# Patient Record
Sex: FEMALE | Race: WHITE
[De-identification: names, ages, dates, MRNs, and addresses within clinical notes are randomized per-mention and may not be internally consistent; named-entity substitution may affect disease eponyms.]

---

## 2016-11-25 NOTE — EDDOCDS
Nurse's Notes                                                                                     

[facility]                                                                         

Name: Mimi Raphael                                                                                 

Age: 66 yrs                                                                                       

Sex: Female                                                                                       

: 1950                                                                                   

MRN: [id number]                                                                                     

Arrival Date: 2016                                                                          

Time: 13:00                                                                                       

Account#: [id number]                                                                              

Bed 4                                                                                             

Private MD: [facility]                                                   

Diagnosis: Chronic obstructive pulmonary disease with (acute) exacerbation;Pleural effusion, not  

elsewhere classified;Hypotension                                                                

                                                                                                  

Presentation:                                                                                     

                                                                                             

13:34 Presenting complaint: Patient states: increasing SOB over last week. more SOB with any  bcj 

      activity. denies chest pain. denies swollen legs. has had diarrhea x 1 week. + cough no     

      sputum. Acuity level changed due to the patient meeting SIRS criteria. Adult Sepsis         

      Screening: Patient has a respiratory rate of greater than or equal to 22 (1 point). The     

      patient does not have new or worsening altered mentation. Systolic blood pressure is        

      less than or equal to 100 (1 point). Patient has a qSOFA Score of 2. Infection              

      Criteria- Patient has cough and/or SOB Sepsis Screen Results: Positive Sepsis Screen-       

      Pt has a qSOFA Score of 2 or more and at least one Infection Criteria. Patient made RAFI     

      Level 2. Suicide/Homicide risk assessment- the patient denies having any suicidal           

      and/or homicidal ideations.  Status: Patient is not a  or             

       dependent. Transition of care: patient was not received from another setting       

      of care.                                                                                    

13:34 Acuity: RAFI Level 3                                                                     [facility] 

13:34 Method Of Arrival: Ambulance                                                            [facility] 

13:34 Acuity: RAFI Level 2                                                                     [facility] 

                                                                                                  

Triage Assessment:                                                                                

13:46 General: Appears in no apparent distress, comfortable, Behavior is cooperative. Pain:   bcj 

      Denies pain. Respiratory: Onset: The symptoms/episode began/occurred yesterday.             

                                                                                                  

Historical:                                                                                       

- Allergies: SULFA (SULFONAMIDES) (Rash);                                                         

- Home Meds:                                                                                      

1. Pradaxa 150 mg oral cap 1 cap 2 times per day                                                

2. metoprolol tartrate 25 mg Oral tab 12.5 mg 2 times per day                                   

3. atorvastatin 40 mg oral tab 1 tab once daily                                                 

4. ProAir HFA 108mcg inhalation HFAA 2 puffs every 6 hours every 2 hours as needed for          

     SOB                                                                                          

5. Spiriva with HandiHaler 18 mcg Inhl CpDv 1 cap once daily                                    

6. Symbicort 160-4.5 mcg/actuation inhalation HFAA 2 puffs 2 times per day                      

7. Breo Ellipta 200-25 mcg/dose inhalation dsdv 1 puff once daily                               

8. potassium chloride 10 mEq Oral TbER 1 tab 2 times per day                                    

9. lisinopril 2.5 mg oral tab once daily                                                        

10. furosemide 80 mg oral tab 1 tab once daily                                                  

11. metformin 500 mg Oral tab 1 tab 2 times per day                                             

12. hydrochlorothiazide 12.5 mg Oral tab 1 tab once daily                                       

13. multivitamin Oral tab 1 tab daily                                                           

14. loratadine 10 mg Oral cap 10 mg daily as needed                                             

15. aspirin 81 mg Oral TbEC 1 tab once daily                                                    

16. docusate calcium 240 mg Oral cap 1 cap 2 times per day                                      

- PMHx: CAD; CHF; COPD; Diabetes - NIDDM: controlled; Hypercholesterolemia;                       

Hypertension; Myocardial infarction; Obesity; Sleep Apnea w/o CPAP;                             

- PSHx: Colon Resection; Coronary Stents; Hernia repair; hernia revision; tendon                  

repaired;                                                                                       

- Social history: Smoking status: Patient states former smoker of tobacco. No barriers            

to communication noted, The patient speaks fluent English.                                      

- Family history: Not pertinent.                                                                  

- : The pt / caregiver states he / she is on anticoagulants: Pradaxa (Dabigatran) Home            

medication list is obtained from the patient, patients' pharmacy. Ep[employer] import data.          

- Exposure Risk Screening:: None identified.                                                      

                                                                                                  

                                                                                                  

Screenin:14 Screening information is obtained from the patient. Fall risk: At risk due to gait      bcj 

      disturbance, The following interventions are performed due to a positive Fall Risk          

      Screen: Fall Risk is added to Special Handling on the patient Summary Screen. A Fall        

      Risk Bracelet was applied to the patient. Side Rails are placed in the up position. A       

      Call Woods is given with instruction to call for help when getting out of bed. Fall          

      Alert bracelet is placed on the patient. Assistance ADL's: requires no assistance with      

      activities of daily living. Abuse/DV Screen: The patient / caregiver reports he/she is:     

      not in a situation that causes fear, pain or injury. Nutritional screening: No deficits     

      noted. home support is adequate.                                                            

18:17 Advance Directives: Currently, there is no health care proxy. There is no active DNR    jmb 

      order. There is no living will. There is no Power of .                              

                                                                                                  

Assessment:                                                                                       

14:14 General: Appears in no apparent distress, comfortable, Behavior is cooperative. Pain:   bcj 

      Denies pain. Cardiovascular: Rhythm is sinus rhythm Chest pain is denied. Respiratory:      

      Airway is patent Respiratory effort is even, unlabored, Respiratory pattern is regular,     

      Breath sounds with rhonchi bilaterally. Breath sounds are diminished bilaterally. Derm:     

      Skin is pink, warm & dry.                                                                 

15:12 General: Appears in no apparent distress, Behavior is appropriate for age, cooperative, jmb 

      Patient laying on stretcher on left side. Patient denies discomfort. Patient reports        

      being able to breath better when she does not move. Patient blood pressure circulating      

      every 15 minutes per provider orders. Patient currently at CT. NO voiced complaints at      

      this time. . Neurological: Level of Consciousness is awake, alert, obeys commands,          

      Oriented to person, place, time, Speech is normal, Facial symmetry appears normal,          

      Facial symmetry: tongue is midline. Cardiovascular: Capillary refill < 3 seconds Heart      

      tones present Pulses are all present. Rhythm is sinus rhythm No ectopy. Respiratory:        

      Airway is patent Respiratory effort is even, unlabored, Respiratory pattern is regular,     

      symmetrical, Breath sounds are diminished bilaterally. GI: Abdomen is obese, Bowel          

      sounds present X 4 quads. Abd is soft and non tender X 4 quads. Derm: Skin is pink,         

      warm & dry. Musculoskeletal: Range of motion intact in all extremities.                   

15:38 General: Appears in no apparent distress, comfortable, Behavior is appropriate for age, jmb 

      cooperative, Patient laying on stretcher sitting up. NO voiced complaints at this time.     

      . Neurological: Level of Consciousness is awake, alert, obeys commands, Oriented to         

      person, place, time. Respiratory: Airway is patent Respiratory effort is even,              

      unlabored, Respiratory pattern is regular, symmetrical.                                     

15:46 General: Appears in no apparent distress, comfortable, Behavior is appropriate for age, jmb 

      cooperative, Patient sitting up on stretcher, continues with some shortness of breath.      

      Patient on 4 liters oxygen via nasal cannula. NO voiced complaints at this time. .          

      Neurological: Level of Consciousness is awake, alert, obeys commands, Oriented to           

      person, place, time. Respiratory: Airway is patent Respiratory effort is even,              

      unlabored, Respiratory pattern is regular, symmetrical.                                     

16:30 General: Appears in no apparent distress, comfortable, Behavior is appropriate for age, jmb 

      cooperative, Patient laying on stretcher, no voiced complaints at this time. .              

      Neurological: Level of Consciousness is awake, alert, obeys commands, Oriented to           

      person, place, time. Respiratory: Airway is patent Respiratory effort is even,              

      unlabored, Respiratory pattern is regular, symmetrical.                                     

17:30 General: Appears in no apparent distress, comfortable, Behavior is appropriate for age, jmb 

      cooperative. Neurological: Level of Consciousness is awake, alert, obeys commands,          

      Oriented to person, place, time. Respiratory: Airway is patent Respiratory effort is        

      even, unlabored, Respiratory pattern is regular, symmetrical.                               

18:02 General: SBAR faxed and tubed to PCU.                                                   jmb 

18:24 General: PAtient ready for transfer to PCU..                                            jmb 

                                                                                                  

Vital Signs:                                                                                      

13:24 BP 92 / 55; Pulse 85; Resp 24; Temp 98.1(O); Pulse Ox 90% on 2 lpm NC; Weight 148.32    dem1

      kg; Height 5 ft. 7 in. (170.18 cm); Pain 8/10;                                              

14:19  / 53 (auto/);                                                                    jmb 

14:19 Pulse 84 MON; Pulse Ox 97% ;                                                            jmb 

14:20 BP 80 / 39 RA Supine (man/lg);                                                          jrd 

15:08  / 56 (auto/);                                                                    jmb 

15:08 Pulse 94 MON; Pulse Ox 93% ;                                                            jmb 

15:38  / 68 (auto/);                                                                    jmb 

15:38 Pulse 96 MON; Pulse Ox 87% ;                                                            jmb 

15:50  / 69 (auto/);                                                                    jmb 

15:50 Pulse 92 MON; Pulse Ox 91% ;                                                            jmb 

15:53  / 56 (auto/);                                                                    jmb 

15:54 Pulse 92 MON; Pulse Ox 90% ;                                                            jmb 

16:08  / 83 (auto/);                                                                    jmb 

16:09 Pulse 94 MON; Pulse Ox 90% ;                                                            jmb 

16:38  / 57 (auto/);                                                                    jmb 

16:39 Pulse 90 MON; Pulse Ox 93% ;                                                            jmb 

16:53  / 61 (auto/);                                                                    jmb 

16:54 Pulse 92 MON; Pulse Ox 96% ;                                                            jmb 

17:08  / 59 (auto/);                                                                    jmb 

17:09 Pulse 94 MON; Pulse Ox 89% ;                                                            jmb 

17:38  / 58 (auto/);                                                                    jmb 

17:39 Pulse 92 MON; Pulse Ox 91% ;                                                            jmb 

18:17  / 60; Pulse 93; Resp 22; Temp 97.8(O); Pulse Ox 91% on 4 lpm NC; Pain 0/10;      jmb 

13:24 Body Mass Index 51.21 (148.32 kg, 170.18 cm)                                            [facility]

                                                                                                  

Vitals:                                                                                           

13:24 Log In Time N/A - ambulance arrival.                                                    dem1

14:14 Refer to monitor trend for complete vital signs trends.                                 [facility] 

                                                                                                  

ED Course:                                                                                        

13:01 Patient visited by Ivana Ace PCA.                                               rs6 

13:01 Dalton Fishman is Private Physician.                                                   rs6 

13:01 [facility] is Private Physician.                                rs6 

13:01 Patient moved to Waiting                                                                rs6 

13:01 Patient moved to 4                                                                      rs6 

13:03 Trent Livingston MD is Attending Physician.                                               br1 

13:10 Patient visited by Trent Livingston MD.                                                   br1 

13:19 EKG done. (by ED staff). Reviewed by Trent Livingston MD.                                   dem1

13:24 Patient visited by Kiki Bonilla.                                                     dem1

13:24 Cardiac monitor on. Pulse ox on. NIBP on.                                               dem1

13:25 Patient visited by Kiki Bonilla.                                                     dem1

13:36 Triage Initiated                                                                        bcj 

13:39 -Arterial Blood Gas Sent.                                                               js11

13:47 Patient visited by Mark Ta RN.                                                  bcj 

14:07 Chest, 2 View (pa\E\lat) Returned.                                                        EDMS

14:14 No apparent distress. Resting quietly. awaiting re-evaluation by ER physician.          bcj 

14:14 The patient / caregiver is instructed regarding the plan of care and ED course. Patient [facility] 

      has correct armband on for positive identification. Placed in gown. Bed in low              

      position. Call light in reach. Side rails up X2.                                            

14:14 Inserted saline lock: 20 gauge in left forearm. Labs drawn. (by ED staff). Sent per     [facility] 

      order to lab. Labs/Blood culture drawn. O2 via nasal cannula \T\ 3L/min.                      

14:16 Patient visited by Mark Ta, POLY.                                                  bcj 

14:16 Lactic Acid (Gray tube on ice) Sent.                                                    bcj 

14:16 -Blood Culture Sent.                                                                    bcj 

14:16 TROPONIN Sent.                                                                          bcj 

14:16 CARDIAC INJURY PROFILE Sent.                                                            bcj 

14:17 -Influenza A&B Rapid Antigen - Nose Sent.                                               bcj

15:02 Corinne Moore .                                                                 ys2 

15:10 Corinne Moore is Hospitalizing Provider.                                                     br1 

15:14 Patient visited by Tony Cardona RN.                                                    mark 

15:21 [facility] Payment Agreement was scanned into [employer] and attached to record.               hs2 

15:48 Patient visited by Tony Cardona RN.                                                    mark 

16:31 Patient visited by Tony Cardona RN.                                                    mark 

17:31 Patient visited by Tony Cardona RN.                                                    mark 

18:17 No procedures done that require assistance.                                             mark 

                                                                                                  

Administered Medications:                                                                         

13:30 Drug: Albuterol-Ipratropium 1 neb [ipratropium-albuterol 0.5 mg-3 mg(2.5 mg base)/3 mL  js11

      nebulization soln (1 neb)] Route: Nebulizer;                                                

13:50 Drug: Albuterol-Ipratropium 1 neb [ipratropium-albuterol 0.5 mg-3 mg(2.5 mg base)/3 mL  js11

      nebulization soln (1 neb)] Route: Nebulizer;                                                

14:01 Drug: Albuterol-Ipratropium 1 neb [ipratropium-albuterol 0.5 mg-3 mg(2.5 mg base)/3 mL  js11

      nebulization soln (1 neb)] Route: Nebulizer;                                                

14:16 Drug: Solu-MEDROL 125 mg [Solu-Medrol 500 mg intravenous solution (125 mg)] Route: IVP; bcj 

      Site: right forearm;                                                                        

14:55 Drug: NS 0.9% 500 ml [sodium chloride 0.9 % intravenous solution] Route: IV; Rate:      bcj 

      bolus; Site: right forearm;                                                                 

15:12 Drug: Ondansetron 4 mg [ondansetron HCl 2 mg/mL intravenous solution (2 mL)] Route:     jmb 

      IVP; Site: left forearm;                                                                    

15:38 Drug: cefTRIAXone 2 grams [ceftriaxone 1 gram solution for injection] Route: IVPB;      mark 

      Infused Over: 30 mins; Site: left forearm;                                                  

                                                                                                  

                                                                                                  

RT:                                                                                               

13:30 Initial Med Neb Given as ordered Patient was instructed and evaluated on procedure      js11

      Patient tolerated procedure well without adverse effect. O2 via nasal cannula \T\ 4L/min.     

      Respiratory: Breath sounds are diminished in left upper lobe and left lower lobe.           

13:40 ABG's drawn from left radial artery allens test done and positive pressure held for 5   js11

      minutes no bleeding noted specimen sent pt. tolerated well.                                 

13:50 Subsequent Med Neb Given as ordered Patient tolerated procedure well without adverse    js11

      effect. Respiratory: Breath sounds are diminished in left upper lobe and left lower         

      lobe.                                                                                       

14:02 Subsequent Med Neb Given as ordered Patient tolerated procedure well without adverse    js11

      effect. Respiratory: Breath sounds are diminished in left upper lobe and left lower         

      lobe.                                                                                       

                                                                                                  

Order Results:                                                                                    

Lab Order: B-Type Natiuretic Peptide; SPEC'M 16 14:08                                       

      Test: BRAIN NATRIURETIC PEPTIDE; Value: 427; Range: <100; Abnormal: Above high normal;      

      Units: PG/ML; Status: F                                                                     

Lab Order: Basic Metabolic Profile; SPEC'M 16 14:08                                         

      Test: GLUCOSE, FASTING; Value: 103; Range: ; Units: MG/DL; Status: F                  

      Test: BLOOD UREA NITROGEN; Value: 13; Range: 7-18; Units: MG/DL; Status: F                  

      Test: CREATININE FOR GFR; Value: 1.22; Range: 0.55-1.02; Abnormal: Above high normal;       

      Units: MG/DL; Status: F                                                                     

      Test: GLOMERULAR FILTRATION RATE; Value: 46.9; Range: >45; Status: F                        

      Test: SODIUM LEVEL; Value: 142; Range: 136-145; Units: MEQ/L; Status: F                     

      Test: POTASSIUM SERUM; Value: 3.4; Range: 3.5-5.1; Abnormal: Below low normal; Units:       

      MEQ/L; Status: F                                                                            

      Test: CHLORIDE LEVEL; Value: 106; Range: ; Units: MEQ/L; Status: F                    

      Test: CARBON DIOXIDE LEVEL; Value: 27; Range: 21-32; Units: MEQ/L; Status: F                

      Test: ANION GAP; Value: 9; Range: 8-16; Units: MEQ/L; Status: F                             

      Test: CALCIUM LEVEL; Value: 8.5; Range: 8.8-10.2; Abnormal: Below low normal; Units:        

      MG/DL; Status: F                                                                            

      Test Note: &nbsp;; Units are mL/min/1.73 m2 Chronic Kidney Disease Staging per NKF:       

      Stage I & II GFR >=60 Normal to Mildly Decreased Stage III GFR 30-59 Moderately           

      Decreased Stage IV GFR 15-29 Severely Decreased Stage V GFR <15 Very Little GFR Left        

      ESRD GFR <15 on RRT                                                                         

Lab Order: CBC with Diff; SPEC'M 16 14:08                                                   

      Test: WHITE BLOOD COUNT; Value: 9.3; Range: 4.0-10.0; Units: K/mm3; Status: F               

      Test: RED BLOOD COUNT; Value: 3.86; Range: 4.00-5.40; Abnormal: Below low normal;           

      Units: M/mm3; Status: F                                                                     

      Test: HEMOGLOBIN; Value: 9.6; Range: 12.0-16.0; Abnormal: Below low normal; Units:          

      g/dl; Status: F                                                                             

      Test: HEMATOCRIT; Value: 34.8; Range: 36.0-47.0; Abnormal: Below low normal; Units: %;      

      Status: F                                                                                   

      Test: MEAN CORPUSCULAR VOLUME; Value: 90.2; Range: 80.0-96.0; Units: fl; Status: F          

      Test: MEAN CORPUSCULAR HEMOGLOBIN; Value: 24.8; Range: 27.0-33.0; Abnormal: Below low       

      normal; Units: pg; Status: F                                                                

      Test: MEAN CORPUSCULAR HGB CONC; Value: 27.5; Range: 32.0-36.5; Abnormal: Below low         

      normal; Units: g/dl; Status: F                                                              

      Test: RED CELL DISTRIBUTION WIDTH; Value: 17.7; Range: 11.5-14.5; Abnormal: Above high      

      normal; Units: %; Status: F                                                                 

      Test: PLATELET COUNT, AUTOMATED; Value: 338; Range: 150-450; Units: k/mm3; Status: F        

      Test: NEUTROPHILS %; Value: 85.9; Range: 36.0-66.0; Abnormal: Above high normal; Units:     

      %; Status: F                                                                                

      Test: LYMPH %; Value: 7.4; Range: 24.0-44.0; Abnormal: Below low normal; Units: %;          

      Status: F                                                                                   

      Test: MONO %; Value: 4.7; Range: 0.0-5.0; Units: %; Status: F                               

      Test: EOS %; Value: 0.8; Range: 0.0-3.0; Units: %; Status: F                                

      Test: BASO %; Value: 0.2; Range: 0.0-1.0; Units: %; Status: F                               

      Test: LARGE UNSTAINED CELL %; Value: 1.0; Range: 0.0-4.0; Units: %; Status: F               

      Test: NEUTROPHILS #; Value: 8.0; Range: 1.8-7.7; Abnormal: Above high normal; Units:        

      K/mm3; Status: F                                                                            

      Test: LYMPH #; Value: 0.8; Range: 1.5-4.5; Abnormal: Below low normal; Units: K/mm3;        

      Status: F                                                                                   

      Test: MONO #; Value: 0.4; Range: 0.0-0.8; Units: K/mm3; Status: F                           

      Test: EOS #; Value: 0.1; Range: 0.0-0.50; Units: K/mm3; Status: F                           

      Test: BASO #; Value: 0.0; Range: 0.0-0.2; Units: K/mm3; Status: F                           

      Test: LARGE UNSTAINED CELL #; Value: 0.1; Range: 0.0-0.4; Units: K/mm3; Status: F           

Lab Order: -Influenza A&B Rapid Antigen - Nose; SPEC'M 16 14:08                           

      Test: INFLUENZA A RAPID SCR by ICA; Value: INFLUENZA A RESULTS NEGATIVE; Status: F          

      Test: INFLUENZA A RAPID SCR by ICA; Value: Comments:; Status: F                             

      Test: INFLUENZA B RAPID SCR by ICA; Value: INFLUENZA B RESULTS NEGATIVE; Status: F          

      Test Note: &nbsp;; The Influenza test is a direct rapid immunoassay for the qualitative   

      detection of Influenza viral antigen. Cell culture (Viral Culture) testing should be        

      considered to confirm NEGATIVE results and to assist in detecting other viruses that        

      can provide similar clinical symptoms. Please contact the lab within 24 hours               

      ([phone] if confirmatory testing is desired.                                              

Lab Order: -Arterial Blood Gas; SPEC'M 16 13:34                                             

      Test: ABG pH (ARTERIAL); Value: 7.339; Range: 7.350-7.450; Abnormal: Below low normal;      

      Units: UNITS; Status: F                                                                     

      Test: ABG PARTIAL PRESSURE CO2; Value: 45.3; Range: 35.0-45.0; Abnormal: Above high         

      normal; Units: mmHg; Status: F                                                              

      Test: ABG PARTIAL PRESSURE O2; Value: 84.4; Range: 75.0-100.0; Units: mmHg; Status: F       

      Test: ABG TOTAL CO2; Value: 25.2; Range: 23.0-31.0; Units: MEQ/L; Status: F                 

      Test: ABG HCO3; Value: 23.8; Range: 22.0-26.0; Units: MEQ/L; Status: F                      

      Test: ABG BASE EXCESS; Value: -2.0; Range: -2.0-2.0; Status: F                              

      Test: ABG STANDARD HCO3; Value: 22.8; Range: 22.0-26.0; Units: MEQ/L; Status: F             

      Test: ABG O2 SATURATION; Value: 96.0; Range: 95.0-99.0; Units: %; Status: F                 

      Test: ABG DEVICE; Value: NASAL MITCHELL; Status: F                                              

Lab Order: CARDIAC INJURY PROFILE; SPEC'M 16 14:08                                          

      Test: CPK CREATINE PHOSPHOKINASE; Value: 33; Range: ; Units: U/L; Status: F           

      Test: CK-MB VALUE MASS; Value: 1.2; Range: 0.0-3.6; Units: NG/ML; Status: F                 

      Test: MB/CK RELATIVE INDEX; Value: 3.63; Range: < OR =4; Status: F                          

      Test Note: &nbsp;; DIAGNOSIS CRITERIA MMB ng/ml Relative Index (RI) NON-AMI < or = 5      

      N/A GRAY ZONE > 5 < or = 4 AMI > 5 > 4                                                      

Lab Order: TROPONIN; SPEC'M 16 14:08                                                        

      Test: TROPONIN I; Value: 0.04; Range: < 0.10; Units: NG/ML; Status: F                       

      Test Note: &nbsp;; Troponin I Reference Interval for [employer] [place] LOCI: 99th             

      Percentile= 0.00-0.045 ng/ml Risk Stratification: <= 0.10 ng/ml Decreased Risk for          

      Adverse Clinical Events. 0.10-1.50 ng/ml Increased Risk for Adverse Clinical Events.        

      Evaluation of additional criterion and/or repeat testing in 2-6 hours is suggested to       

      rule out myocardial damage. >= 1.50 ng/ml Indicative of Myocardial Injury.                  

Lab Order: Lactic Acid (Gray tube on ice); SPEC'M 16 14:08                                  

      Test: LACTIC ACID LEVEL, LACTATE; Value: 4.1; Range: 0.4-2.0; Abnormal: Above upper         

      panic limits; Units: MMOL/L; Status: F                                                      

                                                                                                  

Radiology Order: Chest, 2 View (pa\E\lat)                                                         

      Test: Chest, 2 View (pa\E\lat)                                                              

      REASON FOR EXAMINATION: Shortness of Breath; Clinical: Shortness of breath.; ;              

      Technique: PA and lateral.; ; Comparison: 2016.; ; Findings:; Opacification of        

      the left mid to lower lung zone suggests chronic changes with; superimposed                 

      atelectasis/infiltrate and effusion. Medial right basilar; atelectasis cannot be as         

      excluded as well. Cardiomegaly appears similar to prior; examination although the           

      contour raises the possibility of underlying pericardial; effusion. No pneumothorax.        

      Skeletal structures demonstrate degenerative; changes.; ; Impression:; Chronic stable       

      changes.; Cannot exclude superimposed bilateral atelectasis/infiltrates (left greater       

      than; right) and moderate left pleural effusion.; Cardiac silhouette raises the             

      possibility of underlying pericardial effusion.; ; ; Signed by; Cameron Chang MD           

      2016 01:39 P;                                                                         

Outcome:                                                                                          

15:10 Decision to Hospitalize by Provider.                                                    br1 

18:17 Discharge Assessment: Patient awake, alert and oriented x 3. No cognitive and/or        jmb 

      functional deficits noted. Patient verbalized understanding of disposition                  

      instructions. Patient awake and alert. obeys commands, Oriented to person, place and        

      time. Patient verbalized understanding of disposition instructions. Patient has no          

      functional deficits. patient administered narcotics - no. The following High Risk           

      Discharge criteria are identified: None. Admitted to PCU accompanied by nurse,              

      accompanied by tech, via stretcher, with oxygen, on monitor, with chart. Condition:         

      stable. No special radiology studies were completed. Property sent home with patient.       

18:29 Patient left the ED.                                                                    mark 

                                                                                                  

Signatures:                                                                                       

Dispatcher [employer]                           EDMark Melgar, Trent Min RN, MD MD   br1                                                  

Bill [id number]                                                 

Kiki Bonilla Joshua, RN RN jmb Donoghue, Joseph, PCA                   PCA  jrd                                                  

Ivana Ace, PCA                   PCA  rs6                                                  

Corinne Moore                                     ys2                                                  

Roxann Peña, Reg                   Reg  hs2                                                  

                                                                                                  

**************************************************************************************************

MTDD

## 2016-11-25 NOTE — PHACANCOPD
PHARMACY VANCOMYCIN DOSING


Pt Demographics


Demographics


Patient Age:66 , Weight:158.900  , Gender: female


Adjusted Body Weight


Date: 11/25/16, Adjusted Body Weight: [100] Kg





Events Past 24 Hours


Events Past 24 Hours:  YES: Change in CrCl, 


   NO: Dialysis, Diuretic Therapy, Elevation in WBC, Fever, Other, Pending 

Diagnostics, Pending Procedures





Vancomycin


Vancomycin indication:  elevated lactic acid with severe hypotension


Vancomycin Target Ranges:  15-20 mcg/ml


Vancomycin Load Y/N:  No


Load Dose Date Time


Vancomycin Load Dose:         Date:         Time:


Vancomycin Dose


Date: 11/25/16. Current Vancomycin Dose: [1g IV q8h @21]


Intermittent Dosing?:  No





Labs


Labs











Item Value  Date Time


 


Creatinine 1.22 MG/DL H 11/25/16 1408


 


White Blood Count 9.3 K/mm3 11/25/16 1408








Micro





 Microbiology


11/25/16 Blood Culture, Received


           Pending


11/25/16 Influenza Virus Type A Antigen - Final, Complete


           


11/25/16 Influenza Virus Type B Antigen - Final, Complete


Creatinine Clearance


Date:11/25/16. Creatinine Clearance: [70 ml/min using adjusted BW].





Assessment and Plan


Maintaining Current Dose?:  Yes


Reason for dose change:  No Dose Change





Pharmacist Note


Pharmacist Note


Date: 11/25/16. Pharmacist note: pt has had increasing SOB with hypotension, 

also Hx of CHF and COPD. She does not have a MRSA or relevant past culture Hx. 

She was last on vancomycin at our facility in February 2016, based on that 

dosing I will continue 1g IV q8h without a load due to her current SCr. I will 

continue to monitor over the weekend and order a trough as needed








Mitchell,Randy Pharm.D. Nov 25, 2016 20:45

## 2016-11-25 NOTE — REP
Clinical:  Shortness of breath.

 

Technique:  PA and lateral.

 

Comparison:  04/05/2016.

 

Findings:

Opacification of the left mid to lower lung zone suggests chronic changes with

superimposed atelectasis/infiltrate and effusion.  Medial right basilar

atelectasis cannot be as excluded as well.  Cardiomegaly appears similar to prior

examination although the contour raises the possibility of underlying pericardial

effusion.  No pneumothorax.  Skeletal structures demonstrate degenerative

changes.

 

Impression:

Chronic stable changes.

Cannot exclude superimposed bilateral atelectasis/infiltrates (left greater than

right) and moderate left pleural effusion.

Cardiac silhouette raises the possibility of underlying pericardial effusion.

 

 

Signed by

Cameron Chang MD 11/25/2016 01:39 P

## 2016-11-25 NOTE — HPE
DATE OF ADMISSION:   11/25/2016

 

PRIMARY CARE PROVIDER:  Dr. Olivas in the [facility].

 

HISTORY OF PRESENT ILLNESS:   This patient is a 66-year-old female with a past

medical history significant for myocardial infarction, congestive heart failure

(CHF), chronic obstructive pulmonary disease (COPD), diabetes,

hypercholesterolemia, hypertension, who presented to [facility] on

11/25/2016 for worsening shortness of breath.  The patient stated that she has

increased shortness of breath, especially with any type of movement and symptoms

have been progressively worse, and the patient started to have increased oxygen

requirement.  At baseline, the patient is 3 liters nasal cannula for her chronic

COPD.  The patient denies any sputum production.  Denies any worsening cough.

Denies any wheezing.  The patient denies any lower extremity swelling.  Denies

any fever or chills.  Other associated symptoms include that the patient started

having increasing diarrhea for the past week.  At baseline, the patient has a

bowel movement once; however, for the past week, the patient started having two

to three bowel movements and those stools are almost watery with minimal formed

stool.  Last hospitalization was in June 2014.  Denies any recent sick contacts.

When the patient arrived in the emergency room, the patient was found to have a

blood pressure of 80/39 with a respiration rate of 24, oxygen saturation running

between 85 to 90 with 2 to 3 liters nasal cannula.  IV bolus was given, and the

patient's systolic blood pressure started to rise above 100.  The hospitalist

team was called for admission.

 

ALLERGIES:  SULFA (rash).

 

HOME MEDICATIONS:

- Ventolin two inhalation every 4 hours as needed for shortness of breath

- aspirin 81 mg by mouth daily

- atorvastatin 40 mg by mouth daily

- Pradaxa 150 mg by mouth twice a day

- Flonase two sprays nasally for nasal congestion

- Breo one puff inhalation daily

- Lasix 80 mg by mouth daily

- Lasix 40 mg by mouth at night

- Mucinex 600 mg by mouth twice a day

- Lisinopril 245 mg by mouth daily

- Loratadine 10 mg by mouth daily as needed for allergies

- metformin 500 mg by mouth twice a day

- metoprolol 25 mg by mouth twice a day

- multivitamin one tablet by mouth daily

- potassium chloride 10 mEq by mouth twice a day

- Spiriva inhalation daily

 

PAST MEDICAL HISTORY:

1.  COPD on 3 liters nasal cannula

2.  Diastolic congestive heart failure (CHF), grade 2.

3.  Obstructive sleep apnea.  In the process of getting repeated sleep study for

continuous positive airway pressure (CPAP) application.

4.  Coronary artery disease, status post two stents.

5.  Dyslipidemia.

6.  Severe hypertension.

7.  Morbid obesity.

8.  Ischemic bowel.

 

PAST SURGICAL HISTORY:

1.  Colon resection in 2010 for ischemic bowel.

2.  Hyatal hernia repair times two.

3.  Coronary artery stents times two.

4.  Right lumpectomy.

 

SOCIAL HISTORY:  The patient quit smoking Christmas 2015.  The patient used to

smoke one pack daily for approximately 48 years.  Denies alcohol use.  Last drink

was in 2005.  Denies any recreational drug use.

 

REVIEW OF SYSTEMS:

GENERAL:  No fever.  No chills.

HEENT:  No change in vision.  No auditory changes.

CARDIOVASCULAR:  No chest pain noted.  No palpitations.

RESPIRATORY:  Increased worsening shortness of breath exacerbated by any type of

movement.  Denies any wheezes.  Denies any sputum changes.  Denies any increase

of cough or frequency.

GASTROINTESTINAL:  Diarrhea for the past week, stool is mainly water with no

formed stool.  No nausea.  No vomiting.

MUSCULOSKELETAL:  No muscle pain or joint pain.

NEUROLOGIC:  No numbness or tingling.

 

OBJECTIVE:

VITAL SIGNS:  Blood pressure is 80/39, pulse is 85, respirations 24, temperature

is 98.1, pulse oximetry is 90% with 2 to 3 liters nasal cannula.  Body weight is

148 kg.  Body height is 170 cm.

GENERAL:  Fatigued.  Mild distress secondary to difficulty breathing.  Alert and

oriented times three.

HEENT:  Normocephalic, atraumatic.  Extraocular muscles intact.

CARDIOVASCULAR:  Positive S1, S2.  Regular rate.

LUNGS:  Decreased breath sounds bilaterally.  No wheezes appreciated.  Mild

crackles mainly on the left lower lobe.

ABDOMEN:  Morbidly obese, soft, nontender, nondistended.  Bowel sounds present.

 

EXTREMITIES:  Mild bilateral lower extremity edema.  No cyanosis.

NEUROLOGIC:  Sensation to fine touch grossly intact.  Muscle strength 5/5.

 

LABORATORY DATA:

WBC 9.3, hemoglobin is 9.6, hematocrit is 34.8, platelet count is 338.

 

Sodium is 142, potassium 3.4, chloride is 106, carbon dioxide 27, BUN 13,

creatinine 1.22, GFR is 46.9, fasting glucose 103, lactic acid 4.1, calcium is

8.5, total CK is 33, troponin I is 0.04, BNP is 427.

 

IMAGING STUDIES:

 

Chest x-ray shows chronic stable changes. Cannot exclude superimposed bilateral

atelectasis/infiltrate, left greater than right.  Moderate left pleural effusion.

SOFA score raises the probability of underlying pericardial effusion.

 

CT of the chest without contrast, preliminary results show moderate left pleural

effusion with basilar lingula atelectasis.  Pulmonary vascular congestion.

 

ASSESSMENT AND PLAN:

1.  Acute respiratory distress.  The patient will be admitted to the progressive

care unit (PCU) under inpatient status.  Currently, the patient presented with

acute Sequential Organ Failure Assessment (SOFA) score of 2.  The patient

presents with significant hypotension with tachypnea.  At this moment, we will

panculture the patient and empirically start vancomycin and Zosyn and adjust the

antibiotic regimen based on the culture results.  The patient does have a history

of congestive heart failure (CHF).  The patient has chronic pleural effusion at

baseline.  However, at this moment, the patient does have a lactic acid of 4.1.

IV fluid is warranted at the moment. Based on imaging studies and history, the

source of infection could be either respiratory system versus gastrointestinal

system.

 

2.  Chronic obstructive pulmonary disease (COPD).  Currently, the patient has

respiratory distress.  We are in the process of ruling out any infectious

processes.  The patient will have breathing treatment as needed, and at baseline

the patient is on 3 liters nasal cannula for her COPD.  Continue home breathing

therapy.

 

3.  Diastolic congestive heart failure (CHF) grade 2.  The patient does not have

worsening bilateral lower extremity swelling.  The patient does have pleural

effusions, left greater than right.  When compared to CT today and in 2015, there

is some worsening of the pleural effusion; however, currently, the patient has an

elevated lactic acid with severe hypotension with a systolic blood pressure of

80s.  We will start the patient on gentle hydration.  If the patient starts

having CHF exacerbation symptoms, we will slow down the fluid.

 

4.  Non-insulin-dependent diabetes.  We will hold metformin.  The patient will be

on sliding scale.

 

5.  Hypercholesterolemia.  The patient is on atorvastatin.

 

6.  Hypertension.  The patient has been taking metoprolol for her blood pressure

and also Lasix.  Lasix and metoprolol will be on hold with holding parameters.

 

7.  Obstructive sleep apnea.  The patient stated that she will have a repeated

sleep study in December to apply for her CPAP.

 

8.  History of myocardial infarction.  The patient is on aspirin, metoprolol and

Lisinopril at baseline.  At this moment, the blood pressure medication is on hold

due to hypotension.

 

9.  Deep vein thrombosis (DVT) prophylaxis.  The patient is taking Pradaxa.

## 2016-11-25 NOTE — EDDOCDS
Physician Documentation                                                                           

[facility]                                                                         

Name: Mimi Raphael                                                                                 

Age: 66 yrs                                                                                       

Sex: Female                                                                                       

: 1950                                                                                   

MRN: [id number]                                                                                     

Arrival Date: 2016                                                                          

Time: 13:00                                                                                       

Account#: [id number]                                                                              

Bed 4                                                                                             

Private MD: [facility]                                                   

Disposition:                                                                                      

16 15:10 Hospitalization ordered by Corinne Moore for Inpatient Admission. Preliminary           

diagnosis are Chronic obstructive pulmonary disease with (acute)                                

exacerbation, Pleural effusion, not elsewhere classified, Hypotension.                          

- Bed requested for  PCU.                                                                        

- Status is Inpatient Admission.                                                              jmb 

- Condition is Stable.                                                                            

- Problem is new.                                                                                 

- Symptoms are unchanged.                                                                         

                                                                                                  

                                                                                                  

                                                                                                  

Historical:                                                                                       

- Allergies: SULFA (SULFONAMIDES) (Rash);                                                         

- Home Meds:                                                                                      

1. Pradaxa 150 mg oral cap 1 cap 2 times per day                                                

2. metoprolol tartrate 25 mg Oral tab 12.5 mg 2 times per day                                   

3. atorvastatin 40 mg oral tab 1 tab once daily                                                 

4. ProAir HFA 108mcg inhalation HFAA 2 puffs every 6 hours every 2 hours as needed for          

     SOB                                                                                          

5. Spiriva with HandiHaler 18 mcg Inhl CpDv 1 cap once daily                                    

6. Symbicort 160-4.5 mcg/actuation inhalation HFAA 2 puffs 2 times per day                      

7. Breo Ellipta 200-25 mcg/dose inhalation dsdv 1 puff once daily                               

8. potassium chloride 10 mEq Oral TbER 1 tab 2 times per day                                    

9. lisinopril 2.5 mg oral tab once daily                                                        

10. furosemide 80 mg oral tab 1 tab once daily                                                  

11. metformin 500 mg Oral tab 1 tab 2 times per day                                             

12. hydrochlorothiazide 12.5 mg Oral tab 1 tab once daily                                       

13. multivitamin Oral tab 1 tab daily                                                           

14. loratadine 10 mg Oral cap 10 mg daily as needed                                             

15. aspirin 81 mg Oral TbEC 1 tab once daily                                                    

16. docusate calcium 240 mg Oral cap 1 cap 2 times per day                                      

- PMHx: CAD; CHF; COPD; Diabetes - NIDDM: controlled; Hypercholesterolemia;                       

Hypertension; Myocardial infarction; Obesity; Sleep Apnea w/o CPAP;                             

- PSHx: Colon Resection; Coronary Stents; Hernia repair; hernia revision; tendon                  

repaired;                                                                                       

- Social history: Smoking status: Patient states former smoker of tobacco. No barriers            

to communication noted, The patient speaks fluent English.                                      

- Family history: Not pertinent.                                                                  

- : The pt / caregiver states he / she is on anticoagulants: Pradaxa (Dabigatran) Home            

medication list is obtained from the patient, patients' pharmacy. [employer] import data.          

- Exposure Risk Screening:: None identified.                                                      

                                                                                                  

                                                                                                  

Vital Signs:                                                                                      

                                                                                             

13:24 BP 92 / 55; Pulse 85; Resp 24; Temp 98.1(O); Pulse Ox 90% on 2 lpm NC; Weight 148.32 kg dem1

      / 326.99 lbs; Height 5 ft. 7 in. (170.18 cm); Pain 8/10;                                    

14:19  / 53 (auto/);                                                                    jmb 

14:19 Pulse 84 MON; Pulse Ox 97% ;                                                            jmb 

14:20 BP 80 / 39 RA Supine (man/lg);                                                          jrd 

15:08  / 56 (auto/);                                                                    jmb 

15:08 Pulse 94 MON; Pulse Ox 93% ;                                                            jmb 

15:38  / 68 (auto/);                                                                    jmb 

15:38 Pulse 96 MON; Pulse Ox 87% ;                                                            jmb 

15:50  / 69 (auto/);                                                                    jmb 

15:50 Pulse 92 MON; Pulse Ox 91% ;                                                            jmb 

15:53  / 56 (auto/);                                                                    jmb 

15:54 Pulse 92 MON; Pulse Ox 90% ;                                                            jmb 

16:08  / 83 (auto/);                                                                    jmb 

16:09 Pulse 94 MON; Pulse Ox 90% ;                                                            jmb 

16:38  / 57 (auto/);                                                                    jmb 

16:39 Pulse 90 MON; Pulse Ox 93% ;                                                            jmb 

16:53  / 61 (auto/);                                                                    jmb 

16:54 Pulse 92 MON; Pulse Ox 96% ;                                                            jmb 

17:08  / 59 (auto/);                                                                    jmb 

17:09 Pulse 94 MON; Pulse Ox 89% ;                                                            jmb 

17:38  / 58 (auto/);                                                                    jmb 

17:39 Pulse 92 MON; Pulse Ox 91% ;                                                            jmb 

18:17  / 60; Pulse 93; Resp 22; Temp 97.8(O); Pulse Ox 91% on 4 lpm NC; Pain 0/10;      jmb 

13:24 Body Mass Index 51.21 (148.32 kg, 170.18 cm)                                            dem1

                                                                                                  

MDM:                                                                                              

13:10 Cardiac Monitor/Pulse Ox/q 30 min VS ordered.                                           br1 

13:10 IV Saline Lock ordered.                                                                 br1 

13:10 Oxygen at 4L/Min NC or Home dosage ordered.                                             br1 

13:10 Rhythm Strip to chart ordered.                                                          br1 

13:10 Undress patient appropriately for examination ordered.                                  br1 

13:12 B-Type Natiuretic Peptide Ordered.                                                      EDMS

13:12 Basic Metabolic Profile Ordered.                                                        EDMS

13:12 CBC with Diff Ordered.                                                                  EDMS

13:12 Chest, 2 View (pa\E\lat) Ordered.                                                         EDMS

13:12 ECG WITH READING ER PHYS+CARDIAG ordered.                                               EDMS

13:13 Albuterol-Ipratropium 1 neb Nebulizer every 20 minutes x3 ordered.                      br1 

13:13 Call Respiratory ordered.                                                               br1 

13:13 -Influenza A&B Rapid Antigen - Nose Ordered.                                            EDMS

13:14 Solu-MEDROL 125 mg IVP once ordered.                                                    br1 

13:14 -Arterial Blood Gas Ordered.                                                            EDMS

13:15 CARDIAC INJURY PROFILE Ordered.                                                         EDMS

13:15 TROPONIN Ordered.                                                                       EDMS

13:31 Misc. Nursing Order ordered.                                                            br1 

13:40 -Blood Culture (Adults Only), peripheral from different site, or from device/port/PICC  br1 

      etc. if present ordered.                                                                    

13:41 Lactic Acid (Gray tube on ice) Ordered.                                                 EDMS

13:42 -Blood Culture Ordered.                                                                 EDMS

14:15 -Arterial Blood Gas Reviewed.                                                           br1 

14:15 Chest, 2 View (pa\E\lat) Reviewed.                                                        br1

14:16 -Blood Culture (Adults Only), peripheral from different site, or from device/port/PICC  bcj 

      etc. if present complete.                                                                   

14:20 NS 0.9% 500 ml IV at bolus once ordered.                                                br1 

14:21 BED REQUEST+ADM ordered.                                                                EDMS

14:22 Ondansetron 4 mg IVP once ordered.                                                      br1 

14:23 Call Respiratory complete.                                                              rs6 

14:46 B-Type Natiuretic Peptide Reviewed.                                                     br1 

14:46 Basic Metabolic Profile Reviewed.                                                       br1 

14:46 CBC with Diff Reviewed.                                                                 br1 

14:46 -Influenza A&B Rapid Antigen - Nose Reviewed.                                           br1

14:46 CARDIAC INJURY PROFILE Reviewed.                                                        br1 

14:46 TROPONIN Reviewed.                                                                      br1 

14:59 Financial registration complete.                                                        hs2 

15:05 Lactic Acid (Gray tube on ice) Reviewed.                                                br1 

15:06 CT Chest Without Contrast Ordered.                                                      EDMS

15:06 Recheck B/P ordered.                                                                    br1 

15:06 cefTRIAXone 2 grams IVPB once over 30 mins; dilute in 50mL of NS or D5W ordered.        br1 

15:21 [facility] Payment Agreement was scanned into [employer] and attached to record.               hs2 

16:47 Admission / Observation Status ordered.                                                 EDMS

16:47 CONSISTENT CARBOHYDRATES ordered.                                                       EDMS

16:49 URINALYSIS Ordered.                                                                     EDMS

16:49 BLOOD CULTURES Ordered.                                                                 EDMS

16:49 BLOOD CULTURES Ordered.                                                                 EDMS

16:51 GASTROINTESTINAL (GI) PANEL Ordered.                                                    EDMS

17:10 SPUTUM CULTURE AND GRAM STAIN Ordered.                                                  EDMS

17:32 CARDIAC MARKER PANEL Ordered.                                                           EDMS

17:34 LACTIC ACID LEVEL, LACTATE Ordered.                                                     EDMS

                                                                                                  

Administered Medications:                                                                         

13:30 Drug: Albuterol-Ipratropium 1 neb [ipratropium-albuterol 0.5 mg-3 mg(2.5 mg base)/3 mL  js11

      nebulization soln (1 neb)] Route: Nebulizer;                                                

13:50 Drug: Albuterol-Ipratropium 1 neb [ipratropium-albuterol 0.5 mg-3 mg(2.5 mg base)/3 mL  js11

      nebulization soln (1 neb)] Route: Nebulizer;                                                

14:01 Drug: Albuterol-Ipratropium 1 neb [ipratropium-albuterol 0.5 mg-3 mg(2.5 mg base)/3 mL  js11

      nebulization soln (1 neb)] Route: Nebulizer;                                                

14:16 Drug: Solu-MEDROL 125 mg [Solu-Medrol 500 mg intravenous solution (125 mg)] Route: IVP; bcj 

      Site: right forearm;                                                                        

14:55 Drug: NS 0.9% 500 ml [sodium chloride 0.9 % intravenous solution] Route: IV; Rate:      bcj 

      bolus; Site: right forearm;                                                                 

15:12 Drug: Ondansetron 4 mg [ondansetron HCl 2 mg/mL intravenous solution (2 mL)] Route:     jmb 

      IVP; Site: left forearm;                                                                    

15:38 Drug: cefTRIAXone 2 grams [ceftriaxone 1 gram solution for injection] Route: IVPB;      jmb 

      Infused Over: 30 mins; Site: left forearm;                                                  

                                                                                                  

                                                                                                  

Signatures:                                                                                       

Dispatcher [employer]                           EDMS                                                 

Mark Ta RN RN bcj Roggie, Brian, MD MD   br1                                                  

Tony Cardona,RN                        RN   jmb                                                  

Leidy Nino, RN                    RN   sls2                                                 

Ivana Ace, PCA                   PCA  rs6                                                  

Roxann Peña, Reg                   Reg  hs2                                                  

Bill Jones                               js11                                                 

                                                                                                  

The chart was reviewed and I authenticate all verbal orders and agree with the evaluation and 
treatment provided.Corrections: (The following items were deleted from the chart)

13:15 13:12 CARDIAC INJURY PROFILE+LAB ordered. EDMS                                          EDMS

13:15 13:12 TROPONIN+LAB ordered. EDMS                                                        EDMS

16:51 16:49 GASTROINTESTINAL (GI) PANEL ordered. EDMS                                         EDMS

                                                                                                  

Attachments:                                                                                      

15:21 [facility] Payment Agreement                                                                hs2 

                                                                                                  

**************************************************************************************************

MTDD

## 2016-11-26 NOTE — ECGEPIP
Stationary ECG Study

                           [facility] - ED

                                       

                                       Test Date:    2016

Pat Name:     NGHIA HUI              Department:   

Patient ID:   [id number]                 Room:         -

Gender:       F                        Technician:   nuria

:          1950               Requested By: YAMIL VELASCO

Order Number: [id number]     Reading MD:   Sania Farrar

                                 Measurements

Intervals                              Axis          

Rate:         84                       P:            

ID:           0                        QRS:          87

QRSD:         94                       T:            26

QT:           383                                    

QTc:          455                                    

                           Interpretive Statements

ATRIAL FIBRILLATION

LOW QRS VOLTAGE IN PRECORDIAL LEADS

POSSIBLE INFERIOR MYOCARDIAL INFARCTION, PROBABLY OLD

ABNORMAL RHYTHM ECG

NSTTW ABNORMALITY

BASELINE ARTIFACT LIMITS INTERPRETATION

 

Electronically Signed On 2016 13:47:16 EST by Sania Farrar

## 2016-11-26 NOTE — IPNPDOC
Text Note


Date of Service


The patient was seen on 11/26/16 at 11:18.





NOTE:


Subjective:


Patient is a 66 year old female with a PMHx of CHF (ECHO 2/2016 with normal-

near normal EF, Grade 2 Diastolic dysfunction), COPD (on home O2 at 3 liters), 

DM2, DLP, HTN, atrial fibrillation (on anticoagulation), history of left 

pleural effusion (small to moderate) who presented to the ER with complaints of 

shortness of breath. She notes that she has been requiring more oxygen at home. 

She notes that she is coughing, but non-productive, no sputum. She denies fever 

or chills. She denies any leg swelling. She also noted that she has been 

experiencing some diarrhea, with 3-4 episodes daily in the last few days. She 

notes the diarrhea is loose, but no blood was noted. 





In the ER patient was found to be dyspneic with RR of 24, hypotensive with BP 

of 80/39. She recieve IV fluid bolus of normal saline and her blood pressure 

improved to SBP of 100s. She had an elevated lactic acid in the ER, so she has 

been continued with IV fluids and started on broad spectrum antibiotics (

Vancomycin and Zosyn). 





Patient has been seen and examined at the bedside. Currently she notes that her 

breathing has had some improvement. She denies production of sputum, fever or 

chills. She denies chest pain, palpitations or leg swelling. She notes that 

today her stool has been more formed. 





Objective:


Vitals (See below)


General: Sitting up in bed, no acute distress, AAOx3


HEENT: NC, AT


CVS: Irregularly irregular, +S1S2


Lungs: Poor respiratory effort, Decreased lung sounds at left lung base, No 

wheezing / rhonchi / rales appreciated


Abdomen: Soft, ND, NT, +BSx4


Extremities: +PPx4, No edema, no calf tenderness





Assessment and plan:


1. Sepsis - possibly 2/2 pneumonia (CAP), possible gastrointestinal etiology


- Symptomatic improvement in dyspnea, cough is still present


- Oxygen requirements have gone done; she has been returned back to her 

baseline oxygen requirement of 3 liters


- CT chest 11/25: Moderate left effusion with basilar and lingular atelectasis. 

Pulmonary vascular congestion. Ascites


- Lactic acid has been elevated initially, however normalized at this time


- Blood cultures 11/25 - currently negative; Influenza negative


- Will repeat lactic acid 


- will continue with reduced rate of IV fluid for now


- will attempt to get IR guided drainage of left pleural effusion and send 

fluid for analysis


- Will continue with broad spectrum antibiotics (Vancomycin and Zosyn) - Day #2





2. Normocytic anemia


- Hg baseline of 10-11


- Has received significant IV fluid hydration; possible component of dilution, 

however is also on anticoagulation for atrial fibrillation


- Will check Iron panel, B12, Folate and reticulocyte count


- Will repeat CBC at 12PM


- Urinalysis for hematuria


- Will transfuse if Hg < 8





3. COPD


- no evidence of COPD exacerbation


- physical exam does not reveal any wheezing


- will continue with inhaled therapy from home and duoneb PRN





4. Diastolic CHF, 


- ECHO 2/2015 - with normal / near-normal EF, Grade 2 diastolic dysfunction


- CT chest shows evidence of worsening effusion and vascular congestion


- if repeat lactic acid has normalized; will stop IV fluids 


- Currently does not appear to have fluid overload on physical exam





5. NIDDM2


- c/w insulin sliding scale


- will add Levemir if glucose remains uncontrolled





6. DLP


- c/w atorvastatin





7. HTN


- BP has been low in the ER and currently remains in the low 100s


- will hold hypertensive medications at this time, until blood pressure improves





8. SINTIA - not on CPAP


- Will go for repeat sleep study in December for CPAP





9. History of MI 


- c/w ASA, Atorvastatin


- Metoprolol dose has been decreased (with holding parameters) 


- Lisinopril have been held (re: Hypotension)





10. Atrial fibrillation


- has been on rate control as an outpatient with metoprolol 25mg PO BID


- Metoprolol dose has been continued at 12.5 mg PO BID (with holding parameters

) 


- full anticoagulation with Pradaxa





11. GI prophylaxis


- will start protonix 





12. DVT prophylaxis


- on full anticoagulation with pradaxa





VS,Fishbone, I+O


VS, Fishbone, I+O


 Laboratory Tests


11/25/16 14:08








Calcium Level 8.5 L, Total Creatine Kinase 33, Red Blood Count 3.86 L, Mean 

Corpuscular Volume 90.2, Mean Corpuscular Hemoglobin 24.8 L, Mean Corpuscular 

Hemoglobin Concent 27.5 L, Red Cell Distribution Width 17.7 H, Neutrophils (%) (

Auto) 85.9 H, Lymphocytes (%) (Auto) 7.4 L, Monocytes (%) (Auto) 4.7, 

Eosinophils (%) (Auto) 0.8, Basophils (%) (Auto) 0.2, Neutrophils # (Auto) 8.0 H

, Lymphocytes # (Auto) 0.8 L, Monocytes # (Auto) 0.4, Eosinophils # (Auto) 0.1, 

Basophils # (Auto) 0.0





11/26/16 05:51








Calcium Level 8.2 L, Total Creatine Kinase 31, Red Blood Count 3.32 L, Mean 

Corpuscular Volume 88.2, Mean Corpuscular Hemoglobin 25.4 L, Mean Corpuscular 

Hemoglobin Concent 28.8 L, Red Cell Distribution Width 17.7 H








 Vital Signs








  Date Time  Temp Pulse Resp B/P Pulse Ox O2 Delivery O2 Flow Rate FiO2


 


11/26/16 08:35  90  104/60    


 


11/26/16 08:00 97.7  20  96 Nasal Cannula 3.0 














 I&O- Last 24 Hours up to 6 AM 


 


 11/26/16





 06:00


 


Intake Total 600 ml


 


Output Total 375 ml


 


Balance 225 ml














LATOYA WREN MD Nov 26, 2016 11:52

## 2016-11-27 NOTE — REP
Clinical:  Follow up effusion.

 

Comparison:  11/25/2016.

 

Findings:  Examination is limited by technique and underpenetration.

Cardiomegaly remains stable.  Perihilar and bibasilar opacities (left greater

than right) are identified along with suspected moderate left pleural effusion.

No pneumothorax.

 

Impression:

Cardiomegaly.

Moderate left pleural effusion.

Bibasilar infiltrate/atelectasis.

Findings appear relatively similar to prior examination although evaluation is

limited due to technique.

 

 

Signed by

Cameron Chang MD 11/27/2016 01:13 P

## 2016-11-27 NOTE — IPNPDOC
Text Note


Date of Service


The patient was seen on 11/27/16 at 11:10.





NOTE:


Subjective:


Patient is a 66 year old female with a PMHx of CHF (ECHO 2/2016 with normal-

near normal EF, Grade 2 Diastolic dysfunction), COPD (on home O2 at 3 liters), 

DM2, DLP, HTN, atrial fibrillation (on anticoagulation), history of left 

pleural effusion (small to moderate) who presented to the ER with complaints of 

shortness of breath. She notes that she has been requiring more oxygen at home. 

She notes that she is coughing, but non-productive, no sputum. She denies fever 

or chills. She denies any leg swelling. She also noted that she has been 

experiencing some diarrhea, with 3-4 episodes daily in the last few days. She 

notes the diarrhea is loose, but no blood was noted. 





In the ER patient was found to be dyspneic with RR of 24, hypotensive with BP 

of 80/39. She recieve IV fluid bolus of normal saline and her blood pressure 

improved to SBP of 100s. She had an elevated lactic acid in the ER, so she has 

been continued with IV fluids and started on broad spectrum antibiotics (

Vancomycin and Zosyn). 





Patient has been seen and examined at the bedside. She notes that she had 

difficulty sleeping overnight. She has required more oxygen. Reports 

improvement in chest pain, no sputum production, no chest pain / palpitations / 

leg edema. She notes that she has constipation at this point. 





Objective:


Vitals (See below)


General: Sitting up in bed, no acute distress, AAOx3


HEENT: NC, AT


CVS: Irregularly irregular, +S1S2


Lungs: Poor respiratory effort, Decreased lung sounds at left lung base, No 

wheezing / rhonchi / rales appreciated


Abdomen: Soft, ND, NT, +BSx4


Extremities: +PPx4, No edema, no calf tenderness





Assessment and plan:


1. Sepsis - possibly 2/2 pneumonia (CAP), possible gastrointestinal etiology


- Symptomatic improvement in dyspnea, cough is still present


- Oxygen requirements have gone down from admission; still elevated from 

baseline of 3L


- CT chest 11/25: Moderate left effusion with basilar and lingular atelectasis. 

Pulmonary vascular congestion. Ascites


- Lactic acid has normalized 


- Blood cultures 11/25 - currently negative; Influenza negative


- will discontinue IV fluids 


- will attempt to get IR guided drainage of left pleural effusion and send 

fluid for analysis on Monday - will re-evaluate need based on symptoms 


- Will continue with broad spectrum antibiotics (Vancomycin and Zosyn) - Day #3





2. Normocytic anemia


- Hg baseline of 10-11


- Has received significant IV fluid hydration; possible component of dilution, 

however is also on anticoagulation for atrial fibrillation


- Hg stable 


- Reticulocyte index of 0.9 - Hypoproliferative 


- Vitamin B12 and Foalte - pending 


- Iron panel shows iron deficiency anemia 


- UA negative for hematuria 


- Will transfuse if Hg < 8


- Will start Ferrous sulfate 325 BID





3. COPD


- no evidence of COPD exacerbation


- physical exam does not reveal any wheezing


- will continue with inhaled therapy from home and duoneb PRN





4. Diastolic CHF, 


- ECHO 2/2015 - with normal / near-normal EF, Grade 2 diastolic dysfunction


- CT chest shows evidence of worsening effusion and vascular congestion


- Currently does not appear to have fluid overload on physical exam


- discontinued IV fluids 





5. NIDDM2


- c/w insulin sliding scale


- will add Levemir if glucose remains uncontrolled





6. DLP


- c/w atorvastatin





7. HTN


- BP has been low in the ER and currently remains in the low 100s


- will hold hypertensive medications at this time, until blood pressure improves





8. SINTIA - not on CPAP


- Will go for repeat sleep study in December for CPAP





9. History of MI 


- c/w ASA, Atorvastatin


- Metoprolol dose has been decreased (with holding parameters) 


- Lisinopril have been held (re: Hypotension)





10. Atrial fibrillation


- has been on rate control as an outpatient with metoprolol 25mg PO BID


- Metoprolol dose has been continued at 12.5 mg PO BID (with holding parameters

) 


- full anticoagulation with Pradaxa





11. GI prophylaxis


- c/w protonix 





12. DVT prophylaxis


- on full anticoagulation with pradaxa





VS,Fishbone, I+O


VS, Fishbone, I+O


 Laboratory Tests


11/26/16 12:08








Red Blood Count 3.33 L, Mean Corpuscular Volume 87.4, Mean Corpuscular 

Hemoglobin 25.1 L, Mean Corpuscular Hemoglobin Concent 28.7 L, Red Cell 

Distribution Width 17.7 H, Neutrophils (%) (Auto) 82.4 H, Lymphocytes (%) (Auto

) 6.8 L, Monocytes (%) (Auto) 8.3 H, Eosinophils (%) (Auto) 0.2, Basophils (%) (

Auto) 0.1, Neutrophils # (Auto) 7.2, Lymphocytes # (Auto) 0.8 L, Monocytes # (

Auto) 0.7, Eosinophils # (Auto) 0.0, Basophils # (Auto) 0.0





11/27/16 05:19








Red Blood Count 3.41 L, Mean Corpuscular Volume 88.6, Mean Corpuscular 

Hemoglobin 25.4 L, Mean Corpuscular Hemoglobin Concent 28.6 L, Red Cell 

Distribution Width 17.8 H, Calcium Level 8.2 L








 Vital Signs








  Date Time  Temp Pulse Resp B/P Pulse Ox O2 Delivery O2 Flow Rate FiO2


 


11/27/16 09:00  74  104/64    


 


11/27/16 08:00 96.3  24  90 Nasal Cannula 4.0 














 I&O- Last 24 Hours up to 6 AM 


 


 11/27/16





 06:00


 


Intake Total 3400 ml


 


Output Total 850 ml


 


Balance 2550 ml














LATOYA WREN MD Nov 27, 2016 11:16

## 2016-11-27 NOTE — EDDOCDS
Nurse's Notes                                                                                     

[facility]                                                                         

Name: Mimi Raphael                                                                                 

Age: 66 yrs                                                                                       

Sex: Female                                                                                       

: 1950                                                                                   

MRN: [id number]                                                                                     

Arrival Date: 2016                                                                          

Time: 13:00                                                                                       

Account#: [id number]                                                                              

Bed 4                                                                                             

Private MD: [facility]                                                   

Diagnosis: Chronic obstructive pulmonary disease with (acute) exacerbation;Pleural effusion, not  

elsewhere classified;Hypotension                                                                

                                                                                                  

Presentation:                                                                                     

                                                                                             

13:34 Presenting complaint: Patient states: increasing SOB over last week. more SOB with any  bcj 

      activity. denies chest pain. denies swollen legs. has had diarrhea x 1 week. + cough no     

      sputum. Acuity level changed due to the patient meeting SIRS criteria. Adult Sepsis         

      Screening: Patient has a respiratory rate of greater than or equal to 22 (1 point). The     

      patient does not have new or worsening altered mentation. Systolic blood pressure is        

      less than or equal to 100 (1 point). Patient has a qSOFA Score of 2. Infection              

      Criteria- Patient has cough and/or SOB Sepsis Screen Results: Positive Sepsis Screen-       

      Pt has a qSOFA Score of 2 or more and at least one Infection Criteria. Patient made RAFI     

      Level 2. Suicide/Homicide risk assessment- the patient denies having any suicidal           

      and/or homicidal ideations.  Status: Patient is not a  or             

       dependent. Transition of care: patient was not received from another setting       

      of care.                                                                                    

13:34 Acuity: RAFI Level 3                                                                     [facility] 

13:34 Method Of Arrival: Ambulance                                                            [facility] 

13:34 Acuity: RAFI Level 2                                                                     [facility] 

                                                                                                  

Triage Assessment:                                                                                

13:46 General: Appears in no apparent distress, comfortable, Behavior is cooperative. Pain:   bcj 

      Denies pain. Respiratory: Onset: The symptoms/episode began/occurred yesterday.             

                                                                                                  

Historical:                                                                                       

- Allergies: SULFA (SULFONAMIDES) (Rash);                                                         

- Home Meds:                                                                                      

1. Pradaxa 150 mg oral cap 1 cap 2 times per day                                                

2. metoprolol tartrate 25 mg Oral tab 12.5 mg 2 times per day                                   

3. atorvastatin 40 mg oral tab 1 tab once daily                                                 

4. ProAir HFA 108mcg inhalation HFAA 2 puffs every 6 hours every 2 hours as needed for          

     SOB                                                                                          

5. Spiriva with HandiHaler 18 mcg Inhl CpDv 1 cap once daily                                    

6. Symbicort 160-4.5 mcg/actuation inhalation HFAA 2 puffs 2 times per day                      

7. Breo Ellipta 200-25 mcg/dose inhalation dsdv 1 puff once daily                               

8. potassium chloride 10 mEq Oral TbER 1 tab 2 times per day                                    

9. lisinopril 2.5 mg oral tab once daily                                                        

10. furosemide 80 mg oral tab 1 tab once daily                                                  

11. metformin 500 mg Oral tab 1 tab 2 times per day                                             

12. hydrochlorothiazide 12.5 mg Oral tab 1 tab once daily                                       

13. multivitamin Oral tab 1 tab daily                                                           

14. loratadine 10 mg Oral cap 10 mg daily as needed                                             

15. aspirin 81 mg Oral TbEC 1 tab once daily                                                    

16. docusate calcium 240 mg Oral cap 1 cap 2 times per day                                      

- PMHx: CAD; CHF; COPD; Diabetes - NIDDM: controlled; Hypercholesterolemia;                       

Hypertension; Myocardial infarction; Obesity; Sleep Apnea w/o CPAP;                             

- PSHx: Colon Resection; Coronary Stents; Hernia repair; hernia revision; tendon                  

repaired;                                                                                       

- Social history: Smoking status: Patient states former smoker of tobacco. No barriers            

to communication noted, The patient speaks fluent English.                                      

- Family history: Not pertinent.                                                                  

- : The pt / caregiver states he / she is on anticoagulants: Pradaxa (Dabigatran) Home            

medication list is obtained from the patient, patients' pharmacy. [employer] import data.          

- Exposure Risk Screening:: None identified.                                                      

                                                                                                  

                                                                                                  

Screenin:14 Screening information is obtained from the patient. Fall risk: At risk due to gait      bcj 

      disturbance, The following interventions are performed due to a positive Fall Risk          

      Screen: Fall Risk is added to Special Handling on the patient Summary Screen. A Fall        

      Risk Bracelet was applied to the patient. Side Rails are placed in the up position. A       

      Call Woods is given with instruction to call for help when getting out of bed. Fall          

      Alert bracelet is placed on the patient. Assistance ADL's: requires no assistance with      

      activities of daily living. Abuse/DV Screen: The patient / caregiver reports he/she is:     

      not in a situation that causes fear, pain or injury. Nutritional screening: No deficits     

      noted. home support is adequate.                                                            

18:17 Advance Directives: Currently, there is no health care proxy. There is no active DNR    jmb 

      order. There is no living will. There is no Power of .                              

                                                                                                  

Assessment:                                                                                       

14:14 General: Appears in no apparent distress, comfortable, Behavior is cooperative. Pain:   bcj 

      Denies pain. Cardiovascular: Rhythm is sinus rhythm Chest pain is denied. Respiratory:      

      Airway is patent Respiratory effort is even, unlabored, Respiratory pattern is regular,     

      Breath sounds with rhonchi bilaterally. Breath sounds are diminished bilaterally. Derm:     

      Skin is pink, warm & dry.                                                                 

15:12 General: Appears in no apparent distress, Behavior is appropriate for age, cooperative, jmb 

      Patient laying on stretcher on left side. Patient denies discomfort. Patient reports        

      being able to breath better when she does not move. Patient blood pressure circulating      

      every 15 minutes per provider orders. Patient currently at CT. NO voiced complaints at      

      this time. . Neurological: Level of Consciousness is awake, alert, obeys commands,          

      Oriented to person, place, time, Speech is normal, Facial symmetry appears normal,          

      Facial symmetry: tongue is midline. Cardiovascular: Capillary refill < 3 seconds Heart      

      tones present Pulses are all present. Rhythm is sinus rhythm No ectopy. Respiratory:        

      Airway is patent Respiratory effort is even, unlabored, Respiratory pattern is regular,     

      symmetrical, Breath sounds are diminished bilaterally. GI: Abdomen is obese, Bowel          

      sounds present X 4 quads. Abd is soft and non tender X 4 quads. Derm: Skin is pink,         

      warm & dry. Musculoskeletal: Range of motion intact in all extremities.                   

15:38 General: Appears in no apparent distress, comfortable, Behavior is appropriate for age, jmb 

      cooperative, Patient laying on stretcher sitting up. NO voiced complaints at this time.     

      . Neurological: Level of Consciousness is awake, alert, obeys commands, Oriented to         

      person, place, time. Respiratory: Airway is patent Respiratory effort is even,              

      unlabored, Respiratory pattern is regular, symmetrical.                                     

15:46 General: Appears in no apparent distress, comfortable, Behavior is appropriate for age, jmb 

      cooperative, Patient sitting up on stretcher, continues with some shortness of breath.      

      Patient on 4 liters oxygen via nasal cannula. NO voiced complaints at this time. .          

      Neurological: Level of Consciousness is awake, alert, obeys commands, Oriented to           

      person, place, time. Respiratory: Airway is patent Respiratory effort is even,              

      unlabored, Respiratory pattern is regular, symmetrical.                                     

16:30 General: Appears in no apparent distress, comfortable, Behavior is appropriate for age, jmb 

      cooperative, Patient laying on stretcher, no voiced complaints at this time. .              

      Neurological: Level of Consciousness is awake, alert, obeys commands, Oriented to           

      person, place, time. Respiratory: Airway is patent Respiratory effort is even,              

      unlabored, Respiratory pattern is regular, symmetrical.                                     

17:30 General: Appears in no apparent distress, comfortable, Behavior is appropriate for age, jmb 

      cooperative. Neurological: Level of Consciousness is awake, alert, obeys commands,          

      Oriented to person, place, time. Respiratory: Airway is patent Respiratory effort is        

      even, unlabored, Respiratory pattern is regular, symmetrical.                               

18:02 General: SBAR faxed and tubed to PCU.                                                   jmb 

18:24 General: PAtient ready for transfer to PCU..                                            jmb 

                                                                                                  

Vital Signs:                                                                                      

13:24 BP 92 / 55; Pulse 85; Resp 24; Temp 98.1(O); Pulse Ox 90% on 2 lpm NC; Weight 148.32    dem1

      kg; Height 5 ft. 7 in. (170.18 cm); Pain 8/10;                                              

14:19  / 53 (auto/);                                                                    jmb 

14:19 Pulse 84 MON; Pulse Ox 97% ;                                                            jmb 

14:20 BP 80 / 39 RA Supine (man/lg);                                                          jrd 

15:08  / 56 (auto/);                                                                    jmb 

15:08 Pulse 94 MON; Pulse Ox 93% ;                                                            jmb 

15:38  / 68 (auto/);                                                                    jmb 

15:38 Pulse 96 MON; Pulse Ox 87% ;                                                            jmb 

15:50  / 69 (auto/);                                                                    jmb 

15:50 Pulse 92 MON; Pulse Ox 91% ;                                                            jmb 

15:53  / 56 (auto/);                                                                    jmb 

15:54 Pulse 92 MON; Pulse Ox 90% ;                                                            jmb 

16:08  / 83 (auto/);                                                                    jmb 

16:09 Pulse 94 MON; Pulse Ox 90% ;                                                            jmb 

16:38  / 57 (auto/);                                                                    jmb 

16:39 Pulse 90 MON; Pulse Ox 93% ;                                                            jmb 

16:53  / 61 (auto/);                                                                    jmb 

16:54 Pulse 92 MON; Pulse Ox 96% ;                                                            jmb 

17:08  / 59 (auto/);                                                                    jmb 

17:09 Pulse 94 MON; Pulse Ox 89% ;                                                            jmb 

17:38  / 58 (auto/);                                                                    jmb 

17:39 Pulse 92 MON; Pulse Ox 91% ;                                                            jmb 

18:17  / 60; Pulse 93; Resp 22; Temp 97.8(O); Pulse Ox 91% on 4 lpm NC; Pain 0/10;      jmb 

13:24 Body Mass Index 51.21 (148.32 kg, 170.18 cm)                                            [facility]

                                                                                                  

Vitals:                                                                                           

13:24 Log In Time N/A - ambulance arrival.                                                    dem1

14:14 Refer to monitor trend for complete vital signs trends.                                 [facility] 

                                                                                                  

ED Course:                                                                                        

13:01 Patient visited by Ivana Ace PCA.                                               rs6 

13:01 Dalton Fishman is Private Physician.                                                   rs6 

13:01 [facility] is Private Physician.                                rs6 

13:01 Patient moved to Waiting                                                                rs6 

13:01 Patient moved to 4                                                                      rs6 

13:03 Trent Livingston MD is Attending Physician.                                               br1 

13:10 Patient visited by Trent Livingston MD.                                                   br1 

13:19 EKG done. (by ED staff). Reviewed by Trent Livingston MD.                                   dem1

13:24 Patient visited by Kiki Bonilla.                                                     dem1

13:24 Cardiac monitor on. Pulse ox on. NIBP on.                                               dem1

13:25 Patient visited by Kiki Bonilla.                                                     dem1

13:36 Triage Initiated                                                                        bcj 

13:39 -Arterial Blood Gas Sent.                                                               js11

13:47 Patient visited by Mark Ta RN.                                                  bcj 

14:07 Chest, 2 View (pa\E\lat) Returned.                                                        EDMS

14:14 No apparent distress. Resting quietly. awaiting re-evaluation by ER physician.          bcj 

14:14 The patient / caregiver is instructed regarding the plan of care and ED course. Patient [facility] 

      has correct armband on for positive identification. Placed in gown. Bed in low              

      position. Call light in reach. Side rails up X2.                                            

14:14 Inserted saline lock: 20 gauge in left forearm. Labs drawn. (by ED staff). Sent per     [facility] 

      order to lab. Labs/Blood culture drawn. O2 via nasal cannula \T\ 3L/min.                      

14:16 Patient visited by Mark Ta, POLY.                                                  bcj 

14:16 Lactic Acid (Gray tube on ice) Sent.                                                    bcj 

14:16 -Blood Culture Sent.                                                                    bcj 

14:16 TROPONIN Sent.                                                                          bcj 

14:16 CARDIAC INJURY PROFILE Sent.                                                            bcj 

14:17 -Influenza A&B Rapid Antigen - Nose Sent.                                               bcj

15:02 Corinne Moore .                                                                 ys2 

15:10 Corinne Moore is Hospitalizing Provider.                                                     br1 

15:14 Patient visited by Tony Cardona RN.                                                    mark 

15:21 [facility] Payment Agreement was scanned into [employer] and attached to record.               hs2 

15:48 Patient visited by Tony Cardona RN.                                                    amrckb 

16:31 Patient visited by Tony Cardona RN.                                                    jmb 

17:31 Patient visited by Tony Cardona RN.                                                    marckb 

18:17 No procedures done that require assistance.                                             mark 

21:24 T-Sheet-- Draft Copy was scanned into [employer] and attached to record.                   klr 

                                                                                                  

Administered Medications:                                                                         

13:30 Drug: Albuterol-Ipratropium 1 neb [ipratropium-albuterol 0.5 mg-3 mg(2.5 mg base)/3 mL  js11

      nebulization soln (1 neb)] Route: Nebulizer;                                                

13:50 Drug: Albuterol-Ipratropium 1 neb [ipratropium-albuterol 0.5 mg-3 mg(2.5 mg base)/3 mL  js11

      nebulization soln (1 neb)] Route: Nebulizer;                                                

14:01 Drug: Albuterol-Ipratropium 1 neb [ipratropium-albuterol 0.5 mg-3 mg(2.5 mg base)/3 mL  js11

      nebulization soln (1 neb)] Route: Nebulizer;                                                

14:16 Drug: Solu-MEDROL 125 mg [Solu-Medrol 500 mg intravenous solution (125 mg)] Route: IVP; bcj 

      Site: right forearm;                                                                        

14:55 Drug: NS 0.9% 500 ml [sodium chloride 0.9 % intravenous solution] Route: IV; Rate:      bcj 

      bolus; Site: right forearm;                                                                 

15:12 Drug: Ondansetron 4 mg [ondansetron HCl 2 mg/mL intravenous solution (2 mL)] Route:     jmb 

      IVP; Site: left forearm;                                                                    

15:38 Drug: cefTRIAXone 2 grams [ceftriaxone 1 gram solution for injection] Route: IVPB;      mark 

      Infused Over: 30 mins; Site: left forearm;                                                  

                                                                                                  

                                                                                                  

RT:                                                                                               

13:30 Initial Med Neb Given as ordered Patient was instructed and evaluated on procedure      js11

      Patient tolerated procedure well without adverse effect. O2 via nasal cannula \T\ 4L/min.     

      Respiratory: Breath sounds are diminished in left upper lobe and left lower lobe.           

13:40 ABG's drawn from left radial artery allens test done and positive pressure held for 5   js11

      minutes no bleeding noted specimen sent pt. tolerated well.                                 

13:50 Subsequent Med Neb Given as ordered Patient tolerated procedure well without adverse    js11

      effect. Respiratory: Breath sounds are diminished in left upper lobe and left lower         

      lobe.                                                                                       

14:02 Subsequent Med Neb Given as ordered Patient tolerated procedure well without adverse    js11

      effect. Respiratory: Breath sounds are diminished in left upper lobe and left lower         

      lobe.                                                                                       

                                                                                                  

Order Results:                                                                                    

Lab Order: B-Type Natiuretic Peptide; SPEC'M 16 14:08                                       

      Test: BRAIN NATRIURETIC PEPTIDE; Value: 427; Range: <100; Abnormal: Above high normal;      

      Units: PG/ML; Status: F                                                                     

Lab Order: Basic Metabolic Profile; SPEC'M 16 14:08                                         

      Test: GLUCOSE, FASTING; Value: 103; Range: ; Units: MG/DL; Status: F                  

      Test: BLOOD UREA NITROGEN; Value: 13; Range: 7-18; Units: MG/DL; Status: F                  

      Test: CREATININE FOR GFR; Value: 1.22; Range: 0.55-1.02; Abnormal: Above high normal;       

      Units: MG/DL; Status: F                                                                     

      Test: GLOMERULAR FILTRATION RATE; Value: 46.9; Range: >45; Status: F                        

      Test: SODIUM LEVEL; Value: 142; Range: 136-145; Units: MEQ/L; Status: F                     

      Test: POTASSIUM SERUM; Value: 3.4; Range: 3.5-5.1; Abnormal: Below low normal; Units:       

      MEQ/L; Status: F                                                                            

      Test: CHLORIDE LEVEL; Value: 106; Range: ; Units: MEQ/L; Status: F                    

      Test: CARBON DIOXIDE LEVEL; Value: 27; Range: 21-32; Units: MEQ/L; Status: F                

      Test: ANION GAP; Value: 9; Range: 8-16; Units: MEQ/L; Status: F                             

      Test: CALCIUM LEVEL; Value: 8.5; Range: 8.8-10.2; Abnormal: Below low normal; Units:        

      MG/DL; Status: F                                                                            

      Test Note: &nbsp;; Units are mL/min/1.73 m2 Chronic Kidney Disease Staging per NKF:       

      Stage I & II GFR >=60 Normal to Mildly Decreased Stage III GFR 30-59 Moderately           

      Decreased Stage IV GFR 15-29 Severely Decreased Stage V GFR <15 Very Little GFR Left        

      ESRD GFR <15 on RRT                                                                         

Lab Order: CBC with Diff; SPEC'M 16 14:08                                                   

      Test: WHITE BLOOD COUNT; Value: 9.3; Range: 4.0-10.0; Units: K/mm3; Status: F               

      Test: RED BLOOD COUNT; Value: 3.86; Range: 4.00-5.40; Abnormal: Below low normal;           

      Units: M/mm3; Status: F                                                                     

      Test: HEMOGLOBIN; Value: 9.6; Range: 12.0-16.0; Abnormal: Below low normal; Units:          

      g/dl; Status: F                                                                             

      Test: HEMATOCRIT; Value: 34.8; Range: 36.0-47.0; Abnormal: Below low normal; Units: %;      

      Status: F                                                                                   

      Test: MEAN CORPUSCULAR VOLUME; Value: 90.2; Range: 80.0-96.0; Units: fl; Status: F          

      Test: MEAN CORPUSCULAR HEMOGLOBIN; Value: 24.8; Range: 27.0-33.0; Abnormal: Below low       

      normal; Units: pg; Status: F                                                                

      Test: MEAN CORPUSCULAR HGB CONC; Value: 27.5; Range: 32.0-36.5; Abnormal: Below low         

      normal; Units: g/dl; Status: F                                                              

      Test: RED CELL DISTRIBUTION WIDTH; Value: 17.7; Range: 11.5-14.5; Abnormal: Above high      

      normal; Units: %; Status: F                                                                 

      Test: PLATELET COUNT, AUTOMATED; Value: 338; Range: 150-450; Units: k/mm3; Status: F        

      Test: NEUTROPHILS %; Value: 85.9; Range: 36.0-66.0; Abnormal: Above high normal; Units:     

      %; Status: F                                                                                

      Test: LYMPH %; Value: 7.4; Range: 24.0-44.0; Abnormal: Below low normal; Units: %;          

      Status: F                                                                                   

      Test: MONO %; Value: 4.7; Range: 0.0-5.0; Units: %; Status: F                               

      Test: EOS %; Value: 0.8; Range: 0.0-3.0; Units: %; Status: F                                

      Test: BASO %; Value: 0.2; Range: 0.0-1.0; Units: %; Status: F                               

      Test: LARGE UNSTAINED CELL %; Value: 1.0; Range: 0.0-4.0; Units: %; Status: F               

      Test: NEUTROPHILS #; Value: 8.0; Range: 1.8-7.7; Abnormal: Above high normal; Units:        

      K/mm3; Status: F                                                                            

      Test: LYMPH #; Value: 0.8; Range: 1.5-4.5; Abnormal: Below low normal; Units: K/mm3;        

      Status: F                                                                                   

      Test: MONO #; Value: 0.4; Range: 0.0-0.8; Units: K/mm3; Status: F                           

      Test: EOS #; Value: 0.1; Range: 0.0-0.50; Units: K/mm3; Status: F                           

      Test: BASO #; Value: 0.0; Range: 0.0-0.2; Units: K/mm3; Status: F                           

      Test: LARGE UNSTAINED CELL #; Value: 0.1; Range: 0.0-0.4; Units: K/mm3; Status: F           

Lab Order: -Influenza A&B Rapid Antigen - Nose; SPEC'M 16 14:08                           

      Test: INFLUENZA A RAPID SCR by ICA; Value: INFLUENZA A RESULTS NEGATIVE; Status: F          

      Test: INFLUENZA A RAPID SCR by ICA; Value: Comments:; Status: F                             

      Test: INFLUENZA B RAPID SCR by ICA; Value: INFLUENZA B RESULTS NEGATIVE; Status: F          

      Test Note: &nbsp;; The Influenza test is a direct rapid immunoassay for the qualitative   

      detection of Influenza viral antigen. Cell culture (Viral Culture) testing should be        

      considered to confirm NEGATIVE results and to assist in detecting other viruses that        

      can provide similar clinical symptoms. Please contact the lab within 24 [phone]               

      [phone] if confirmatory testing is desired.                                              

Lab Order: -Arterial Blood Gas; SPEC'M 16 13:34                                             

      Test: ABG pH (ARTERIAL); Value: 7.339; Range: 7.350-7.450; Abnormal: Below low normal;      

      Units: UNITS; Status: F                                                                     

      Test: ABG PARTIAL PRESSURE CO2; Value: 45.3; Range: 35.0-45.0; Abnormal: Above high         

      normal; Units: mmHg; Status: F                                                              

      Test: ABG PARTIAL PRESSURE O2; Value: 84.4; Range: 75.0-100.0; Units: mmHg; Status: F       

      Test: ABG TOTAL CO2; Value: 25.2; Range: 23.0-31.0; Units: MEQ/L; Status: F                 

      Test: ABG HCO3; Value: 23.8; Range: 22.0-26.0; Units: MEQ/L; Status: F                      

      Test: ABG BASE EXCESS; Value: -2.0; Range: -2.0-2.0; Status: F                              

      Test: ABG STANDARD HCO3; Value: 22.8; Range: 22.0-26.0; Units: MEQ/L; Status: F             

      Test: ABG O2 SATURATION; Value: 96.0; Range: 95.0-99.0; Units: %; Status: F                 

      Test: ABG DEVICE; Value: NASAL MITCHELL; Status: F                                              

Lab Order: CARDIAC INJURY PROFILE; SPEC' 16 14:08                                          

      Test: CPK CREATINE PHOSPHOKINASE; Value: 33; Range: ; Units: U/L; Status: F           

      Test: CK-MB VALUE MASS; Value: 1.2; Range: 0.0-3.6; Units: NG/ML; Status: F                 

      Test: MB/CK RELATIVE INDEX; Value: 3.63; Range: < OR =4; Status: F                          

      Test Note: &nbsp;; DIAGNOSIS CRITERIA MMB ng/ml Relative Index (RI) NON-AMI < or = 5      

      N/A GRAY ZONE > 5 < or = 4 AMI > 5 > 4                                                      

Lab Order: TROPONIN; SPEC'M 16 14:08                                                        

      Test: TROPONIN I; Value: 0.04; Range: < 0.10; Units: NG/ML; Status: F                       

      Test Note: &nbsp;; Troponin I Reference Interval for [employer] LOCI: 99th             

      Percentile= 0.00-0.045 ng/ml Risk Stratification: <= 0.10 ng/ml Decreased Risk for          

      Adverse Clinical Events. 0.10-1.50 ng/ml Increased Risk for Adverse Clinical Events.        

      Evaluation of additional criterion and/or repeat testing in 2-6 hours is suggested to       

      rule out myocardial damage. >= 1.50 ng/ml Indicative of Myocardial Injury.                  

Lab Order: Lactic Acid (Gray tube on ice); SPEC'M 16 14:08                                  

      Test: LACTIC ACID LEVEL, LACTATE; Value: 4.1; Range: 0.4-2.0; Abnormal: Above upper         

      panic limits; Units: MMOL/L; Status: F                                                      

                                                                                                  

Radiology Order: Chest, 2 View (pa\E\lat)                                                         

      Test: Chest, 2 View (pa\E\lat)                                                              

      REASON FOR EXAMINATION: Shortness of Breath; Clinical: Shortness of breath.; ;              

      Technique: PA and lateral.; ; Comparison: 2016.; ; Findings:; Opacification of        

      the left mid to lower lung zone suggests chronic changes with; superimposed                 

      atelectasis/infiltrate and effusion. Medial right basilar; atelectasis cannot be as         

      excluded as well. Cardiomegaly appears similar to prior; examination although the           

      contour raises the possibility of underlying pericardial; effusion. No pneumothorax.        

      Skeletal structures demonstrate degenerative; changes.; ; Impression:; Chronic stable       

      changes.; Cannot exclude superimposed bilateral atelectasis/infiltrates (left greater       

      than; right) and moderate left pleural effusion.; Cardiac silhouette raises the             

      possibility of underlying pericardial effusion.; ; ; Signed by; Cameron Chang MD           

      2016 01:39 P;                                                                         

Outcome:                                                                                          

15:10 Decision to Hospitalize by Provider.                                                    br1 

18:17 Discharge Assessment: Patient awake, alert and oriented x 3. No cognitive and/or        jmb 

      functional deficits noted. Patient verbalized understanding of disposition                  

      instructions. Patient awake and alert. obeys commands, Oriented to person, place and        

      time. Patient verbalized understanding of disposition instructions. Patient has no          

      functional deficits. patient administered narcotics - no. The following High Risk           

      Discharge criteria are identified: None. Admitted to PCU accompanied by nurse,              

      accompanied by tech, via stretcher, with oxygen, on monitor, with chart. Condition:         

      stable. No special radiology studies were completed. Property sent home with patient.       

18:29 Patient left the ED.                                                                    mark 

                                                                                                  

Signatures:                                                                                       

Dispatcher [employer]                           EDMark Melgar, Trent Min RN, MD MD   br1                                                  

Bill [id number]                                                 

Kiki Bonilla1                                                 

Tony Cardona RN RN jmb Donoghue, Joseph, PCA                   PCA  d                                                  

Ivana Ace, PCA                   PCA  rs6                                                  

Corinne Moore                                     ys2                                                  

Roxann Peña, Reg                   Reg  hs2                                                  

Jessie Berger                                                  

                                                                                                  

**************************************************************************************************



*** Chart Complete ***
MTDD

## 2016-11-27 NOTE — EDDOCDS
Physician Documentation                                                                           

[facility]                                                                         

Name: Mimi Raphael                                                                                 

Age: 66 yrs                                                                                       

Sex: Female                                                                                       

: 1950                                                                                   

MRN: [id number]                                                                                     

Arrival Date: 2016                                                                          

Time: 13:00                                                                                       

Account#: [id number]                                                                              

Bed 4                                                                                             

Private MD: [facility]                                                   

Disposition:                                                                                      

16 15:10 Hospitalization ordered by Corinne Moore for Inpatient Admission. Preliminary           

diagnosis are Chronic obstructive pulmonary disease with (acute)                                

exacerbation, Pleural effusion, not elsewhere classified, Hypotension.                          

- Bed requested for  PCU.                                                                        

- Status is Inpatient Admission.                                                              jmb 

- Condition is Stable.                                                                            

- Problem is new.                                                                                 

- Symptoms are unchanged.                                                                         

                                                                                                  

                                                                                                  

                                                                                                  

Historical:                                                                                       

- Allergies: SULFA (SULFONAMIDES) (Rash);                                                         

- Home Meds:                                                                                      

1. Pradaxa 150 mg oral cap 1 cap 2 times per day                                                

2. metoprolol tartrate 25 mg Oral tab 12.5 mg 2 times per day                                   

3. atorvastatin 40 mg oral tab 1 tab once daily                                                 

4. ProAir HFA 108mcg inhalation HFAA 2 puffs every 6 hours every 2 hours as needed for          

     SOB                                                                                          

5. Spiriva with HandiHaler 18 mcg Inhl CpDv 1 cap once daily                                    

6. Symbicort 160-4.5 mcg/actuation inhalation HFAA 2 puffs 2 times per day                      

7. Breo Ellipta 200-25 mcg/dose inhalation dsdv 1 puff once daily                               

8. potassium chloride 10 mEq Oral TbER 1 tab 2 times per day                                    

9. lisinopril 2.5 mg oral tab once daily                                                        

10. furosemide 80 mg oral tab 1 tab once daily                                                  

11. metformin 500 mg Oral tab 1 tab 2 times per day                                             

12. hydrochlorothiazide 12.5 mg Oral tab 1 tab once daily                                       

13. multivitamin Oral tab 1 tab daily                                                           

14. loratadine 10 mg Oral cap 10 mg daily as needed                                             

15. aspirin 81 mg Oral TbEC 1 tab once daily                                                    

16. docusate calcium 240 mg Oral cap 1 cap 2 times per day                                      

- PMHx: CAD; CHF; COPD; Diabetes - NIDDM: controlled; Hypercholesterolemia;                       

Hypertension; Myocardial infarction; Obesity; Sleep Apnea w/o CPAP;                             

- PSHx: Colon Resection; Coronary Stents; Hernia repair; hernia revision; tendon                  

repaired;                                                                                       

- Social history: Smoking status: Patient states former smoker of tobacco. No barriers            

to communication noted, The patient speaks fluent English.                                      

- Family history: Not pertinent.                                                                  

- : The pt / caregiver states he / she is on anticoagulants: Pradaxa (Dabigatran) Home            

medication list is obtained from the patient, patients' pharmacy. [employer] import data.          

- Exposure Risk Screening:: None identified.                                                      

                                                                                                  

                                                                                                  

Vital Signs:                                                                                      

                                                                                             

13:24 BP 92 / 55; Pulse 85; Resp 24; Temp 98.1(O); Pulse Ox 90% on 2 lpm NC; Weight 148.32 kg dem1

      / 326.99 lbs; Height 5 ft. 7 in. (170.18 cm); Pain 8/10;                                    

14:19  / 53 (auto/);                                                                    jmb 

14:19 Pulse 84 MON; Pulse Ox 97% ;                                                            jmb 

14:20 BP 80 / 39 RA Supine (man/lg);                                                          jrd 

15:08  / 56 (auto/);                                                                    jmb 

15:08 Pulse 94 MON; Pulse Ox 93% ;                                                            jmb 

15:38  / 68 (auto/);                                                                    jmb 

15:38 Pulse 96 MON; Pulse Ox 87% ;                                                            jmb 

15:50  / 69 (auto/);                                                                    jmb 

15:50 Pulse 92 MON; Pulse Ox 91% ;                                                            jmb 

15:53  / 56 (auto/);                                                                    jmb 

15:54 Pulse 92 MON; Pulse Ox 90% ;                                                            jmb 

16:08  / 83 (auto/);                                                                    jmb 

16:09 Pulse 94 MON; Pulse Ox 90% ;                                                            jmb 

16:38  / 57 (auto/);                                                                    jmb 

16:39 Pulse 90 MON; Pulse Ox 93% ;                                                            jmb 

16:53  / 61 (auto/);                                                                    jmb 

16:54 Pulse 92 MON; Pulse Ox 96% ;                                                            jmb 

17:08  / 59 (auto/);                                                                    jmb 

17:09 Pulse 94 MON; Pulse Ox 89% ;                                                            jmb 

17:38  / 58 (auto/);                                                                    jmb 

17:39 Pulse 92 MON; Pulse Ox 91% ;                                                            jmb 

18:17  / 60; Pulse 93; Resp 22; Temp 97.8(O); Pulse Ox 91% on 4 lpm NC; Pain 0/10;      jmb 

13:24 Body Mass Index 51.21 (148.32 kg, 170.18 cm)                                            dem1

                                                                                                  

MDM:                                                                                              

13:10 Cardiac Monitor/Pulse Ox/q 30 min VS ordered.                                           br1 

13:10 IV Saline Lock ordered.                                                                 br1 

13:10 Oxygen at 4L/Min NC or Home dosage ordered.                                             br1 

13:10 Rhythm Strip to chart ordered.                                                          br1 

13:10 Undress patient appropriately for examination ordered.                                  br1 

13:12 B-Type Natiuretic Peptide Ordered.                                                      EDMS

13:12 Basic Metabolic Profile Ordered.                                                        EDMS

13:12 CBC with Diff Ordered.                                                                  EDMS

13:12 Chest, 2 View (pa\E\lat) Ordered.                                                         EDMS

13:12 ECG WITH READING ER PHYS+CARDIAG ordered.                                               EDMS

13:13 Albuterol-Ipratropium 1 neb Nebulizer every 20 minutes x3 ordered.                      br1 

13:13 Call Respiratory ordered.                                                               br1 

13:13 -Influenza A&B Rapid Antigen - Nose Ordered.                                            EDMS

13:14 Solu-MEDROL 125 mg IVP once ordered.                                                    br1 

13:14 -Arterial Blood Gas Ordered.                                                            EDMS

13:15 CARDIAC INJURY PROFILE Ordered.                                                         EDMS

13:15 TROPONIN Ordered.                                                                       EDMS

13:31 Misc. Nursing Order ordered.                                                            br1 

13:40 -Blood Culture (Adults Only), peripheral from different site, or from device/port/PICC  br1 

      etc. if present ordered.                                                                    

13:41 Lactic Acid (Gray tube on ice) Ordered.                                                 EDMS

13:42 -Blood Culture Ordered.                                                                 EDMS

14:15 -Arterial Blood Gas Reviewed.                                                           br1 

14:15 Chest, 2 View (pa\E\lat) Reviewed.                                                        br1

14:16 -Blood Culture (Adults Only), peripheral from different site, or from device/port/PICC  bcj 

      etc. if present complete.                                                                   

14:20 NS 0.9% 500 ml IV at bolus once ordered.                                                br1 

14:21 BED REQUEST+ADM ordered.                                                                EDMS

14:22 Ondansetron 4 mg IVP once ordered.                                                      br1 

14:23 Call Respiratory complete.                                                              rs6 

14:46 B-Type Natiuretic Peptide Reviewed.                                                     br1 

14:46 Basic Metabolic Profile Reviewed.                                                       br1 

14:46 CBC with Diff Reviewed.                                                                 br1 

14:46 -Influenza A&B Rapid Antigen - Nose Reviewed.                                           br1

14:46 CARDIAC INJURY PROFILE Reviewed.                                                        br1 

14:46 TROPONIN Reviewed.                                                                      br1 

14:59 Financial registration complete.                                                        hs2 

15:05 Lactic Acid (Gray tube on ice) Reviewed.                                                br1 

15:06 CT Chest Without Contrast Ordered.                                                      EDMS

15:06 Recheck B/P ordered.                                                                    br1 

15:06 cefTRIAXone 2 grams IVPB once over 30 mins; dilute in 50mL of NS or D5W ordered.        br1 

15:21 [facility] Payment Agreement was scanned into [employer] and attached to record.               hs2 

16:47 Admission / Observation Status ordered.                                                 EDMS

16:47 CONSISTENT CARBOHYDRATES ordered.                                                       EDMS

16:49 URINALYSIS Ordered.                                                                     EDMS

16:49 BLOOD CULTURES Ordered.                                                                 EDMS

16:49 BLOOD CULTURES Ordered.                                                                 EDMS

16:51 GASTROINTESTINAL (GI) PANEL Ordered.                                                    EDMS

17:10 SPUTUM CULTURE AND GRAM STAIN Ordered.                                                  EDMS

17:32 CARDIAC MARKER PANEL Ordered.                                                           EDMS

17:34 LACTIC ACID LEVEL, LACTATE Ordered.                                                     EDMS

21:24 T-Sheet-- Draft Copy was scanned into [employer] and attached to record.                   klr 

                                                                                                  

Administered Medications:                                                                         

13:30 Drug: Albuterol-Ipratropium 1 neb [ipratropium-albuterol 0.5 mg-3 mg(2.5 mg base)/3 mL  js11

      nebulization soln (1 neb)] Route: Nebulizer;                                                

13:50 Drug: Albuterol-Ipratropium 1 neb [ipratropium-albuterol 0.5 mg-3 mg(2.5 mg base)/3 mL  js11

      nebulization soln (1 neb)] Route: Nebulizer;                                                

14:01 Drug: Albuterol-Ipratropium 1 neb [ipratropium-albuterol 0.5 mg-3 mg(2.5 mg base)/3 mL  js11

      nebulization soln (1 neb)] Route: Nebulizer;                                                

14:16 Drug: Solu-MEDROL 125 mg [Solu-Medrol 500 mg intravenous solution (125 mg)] Route: IVP; bcj 

      Site: right forearm;                                                                        

14:55 Drug: NS 0.9% 500 ml [sodium chloride 0.9 % intravenous solution] Route: IV; Rate:      bcj 

      bolus; Site: right forearm;                                                                 

15:12 Drug: Ondansetron 4 mg [ondansetron HCl 2 mg/mL intravenous solution (2 mL)] Route:     jmb 

      IVP; Site: left forearm;                                                                    

15:38 Drug: cefTRIAXone 2 grams [ceftriaxone 1 gram solution for injection] Route: IVPB;      jmb 

      Infused Over: 30 mins; Site: left forearm;                                                  

                                                                                                  

                                                                                                  

Signatures:                                                                                       

Dispatcher [employer]                           EDMS                                                 

Mark Ta, RN                      RN   Trent Rothman MD MD   br1                                                  

Tony Cardona RN                        RN   [email or web address]                                                  

Leidy Nino RN                    RN   sls2                                                 

Ivana Ace, PCA                   PCA  rs6                                                  

Roxann Peña, Reg                   Reg  hs2                                                  

TarunJessie andrea                               klr                                                  

Bill Jones                               js11                                                 

                                                                                                  

The chart was reviewed and I authenticate all verbal orders and agree with the evaluation and 
treatment provided.Corrections: (The following items were deleted from the chart)

13:15 13:12 CARDIAC INJURY PROFILE+LAB ordered. EDMS                                          EDMS

13:15 13:12 TROPONIN+LAB ordered. EDMS                                                        EDMS

16:51 16:49 GASTROINTESTINAL (GI) PANEL ordered. EDMS                                         EDMS

                                                                                                  

Attachments:                                                                                      

15:21 [facility] Payment Agreement                                                                hs2 

21:24 T-Sheet-- Draft Copy                                                                    klr 

                                                                                                  

**************************************************************************************************



*** Chart Complete ***
MTDD

## 2016-11-27 NOTE — PHACANCOPD
PHARMACY VANCOMYCIN DOSING


Pt Demographics


Demographics


Patient Age:66 , Weight:165.300  , Gender: female


Adjusted Body Weight


Date: 11/25/16, Adjusted Body Weight: [100] Kg





Events Past 24 Hours


Events Past 24 Hours:  YES: Change in CrCl, 


   NO: Dialysis, Diuretic Therapy, Elevation in WBC, Fever, Other, Pending 

Diagnostics, Pending Procedures





Vancomycin


Vancomycin indication:  elevated lactic acid with severe hypotension


Vancomycin Target Ranges:  15-20 mcg/ml


Vancomycin Load Y/N:  No


Load Dose Date Time


Vancomycin Load Dose:         Date:         Time:


Vancomycin Dose


Date: 11/25/16. Current Vancomycin Dose: [1g IV q8h @21]


Intermittent Dosing?:  No





Labs


Labs











Item Value  Date Time


 


White Blood Count 6.9 K/mm3 11/26/16 0551


 


White Blood Count 8.8 K/mm3 11/26/16 1208


 


White Blood Count 8.9 K/mm3 11/27/16 0519


 


Vancomycin Level Trough 21.7 UG/ML H 11/27/16 1200


 


Creatinine 1.04 MG/DL H 11/26/16 0551


 


Creatinine 1.00 MG/DL 11/27/16 0519


 


Creatinine 1.22 MG/DL H 11/25/16 1408








Micro





 Microbiology


11/25/16 Blood Culture - Preliminary, Resulted


           No growth after 24 hours . All specim...


11/25/16 Blood Culture - Preliminary, Resulted


           No growth after 24 hours . All specim...


11/25/16 Blood Culture - Preliminary, Resulted


           


11/25/16 Influenza Virus Type A Antigen - Final, Complete


           


11/25/16 Influenza Virus Type B Antigen - Final, Complete


Creatinine Clearance


Date:11/25/16. Creatinine Clearance: [70 ml/min using adjusted BW].





Assessment and Plan


Maintaining Current Dose?:  Yes


Reason for dose change:  Trough too low, Other





Pharmacist Note


Pharmacist Note


Date: 11/27/16. Pharmacist note: vanco trough was drawn on time 1 hr before the 

dose and came back at 21.7. I will hold the 13:00 dose and resume 1g q8h dosing 

as her renal function has improved since admission. Blood cultures are 1/3 + 

for gram positive cocci in cl, influenza negative. No other cultures pending. 

We will continue to monitor. 





Date: 11/25/16. Pharmacist note: pt has had increasing SOB with hypotension, 

also Hx of CHF and COPD. She does not have a MRSA or relevant past culture Hx. 

She was last on vancomycin at our facility in February 2016, based on that 

dosing I will continue 1g IV q8h without a load due to her current SCr. I will 

continue to monitor over the weekend and order a trough as needed








Randy Mitchell Pharm.D. Nov 27, 2016 13:04

## 2016-11-28 NOTE — REP
TWO VIEW CHEST:

 

Two views of the chest are performed status post left thoracentesis.  There is no

pneumothorax.  There is bibasilar fibroatelectatic change.  There is

cardiomegaly.  There is calcification and tortuosity of the thoracic aorta.

 

IMPRESSION:

 

No pneumothorax status post left thoracentesis.

 

 

Signed by

Donn Kuo MD 11/28/2016 04:45 P

## 2016-11-28 NOTE — REP
ULTRASOUND GUIDED LEFT THORACENTESIS:

 

The procedure was performed under the direct supervision of Dr. Kuo.

 

The risks and benefits of the procedure were explained to the patient and

informed consent was obtained.

 

The left pleural effusion was localized using ultrasound guidance. The skin was

prepped and draped in a sterile fashion. 1% lidocaine was used as a local

anesthetic. An #8-Irish multisidehole catheter was inserted using trocar

technique. 610 mL of jerson colored fluid was withdrawn with a sample sent to the

lab for analysis.

 

The patient tolerated the procedure well and there were no immediate

complications.

 

 

 

 

Reviewed by

TEDDY Yung 11/29/2016 09:40 AEdited and Signed by

Donn Kuo MD 11/29/2016 03:31 P

## 2016-11-28 NOTE — IPNPDOC
Text Note


Date of Service


The patient was seen on 11/28/16 at 12:36.





NOTE:


Subjective:


Patient is a 66 year old female with a PMHx of CHF (ECHO 2/2016 with normal-

near normal EF, Grade 2 Diastolic dysfunction), COPD (on home O2 at 3 liters), 

DM2, DLP, HTN, atrial fibrillation (on anticoagulation), history of left 

pleural effusion (small to moderate) who presented to the ER with complaints of 

shortness of breath. She notes that she has been requiring more oxygen at home. 

She notes that she is coughing, but non-productive, no sputum. She denies fever 

or chills. She denies any leg swelling. She also noted that she has been 

experiencing some diarrhea, with 3-4 episodes daily in the last few days. She 

notes the diarrhea is loose, but no blood was noted. 





In the ER patient was found to be dyspneic with RR of 24, hypotensive with BP 

of 80/39. She recieve IV fluid bolus of normal saline and her blood pressure 

improved to SBP of 100s. She had an elevated lactic acid in the ER, so she has 

been continued with IV fluids and started on broad spectrum antibiotics (

Vancomycin and Zosyn). 





Patient has been seen and examined at the bedside. She was seen after her 

thoracentesis, she notes her breathing is doing a better than admission. She is 

concerned about her leg swelling. 





Objective:


Vitals (See below)


General: Sitting up in bed, no acute distress, AAOx3


HEENT: NC, AT


CVS: Irregularly irregular, +S1S2


Lungs: Poor respiratory effort, Decreased lung sounds at left lung base, No 

wheezing / rhonchi / rales appreciated


Abdomen: Soft, ND, NT, +BSx4


Extremities: +PPx4, No edema, no calf tenderness





Assessment and plan:


1. Sepsis - possibly 2/2 pneumonia (CAP), possible gastrointestinal etiology


- Symptomatic improvement in dyspnea, cough is still present


- Oxygen requirements went back up to 4L (from baseline of 3L)


- CT chest 11/25: Moderate left effusion with basilar and lingular atelectasis. 

Pulmonary vascular congestion. Ascites


- Lactic acid has normalized 


- Blood cultures from ER 11/25 - Gram positive cocci in clusters; Repeat blood 

cultures on floor 11/25 - Negative; Influenza negative


- s/p IV fluids 


- s/p Thoracentesis; removed 610 cc of fluid; appears transudative; awaiting 

gram stain and culture 


- Will continue with broad spectrum antibiotics (Vancomycin and Zosyn) - Day #4





2. Normocytic anemia


- Hg baseline of 10-11


- Reticulocyte index of 0.9 - Hypoproliferative 


- Vitamin B12 and Foalte normal; Iron panel shows iron deficiency anemia 


- c/w Ferrous sulfate 325 BID





3. COPD


- no evidence of COPD exacerbation


- physical exam does not reveal any wheezing


- will continue with inhaled therapy from home and duoneb PRN





4. Diastolic CHF, 


- ECHO 2/2015 - with normal / near-normal EF, Grade 2 diastolic dysfunction


- CT chest shows evidence of worsening effusion and vascular congestion


- Currently does not appear to have fluid overload on physical exam


- s/p IV fluids


- Restarted lasix today at home dose; 80 am and 40 pm  





5. NIDDM2


- c/w insulin sliding scale


- will add Levemir if glucose remains uncontrolled





6. DLP


- c/w atorvastatin





7. HTN


- BP has been low in the ER and currently remains in the low 100s


- will restart Lisinopril 2.5 with holding parameters 





8. SINTIA - not on CPAP


- Will go for repeat sleep study in December for CPAP





9. History of MI 


- c/w ASA, Atorvastatin


- Metoprolol dose has been decreased (with holding parameters) 


- Lisinopril have been held (re: Hypotension)





10. Atrial fibrillation


- has been on rate control as an outpatient with metoprolol 25mg PO BID


- Metoprolol dose has been continued at 12.5 mg PO BID (with holding parameters

) 


- restarted Pradaxa (re: thoracentesis - held for 2 doses)





11. GI prophylaxis


- c/w protonix 





12. DVT prophylaxis


- restarted pradaxa





VS,Fishbone, I+O


VS, Fishbone, I+O


 Laboratory Tests


11/28/16 05:39








Calcium Level 8.1 L, Red Blood Count 3.20 L, Mean Corpuscular Volume 90.1, Mean 

Corpuscular Hemoglobin 25.9 L, Mean Corpuscular Hemoglobin Concent 28.8 L, Red 

Cell Distribution Width 16.5 H








 Vital Signs








  Date Time  Temp Pulse Resp B/P Pulse Ox O2 Delivery O2 Flow Rate FiO2


 


11/28/16 09:59  90  121/65    


 


11/28/16 09:39     91 Nasal Cannula 4.0 


 


11/28/16 04:34 97.5  20     














 I&O- Last 24 Hours up to 6 AM 


 


 11/28/16





 06:00


 


Intake Total 1250 ml


 


Output Total 1075 ml


 


Balance 175 ml














LATOYA WREN MD Nov 28, 2016 12:48

## 2016-11-29 NOTE — REP
PORTABLE CHEST X-RAY:  SINGLE VIEW.

 

History:  Hypoxia.  Status post thoracentesis.

 

Comparison study 28 November 2016.

 

Findings:  Moderate cardiomegaly is again observed unchanged.  There is hazy

opacity in the left base and the left hemidiaphragm is indistinct.  The

radiograph is poorly penetrated due to patient body habitus.  There is no

evidence of pneumothorax or new infiltrate.

 

 

Signed by

Corona He MD 11/29/2016 12:34 P

## 2016-11-29 NOTE — ECGEPIP
Stationary ECG Study

                              [facility]

                                       

                                       Test Date:    2016

Pat Name:     NGHIA HUI              Department:   

Patient ID:   [id number]                 Room:         [place] [address]

Gender:       F                        Technician:   EDY

:          1950               Requested By: GUERLINE ALAN

Order Number: [id number]     Reading MD:   David Johnson

                                 Measurements

Intervals                              Axis          

Rate:         105                      P:            

SC:           0                        QRS:          77

QRSD:         94                       T:            -69

QT:           294                                    

QTc:          389                                    

                           Interpretive Statements

Atrial fibrillation with controlled ventricular response

Incomplete RBBB

Low QRS complex voltage in the precordial leads

Nonspecific ST-T wave abnormalities

Q in III of uncertain significance

No significant change when compared to prior tracing of 2016

Electronically Signed On 2016 22:47:14 EST by David Johnson

## 2016-11-29 NOTE — IPN
DATE:  11/29/2016

 

Patient is seen and examined at the bedside.  Chart has been reviewed.  Upon

returning from the bathroom, the patient became more dyspneic.  Currently

saturating 92% on 4 liters nasal cannula.  Denies any chest pain, pressure or

tightness, nausea, vomiting, epigastric discomfort, fever or chills.  Telemetry

shows three beats of nonsustained V-tach.  Potassium level is normal.  Magnesium

was 2.1 yesterday.  Magnesium today is pending.  No other issues aside from

chronic lower extremity edema, which is unchanged from yesterday.  The patient

denies dizziness or lightheadedness.  Blood pressure had been running in the 90s
,

systolic to about 110.

 

Temperature 96.3, pulse 94, respiratory rate 22, blood pressure 110/60, 92% on 4

liters nasal cannula.

Generally, patient is in no distress, able to complete full sentences by using

respiratory accessory muscles.

Lungs diminished breath sounds, no wheezing or rales.

Heart S1 and S2, irregular.

Abdomen is soft, nontender, nondistended.  Morbidly obese.  Positive bowel 
sounds

times four quadrants.

Extremities no cyanosis or clubbing.

 

LABORATORY DATA:  CBC and metabolic panel have been reviewed.

 

ASSESSMENT/PLAN:  This is a 66-year-old female with a history of congestive 
heart

failure (CHF), grade 2 diastolic dysfunction, normal systolic function, COPD on

chronic home oxygen at 3 liters nasal cannula at all times, type 2 diabetes,

dyslipidemia, hypertension, atrial fibrillation, history of left pleural

effusion, small to moderate being worked up for obstructive sleep apnea (SINTIA)

with planned sleep apnea study as an outpatient, who presented with worsening

shortness of breath, cough which is non productive without sputum, fever or

chills.

 

IMPRESSION:

1.  Community acquired pneumonia.  Currently on vancomycin and Zosyn, changed to

ceftriaxone today.  Blood culture showed gram positive cocci in clusters.  
Repeat

cultures were negative.  Influenza is negative.  Thoracentesis removed 610 mL of

fluid, which was transudative.

 

2.  Normocytic anemia.  Hemoglobin of 10-12.  Hypoproliferative retic index 0.9,

B12 and folate are normal.

 

3.  Iron deficiency.  Continue with ferrous sulfate.

 

4.  COPD.  No exacerbation.  No wheezing on exam.  Continue with inhaled therapy

and DuoNebs.

 

5.  Diastolic heart failure.  CT shows worsening effusion, vascular congestion

and the patient was restarted on her home dose of Lasix.  If blood pressure

permits, will change to IV Lasix for better diuresis.

 

6.  Nonsustained V-tach.  Obtain an echo.  Will check potassium and magnesium.

 

7.  Hypertension.  Currently with systolic pressure in the 90s.  Will 
discontinue

lisinopril for now, and decrease metoprolol dose.

 

8.  Chronic hypoxic respiratory failure.  At home dose of oxygen.

 

9.  Irregular rhythm with PACs.  Obtain a 12 lead EKG.

 

10.  Obesity.  Rule out sleep apnea.  Continue with home oxygen.

MTDD

## 2016-11-29 NOTE — ECHO
DATE OF PROCEDURE: 11/29/2016

 

REFERRING PHYSICIAN: Jaz Otero MD

 

INDICATION: Nonsustained ventricular tachycardia.

 

HEIGHT: 170 cm

WEIGHT: 169 kg.

 

DIMENSIONS:

IVS: 1.4

LV: 5.6

LVPW: 1.4

LA: 4.6

Aorta: 2.8

 

FINDINGS: Study is of very limited technical quality.  The patient is morbidly

obese with body mass index 58. Left ventricle is on upper limits of normal size.

There is mild to moderate left ventricular hypertrophy.  Based on very limited

views overall left ventricular systolic function is going to be probably mildly

reduced.  Right ventricle appears enlarged, but it was very poorly seen.  Both

atria are likely severely enlarged.  Aortic valve is sclerotic, I cannot comment

much on its structure.  There is also some thickening of mitral leaflets and

mitral annular calcifications, but leaflet mobility seems to be grossly

preserved.  Tricuspid valve appears grossly normal, again, based on very limited

visualization.  Pulmonic valve was not well seen.  Pericardial fat pad is noted.

Inferior vena cava was not visualized.  Aortic root is normal.  Aortic arch and

abdominal aorta were not well seen.

 

Doppler interrogation of aortic valve reveals no stenosis and trivial

insufficiency.  There is likely some degree of mitral insufficiency possibly due

to posterior mitral leaflet prolapse with mitral regurgitation (MR) jet oriented

towards the atrial surface of anterior mitral leaflet.  I cannot reliably assess

the severity but it does not look worse than maybe mild or mild-to-moderate.

Tricuspid valve and pulmonic valve were poorly seen and even though there is 
some

degree of tricuspid insufficiency, I am unable to estimate pulmonary artery

pressure.

 

Mitral inflow pattern and tissue Doppler imaging revealed grade 2 diastolic

dysfunction.

 

CONCLUSIONS:

1.  Study is of very limited technical quality.

2.  Borderline dilated left ventricle with mild to moderate left ventricular

hypertrophy (LVH) and overall at least mildly reduced left ventricle (LV)

systolic function.

3.  Dilated right ventricle.

4.  Aortic sclerosis but no significant stenosis or insufficiency.

5.  Some degree of mitral insufficiency, probably not more than

mild-to-moderate.

6.  Unable to estimate central venous pressure and pulmonary artery pressure.

 

COMMENT:

Subacute bacterial endocarditis (SBE) prophylaxis is not recommended.  Overall

very limited echocardiogram.

[facility]

## 2016-11-30 NOTE — IPN
DATE:  2016

 

Patient is seen and examined at the bedside.  Chart has been reviewed.  Patient

denies any dizziness, lightheadedness.  No chest pain, pressure, or tightness.

She is still short of breath, especially with ambulation and desaturates to 88%

and currently requiring 4 liters of oxygen.  Repeat xray does not show pulmonary

edema or any changes in the infiltrates, there is hazy opacity at the left base

and left hemidiaphragm is indistinct, no pneumothorax and no new infiltrate has

been seen.  There is no fever or chills overnight.  Patient's white count has

been normal for the past 4 days.  Input and output has been net negative for the

past 3 days.  Current weight, however, is 167.5 kg, with admission weight of

158.9 kg.  Patient is currently in a fluid restriction.  Complains of 
generalized

weakness, unable to ambulate due to worsening shortness of breath.

 

Temperature 96.8, pulse 89, respiratory rate 22, blood pressure 111/52, 97% on 4

liters nasal cannula.

GENERALLY:  Patient is awake, alert, oriented times three.  Answering questions

appropriately.  She has poor dentition with missing teeth.  No jugular venous

distention.  Moist mucous membranes.

LUNGS:  Diminished with bilateral crackles at the bases.

HEART:  S1, S2, irregularly irregular.

ABDOMEN:  Soft, nontender, nondistended.  Positive bowel sounds.

EXTREMITIES:  2+ pitting edema.

 

CBC and metabolic panel have been reviewed.

 

ASSESSMENT AND PLAN:  This is a 66-year-old female with history of chronic

obstructive pulmonary disease (COPD), congestive heart failure (CHF) grade 2

diastolic dysfunction with normal systolic function, on chronic 3 liters of

oxygen with hypoxic respiratory failure, type 2 diabetes, dyslipidemia,

hypertension, history of left pleural effusions, mild to moderate, being worked

up for obstructive sleep apnea as outpatient, presented with worsening shortness

of breath, cough which is nonproductive without sputum, fever, or chills.

Patient is currently being treated for the followin.  Community-acquired pneumonia.  Patient has received vancomycin and Zosyn.

Patient has no sputum culture.  She is currently switched over to ceftriaxone

with no fever, chills, or worsening white count.

 

2.  Congestive heart failure (CHF), diastolic dysfunction.  Patient appears to 
be

much more hypoxic which is most likely due to community-acquired pneumonia,

however she does have pitting edema.  We will change to intravenous (IV) Lasix

for better diuresis at 20 mg with holding parameters for systolic pressure less

than 110.

 

3.  Nonsustained ventricular tachycardia.  Potassium and magnesium are within

normal limits, supplement if needed.  Echocardiogram read by Dr. Fishman shows

some degree of mitral insufficiency, not more than mild to moderate, dilated

right ventricle.

 

4.  Morbid obesity.  Rule out obstructive sleep apnea as outpatient.

 

5.  Iron deficiency anemia.  Hemoglobin is stable.  Continue with ferrous 
sulfate

for now.

 

6.  Hypertension.  We have had to discontinue lisinopril and metoprolol 
secondary

to low blood pressure in the 90s.  Will discontinue patient's orthostatics as 
she

does not appear to be dehydrated.

 

7.  Chronic hypoxic respiratory failure.  Currently on 4 liters nasal cannula,

home dose is 3 liters.  Will continue with diuresis until patient is much more

euvolemic.  Patient may be transferred to medical/surgical floor as she is

medically stable.  We will change to oral antibiotics at discharge.  Continue to

work with physical therapy.  She is currently not safe for discharge as of 
today.

Patient will require continued diuresis at least for the next 4 days until she 
is

euvolemic and back to her baseline 3 liters of home oxygen.

MTDD

## 2016-12-01 NOTE — IPN
DATE:  12/01/2016

 

Patient is seen and examined at the bedside.  Chart has been reviewed.  This

morning, patient still complains of increasing shortness of breath with

ambulation.  Denies any paroxysmal nocturnal dyspnea (PMD) or orthopnea.  She

chronic two pillow orthopnea and has chronic lower extremity edema.  Patient has

been diuresing well, 3.7 liters last night, negative 2.6 liters overnight.

Current weight is 161 kg from a peak weight of 168.5 kg on intravenous Lasix.

She denies any lightheadedness, dizziness this morning and has no other

complaints.

 

VITALS:  Temperature 97, pulse 101, respiratory 20, blood pressure 128/61, 89% 
on

4 liters nasal cannula.

General:  Awake, alert, oriented times three, answering questions appropriately.

No use of respiratory accessory muscles.  Able to complete full sentences.  She

has poor dentition with missing teeth.  No jugular venous distention.  Moist

mucous membranes.

Lungs:  Diminished breath sounds bilaterally with fine crackles at the bases.

Heart:  S1, S2.  Irregular.

Abdomen:  Obese, soft, nontender, nondistended.  Positive bowel sounds times 
four

quadrants.  No hepatosplenomegaly, rebound or guarding.

Extremities:  2+ pitting edema.

 

12/01 CBC, metabolic panel have been reviewed.  Creatinine is notable 1.03.  BNP

was 240.

 

ASSESSMENT AND PLAN:  This is a 66-year-old female with history of Chronic

obstructive pulmonary disease (COPD), congestive heart failure (CHF) grade 2

diastolic dysfunction with normal systolic function on chronic 3 liters of 
oxygen

with chronic hypoxic respiratory failure, type 2 diabetes, dyslipidemia,

hypertension, history of left pleural effusion mild to moderate, being worked up

for obstructive sleep apnea, presented with shortness of breath, cough which was

nonproductive without sputum, fever or chills.

 

CURRENT ISSUES:

1.  Acute on chronic hypoxic respiratory failure.  Patient's baseline oxygen

requirement is 3 liters, currently at 4 liters.  She is currently being diuresed

with intravenous Lasix to net negative with a 1.8 fluid restriction according to

the patient per Dr. Fishman.  She was initially placed on 1.5 liter fluid

restriction and increased to 1.8 eventually.  Patient's dry weight is usually 
324

pounds.  She is currently still hypoxic at 89% via nasal cannula.  She appears 
to

be diuresing well with weight loss from 167.5 kg to 161.6 kg.

 

2.  Congestive heart failure (CHF), diastolic dysfunction, appears to be much

more hypoxic, most likely secondary to community acquired pneumonia but with

persistent pitting edema, patient has been changed to IV Lasix with better

diuresis.  Holding parameters for systolic pressure less than 110.

 

3.  Nonsustained ventricular tachycardia, three beats on telemetry 4 days ago.

Echocardiogram shows mild mitral insufficiency, not more than mild to moderate

dilated right ventricle.  Potassium and magnesium are within normal limits.

Supplement them if needed.

 

4.  Community acquired pneumonia.  Patient had initially received broad spectrum

antibiotics with vancomycin and Zosyn.  Sputum culture negative.  Blood culture

showed staphylococcus capitis with repeat blood culture being negative.  Patient

is currently on ceftriaxone with no fever or chills and stable white count.

 

5.  Hypertension.  Patient's lisinopril and metoprolol have been discontinued 
due

to low blood pressure in the 90s.  Patient's orthostatics were discontinued as

she appears to not be dehydrated.  Will resume low dose metoprolol for better

rate control.

 

6.  Morbid obesity, probable obstructive sleep apnea.  Patient will need a 
formal

sleep study as outpatient.  Continue with supplemental oxygen for now.

 

7.  Acute on chronic hypoxic respiratory failure, currently on 4 liters of

oxygen, previously on 3 liters most likely secondary to community acquired

pneumonia.  Continue with antibiotics for now.  Patient was medically stable on

medical/surgical floor, not safe for discharge as yet and has not passed a home

safety evaluation.

 

DISPOSITION: Possible discharge over the weekend.  Patient will need a few more

days of diuresis, monitor for worsening symptoms.

 

Anemia.  No acute indication for red blood cell transfusion.  If patient has

persistent hemoglobin less than 8, will transfuse 1-2 units of blood.  She

appears to have anemia of chronic disease.  Stool for blood is still pending.

Will start on iron supplements and vitamin C.

[facility]

## 2016-12-02 NOTE — IPN
DATE:  12/02/2016

 

The patient had a coughing fit this morning. States that she felt nauseous

afterwards and received Zofran. She continues to have dyspnea on exertion.

Chronic lower extremity edema is unchanged despite the diuresis net negative for

the past four days. The patient denies dizziness or lightheadedness. She also

complains of constipation and has not had a bowel movement without difficulty 
for

at least 3 days. She has had two bowel movements yesterday, very difficulty to

get out and requested a stool softener.

 

Vitals: Temperature 97.1, pulse 91, respiratory rate 18, blood pressure 137/.61,

90% 4 liters nasal cannula.

Generally, the patient is awake, alert, oriented times three answering questions

appropriately. Speaks in full sentences. No cyanosis. No use of respiratory

accessory muscles.

Has poor dentition with missing teeth. No jugular venous distention. Moist 
mucous

membranes.

Lungs are clear to auscultation. No wheezing rales or rhonchi.

Heart: S1, S2. Irregular.

Abdomen is obese, soft, nontender, nondistended, positive bowel sounds in all

four quadrants. No hepatosplenomegaly, rebound or guarding.

Extremities: 2+ pitting edema, unchanged.

 

LAB DATA: CBC, metabolic panel have been reviewed. Microbiology has been

reviewed.

 

ASSESSMENT/PLAN: This is a 66-year-old female with a history COPD, congestive

heart failure, grade 2 diastolic dysfunction with normal systolic function on

chronic 3 liters of home oxygen with chronic hypoxic respiratory failure, type 2

diabetes, dyslipidemia, hypertension, left pleural effusion, mild to moderate

being worked up for sleep apnea as an outpatient, who presented with worsening

shortness of breath, nonproductive cough without sputum, fever or chills.

 

Current Issues:

1.  Acute on chronic hypoxic respiratory failure: The patient's baseline oxygen

requirement is 3 liters, currently at 4 liters. She is currently being diuresed

with intravenous Lasix with a net negative with 1.8 liter fluid restriction,

according to the patient per Dr. Fishman. The patient's dry weight is 324 pounds.

She is still hypoxic. She appeared to be diuresing well and has been net 
negative

for the past 4 days. To continue with diuresis and treatment for pneumonia.

 

2.  Community acquired pneumonia. The patient had initially been treated with

broad spectrum antibiotics with vancomycin and Zosyn. Sputum cultures could not

be obtained as the patient has had a dry cough. Blood cultures showed

Staphylococcus capitus with repeat blood cultures being negative. She has had no

fever, no chills and a stable white count. She is currently on IV ceftriaxone

with improvement, still hypoxic. Will continue with nebulizer treatments and her

Spiriva for now.

 

3.  Congestive heart failure diastolic dysfunction: She appears to be more

hypoxic but most likely secondary to pneumonia, but with persistent edema. The

patient appears to be euvolemic with no signs of pulmonary edema on chest x-ray.

The patient has been changed to IV Lasix for better diuresis with holding

parameters for systolic pressure less than 110.

 

4.  Hypertension: Lisinopril and metoprolol have been discontinued to prior 
blood

pressure in the 90s systolic. The patient is orthostatic. Will discontinue this

as she does not appear to be dehydrated. The patient appears to be slightly more

tachycardic. She has been receiving low dose of pressor 12.5 mg by mouth twice a

day.

 

5.  Obstructive sleep apnea, probable: The patient is waiting a sleep study

scheduled for Thursday next week. The patient may be discharged either Monday or

Tuesday. Will continue diuresis over the weekend. She will need to reschedule 
her

sleep study exam.

 

6.  History of coronary artery disease status post two stents. She is resumed on

low dose metoprolol at a reduced dose due to diuresis. Will resume low dose

lisinopril at discharge.

 

7.  History of ischemic bowel: The patient is continued on aspirin and Pradaxa.

MTDD

## 2016-12-04 NOTE — IPN
DATE:  12/03/2016

 

The patient is seen and examined at the bedside. Chart has been reviewed.

 

This morning, the patient still continues to complain of severe fatigue, dyspnea

on exertion as she walks around the room, into the bathroom. She was afebrile. No

cough. No chest pain, pressure or tightness. Chronic lower extremity edema

despite adequate diuresis. The patient has been net negative balance for the past

5 days with improvement in weight from a peak weight of 168.5 kg to current

weight of 163.9 kg. The patient's blood pressure is well maintained at 119

systolic, 113-124 systolic. No other issues per nursing.

 

Temperature 98, pulse 94, respiratory rate 18, blood pressure 119/59, 96% on 4

liters nasal cannula.

Generally, patient is awake, alert, oriented times three, answering questions

appropriately. No respiratory distress, use of accessory muscles, able to

complete full sentences.

She has poor dentition with missing teeth.

No jugular venous distention.

Lungs are clear to auscultation. No wheezing, rales or rhonchi.

Heart: S1, S2, regular.

Abdomen is obese, soft, nontender, nondistended. Positive bowel sounds in all

four quadrants. No hepatosplenomegaly, rebound or guarding.

Extremities: Improved 2+ pitting edema.

 

LABORATORY DATA:

CBC, metabolic panel, imaging studies and microbiology have been reviewed.

 

ASSESSMENT AND PLAN:

This is a 66-year-old female with a history of chronic obstructive pulmonary

disease (COPD), morbid obesity, congestive heart failure, grade 2 diastolic

dysfunction, normal systolic function, with chronic hypoxic respiratory failure -

on 3 liters of home oxygen, type 2 diabetes, dyslipidemia, hypertension, left

pleural effusion, mild to moderate, being worked up for sleep apnea as an

outpatient, presented with worsening shortness of breath, nonproductive cough

with sputum, fevers, chills, admitted for acute on chronic hypoxic respiratory

failure secondary to community-acquired pneumonia with chronic lower extremity

edema.

 

Current Issues:

 

1. Acute on chronic hypoxic respiratory failure secondary to pneumonia. Currently

on IV antibiotics and supplemental oxygen. Currently at 4 liters, from a baseline

of 3 liters. She is currently being diuresed with IV Lasix with net negative

balance for the past 5 days. The patient is usually on 1-1/2 to 1.8 liters fluid

restriction, follows with Dr. Fishman as an outpatient.

 

2. Community-acquired pneumonia. The patient was initially treated with

broad-spectrum antibiotics with vancomycin and Zosyn. Sputum cultures could not

be obtained as the patient has had a dry cough. Blood culture showed

Staphylococcus capitis with repeat blood cultures being negative. She has had no

fever, chills and stable white count. Currently on IV ceftriaxone with some

improvement. Still hypoxic, however; will continue on nebulizer treatment and her

Spiriva for now.

 

3. Congestive heart failure, diastolic dysfunction. She appears to be more

hypoxic. Most likely secondary to pneumonia but with persistent lower extremity

edema. The patient appears to be euvolemic with no signs of pulmonary edema on

chest x-ray or clinically as her lungs are clear. The patient has been changed to

IV Lasix for better diuresis with holding parameters for systolic pressure less

than 110.

 

4. Hypertension. Lisinopril and metoprolol have been discontinued due to blood

pressure in the 90s and orthostasis. Currently does not appear to be dehydrated.

We will continue Lasix diuresis. Once the patient is euvolemic, we may restart

lower doses of her lisinopril and metoprolol.

 

5. Obstructive sleep apnea probable. Awaiting sleep study. Scheduled for Thursday

next week. The patient may be discharged either on Monday or Tuesday. Will

continue with Lasix diuresis over the weekend, and she will need to reschedule

her sleep study exam for the following week.

 

6. History of coronary artery disease, status post two stents. We will resume low

dose metoprolol and lisinopril once diuresis is discontinued and patient's

hypoxia is improved.

 

7. History of ischemic bowel. Continue aspirin and Pradaxa.

## 2016-12-04 NOTE — REP
PORTABLE CHEST:

 

Portable view of the chest is performed and compared to prior study of

11/29/2016.  There is again cardiomegaly.  Bibasilar atelectasis/infiltrate has

slightly worsened on the right, but is unchanged on the left.  Cardiomediastinal

silhouette is unchanged.  There is some degree of vascular congestion which is

stable.

 

IMPRESSION:

 

Relatively stable exam as discussed in detail above.  There does appear to be

mild increase in atelectatic change in the right lung base.

 

 

Signed by

Donn Kuo MD 12/04/2016 05:45 P

## 2016-12-05 NOTE — IPN
DATE:  12/04/2016

 

Patient is seen and examined at the bedside.  Chart has been reviewed.

 

Patient complains of persistent shortness of breath and dizziness on exertion,

which is unchanged by net negative diuresis.  Repeat chest x-ray, 11/29/2016,

shows no pneumothorax.  Patient complained of severe back pain yesterday

alleviated with some Tylenol.  No other issues per nursing overnight.

 

Vital signs:  Temperature 97.4, pulse 94, respiratory rate 22, blood pressure

116/69, 92% on 4 liters nasal cannula.

Generally, patient is awake, alert, oriented times three, answering questions

appropriately, speaks in full sentences.   No cyanosis.  No use of accessory

respiratory muscles.

Has poor dentition with missing teeth. No jugular venous distention.  Moist

mucous membranes.

Lungs are clear to auscultation.  No wheezing, rales or rhonchi.

Heart:  S1, S2, irregular.

Abdomen is soft, nontender, nondistended.  Positive bowel sounds in all four

quadrants.  No hepatosplenomegaly, rebound or guarding.

Extremities:  2+ pitting edema unchanged.

 

LABORATORY DATA:  CBC, metabolic panel, microbiology, imaging studies have been

reviewed.

 

ASSESSMENT AND PLAN:  This is a 66-year-old female with a history of chronic

obstructive pulmonary disease (COPD), congestive heart failure (CHF), grade 2

diastolic dysfunction with normal systolic function on chronic 3 liters of home

oxygen with chronic hypoxic respiratory failure, type 2 diabetes, dyslipidemia,

hypertension, left pleural effusion status post thoracentesis  with 600 mL

removed, being worked up for sleep apnea as outpatient, presented with worsening

shortness of breath, nonproductive cough without sputum, fevers or chills.

 

IMPRESSION:

1.  Acute on chronic hypoxic respiratory failure.  Baseline oxygen requirement is

3 liters nasal cannula, currently 4 liters.  Currently being diuresed with IV

Lasix and net negative balance for the past 6 days with 1.8 fluid restriction

according to the patient per Dr. Fishman.  Patient's dry weight today 124 pounds.

Currently still hypoxic, appears to be diuresing well and has been net negative

for the past 6 days.  Patient is currently nebulizer treatments.

 

2.  Continue with diuresing treatment for pneumonia.  Will evaluate.  Will repeat

chest x-ray and CT chest.  Rule out pulmonary embolism.

 

3.  Community-acquired pneumonia.  Currently, patient was initially treated with

broad-spectrum antibiotics with vancomycin and Zosyn.  Sputum cultures could not

be obtained as the patient has had a dry cough.  Blood culture showed

Staphylococcus capitis with repeat blood cultures being negative.  No fever or

chills.  Stable white count.  Currently on IV ceftriaxone with some improvement

but still hypoxic.  Nebulizer treatments and Spiriva are continued.

 

3.  Congestive heart failure, diastolic dysfunction.  Appears to be more hypoxic.

Patient will have repeat chest x-ray today and CT chest.

 

4. Hypertension.  Patient is currently receiving IV Lasix.  Blood pressure is

well maintained.

 

5.  History of coronary artery disease, myocardial infarction (MI) in the past,

chronic.

 

6.  Ischemic bowel on chronic Pradaxa, status post colon resection in 2010.

## 2016-12-05 NOTE — IPN
DATE:  12/05/2016

 

Despite adequate diuresis and net negative balance for the past 6 days, patient

continues to have persistent hypoxia with requirement of 4-5 liters of oxygen

from baseline of 3 liters.  She has been on intravenous antibiotics since

admission and has remained afebrile with normal white count.  Patient's anemia 
is

unchanged.  Hemoglobin is above 8 at 8.5 and hematocrit of 29.  Current weight 
is

162.4 kg with a peak weight of 168.5 kg.  Patient complains of thirst, dry lips,

and dry mouth.  She also complains of severe pain in the back which is usually

alleviated by 1 gram of Tylenol 2-3 times daily.

 

Temperature 97.4, pulse 99, respiratory rate 22, blood pressure 132/70, 91% on 4

liters nasal cannula.  Input and output overnight:  Input of 1270, output of

2400, negative 1130.  Current weight is 162.4 kg with a peak weight of 168.5 kg

during this admission.  Admission weight of 148.32 kg.

 

Generally, patient is awake, alert, oriented times three, answering questions

appropriately.  Speaks in full sentences.  No cyanosis.  No use of respiratory 
or

accessory muscles.  Poor dentition with missing teeth.  No jugular venous

distention.  Dry mucous membranes and cracked lips.

Lungs are clear to auscultation.  No wheezing, rales, or rhonchi.

Heart S1, S2, irregular.

Abdomen is soft, nontender, nondistended.  Positive bowel sounds in all four

quadrants.  No hepatosplenomegaly, rebound, or guarding.

Extremities 2+ pitting edema.

 

LABORATORY DATA:  CBC, metabolic panel, microbiology, and imaging studies have

been reviewed.  CT chest has no report at the moment.

 

ASSESSMENT AND PLAN:  This is a 66-year-old female with history of chronic

obstructive pulmonary disease (COPD), congestive heart failure (CHF) grade 2

diastolic dysfunction with normal systolic function, on chronic 3 liters of

oxygen at home with chronic hypoxic respiratory failure, type 2 diabetes,

dyslipidemia, chronic back pain, hypertension, left pleural effusion status post

thoracentesis with 600 mL removed, being worked up for sleep apnea as outpatient

with an appointment on Thursday, presents with worsening shortness of breath,

nonproductive cough without sputum, fever, or chills.

 

IMPRESSION:

1.  Acute on chronic hypoxic respiratory failure.  Baseline oxygen requirement 
is

3 liters nasal cannula, currently on 4-5 liters.  She is being treated for

multifactorial etiology including community-acquired pneumonia with intravenous

(IV) antibiotics.  Net negative balance and Lasix with improvement.  Dry weight

is 324 pounds.  Will obtain a CT chest to rule out other etiology.  Patient has

been on nebulizer treatments with clear lungs.  No signs of chronic obstructive

pulmonary disease (COPD) exacerbation.  Patient takes Pradaxa for ischemic bowel
,

unlikely to have pulmonary embolus or deep venous thrombosis (DVT).

 

2.  Community-acquired pneumonia.  Patient was initially on vancomycin and 
Zosyn.

Sputum cultures could not be obtained as the patient has had a dry cough.  Blood

culture showed Staphylococcus capitis.  Repeat blood cultures are negative.  No

fever or chills, stable white count.  Currently on intravenous (IV) ceftriaxone.

Still hypoxic on nebulizer treatment, Spiriva.  Patient may resume her home Breo

Ellipta.

 

3.  Morbid obesity.  Needs outpatient workup for obstructive sleep apnea but may

need to reschedule due to appointment being on Thursday.

 

4.  Hypertension.  Currently on intravenous (IV) Lasix.  Blood pressure is

controlled.

 

5.  Congestive heart failure (CHF) diastolic dysfunction.  Appears to be hypoxic

but no edema.  Appears to be compensated.

 

6.  History of coronary artery disease (CAD) myocardial infarction (MI) in the

past, chronic.

 

7.  Ischemic bowel.  On chronic Pradaxa status post colon resection in 2010.

 

DISPOSITION:  Patient has not passed a home safety evaluation, therefore we will

continue with intravenous (IV) medications for now to optimize.

MTDD

## 2016-12-06 NOTE — IPNPDOC
Assessment/Plan


Date Seen


The patient was seen on 12/6/16.





Problems


Problems:  


(1) Acute and chronic respiratory failure with hypoxia


Status:  Acute


Problem Text:  multifactorial due to CAP and diastolic chf exacerbation and 

fluid


slowly improving with diuresis and antibiotics. 





(2) CAP (community acquired pneumonia)


Status:  Acute


Problem Text:  continue antibiotic ceftriaxone. 





(3) Acute on chronic diastolic CHF (congestive heart failure)


Status:  Acute


Problem Text:  continue diuresis , daily weights. 





(4) Constipation


Status:  Acute


Problem Text:  on bowel regimen 





(5) SINTIA (obstructive sleep apnea)


Status:  Chronic


(6) Morbid obesity


Status:  Chronic


(7) Diabetes


Status:  Chronic


(8) Hypertension


Status:  Chronic


(9) COPD (chronic obstructive pulmonary disease)


Status:  Chronic


Problem Text:  continue home meds.





(10) CAD (coronary artery disease)


Status:  Chronic


(11) Hyperlipidemia


Status:  Chronic


(12) Mitral stenosis and incompetence


Status:  Chronic


(13) Pulmonary hypertension


Status:  Chronic


(14) Afib


Status:  Chronic


Problem Text:  on pradaxa








Plan / VTE


VTE Prophylaxis Ordered?:  Yes





Subjective


CC/HPI


The patient is a 66-year-old female admitted with a reason for visit of Acute 

Respiratory Distress.


Events since last encounter


continues to be SOb requiring more than baseline of oxygen, had severe 

constipation yesterday had to be disimpacted, had some bowel movements after 

that , no fever or chills no chest pain, no abdominal pain , nausea or vomiting.





Objective


General Exam:  : Alert: No Acute Distress


Eye Exam:  : Conjunctiva & lids normal: EOMI: PERRLANo: Sclera icteric


ENT Exam:  : Atraumatic: Mucous membr. moist/pink: Pharynx Normal


Neck Exam:  : SuppleNo: JVD, thyromegaly


Chest Exam:  : Diminished


Heart Exam:  : Normal S1: Normal S2: Rate Normal: Regular Rhythm


Abdomen Exam:  : Normal bowel sounds: SoftNo: Hepatospenomegaly, Tenderness


Extremity Exam:  : Edema


Vital Signs  I&O





 Vital Sign - Last 24 Hours








 12/5/16 12/5/16 12/5/16 12/5/16





 12:37 14:00 18:19 19:53


 


Temp  96.3  


 


Pulse 104 98 101 


 


Resp  22  


 


B/P 138/74 116/61 145/65 


 


Pulse Ox  93  


 


O2 Delivery  Nasal Cannula  Nasal Cannula


 


O2 Flow Rate  4.0  5.0


 


    





 12/5/16 12/5/16 12/6/16 12/6/16





 21:00 22:00 06:00 09:19


 


Temp  98.3 96.9 


 


Pulse  105 96 96


 


Resp  19 17 


 


B/P  152/58 133/65 133/65


 


Pulse Ox  89  


 


O2 Delivery Nasal Cannula   


 


O2 Flow Rate 4.0   


 


    





 12/6/16   





 10:27   


 


O2 Delivery Nasal Cannula   


 


O2 Flow Rate 4.0   














 I&O- Last 24 Hours up to 6 AM 


 


 12/6/16





 05:59


 


Intake Total 1370 ml


 


Output Total 2150 ml


 


Balance -780 ml











Laboratory Data


Labs 24H


 Laboratory Tests 2


12/5/16 11:51: Bedside Glucose (Misc Panel) 118H


12/5/16 17:12: Bedside Glucose (Misc Panel) 156H


12/6/16 06:19: 


Anion Gap 8, Blood Urea Nitrogen 18, Creatinine 1.02, Sodium Level 139, 

Potassium Level 4.9, Chloride Level 100, Carbon Dioxide Level 31, Calcium Level 

8.7L, Glomerular Filtration Rate 57.7


CBC/BMP


 Laboratory Tests


12/6/16 06:19








Calcium Level 8.7 L


FSBS





Laboratory Tests








Test


  12/5/16


11:51 12/5/16


17:12 Range/Units


 


 


Bedside Glucose (Misc Panel) 118 156   MG/DL











Medications


Medications





 Current Medications








 Medications


  (Trade)  Dose


 Ordered  Sig/Andrea


 Route


 PRN Reason  Start Time


 Stop Time Status Last Admin


Dose Admin


 


 Acetaminophen


  (Tylenol)  650 mg  Q4HP  PRN


 PO


 MILD PAIN OR FEVER  11/25/16 16:45


 11/27/16 09:27 DC 11/26/16 22:32


 


 


 Acetaminophen


  (Tylenol)  650 mg  Q6HP  PRN


 PO


 MILD PAIN OR FEVER  11/27/16 09:30


 12/5/16 11:26 DC 12/5/16 05:07


 


 


 Acetaminophen


  (Tylenol)  1,000 mg  Q6HP  PRN


 PO


 PAIN / FEVER  12/5/16 11:30


 1/4/17 11:29  12/5/16 18:14


 


 


 Albuterol Sulfate


  (Proventil Neb)  2.5 mg  Q4HP  PRN


 NEB


 SOB/WHEEZING  11/25/16 17:00


 11/26/16 11:45 DC  


 


 


 Albuterol Sulfate


  (Proventil Neb)  2.5 mg  RQ4H


 NEB


   11/29/16 12:00


 12/2/16 07:01 DC 12/2/16 03:44


 


 


 Albuterol Sulfate


  (Proventil,


 Ventolin Hfa)  2 puff  Q4H  PRN


 INH


 SHORTNESS OF BREATH  11/25/16 17:00


 11/25/16 17:06 DC  


 


 


 Albuterol/


 Ipratropium


  (Duoneb (Ipr


 0.5mg/Alb 2.5mg))  3 ml  Q2HP  PRN


 NEB


 SOB/WHEEZING  11/26/16 11:45


 11/29/16 10:54 DC 11/29/16 07:06


 


 


 Albuterol/


 Ipratropium


  (Duoneb (Ipr


 0.5mg/Alb 2.5mg))  3 ml  Q2HP  PRN


 NEB


 SOB/WHEEZING  12/2/16 07:00


 1/1/17 06:59   


 


 


 Albuterol/


 Ipratropium


  (Duoneb (Ipr


 0.5mg/Alb 2.5mg))  3 ml  RQID


 NEB


   12/2/16 08:00


 1/1/17 07:59  12/6/16 07:13


 


 


 Albuterol/


 Ipratropium


  (Duoneb (Ipr


 0.5mg/Alb 2.5mg))  9 ml  STK-MED ONCE


 As Ordered


   11/25/16 13:22


 11/25/16 13:23 DC  


 


 


 Ascorbic Acid


  (Vitamin C)  500 mg  BIDWM


 PO


   12/1/16 08:00


 12/31/16 07:59  12/6/16 09:17


 


 


 Aspirin


  (Ecotrin)  81 mg  DAILY


 PO


   11/26/16 09:00


 12/26/16 08:59  12/6/16 09:18


 


 


 Atorvastatin


 Calcium


  (Lipitor)  40 mg  DAILY


 PO


   11/26/16 09:00


 12/26/16 08:59  12/6/16 09:17


 


 


 Bisacodyl


  (Dulcolax)  10 mg  Q12HP  PRN


 HI


 CONSTIPATION  12/2/16 07:45


 1/1/17 07:44   


 


 


 Ceftriaxone


 Sodium 2 gm  2 gm  STK-MED ONCE


 As Ordered


   11/25/16 15:28


 11/25/16 15:29 DC  


 


 


 Ceftriaxone


 Sodium/Dextrose


  (Rocephin/


 Dextrose 5%


 Mini-Bag Plus)  50 ml @ 


 100 mls/hr  Q24H


 IV


   11/29/16 12:00


 12/12/16 11:59  12/5/16 12:36


 


 


 Dabigatran


  (Pradaxa)  150 mg  BID


 PO


   11/25/16 21:00


 11/27/16 14:02 DC 11/27/16 10:06


 


 


 Dabigatran


  (Pradaxa)  150 mg  BID


 PO


   11/28/16 09:00


 12/12/16 08:59  12/6/16 09:17


 


 


 Dextrose


  (Dextrose 50%)  25 ml  ASDIRECTED  PRN


 IV


 SEE LABEL COMMENTS  11/25/16 17:45


 12/25/16 17:44   


 


 


 Docusate Sodium


  (Colace)  100 mg  BIDP  PRN


 PO


 CONSTIPATION  11/27/16 09:30


 12/27/16 09:29  12/6/16 09:17


 


 


 Ferrous Sulfate


  (Ferrous Sulfate)  325 mg  BID


 PO


   11/27/16 09:00


 12/27/16 08:59  12/6/16 09:17


 


 


 Fluticasone


 Propionate


  (Flonase 0.05%


 Nasal Spray)  2 spray  DAILY  PRN


 NA


 CONGESTION  11/25/16 17:00


 12/25/16 16:59  12/6/16 10:37


 


 


 Furosemide


  (Lasix)  20 mg  Q6H


 IV


   11/30/16 18:00


 12/30/16 17:59  12/6/16 05:36


 


 


 Furosemide


  (Lasix)  40 mg  DAILY@17


 PO


   11/28/16 17:00


 11/30/16 16:25 DC 11/29/16 15:59


 


 


 Furosemide


  (Lasix)  40 mg  ONCE  ONCE


 PO


   11/28/16 11:45


 11/28/16 11:46 DC 11/28/16 12:47


 


 


 Furosemide


  (Lasix)  80 mg  DAILY


 PO


   11/29/16 09:00


 11/30/16 16:25 DC 11/30/16 08:13


 


 


 Glucagon 1 mg  1 mg  ASDIRECTED  PRN


 SC


 SEE LABEL COMMENTS  11/25/16 17:45


 12/25/16 17:44   


 


 


 Glucose


  (Glucose)  16 GM  ASDIRECTED  PRN


 PO


 SEE LABEL COMMENTS  11/25/16 17:45


 12/25/16 17:44   


 


 


 Guaifenesin


  (Mucinex)  600 mg  BID  PRN


 PO


 CHEST CONGESTION  11/25/16 17:00


 12/25/16 16:59   


 


 


 Home Med


  (Med Rec


 Complete!)    ASDIRECTED


 XX


   11/25/16 15:15


 11/25/16 15:15 DC  


 


 


 Influenza Virus


 Vaccine


  (Fluzone High


 Dose)  0.5 ml  ONCE  ONCE


 IM


   11/27/16 09:00


 11/27/16 09:01 DC 11/27/16 10:11


 


 


 Insulin Human


 Lispro


  (HumaLOG INSULIN)  SEE


 PROTOCOL


 TABLE  AC


 SC


   11/26/16 07:30


 12/26/16 07:29  12/6/16 09:16


 


 


 Insulin Human


 Lispro


  (HumaLOG INSULIN)  SEE


 PROTOCOL


 TABLE  QHS


 SC


   11/25/16 21:00


 12/25/16 20:59   


 


 


 Iopamidol


  (Isovue-370 76%)  100 ml  STK-MED ONCE


 As Ordered


   12/5/16 07:41


 12/5/16 07:42 DC  


 


 


 Lidocaine


  (Lidoderm Patch)  3 patch  DAILY


 TD


   12/5/16 09:00


 1/4/17 08:59  12/6/16 09:19


 


 


 Lisinopril


  (Prinivil)  2.5 mg  DAILY


 PO


   11/26/16 09:00


 11/26/16 09:00 DC  


 


 


 Lisinopril 2.5 mg  2.5 mg  DAILY


 PO


   11/28/16 09:00


 11/29/16 11:06 DC 11/29/16 08:33


 


 


 Loratadine


  (Claritin)  10 mg  DAILY  PRN


 PO


 ALLERGIES  11/25/16 17:00


 12/25/16 16:59  11/26/16 18:15


 


 


 Magnesium


 Hydroxide


  (Milk Of


 Magnesia)  30 ml  Q4HP  PRN


 PO


 CONSTIPATION  12/2/16 07:45


 1/1/17 07:44  12/6/16 09:17


 


 


 Magnesium Sulfate/


 Dextrose/IV


 Miscellaneous


 Supplies


  (Mag Sulf 1gm/


 100ml (Mag Run))  100 ml @ 


 100 mls/hr  ONCE  ONCE


 IV


   11/26/16 02:00


 11/26/16 02:59 DC 11/26/16 02:05


 


 


 Methylprednisolone


  (SOLUmedrol)  125 mg  STK-MED ONCE


 As Ordered


   11/25/16 13:18


 11/25/16 13:19 DC  


 


 


 Metoprolol


 Tartrate


  (Lopressor)  12.5 mg  BID


 PO


   11/26/16 21:00


 11/28/16 16:55 DC 11/28/16 09:59


 


 


 Metoprolol


 Tartrate


  (Lopressor)  12.5 mg  BID


 PO


   12/1/16 09:00


 12/31/16 08:59  12/6/16 09:19


 


 


 Metoprolol


 Tartrate


  (Lopressor)  25 mg  BID


 PO


   11/25/16 21:00


 11/26/16 11:51 DC  


 


 


 Metoprolol


 Tartrate 25 mg  25 mg  BID


 PO


   11/28/16 21:00


 11/29/16 11:06 DC  


 


 


 Miscellaneous


  (Unresolved


 Patient Own Med


 Order)  SEE LABEL


 COMMENTS  UNRESOLVED


 XX


   12/5/16 00:01


 12/5/16 20:41 DC 12/5/16 18:33


 


 


 Multivitamins


  (Theragram-M)  1 tab  DAILY


 PO


   11/26/16 09:00


 12/26/16 08:59  12/6/16 09:18


 


 


 Non-Formulary


 Medication  breo-ellipta


 pls resume


 home d...  BID


 INH


   12/5/16 21:00


 12/5/16 21:00 DC  


 


 


 Non-Formulary


 Medication


  (** See Comment


 Field Below **)  REMOVE


 LIDODERM


 PATCHES  DAILY@21


 XX


   12/5/16 21:00


 1/4/17 20:59  12/5/16 21:53


 


 


 Ondansetron HCl


  (Zofran)  4 mg  Q6HP  PRN


 IV


 NAUSEA OR VOMITING  11/25/16 16:45


 12/25/16 16:44  12/6/16 11:17


 


 


 Ondansetron HCl


  (Zofran)  4 mg  STK-MED ONCE


 As Ordered


   11/25/16 15:07


 11/25/16 15:08 DC  


 


 


 Oxycodone/


 Acetaminophen


  (Percocet 5mg/


 325mg Tablet)  1 tab  Q4HP  PRN


 PO


 MODERATE PAIN (PS 5-7)  11/25/16 16:45


 12/9/16 16:44  12/3/16 15:11


 


 


 Pantoprazole


 Sodium


  (Protonix)  40 mg  DAILY


 PO


   11/26/16 09:00


 12/26/16 08:59  12/6/16 09:18


 


 


 Patient Own


 Medication


  (Patient'S Own


 Med)  Breo-ellipta


 200/25: ONE


 PUFF...  DAILY


 INH


   12/6/16 09:00


 1/5/17 08:59  12/6/16 07:10


 


 


 Piperacillin Sod/


 Tazobactam Sod


 3.375 gm/Dextrose  50 ml @ 50


 mls/hr  Q8H


 IV


   11/25/16 17:00


 11/25/16 17:00 DC  


 


 


 Piperacillin Sod/


 Tazobactam Sod/


 Dextrose


  (Zosyn/Dextrose


 5% Mini-Bag Plus)  50 ml @ 50


 mls/hr  Q8H


 IV


   11/25/16 20:00


 11/29/16 10:07 DC 11/29/16 03:22


 


 


 Polyethylene


 Glycol


  (Miralax)  1 pkt  DAILYPRN  PRN


 PO


 CONSTIPATION  12/2/16 07:45


 1/1/17 07:44  12/5/16 18:14


 


 


 Potassium Chloride


  (Micro-K


 Extencaps)  10 meq  BID


 PO


   11/25/16 21:00


 12/3/16 06:58 DC 12/2/16 21:17


 


 


 Potassium Chloride


  (Micro-K


 Extencaps)  20 meq  BID


 PO


   12/3/16 09:00


 1/2/17 08:59  12/6/16 09:18


 


 


 Sodium


 Biphosphate/


 Sodium Phosphate


  (Fleet Enema)  1 ENEMA  Q12HP  PRN


 HI


 CONSTIPATION  12/2/16 07:45


 1/1/17 07:44   


 


 


 Sodium Chloride


  (Nacl 0.9%)  1,000 ml @ 


 40 mls/hr  Q24H


 IV


   11/25/16 16:43


 11/27/16 09:26 DC 11/26/16 12:16


 


 


 Sodium Chloride


  (Saline Lock


 Flush)  2 ml  ASDIRECTED  PRN


 IV


 SEE LABEL COMMENTS  11/28/16 05:15


 12/28/16 05:14   


 


 


 Sodium Chloride


  (Saline Lock


 Flush)  2 ml  SLF


 IV


   11/28/16 06:00


 12/28/16 05:59  12/6/16 05:36


 


 


 Tiotropium


 Bromide 1


 inhalation  1


 inhalation  DAILY@08


 INH


   11/26/16 08:00


 12/26/16 07:59  12/5/16 07:34


 


 


 Vancomycin HCl


 1000 mg/IV


 Miscellaneous


 Supplies 1 each/


 Dextrose  270 ml @ 


 270 mls/hr  Q8H


 IV


   11/25/16 17:00


 11/25/16 17:00 DC  


 


 


 Vancomycin HCl


 1000 mg/IV


 Miscellaneous


 Supplies 1 each/


 Dextrose  270 ml @ 


 270 mls/hr  Q8H


 IV


   11/25/16 21:00


 11/28/16 13:22 DC 11/28/16 06:02


 


 


 Vancomycin HCl/IV


 Miscellaneous


 Supplies/Dextrose


  (Vancomycin HCl/


 Adapter-Vial Mate/


 Dextrose 5%)  270 ml @ 


 135 mls/hr  Q8H


 IV


   11/28/16 13:00


 11/29/16 10:07 DC 11/29/16 04:43


 











Allergies


Coded Allergies:  


     Sulfa Drugs (Verified  Allergy, Unknown, 4/5/13)


     Sulfa Drugs Cross Reactors (Verified  Allergy, Unknown, 4/5/13)


     Heparin (Verified  Adverse Reaction, Intermediate, HIT, 6/9/16)








JOSE REINOSO MD Dec 6, 2016 11:25

## 2016-12-06 NOTE — REP
CT CHEST WITH IV CONTRAST, 12/05/2016:

 

Indication:  Hypoxia.

 

Comparison:  CT chest without contrast 11/25/2016.

 

TECHNIQUE: Following IV contrast administration of 75 ml Isovue 370 mg/ml, 3 mm

contiguous spiral axial sections were performed through the chest.

 

Findings:  The thoracic aorta is ectatic yet without aneurysm or dissection.

Cardiac silhouette is moderately enlarged with enlargement of the right atrium

and the inferior vena cava.  There are moderate coronary artery calcifications.

Scattered sub cm mediastinal and hilar nodes as well as enlarged 14 mm right

paratracheal node were noted.  There is also a 17 mm carinal node.

 

There is a small posterior layering left pleural effusion mildly improved from

prior study.  There is plate-like atelectasis and/or scarring in the left upper

lobe, lingula and also in the right upper lobe and right middle lobe.  There is

right basilar atelectasis as well.

 

Visualized portions of the liver demonstrate scalloped margins as can be seen

with cirrhosis.  There is a small amount of ascites within the abdomen which is

decreased/improved when compared with prior study.

 

The gallbladder is mildly distended and contains few stones, measuring up to 2.3

cm diameter.  There is thickening of the bilateral adrenal glands.

 

Impression:

 

Moderate cardiomegaly with findings suggestive of right heart failure with

enlargement of the right atrium and the inferior vena cava.

 

Mediastinal and hilar adenopathy,  some nodes of which are enlarged.

 

Small left pleural effusion minimally improved/decreased in size.

 

Scattered areas of atelectatic changes and/or fibrolinear scarring bilaterally.

 

Cirrhosis, small amount of abdominal ascites.  Generous sized gallbladder with

cholelithiasis, incompletely included in view.

 

 

Signed by

Alysia Ocampo MD 12/11/2016 02:14 P

## 2016-12-07 NOTE — IPNPDOC
Assessment/Plan


Date Seen


The patient was seen on 12/7/16.





Problems


Problems:  


(1) Acute and chronic respiratory failure with hypoxia


Status:  Acute


Problem Text:  multifactorial due to CAP and diastolic chf exacerbation and 

fluid


slowly improving with diuresis and antibiotics. 





(2) CAP (community acquired pneumonia)


Status:  Acute


Problem Text:  continue antibiotic ceftriaxone. 





(3) Acute on chronic diastolic CHF (congestive heart failure)


Status:  Acute


Problem Text:  continue diuresis , daily weights. 


will start lasix infusion





(4) Constipation


Status:  Acute


Problem Text:  on bowel regimen 





(5) SINTIA (obstructive sleep apnea)


Status:  Chronic


(6) Morbid obesity


Status:  Chronic


(7) Diabetes


Status:  Chronic


(8) Hypertension


Status:  Chronic


(9) COPD (chronic obstructive pulmonary disease)


Status:  Chronic


Problem Text:  continue home meds.





(10) CAD (coronary artery disease)


Status:  Chronic


(11) Hyperlipidemia


Status:  Chronic


(12) Mitral stenosis and incompetence


Status:  Chronic


(13) Pulmonary hypertension


Status:  Chronic


(14) Afib


Status:  Chronic


Problem Text:  on pradaxa








Plan / VTE


VTE Prophylaxis Ordered?:  Yes





Subjective


CC/HPI


The patient is a 66-year-old female admitted with a reason for visit of Acute 

Respiratory Distress.


Events since last encounter


no new complaints today.





Objective


General Exam:  : Alert: No Acute Distress


Eye Exam:  : Conjunctiva & lids normal: EOMI: PERRLANo: Sclera icteric


ENT Exam:  : Atraumatic: Mucous membr. moist/pink: Pharynx Normal


Neck Exam:  : SuppleNo: JVD, thyromegaly


Chest Exam:  : Diminished


Heart Exam:  : Normal S1: Normal S2: Rate Normal: Regular Rhythm


Abdomen Exam:  : Normal bowel sounds: SoftNo: Hepatospenomegaly, Tenderness


Extremity Exam:  : Edema


Vital Signs  I&O





 Vital Sign - Last 24 Hours








 12/6/16 12/6/16 12/6/16 12/6/16





 09:19 10:27 14:00 19:16


 


Temp   97.8 


 


Pulse 96  87 


 


Resp   20 


 


B/P 133/65  133/69 


 


Pulse Ox   88 88


 


O2 Delivery  Nasal Cannula Nasal Cannula Nasal Cannula


 


O2 Flow Rate  4.0 4.0 4.0


 


    





 12/6/16 12/6/16 12/6/16 12/6/16





 21:56 22:00 22:00 23:45


 


Temp  98.2  


 


Pulse 102 102  88


 


Resp  20  


 


B/P 111/64 111/64  135/67


 


Pulse Ox  91  


 


O2 Delivery  Nasal Cannula Nasal Cannula 


 


O2 Flow Rate  4.0 4.0 


 


    





 12/7/16   





 06:00   


 


Temp 97.5   


 


Pulse 90   


 


Resp 19   


 


B/P 126/55   


 


Pulse Ox 93   


 


O2 Delivery Nasal Cannula   


 


O2 Flow Rate 4.0   














 I&O- Last 24 Hours up to 6 AM 


 


 12/7/16





 06:00


 


Intake Total 1490 ml


 


Output Total 1200 ml


 


Balance 290 ml











Laboratory Data


Labs 24H


 Laboratory Tests 2


12/6/16 11:41: Bedside Glucose (Misc Panel) 128H


12/6/16 16:55: Bedside Glucose (Misc Panel) 130H


12/6/16 20:17: Bedside Glucose (Misc Panel) 187H


12/7/16 05:42: 


Anion Gap 6L, Blood Urea Nitrogen 21H, Creatinine 1.12H, Sodium Level 139, 

Potassium Level 4.7, Chloride Level 101, Carbon Dioxide Level 32, Calcium Level 

8.6L, Glomerular Filtration Rate 51.8


CBC/BMP


 Laboratory Tests


12/7/16 05:42








Calcium Level 8.6 L


FSBS





Laboratory Tests








Test


  12/6/16


11:41 12/6/16


16:55 12/6/16


20:17 Range/Units


 


 


Bedside Glucose (Misc Panel) 128 130 187   MG/DL











Medications


Medications





 Current Medications








 Medications


  (Trade)  Dose


 Ordered  Sig/Andrea


 Route


 PRN Reason  Start Time


 Stop Time Status Last Admin


Dose Admin


 


 Acetaminophen


  (Tylenol)  650 mg  Q4HP  PRN


 PO


 MILD PAIN OR FEVER  11/25/16 16:45


 11/27/16 09:27 DC 11/26/16 22:32


 


 


 Acetaminophen


  (Tylenol)  650 mg  Q6HP  PRN


 PO


 MILD PAIN OR FEVER  11/27/16 09:30


 12/5/16 11:26 DC 12/5/16 05:07


 


 


 Acetaminophen


  (Tylenol)  1,000 mg  Q6HP  PRN


 PO


 PAIN / FEVER  12/5/16 11:30


 1/4/17 11:29  12/6/16 23:57


 


 


 Albuterol Sulfate


  (Proventil Neb)  2.5 mg  Q4HP  PRN


 NEB


 SOB/WHEEZING  11/25/16 17:00


 11/26/16 11:45 DC  


 


 


 Albuterol Sulfate


  (Proventil Neb)  2.5 mg  RQ4H


 NEB


   11/29/16 12:00


 12/2/16 07:01 DC 12/2/16 03:44


 


 


 Albuterol Sulfate


  (Proventil,


 Ventolin Hfa)  2 puff  Q4H  PRN


 INH


 SHORTNESS OF BREATH  11/25/16 17:00


 11/25/16 17:06 DC  


 


 


 Albuterol/


 Ipratropium


  (Duoneb (Ipr


 0.5mg/Alb 2.5mg))  3 ml  Q2HP  PRN


 NEB


 SOB/WHEEZING  11/26/16 11:45


 11/29/16 10:54 DC 11/29/16 07:06


 


 


 Albuterol/


 Ipratropium


  (Duoneb (Ipr


 0.5mg/Alb 2.5mg))  3 ml  Q2HP  PRN


 NEB


 SOB/WHEEZING  12/2/16 07:00


 1/1/17 06:59   


 


 


 Albuterol/


 Ipratropium


  (Duoneb (Ipr


 0.5mg/Alb 2.5mg))  3 ml  RQID


 NEB


   12/2/16 08:00


 1/1/17 07:59  12/6/16 19:16


 


 


 Albuterol/


 Ipratropium


  (Duoneb (Ipr


 0.5mg/Alb 2.5mg))  9 ml  STK-MED ONCE


 As Ordered


   11/25/16 13:22


 11/25/16 13:23 DC  


 


 


 Ascorbic Acid


  (Vitamin C)  500 mg  BIDWM


 PO


   12/1/16 08:00


 12/31/16 07:59  12/6/16 17:51


 


 


 Aspirin


  (Ecotrin)  81 mg  DAILY


 PO


   11/26/16 09:00


 12/26/16 08:59  12/6/16 09:18


 


 


 Atorvastatin


 Calcium


  (Lipitor)  40 mg  DAILY


 PO


   11/26/16 09:00


 12/26/16 08:59  12/6/16 09:17


 


 


 Bisacodyl


  (Dulcolax)  10 mg  Q12HP  PRN


 MT


 CONSTIPATION  12/2/16 07:45


 1/1/17 07:44   


 


 


 Ceftriaxone


 Sodium 2 gm  2 gm  STK-MED ONCE


 As Ordered


   11/25/16 15:28


 11/25/16 15:29 DC  


 


 


 Ceftriaxone


 Sodium/Dextrose


  (Rocephin/


 Dextrose 5%


 Mini-Bag Plus)  50 ml @ 


 100 mls/hr  Q24H


 IV


   11/29/16 12:00


 12/12/16 11:59  12/6/16 12:41


 


 


 Dabigatran


  (Pradaxa)  150 mg  BID


 PO


   11/25/16 21:00


 11/27/16 14:02 DC 11/27/16 10:06


 


 


 Dabigatran


  (Pradaxa)  150 mg  BID


 PO


   11/28/16 09:00


 12/12/16 08:59  12/6/16 21:57


 


 


 Dextrose


  (Dextrose 50%)  25 ml  ASDIRECTED  PRN


 IV


 SEE LABEL COMMENTS  11/25/16 17:45


 12/25/16 17:44   


 


 


 Docusate Sodium


  (Colace)  100 mg  BIDP  PRN


 PO


 CONSTIPATION  11/27/16 09:30


 12/27/16 09:29  12/6/16 09:17


 


 


 Ferrous Sulfate


  (Ferrous Sulfate)  325 mg  BID


 PO


   11/27/16 09:00


 12/27/16 08:59  12/6/16 21:56


 


 


 Fluticasone


 Propionate


  (Flonase 0.05%


 Nasal Spray)  2 spray  DAILY  PRN


 NA


 CONGESTION  11/25/16 17:00


 12/25/16 16:59  12/6/16 10:37


 


 


 Furosemide


  (Lasix)  20 mg  Q6H


 IV


   11/30/16 18:00


 12/30/16 17:59  12/7/16 06:06


 


 


 Furosemide


  (Lasix)  40 mg  DAILY@17


 PO


   11/28/16 17:00


 11/30/16 16:25 DC 11/29/16 15:59


 


 


 Furosemide


  (Lasix)  40 mg  ONCE  ONCE


 PO


   11/28/16 11:45


 11/28/16 11:46 DC 11/28/16 12:47


 


 


 Furosemide


  (Lasix)  80 mg  DAILY


 PO


   11/29/16 09:00


 11/30/16 16:25 DC 11/30/16 08:13


 


 


 Glucagon 1 mg  1 mg  ASDIRECTED  PRN


 SC


 SEE LABEL COMMENTS  11/25/16 17:45


 12/25/16 17:44   


 


 


 Glucose


  (Glucose)  16 GM  ASDIRECTED  PRN


 PO


 SEE LABEL COMMENTS  11/25/16 17:45


 12/25/16 17:44   


 


 


 Guaifenesin


  (Mucinex)  600 mg  BID  PRN


 PO


 CHEST CONGESTION  11/25/16 17:00


 12/25/16 16:59   


 


 


 Home Med


  (Med Rec


 Complete!)    ASDIRECTED


 XX


   11/25/16 15:15


 11/25/16 15:15 DC  


 


 


 Influenza Virus


 Vaccine


  (Fluzone High


 Dose)  0.5 ml  ONCE  ONCE


 IM


   11/27/16 09:00


 11/27/16 09:01 DC 11/27/16 10:11


 


 


 Insulin Human


 Lispro


  (HumaLOG INSULIN)  SEE


 PROTOCOL


 TABLE  AC


 SC


   11/26/16 07:30


 12/26/16 07:29  12/6/16 17:52


 


 


 Insulin Human


 Lispro


  (HumaLOG INSULIN)  SEE


 PROTOCOL


 TABLE  QHS


 SC


   11/25/16 21:00


 12/25/16 20:59   


 


 


 Iopamidol


  (Isovue-370 76%)  100 ml  STK-MED ONCE


 As Ordered


   12/5/16 07:41


 12/5/16 07:42 DC  


 


 


 Lidocaine


  (Lidoderm Patch)  3 patch  DAILY


 TD


   12/5/16 09:00


 1/4/17 08:59  12/6/16 09:19


 


 


 Lisinopril


  (Prinivil)  2.5 mg  DAILY


 PO


   11/26/16 09:00


 11/26/16 09:00 DC  


 


 


 Lisinopril 2.5 mg  2.5 mg  DAILY


 PO


   11/28/16 09:00


 11/29/16 11:06 DC 11/29/16 08:33


 


 


 Loratadine


  (Claritin)  10 mg  DAILY  PRN


 PO


 ALLERGIES  11/25/16 17:00


 12/25/16 16:59  11/26/16 18:15


 


 


 Magnesium


 Hydroxide


  (Milk Of


 Magnesia)  30 ml  Q4HP  PRN


 PO


 CONSTIPATION  12/2/16 07:45


 1/1/17 07:44  12/6/16 09:17


 


 


 Magnesium Sulfate/


 Dextrose/IV


 Miscellaneous


 Supplies


  (Mag Sulf 1gm/


 100ml (Mag Run))  100 ml @ 


 100 mls/hr  ONCE  ONCE


 IV


   11/26/16 02:00


 11/26/16 02:59 DC 11/26/16 02:05


 


 


 Methylprednisolone


  (SOLUmedrol)  125 mg  STK-MED ONCE


 As Ordered


   11/25/16 13:18


 11/25/16 13:19 DC  


 


 


 Metoprolol


 Tartrate


  (Lopressor)  12.5 mg  BID


 PO


   11/26/16 21:00


 11/28/16 16:55 DC 11/28/16 09:59


 


 


 Metoprolol


 Tartrate


  (Lopressor)  12.5 mg  BID


 PO


   12/1/16 09:00


 12/31/16 08:59  12/6/16 21:56


 


 


 Metoprolol


 Tartrate


  (Lopressor)  25 mg  BID


 PO


   11/25/16 21:00


 11/26/16 11:51 DC  


 


 


 Metoprolol


 Tartrate 25 mg  25 mg  BID


 PO


   11/28/16 21:00


 11/29/16 11:06 DC  


 


 


 Miscellaneous


  (Unresolved


 Patient Own Med


 Order)  SEE LABEL


 COMMENTS  UNRESOLVED


 XX


   12/5/16 00:01


 12/5/16 20:41 DC 12/5/16 18:33


 


 


 Multivitamins


  (Theragram-M)  1 tab  DAILY


 PO


   11/26/16 09:00


 12/26/16 08:59  12/6/16 09:18


 


 


 Non-Formulary


 Medication  breo-ellipta


 pls resume


 home d...  BID


 INH


   12/5/16 21:00


 12/5/16 21:00 DC  


 


 


 Non-Formulary


 Medication


  (** See Comment


 Field Below **)  REMOVE


 LIDODERM


 PATCHES  DAILY@21


 XX


   12/5/16 21:00


 1/4/17 20:59  12/6/16 21:00


 


 


 Ondansetron HCl


  (Zofran)  4 mg  Q6HP  PRN


 IV


 NAUSEA OR VOMITING  11/25/16 16:45


 12/25/16 16:44  12/6/16 11:17


 


 


 Ondansetron HCl


  (Zofran)  4 mg  STK-MED ONCE


 As Ordered


   11/25/16 15:07


 11/25/16 15:08 DC  


 


 


 Oxycodone/


 Acetaminophen


  (Percocet 5mg/


 325mg Tablet)  1 tab  Q4HP  PRN


 PO


 MODERATE PAIN (PS 5-7)  11/25/16 16:45


 12/9/16 16:44  12/3/16 15:11


 


 


 Pantoprazole


 Sodium


  (Protonix)  40 mg  DAILY


 PO


   11/26/16 09:00


 12/26/16 08:59  12/6/16 09:18


 


 


 Patient Own


 Medication


  (Patient'S Own


 Med)  Breo-ellipta


 200/25: ONE


 PUFF...  DAILY


 INH


   12/6/16 09:00


 1/5/17 08:59  12/6/16 07:10


 


 


 Piperacillin Sod/


 Tazobactam Sod


 3.375 gm/Dextrose  50 ml @ 50


 mls/hr  Q8H


 IV


   11/25/16 17:00


 11/25/16 17:00 DC  


 


 


 Piperacillin Sod/


 Tazobactam Sod/


 Dextrose


  (Zosyn/Dextrose


 5% Mini-Bag Plus)  50 ml @ 50


 mls/hr  Q8H


 IV


   11/25/16 20:00


 11/29/16 10:07 DC 11/29/16 03:22


 


 


 Polyethylene


 Glycol


  (Miralax)  1 pkt  DAILYPRN  PRN


 PO


 CONSTIPATION  12/2/16 07:45


 1/1/17 07:44  12/5/16 18:14


 


 


 Potassium Chloride


  (Micro-K


 Extencaps)  10 meq  BID


 PO


   11/25/16 21:00


 12/3/16 06:58 DC 12/2/16 21:17


 


 


 Potassium Chloride


  (Micro-K


 Extencaps)  20 meq  BID


 PO


   12/3/16 09:00


 1/2/17 08:59  12/6/16 21:56


 


 


 Sodium


 Biphosphate/


 Sodium Phosphate


  (Fleet Enema)  1 ENEMA  Q12HP  PRN


 MT


 CONSTIPATION  12/2/16 07:45


 1/1/17 07:44   


 


 


 Sodium Chloride


  (Nacl 0.9%)  1,000 ml @ 


 40 mls/hr  Q24H


 IV


   11/25/16 16:43


 11/27/16 09:26 DC 11/26/16 12:16


 


 


 Sodium Chloride


  (Saline Lock


 Flush)  2 ml  ASDIRECTED  PRN


 IV


 SEE LABEL COMMENTS  11/28/16 05:15


 12/28/16 05:14   


 


 


 Sodium Chloride


  (Saline Lock


 Flush)  2 ml  SLF


 IV


   11/28/16 06:00


 12/28/16 05:59  12/7/16 06:06


 


 


 Tiotropium


 Bromide 1


 inhalation  1


 inhalation  DAILY@08


 INH


   11/26/16 08:00


 12/26/16 07:59  12/6/16 11:23


 


 


 Vancomycin HCl


 1000 mg/IV


 Miscellaneous


 Supplies 1 each/


 Dextrose  270 ml @ 


 270 mls/hr  Q8H


 IV


   11/25/16 17:00


 11/25/16 17:00 DC  


 


 


 Vancomycin HCl


 1000 mg/IV


 Miscellaneous


 Supplies 1 each/


 Dextrose  270 ml @ 


 270 mls/hr  Q8H


 IV


   11/25/16 21:00


 11/28/16 13:22 DC 11/28/16 06:02


 


 


 Vancomycin HCl/IV


 Miscellaneous


 Supplies/Dextrose


  (Vancomycin HCl/


 Adapter-Vial Mate/


 Dextrose 5%)  270 ml @ 


 135 mls/hr  Q8H


 IV


   11/28/16 13:00


 11/29/16 10:07 DC 11/29/16 04:43


 











Allergies


Coded Allergies:  


     Sulfa Drugs (Verified  Allergy, Unknown, 4/5/13)


     Sulfa Drugs Cross Reactors (Verified  Allergy, Unknown, 4/5/13)


     Heparin (Verified  Adverse Reaction, Intermediate, HIT, 6/9/16)








JOSE REINOSO MD Dec 7, 2016 07:44

## 2016-12-08 NOTE — IPNPDOC
Assessment/Plan


Date Seen


The patient was seen on 12/8/16.





Problems


Problems:  


(1) Acute and chronic respiratory failure with hypoxia


Status:  Acute


Problem Text:  multifactorial due to CAP and diastolic chf exacerbation and 

fluid


slowly improving with diuresis and antibiotics. 





(2) CAP (community acquired pneumonia)


Status:  Acute


Problem Text:  finished course on ceftriaxone. 





(3) Acute on chronic diastolic CHF (congestive heart failure)


Status:  Acute


Problem Text:  continue diuresis , daily weights. 


will start lasix infusion





(4) Constipation


Status:  Resolved


Problem Text:  on bowel regimen 





(5) SINTIA (obstructive sleep apnea)


Status:  Chronic


(6) Morbid obesity


Status:  Chronic


(7) Diabetes


Status:  Chronic


(8) Hypertension


Status:  Chronic


(9) COPD (chronic obstructive pulmonary disease)


Status:  Chronic


Problem Text:  continue home meds.





(10) CAD (coronary artery disease)


Status:  Chronic


(11) Hyperlipidemia


Status:  Chronic


(12) Mitral stenosis and incompetence


Status:  Chronic


(13) Pulmonary hypertension


Status:  Chronic


(14) Afib


Status:  Chronic


Problem Text:  on pradaxa








Plan / VTE


VTE Prophylaxis Ordered?:  Yes





Plan / Urinary Catheter


Reason for insertion/continuin:  Other-document below





Subjective


CC/HPI


The patient is a 66-year-old female admitted with a reason for visit of Acute 

Respiratory Distress.


Events since last encounter


no new complaints, breathing good still requiring 4 l of oxygen, making good 

urine





Objective


General Exam:  : Alert: No Acute Distress


Eye Exam:  : Conjunctiva & lids normal: EOMI: PERRLANo: Sclera icteric


ENT Exam:  : Atraumatic: Mucous membr. moist/pink: Pharynx Normal


Neck Exam:  : SuppleNo: JVD, thyromegaly


Chest Exam:  : Diminished


Heart Exam:  : Normal S1: Normal S2: Rate Normal: Regular Rhythm


Abdomen Exam:  : Normal bowel sounds: SoftNo: Hepatospenomegaly, Tenderness


Extremity Exam:  : Edema


Vital Signs  I&O





 Vital Sign - Last 24 Hours








 12/7/16 12/7/16 12/7/16 12/7/16





 09:00 09:07 14:00 20:18


 


Temp   97.2 


 


Pulse  90 84 


 


Resp   18 


 


B/P   130/59 


 


Pulse Ox   91 92


 


O2 Delivery Nasal Cannula  Nasal Cannula Nasal Cannula


 


O2 Flow Rate 4.0  4.0 4.0


 


    





 12/7/16 12/7/16 12/7/16 12/8/16





 20:41 20:51 22:00 06:00


 


Temp   97.7 97.4


 


Pulse  100 90 76


 


Resp   17 19


 


B/P  131/58 160/75 138/82


 


Pulse Ox   92 94


 


O2 Delivery Nasal Cannula  Nasal Cannula Nasal Cannula


 


O2 Flow Rate 4.0  4.0 4.0


 


    





 12/8/16   





 08:03   


 


Pulse 92   


 


B/P 123/65   














 I&O- Last 24 Hours up to 6 AM 


 


 12/8/16





 06:00


 


Intake Total 1200 ml


 


Output Total 2100 ml


 


Balance -900 ml











Laboratory Data


Labs 24H


 Laboratory Tests 2


12/7/16 11:18: Bedside Glucose (Misc Panel) 128H


12/7/16 17:19: Bedside Glucose (Misc Panel) 135H


12/8/16 01:39: 


Creatine Kinase MB 1.0, Creatine Kinase MB Relative Index 3.84, Total Creatine 

Kinase 26, Troponin I 0.02


12/8/16 05:57: 


Anion Gap 8, White Blood Count 10.5H, Red Blood Count 3.28L, Hemoglobin 8.7L, 

Hematocrit 30.5L, Mean Corpuscular Volume 93.0, Mean Corpuscular Hemoglobin 

26.6L, Mean Corpuscular Hemoglobin Concent 28.6L, Red Cell Distribution Width 

23.6H, Platelet Count 189, Neutrophils (%) (Auto) 84.2H, Lymphocytes (%) (Auto) 

6.8L, Monocytes (%) (Auto) 6.0H, Eosinophils (%) (Auto) 1.7, Basophils (%) (Auto

) 0.2, Neutrophils # (Auto) 8.8H, Lymphocytes # (Auto) 0.8L, Monocytes # (Auto) 

0.6, Eosinophils # (Auto) 0.2, Basophils # (Auto) 0.0, Blood Urea Nitrogen 20H, 

Creatinine 1.12H, Sodium Level 137, Potassium Level 4.1, Chloride Level 98, 

Carbon Dioxide Level 31, Calcium Level 8.5L, Glomerular Filtration Rate 51.8, 

Large Unclassified Cells # 0.1, Large Unclassified Cells % 1.2


CBC/BMP


 Laboratory Tests


12/8/16 05:57








Calcium Level 8.5 L, Red Blood Count 3.28 L, Mean Corpuscular Volume 93.0, Mean 

Corpuscular Hemoglobin 26.6 L, Mean Corpuscular Hemoglobin Concent 28.6 L, Red 

Cell Distribution Width 23.6 H, Neutrophils (%) (Auto) 84.2 H, Lymphocytes (%) (

Auto) 6.8 L, Monocytes (%) (Auto) 6.0 H, Eosinophils (%) (Auto) 1.7, Basophils (

%) (Auto) 0.2, Neutrophils # (Auto) 8.8 H, Lymphocytes # (Auto) 0.8 L, 

Monocytes # (Auto) 0.6, Eosinophils # (Auto) 0.2, Basophils # (Auto) 0.0


FSBS





Laboratory Tests








Test


  12/7/16


11:18 12/7/16


17:19 Range/Units


 


 


Bedside Glucose (Misc Panel) 128 135   MG/DL











Current Medications


Current Medications





 Current Medications


Acetaminophen (Tylenol) 650 mg Q4HP  PRN PO MILD PAIN OR FEVER Last 

administered on 11/26/16at 22:32;  Start 11/25/16 at 16:45;  Stop 11/27/16 at 09

:27;  Status DC


Acetaminophen (Tylenol) 650 mg Q6HP  PRN PO MILD PAIN OR FEVER Last 

administered on 12/5/16at 05:07;  Start 11/27/16 at 09:30;  Stop 12/5/16 at 11:

26;  Status DC


Acetaminophen (Tylenol) 1,000 mg Q6HP  PRN PO PAIN / FEVER Last administered on 

12/7/16at 22:47;  Start 12/5/16 at 11:30;  Stop 1/4/17 at 11:29


Albuterol Sulfate (Proventil Neb) 2.5 mg Q4HP  PRN NEB SOB/WHEEZING;  Start 11/ 25/16 at 17:00;  Stop 11/26/16 at 11:45;  Status DC


Albuterol Sulfate (Proventil Neb) 2.5 mg RQ4H NEB  Last administered on 12/2/ 16at 03:44;  Start 11/29/16 at 12:00;  Stop 12/2/16 at 07:01;  Status DC


Albuterol Sulfate (Proventil, Ventolin Hfa) 2 puff Q4H  PRN INH SHORTNESS OF 

BREATH;  Start 11/25/16 at 17:00;  Stop 11/25/16 at 17:06;  Status DC


Albuterol/ Ipratropium (Duoneb (Ipr 0.5mg/Alb 2.5mg)) 3 ml Q2HP  PRN NEB SOB/

WHEEZING Last administered on 11/29/16at 07:06;  Start 11/26/16 at 11:45;  Stop 

11/29/16 at 10:54;  Status DC


Albuterol/ Ipratropium (Duoneb (Ipr 0.5mg/Alb 2.5mg)) 3 ml Q2HP  PRN NEB SOB/

WHEEZING;  Start 12/2/16 at 07:00;  Stop 1/1/17 at 06:59


Albuterol/ Ipratropium (Duoneb (Ipr 0.5mg/Alb 2.5mg)) 3 ml RQID NEB  Last 

administered on 12/7/16at 20:16;  Start 12/2/16 at 08:00;  Stop 1/1/17 at 07:59


Albuterol/ Ipratropium (Duoneb (Ipr 0.5mg/Alb 2.5mg)) 9 ml STK-MED ONCE As 

Ordered ;  Start 11/25/16 at 13:22;  Stop 11/25/16 at 13:23;  Status DC


Ascorbic Acid (Vitamin C) 500 mg BIDWM PO  Last administered on 12/8/16at 08:03

;  Start 12/1/16 at 08:00;  Stop 12/31/16 at 07:59


Aspirin (Ecotrin) 81 mg DAILY PO  Last administered on 12/8/16at 08:03;  Start 

11/26/16 at 09:00;  Stop 12/26/16 at 08:59


Atorvastatin Calcium (Lipitor) 40 mg DAILY PO  Last administered on 12/8/16at 08

:03;  Start 11/26/16 at 09:00;  Stop 12/26/16 at 08:59


Bisacodyl (Dulcolax) 10 mg Q12HP  PRN HI CONSTIPATION;  Start 12/2/16 at 07:45;

  Stop 1/1/17 at 07:44


Ceftriaxone Sodium 2 gm 2 gm STK-MED ONCE As Ordered ;  Start 11/25/16 at 15:28

;  Stop 11/25/16 at 15:29;  Status DC


Ceftriaxone Sodium/Dextrose (Rocephin/ Dextrose 5% Mini-Bag Plus) 50 ml @  100 

mls/hr Q24H IV  Last administered on 12/7/16at 11:56;  Start 11/29/16 at 12:00;

  Stop 12/12/16 at 11:59


Dabigatran (Pradaxa) 150 mg BID PO  Last administered on 11/27/16at 10:06;  

Start 11/25/16 at 21:00;  Stop 11/27/16 at 14:02;  Status DC


Dabigatran (Pradaxa) 150 mg BID PO  Last administered on 12/8/16at 08:03;  

Start 11/28/16 at 09:00;  Stop 12/12/16 at 08:59


Dextrose (Dextrose 50%) 25 ml ASDIRECTED  PRN IV SEE LABEL COMMENTS;  Start 11/ 25/16 at 17:45;  Stop 12/25/16 at 17:44


Docusate Sodium (Colace) 100 mg BIDP  PRN PO CONSTIPATION Last administered on 

12/8/16at 08:06;  Start 11/27/16 at 09:30;  Stop 12/27/16 at 09:29


Ferrous Sulfate (Ferrous Sulfate) 325 mg BID PO  Last administered on 12/8/16at 

08:06;  Start 11/27/16 at 09:00;  Stop 12/27/16 at 08:59


Fluticasone Propionate (Flonase 0.05% Nasal Spray) 2 spray DAILY  PRN NA 

CONGESTION Last administered on 12/6/16at 10:37;  Start 11/25/16 at 17:00;  

Stop 12/25/16 at 16:59


Furosemide (Lasix) 20 mg Q6H IV  Last administered on 12/7/16at 06:06;  Start 11 /30/16 at 18:00;  Stop 12/7/16 at 10:55;  Status DC


Furosemide (Lasix) 40 mg DAILY@17 PO  Last administered on 11/29/16at 15:59;  

Start 11/28/16 at 17:00;  Stop 11/30/16 at 16:25;  Status DC


Furosemide (Lasix) 40 mg ONCE  ONCE PO  Last administered on 11/28/16at 12:47;  

Start 11/28/16 at 11:45;  Stop 11/28/16 at 11:46;  Status DC


Furosemide (Lasix) 80 mg DAILY PO  Last administered on 11/30/16at 08:13;  

Start 11/29/16 at 09:00;  Stop 11/30/16 at 16:25;  Status DC


Furosemide/ Dextrose (Lasix/Dextrose 5%) 250 ml @  10 mls/hr Q24H IV  Last 

administered on 12/7/16at 11:57;  Start 12/7/16 at 11:00;  Stop 1/6/17 at 10:59


Glucagon 1 mg 1 mg ASDIRECTED  PRN SC SEE LABEL COMMENTS;  Start 11/25/16 at 17:

45;  Stop 12/25/16 at 17:44


Glucose (Glucose) 16 GM ASDIRECTED  PRN PO SEE LABEL COMMENTS;  Start 11/25/16 

at 17:45;  Stop 12/25/16 at 17:44


Guaifenesin (Mucinex) 600 mg BID  PRN PO CHEST CONGESTION;  Start 11/25/16 at 17

:00;  Stop 12/25/16 at 16:59


Home Med (Med Rec Complete!)  ASDIRECTED XX ;  Start 11/25/16 at 15:15;  Stop 11 /25/16 at 15:15;  Status DC


Influenza Virus Vaccine (Fluzone High Dose) 0.5 ml ONCE  ONCE IM  Last 

administered on 11/27/16at 10:11;  Start 11/27/16 at 09:00;  Stop 11/27/16 at 09

:01;  Status DC


Insulin Human Lispro (HumaLOG INSULIN) SEE PROTOCOL TABLE AC SC  Last 

administered on 12/8/16at 08:02;  Start 11/26/16 at 07:30;  Stop 12/26/16 at 07:

29


Insulin Human Lispro (HumaLOG INSULIN) SEE PROTOCOL TABLE QHS SC ;  Start 11/25/ 16 at 21:00;  Stop 12/25/16 at 20:59


Iopamidol (Isovue-370 76%) 100 ml STK-MED ONCE As Ordered ;  Start 12/5/16 at 07

:41;  Stop 12/5/16 at 07:42;  Status DC


Lidocaine (Lidoderm Patch) 3 patch DAILY TD  Last administered on 12/8/16at 08:

07;  Start 12/5/16 at 09:00;  Stop 1/4/17 at 08:59


Lisinopril (Prinivil) 2.5 mg DAILY PO ;  Start 11/26/16 at 09:00;  Stop 11/26/ 16 at 09:00;  Status DC


Lisinopril 2.5 mg 2.5 mg DAILY PO  Last administered on 11/29/16at 08:33;  

Start 11/28/16 at 09:00;  Stop 11/29/16 at 11:06;  Status DC


Loratadine (Claritin) 10 mg DAILY  PRN PO ALLERGIES Last administered on 11/26/ 16at 18:15;  Start 11/25/16 at 17:00;  Stop 12/25/16 at 16:59


Magnesium Hydroxide (Milk Of Magnesia) 30 ml Q4HP  PRN PO CONSTIPATION Last 

administered on 12/6/16at 09:17;  Start 12/2/16 at 07:45;  Stop 1/1/17 at 07:44


Magnesium Sulfate/ Dextrose/IV Miscellaneous Supplies (Mag Sulf 1gm/ 100ml (Mag 

Run)) 100 ml @  100 mls/hr ONCE  ONCE IV  Last administered on 11/26/16at 02:05

;  Start 11/26/16 at 02:00;  Stop 11/26/16 at 02:59;  Status DC


Methylprednisolone (SOLUmedrol) 125 mg STK-MED ONCE As Ordered ;  Start 11/25/ 16 at 13:18;  Stop 11/25/16 at 13:19;  Status DC


Metoprolol Tartrate (Lopressor) 12.5 mg BID PO  Last administered on 11/28/16at 

09:59;  Start 11/26/16 at 21:00;  Stop 11/28/16 at 16:55;  Status DC


Metoprolol Tartrate (Lopressor) 12.5 mg BID PO  Last administered on 12/8/16at 

08:03;  Start 12/1/16 at 09:00;  Stop 12/31/16 at 08:59


Metoprolol Tartrate (Lopressor) 25 mg BID PO ;  Start 11/25/16 at 21:00;  Stop 

11/26/16 at 11:51;  Status DC


Metoprolol Tartrate 25 mg 25 mg BID PO ;  Start 11/28/16 at 21:00;  Stop 11/29/ 16 at 11:06;  Status DC


Miscellaneous (Unresolved Patient Own Med Order) SEE LABEL COMMENTS UNRESOLVED 

XX  Last administered on 12/5/16at 18:33;  Start 12/5/16 at 00:01;  Stop 12/5/ 16 at 20:41;  Status DC


Multivitamins (Theragram-M) 1 tab DAILY PO  Last administered on 12/8/16at 08:07

;  Start 11/26/16 at 09:00;  Stop 12/26/16 at 08:59


Non-Formulary Medication breo-ellipta pls resume home d... BID INH ;  Start 12/5 /16 at 21:00;  Stop 12/5/16 at 21:00;  Status DC


Non-Formulary Medication (** See Comment Field Below **) REMOVE LIDODERM 

PATCHES DAILY@21 XX  Last administered on 12/7/16at 20:43;  Start 12/5/16 at 21:

00;  Stop 1/4/17 at 20:59


Ondansetron HCl (Zofran) 4 mg Q6HP  PRN IV NAUSEA OR VOMITING Last administered 

on 12/6/16at 11:17;  Start 11/25/16 at 16:45;  Stop 12/25/16 at 16:44


Ondansetron HCl (Zofran) 4 mg STK-MED ONCE As Ordered ;  Start 11/25/16 at 15:07

;  Stop 11/25/16 at 15:08;  Status DC


Oxycodone/ Acetaminophen (Percocet 5mg/ 325mg Tablet) 1 tab Q4HP  PRN PO 

MODERATE PAIN (PS 5-7) Last administered on 12/3/16at 15:11;  Start 11/25/16 at 

16:45;  Stop 12/9/16 at 16:44


Pantoprazole Sodium (Protonix) 40 mg DAILY PO  Last administered on 12/8/16at 08

:04;  Start 11/26/16 at 09:00;  Stop 12/26/16 at 08:59


Patient Own Medication Breo-ellipta 200/25: ONE PUFF... DAILY INH  Last 

administered on 12/8/16at 07:09;  Start 12/6/16 at 09:00;  Stop 1/5/17 at 08:59


Piperacillin Sod/ Tazobactam Sod 3.375 gm/Dextrose 50 ml @ 50 mls/hr Q8H IV ;  

Start 11/25/16 at 17:00;  Stop 11/25/16 at 17:00;  Status DC


Piperacillin Sod/ Tazobactam Sod/ Dextrose (Zosyn/Dextrose 5% Mini-Bag Plus) 50 

ml @ 50 mls/hr Q8H IV  Last administered on 11/29/16at 03:22;  Start 11/25/16 

at 20:00;  Stop 11/29/16 at 10:07;  Status DC


Polyethylene Glycol (Miralax) 1 pkt DAILYPRN  PRN PO CONSTIPATION Last 

administered on 12/5/16at 18:14;  Start 12/2/16 at 07:45;  Stop 1/1/17 at 07:44


Potassium Chloride (Micro-K Extencaps) 10 meq BID PO  Last administered on 12/2/ 16at 21:17;  Start 11/25/16 at 21:00;  Stop 12/3/16 at 06:58;  Status DC


Potassium Chloride (Micro-K Extencaps) 20 meq BID PO  Last administered on 12/8/ 16at 08:03;  Start 12/3/16 at 09:00;  Stop 1/2/17 at 08:59


Sodium Biphosphate/ Sodium Phosphate (Fleet Enema) 1 ENEMA Q12HP  PRN HI 

CONSTIPATION;  Start 12/2/16 at 07:45;  Stop 1/1/17 at 07:44


Sodium Chloride (Nacl 0.9%) 1,000 ml @  40 mls/hr Q24H IV  Last administered on 

11/26/16at 12:16;  Start 11/25/16 at 16:43;  Stop 11/27/16 at 09:26;  Status DC


Sodium Chloride (Saline Lock Flush) 2 ml ASDIRECTED  PRN IV SEE LABEL COMMENTS;

  Start 11/28/16 at 05:15;  Stop 12/28/16 at 05:14


Sodium Chloride (Saline Lock Flush) 2 ml SLF IV  Last administered on 12/7/16at 

20:42;  Start 11/28/16 at 06:00;  Stop 12/28/16 at 05:59


Tiotropium Bromide 1 inhalation 1 inhalation DAILY@08 INH  Last administered on 

12/8/16at 07:09;  Start 11/26/16 at 08:00;  Stop 12/26/16 at 07:59


Vancomycin HCl 1000 mg/IV Miscellaneous Supplies 1 each/ Dextrose 270 ml @  270 

mls/hr Q8H IV ;  Start 11/25/16 at 17:00;  Stop 11/25/16 at 17:00;  Status DC


Vancomycin HCl 1000 mg/IV Miscellaneous Supplies 1 each/ Dextrose 270 ml @  270 

mls/hr Q8H IV  Last administered on 11/28/16at 06:02;  Start 11/25/16 at 21:00;

  Stop 11/28/16 at 13:22;  Status DC


Vancomycin HCl/IV Miscellaneous Supplies/Dextrose (Vancomycin HCl/ Adapter-Vial 

Mate/ Dextrose 5%) 270 ml @  135 mls/hr Q8H IV  Last administered on 11/29/16at 

04:43;  Start 11/28/16 at 13:00;  Stop 11/29/16 at 10:07;  Status DC





Allergies


Coded Allergies:  


     Sulfa Drugs (Verified  Allergy, Unknown, 4/5/13)


     Sulfa Drugs Cross Reactors (Verified  Allergy, Unknown, 4/5/13)


     Heparin (Verified  Adverse Reaction, Intermediate, HIT, 6/9/16)








JOSE REINOSO MD Dec 8, 2016 08:56

## 2016-12-09 NOTE — IPNPDOC
Assessment/Plan


Date Seen


The patient was seen on 12/9/16.





Problems


Problems:  


(1) Acute and chronic respiratory failure with hypoxia


Status:  Acute


Problem Text:  multifactorial due to CAP and diastolic chf exacerbation and 

fluid


slowly improving with diuresis and antibiotics. 





(2) CAP (community acquired pneumonia)


Status:  Acute


Problem Text:  finished course on ceftriaxone. 





(3) Acute on chronic diastolic CHF (congestive heart failure)


Status:  Acute


Problem Text:  continue diuresis , daily weights. 


will start lasix infusion





(4) Constipation


Status:  Resolved


Problem Text:  on bowel regimen 





(5) SINTIA (obstructive sleep apnea)


Status:  Chronic


(6) Morbid obesity


Status:  Chronic


(7) Diabetes


Status:  Chronic


(8) Hypertension


Status:  Chronic


(9) COPD (chronic obstructive pulmonary disease)


Status:  Chronic


Problem Text:  continue home meds.





(10) CAD (coronary artery disease)


Status:  Chronic


(11) Hyperlipidemia


Status:  Chronic


(12) Mitral stenosis and incompetence


Status:  Chronic


(13) Pulmonary hypertension


Status:  Chronic


(14) Afib


Status:  Chronic


Problem Text:  on pradaxa








Plan / VTE


VTE Prophylaxis Ordered?:  Yes





Plan / Urinary Catheter


Reason for insertion/continuin:  Other-document below





Subjective


CC/HPI


The patient is a 66-year-old female admitted with a reason for visit of Acute 

Respiratory Distress.


Events since last encounter


had episode of severe sob at night with oxygen saturations dropping to 70s, 

having good negative balance





Objective


General Exam:  : Alert: No Acute Distress


Eye Exam:  : Conjunctiva & lids normal: EOMI: PERRLANo: Sclera icteric


ENT Exam:  : Atraumatic: Mucous membr. moist/pink: Pharynx Normal


Neck Exam:  : SuppleNo: JVD, thyromegaly


Chest Exam:  : Diminished


Heart Exam:  : Normal S1: Normal S2: Rate Normal: Regular Rhythm


Abdomen Exam:  : Normal bowel sounds: SoftNo: Hepatospenomegaly, Tenderness


Extremity Exam:  : Edema


Vital Signs  I&O





 Vital Sign - Last 24 Hours








 12/9/16 12/9/16 12/9/16 12/9/16





 03:12 05:55 08:00 08:59


 


Temp  97.5  


 


Pulse  99  99


 


Resp 22 19 20 


 


B/P  118/67  118/67


 


Pulse Ox  89  


 


O2 Delivery Nasal Cannula Nasal Cannula Nasal Cannula 


 


O2 Flow Rate 4.0 3.0 4.0 


 


    





 12/9/16 12/9/16 12/9/16 12/9/16





 09:00 14:00 16:18 16:48


 


Temp  97.6  


 


Pulse  95  


 


Resp  16 20 20


 


B/P  122/68  


 


Pulse Ox  92  


 


O2 Delivery Nasal Cannula Nasal Cannula  Nasal Cannula


 


O2 Flow Rate 4.0 4.0  4.0


 


    





 12/9/16   





 21:30   


 


Pulse 100   


 


B/P 136/88   














 I&O- Last 24 Hours up to 6 AM 


 


 12/9/16





 05:59


 


Intake Total 480 ml


 


Output Total 1975 ml


 


Balance -1495 ml











Laboratory Data


Labs 24H


 Laboratory Tests 2


12/9/16 05:54: 


Blood Urea Nitrogen 20H, Creatinine 1.07H, Sodium Level 140, Potassium Level 4.0

, Chloride Level 100, Carbon Dioxide Level 31, Calcium Level 8.5L, Aspartate 

Amino Transf (AST/SGOT) 16, Alanine Aminotransferase (ALT/SGPT) 18, Alkaline 

Phosphatase 81, Total Bilirubin 0.8, Total Protein 7.0, Albumin 3.3, Albumin/

Globulin Ratio 0.89L, Anion Gap 9, White Blood Count 8.9, Red Blood Count 3.38L

, Hemoglobin 9.4L, Hematocrit 32.6L, Mean Corpuscular Volume 96.4H, Mean 

Corpuscular Hemoglobin 27.7, Mean Corpuscular Hemoglobin Concent 28.7L, Red 

Cell Distribution Width 22.3H, Platelet Count 162, Neutrophils (%) (Auto) 82.7H

, Lymphocytes (%) (Auto) 7.6L, Monocytes (%) (Auto) 5.6H, Eosinophils (%) (Auto

) 1.4, Basophils (%) (Auto) 0.2, Neutrophils # (Auto) 7.4, Lymphocytes # (Auto) 

0.7L, Monocytes # (Auto) 0.5, Eosinophils # (Auto) 0.1, Basophils # (Auto) 0.0, 

Glomerular Filtration Rate 54.6, Large Unclassified Cells # 0.2, Large 

Unclassified Cells % 2.4, Magnesium Level 2.6H


12/9/16 05:58: Bedside Glucose (Misc Panel) 129H


12/9/16 12:14: Bedside Glucose (Misc Panel) 131H


12/9/16 16:44: Bedside Glucose (Misc Panel) 136H


12/9/16 20:01: Bedside Glucose (Misc Panel) 185H


CBC/BMP


 Laboratory Tests


12/9/16 05:54








Calcium Level 8.5 L, Aspartate Amino Transf (AST/SGOT) 16, Alanine 

Aminotransferase (ALT/SGPT) 18, Alkaline Phosphatase 81, Total Bilirubin 0.8, 

Total Protein 7.0, Albumin 3.3, Red Blood Count 3.38 L, Mean Corpuscular Volume 

96.4 H, Mean Corpuscular Hemoglobin 27.7, Mean Corpuscular Hemoglobin Concent 

28.7 L, Red Cell Distribution Width 22.3 H, Neutrophils (%) (Auto) 82.7 H, 

Lymphocytes (%) (Auto) 7.6 L, Monocytes (%) (Auto) 5.6 H, Eosinophils (%) (Auto

) 1.4, Basophils (%) (Auto) 0.2, Neutrophils # (Auto) 7.4, Lymphocytes # (Auto) 

0.7 L, Monocytes # (Auto) 0.5, Eosinophils # (Auto) 0.1, Basophils # (Auto) 0.0


FSBS





Laboratory Tests








Test


  12/9/16


05:58 12/9/16


12:14 12/9/16


16:44 12/9/16


20:01 Range/Units


 


 


Bedside Glucose (Misc Panel) 129 131 136 185   MG/DL











Current Medications


Current Medications





 Current Medications


Acetaminophen (Tylenol) 650 mg Q4HP  PRN PO MILD PAIN OR FEVER Last 

administered on 11/26/16at 22:32;  Start 11/25/16 at 16:45;  Stop 11/27/16 at 09

:27;  Status DC


Acetaminophen (Tylenol) 650 mg Q6HP  PRN PO MILD PAIN OR FEVER Last 

administered on 12/5/16at 05:07;  Start 11/27/16 at 09:30;  Stop 12/5/16 at 11:

26;  Status DC


Acetaminophen (Tylenol) 1,000 mg Q6HP  PRN PO PAIN / FEVER Last administered on 

12/8/16at 20:06;  Start 12/5/16 at 11:30;  Stop 1/4/17 at 11:29


Albuterol Sulfate (Proventil Neb) 2.5 mg Q4HP  PRN NEB SOB/WHEEZING;  Start 11/ 25/16 at 17:00;  Stop 11/26/16 at 11:45;  Status DC


Albuterol Sulfate (Proventil Neb) 2.5 mg RQ4H NEB  Last administered on 12/2/ 16at 03:44;  Start 11/29/16 at 12:00;  Stop 12/2/16 at 07:01;  Status DC


Albuterol Sulfate (Proventil, Ventolin Hfa) 2 puff Q4H  PRN INH SHORTNESS OF 

BREATH;  Start 11/25/16 at 17:00;  Stop 11/25/16 at 17:06;  Status DC


Albuterol/ Ipratropium (Duoneb (Ipr 0.5mg/Alb 2.5mg)) 3 ml Q2HP  PRN NEB SOB/

WHEEZING Last administered on 11/29/16at 07:06;  Start 11/26/16 at 11:45;  Stop 

11/29/16 at 10:54;  Status DC


Albuterol/ Ipratropium (Duoneb (Ipr 0.5mg/Alb 2.5mg)) 3 ml Q2HP  PRN NEB SOB/

WHEEZING Last administered on 12/9/16at 03:24;  Start 12/2/16 at 07:00;  Stop 1/ 1/17 at 06:59


Albuterol/ Ipratropium (Duoneb (Ipr 0.5mg/Alb 2.5mg)) 3 ml RQID NEB  Last 

administered on 12/9/16at 19:15;  Start 12/2/16 at 08:00;  Stop 1/1/17 at 07:59


Albuterol/ Ipratropium (Duoneb (Ipr 0.5mg/Alb 2.5mg)) 9 ml STK-MED ONCE As 

Ordered ;  Start 11/25/16 at 13:22;  Stop 11/25/16 at 13:23;  Status DC


Ascorbic Acid (Vitamin C) 500 mg BIDWM PO  Last administered on 12/9/16at 17:42

;  Start 12/1/16 at 08:00;  Stop 12/31/16 at 07:59


Aspirin (Ecotrin) 81 mg DAILY PO  Last administered on 12/9/16at 08:58;  Start 

11/26/16 at 09:00;  Stop 12/26/16 at 08:59


Atorvastatin Calcium (Lipitor) 40 mg DAILY PO  Last administered on 12/9/16at 08

:59;  Start 11/26/16 at 09:00;  Stop 12/26/16 at 08:59


Bisacodyl (Dulcolax) 10 mg Q12HP  PRN AK CONSTIPATION;  Start 12/2/16 at 07:45;

  Stop 1/1/17 at 07:44


Ceftriaxone Sodium 2 gm 2 gm STK-MED ONCE As Ordered ;  Start 11/25/16 at 15:28

;  Stop 11/25/16 at 15:29;  Status DC


Ceftriaxone Sodium/Dextrose (Rocephin/ Dextrose 5% Mini-Bag Plus) 50 ml @  100 

mls/hr Q24H IV  Last administered on 12/7/16at 11:56;  Start 11/29/16 at 12:00;

  Stop 12/8/16 at 08:55;  Status DC


Dabigatran (Pradaxa) 150 mg BID PO  Last administered on 11/27/16at 10:06;  

Start 11/25/16 at 21:00;  Stop 11/27/16 at 14:02;  Status DC


Dabigatran (Pradaxa) 150 mg BID PO  Last administered on 12/9/16at 21:26;  

Start 11/28/16 at 09:00;  Stop 12/12/16 at 08:59


Dextrose (Dextrose 50%) 25 ml ASDIRECTED  PRN IV SEE LABEL COMMENTS;  Start 11/ 25/16 at 17:45;  Stop 12/25/16 at 17:44


Docusate Sodium (Colace) 100 mg BIDP  PRN PO CONSTIPATION Last administered on 

12/8/16at 08:06;  Start 11/27/16 at 09:30;  Stop 12/27/16 at 09:29


Ferrous Sulfate (Ferrous Sulfate) 325 mg BID PO  Last administered on 12/9/16at 

21:25;  Start 11/27/16 at 09:00;  Stop 12/27/16 at 08:59


Fluticasone Propionate (Flonase 0.05% Nasal Spray) 2 spray DAILY  PRN NA 

CONGESTION Last administered on 12/8/16at 20:03;  Start 11/25/16 at 17:00;  

Stop 12/25/16 at 16:59


Furosemide (Lasix) 20 mg Q6H IV  Last administered on 12/7/16at 06:06;  Start 11 /30/16 at 18:00;  Stop 12/7/16 at 10:55;  Status DC


Furosemide (Lasix) 40 mg DAILY@17 PO  Last administered on 11/29/16at 15:59;  

Start 11/28/16 at 17:00;  Stop 11/30/16 at 16:25;  Status DC


Furosemide (Lasix) 40 mg ONCE  ONCE PO  Last administered on 11/28/16at 12:47;  

Start 11/28/16 at 11:45;  Stop 11/28/16 at 11:46;  Status DC


Furosemide (Lasix) 80 mg DAILY PO  Last administered on 11/30/16at 08:13;  

Start 11/29/16 at 09:00;  Stop 11/30/16 at 16:25;  Status DC


Furosemide/ Dextrose (Lasix/Dextrose 5%) 250 ml @  10 mls/hr Q24H IV  Last 

administered on 12/9/16at 11:31;  Start 12/7/16 at 11:00;  Stop 1/6/17 at 10:59


Glucagon 1 mg 1 mg ASDIRECTED  PRN SC SEE LABEL COMMENTS;  Start 11/25/16 at 17:

45;  Stop 12/25/16 at 17:44


Glucose (Glucose) 16 GM ASDIRECTED  PRN PO SEE LABEL COMMENTS;  Start 11/25/16 

at 17:45;  Stop 12/25/16 at 17:44


Guaifenesin (Mucinex) 600 mg BID  PRN PO CHEST CONGESTION;  Start 11/25/16 at 17

:00;  Stop 12/25/16 at 16:59


Home Med (Med Rec Complete!)  ASDIRECTED XX ;  Start 11/25/16 at 15:15;  Stop 11 /25/16 at 15:15;  Status DC


Influenza Virus Vaccine (Fluzone High Dose) 0.5 ml ONCE  ONCE IM  Last 

administered on 11/27/16at 10:11;  Start 11/27/16 at 09:00;  Stop 11/27/16 at 09

:01;  Status DC


Insulin Human Lispro (HumaLOG INSULIN) SEE PROTOCOL TABLE AC SC  Last 

administered on 12/9/16at 17:42;  Start 11/26/16 at 07:30;  Stop 12/26/16 at 07:

29


Insulin Human Lispro (HumaLOG INSULIN) SEE PROTOCOL TABLE QHS SC ;  Start 11/25/ 16 at 21:00;  Stop 12/25/16 at 20:59


Iopamidol (Isovue-370 76%) 100 ml STK-MED ONCE As Ordered ;  Start 12/5/16 at 07

:41;  Stop 12/5/16 at 07:42;  Status DC


Lidocaine (Lidoderm Patch) 3 patch DAILY TD  Last administered on 12/9/16at 08:

58;  Start 12/5/16 at 09:00;  Stop 1/4/17 at 08:59


Lisinopril (Prinivil) 2.5 mg DAILY PO ;  Start 11/26/16 at 09:00;  Stop 11/26/ 16 at 09:00;  Status DC


Lisinopril 2.5 mg 2.5 mg DAILY PO  Last administered on 11/29/16at 08:33;  

Start 11/28/16 at 09:00;  Stop 11/29/16 at 11:06;  Status DC


Loratadine (Claritin) 10 mg DAILY  PRN PO ALLERGIES Last administered on 11/26/ 16at 18:15;  Start 11/25/16 at 17:00;  Stop 12/25/16 at 16:59


Magnesium Hydroxide (Milk Of Magnesia) 30 ml Q4HP  PRN PO CONSTIPATION Last 

administered on 12/6/16at 09:17;  Start 12/2/16 at 07:45;  Stop 1/1/17 at 07:44


Magnesium Sulfate/ Dextrose/IV Miscellaneous Supplies (Mag Sulf 1gm/ 100ml (Mag 

Run)) 100 ml @  100 mls/hr ONCE  ONCE IV  Last administered on 11/26/16at 02:05

;  Start 11/26/16 at 02:00;  Stop 11/26/16 at 02:59;  Status DC


Methylprednisolone (SOLUmedrol) 125 mg STK-MED ONCE As Ordered ;  Start 11/25/ 16 at 13:18;  Stop 11/25/16 at 13:19;  Status DC


Metoprolol Tartrate (Lopressor) 12.5 mg BID PO  Last administered on 11/28/16at 

09:59;  Start 11/26/16 at 21:00;  Stop 11/28/16 at 16:55;  Status DC


Metoprolol Tartrate (Lopressor) 12.5 mg BID PO  Last administered on 12/9/16at 

21:30;  Start 12/1/16 at 09:00;  Stop 12/31/16 at 08:59


Metoprolol Tartrate (Lopressor) 25 mg BID PO ;  Start 11/25/16 at 21:00;  Stop 

11/26/16 at 11:51;  Status DC


Metoprolol Tartrate 25 mg 25 mg BID PO ;  Start 11/28/16 at 21:00;  Stop 11/29/ 16 at 11:06;  Status DC


Miscellaneous (Unresolved Patient Own Med Order) SEE LABEL COMMENTS UNRESOLVED 

XX  Last administered on 12/5/16at 18:33;  Start 12/5/16 at 00:01;  Stop 12/5/ 16 at 20:41;  Status DC


Multivitamins (Theragram-M) 1 tab DAILY PO  Last administered on 12/9/16at 08:59

;  Start 11/26/16 at 09:00;  Stop 12/26/16 at 08:59


Non-Formulary Medication breo-ellipta pls resume home d... BID INH ;  Start 12/5 /16 at 21:00;  Stop 12/5/16 at 21:00;  Status DC


Non-Formulary Medication (** See Comment Field Below **) REMOVE LIDODERM 

PATCHES DAILY@21 XX  Last administered on 12/9/16at 21:00;  Start 12/5/16 at 21:

00;  Stop 1/4/17 at 20:59


Nystatin (Mycostatin Powder, Nystop) apply under abd folds ... BID TOP  Last 

administered on 12/9/16at 21:26;  Start 12/8/16 at 21:00;  Stop 1/7/17 at 20:59


Ondansetron HCl (Zofran) 4 mg Q6HP  PRN IV NAUSEA OR VOMITING Last administered 

on 12/8/16at 19:12;  Start 11/25/16 at 16:45;  Stop 12/25/16 at 16:44


Ondansetron HCl (Zofran) 4 mg STK-MED ONCE As Ordered ;  Start 11/25/16 at 15:07

;  Stop 11/25/16 at 15:08;  Status DC


Oxycodone/ Acetaminophen (Percocet 5mg/ 325mg Tablet) 1 tab Q4HP  PRN PO 

MODERATE PAIN (PS 5-7) Last administered on 12/9/16at 16:18;  Start 11/25/16 at 

16:45;  Stop 12/16/16 at 16:44


Pantoprazole Sodium (Protonix) 40 mg DAILY PO  Last administered on 12/9/16at 08

:59;  Start 11/26/16 at 09:00;  Stop 12/26/16 at 08:59


Patient Own Medication Breo-ellipta 200/25: ONE PUFF... DAILY INH  Last 

administered on 12/9/16at 08:00;  Start 12/6/16 at 09:00;  Stop 1/5/17 at 08:59


Piperacillin Sod/ Tazobactam Sod 3.375 gm/Dextrose 50 ml @ 50 mls/hr Q8H IV ;  

Start 11/25/16 at 17:00;  Stop 11/25/16 at 17:00;  Status DC


Piperacillin Sod/ Tazobactam Sod/ Dextrose (Zosyn/Dextrose 5% Mini-Bag Plus) 50 

ml @ 50 mls/hr Q8H IV  Last administered on 11/29/16at 03:22;  Start 11/25/16 

at 20:00;  Stop 11/29/16 at 10:07;  Status DC


Polyethylene Glycol (Miralax) 1 pkt DAILYPRN  PRN PO CONSTIPATION Last 

administered on 12/5/16at 18:14;  Start 12/2/16 at 07:45;  Stop 1/1/17 at 07:44


Potassium Chloride (Micro-K Extencaps) 10 meq BID PO  Last administered on 12/2/ 16at 21:17;  Start 11/25/16 at 21:00;  Stop 12/3/16 at 06:58;  Status DC


Potassium Chloride (Micro-K Extencaps) 20 meq BID PO  Last administered on 12/9/ 16at 21:26;  Start 12/3/16 at 09:00;  Stop 1/2/17 at 08:59


Sodium Biphosphate/ Sodium Phosphate (Fleet Enema) 1 ENEMA Q12HP  PRN AK 

CONSTIPATION;  Start 12/2/16 at 07:45;  Stop 1/1/17 at 07:44


Sodium Chloride (Nacl 0.9%) 1,000 ml @  40 mls/hr Q24H IV  Last administered on 

11/26/16at 12:16;  Start 11/25/16 at 16:43;  Stop 11/27/16 at 09:26;  Status DC


Sodium Chloride (Saline Lock Flush) 2 ml ASDIRECTED  PRN IV SEE LABEL COMMENTS;

  Start 11/28/16 at 05:15;  Stop 12/28/16 at 05:14


Sodium Chloride (Saline Lock Flush) 2 ml SLF IV  Last administered on 12/8/16at 

13:11;  Start 11/28/16 at 06:00;  Stop 12/28/16 at 05:59


Tiotropium Bromide 1 inhalation 1 inhalation DAILY@08 INH  Last administered on 

12/9/16at 08:00;  Start 11/26/16 at 08:00;  Stop 12/26/16 at 07:59


Vancomycin HCl 1000 mg/IV Miscellaneous Supplies 1 each/ Dextrose 270 ml @  270 

mls/hr Q8H IV ;  Start 11/25/16 at 17:00;  Stop 11/25/16 at 17:00;  Status DC


Vancomycin HCl 1000 mg/IV Miscellaneous Supplies 1 each/ Dextrose 270 ml @  270 

mls/hr Q8H IV  Last administered on 11/28/16at 06:02;  Start 11/25/16 at 21:00;

  Stop 11/28/16 at 13:22;  Status DC


Vancomycin HCl/IV Miscellaneous Supplies/Dextrose (Vancomycin HCl/ Adapter-Vial 

Mate/ Dextrose 5%) 270 ml @  135 mls/hr Q8H IV  Last administered on 11/29/16at 

04:43;  Start 11/28/16 at 13:00;  Stop 11/29/16 at 10:07;  Status DC





Allergies


Coded Allergies:  


     Sulfa Drugs (Verified  Allergy, Unknown, 4/5/13)


     Sulfa Drugs Cross Reactors (Verified  Allergy, Unknown, 4/5/13)


     Heparin (Verified  Adverse Reaction, Intermediate, HIT, 6/9/16)








JOSE REINOSO MD Dec 9, 2016 21:56

## 2016-12-09 NOTE — ECGEPIP
Stationary ECG Study

                              [facility] [place]

                                       

                                       Test Date:    2016

Pat Name:     NGHIA HUI              Department:   

Patient ID:   [id number]                 Room:         [place] [address]

Gender:       F                        Technician:   DIONNE

:          1950               Requested By: RICHARD SWEET

Order Number: [id number]     Reading MD:   Iván Leroy

                                 Measurements

Intervals                              Axis          

Rate:         86                       P:            

NM:           0                        QRS:          93

QRSD:         119                      T:            28

QT:           372                                    

QTc:          447                                    

                           Interpretive Statements

ATRIAL FIBRILLATION

BORDERLINE RIGHT AXIS DEVIATION

LOW QRS VOLTAGE IN PRECORDIAL LEADS

INCOMPLETE RIGHT BUNDLE BRANCH BLOCK

PROBABLE INFERIOR MYOCARDIAL INFARCTION, PROBABLY OLD

Nonspecific ST-T abnormality

Compared to the last 3 tracings in the system, no significant changes

Electronically Signed On 2016 23:27:37 EST by Iván Leroy

## 2016-12-10 NOTE — IPNPDOC
Assessment/Plan


Date Seen


The patient was seen on 12/10/16.





Problems


Problems:  


(1) Acute on chronic diastolic CHF (congestive heart failure)


Status:  Acute


Problem Text:  continue diuresis , daily weights. 


on  lasix infusion





(2) Acute and chronic respiratory failure with hypoxia


Status:  Acute


Problem Text:  multifactorial due to CAP and diastolic chf exacerbation and 

fluid


slowly improving with diuresis and antibiotics. 





(3) CAP (community acquired pneumonia)


Status:  Acute


Problem Text:  finished course on ceftriaxone. 





(4) Constipation


Status:  Resolved


Problem Text:  on bowel regimen 





(5) SINTIA (obstructive sleep apnea)


Status:  Chronic


(6) Morbid obesity


Status:  Chronic


(7) Diabetes


Status:  Chronic


(8) Hypertension


Status:  Chronic


(9) COPD (chronic obstructive pulmonary disease)


Status:  Chronic


Problem Text:  continue home meds.





(10) CAD (coronary artery disease)


Status:  Chronic


(11) Hyperlipidemia


Status:  Chronic


(12) Mitral stenosis and incompetence


Status:  Chronic


(13) Pulmonary hypertension


Status:  Chronic


(14) Afib


Status:  Chronic


Problem Text:  on pradaxa








Plan / VTE


VTE Prophylaxis Ordered?:  Yes





Plan / Urinary Catheter


Reason for insertion/continuin:  Other-document below





Subjective


CC/HPI


The patient is a 66-year-old female admitted with a reason for visit of Acute 

Respiratory Distress.


Events since last encounter


no new complaints continues to be 1 to 1.5 liter negative in 24 hours, still 

with severe bipedal edema, intermittent episodes of SOB with drops in 

saturations to 70s





Objective


General Exam:  : Alert: No Acute Distress


Eye Exam:  : Conjunctiva & lids normal: EOMI: PERRLANo: Sclera icteric


ENT Exam:  : Atraumatic: Mucous membr. moist/pink: Pharynx Normal


Neck Exam:  : SuppleNo: JVD, thyromegaly


Chest Exam:  : Diminished


Heart Exam:  : Normal S1: Normal S2: Rate Normal: Regular Rhythm


Abdomen Exam:  : Normal bowel sounds: SoftNo: Hepatospenomegaly, Tenderness


Extremity Exam:  : Edema


Vital Signs  I&O





 Vital Sign - Last 24 Hours








 12/9/16 12/9/16 12/9/16 12/9/16





 08:59 09:00 14:00 16:18


 


Temp   97.6 


 


Pulse 99  95 


 


Resp   16 20


 


B/P 118/67  122/68 


 


Pulse Ox   92 


 


O2 Delivery  Nasal Cannula Nasal Cannula 


 


O2 Flow Rate  4.0 4.0 


 


    





 12/9/16 12/9/16 12/9/16 12/9/16





 16:48 21:00 21:15 21:30


 


Temp   97.9 


 


Pulse   100 100


 


Resp 20  19 


 


B/P   136/88 136/88


 


Pulse Ox   90 


 


O2 Delivery Nasal Cannula Nasal Cannula Nasal Cannula 


 


O2 Flow Rate 4.0 4.0 4.0 


 


    





 12/10/16 12/10/16 12/10/16 





 06:00 07:53 07:55 


 


Temp 97.6   


 


Pulse 91 100  


 


Resp 18  20 


 


B/P 132/77 136/88  


 


Pulse Ox 90   


 


O2 Delivery Nasal Cannula   


 


O2 Flow Rate 3.0   














 I&O- Last 24 Hours up to 6 AM 


 


 12/10/16





 06:00


 


Intake Total 1190 ml


 


Output Total 2350 ml


 


Balance -1160 ml











Laboratory Data


Labs 24H


 Laboratory Tests 2


12/9/16 12:14: Bedside Glucose (Misc Panel) 131H


12/9/16 16:44: Bedside Glucose (Misc Panel) 136H


12/9/16 20:01: Bedside Glucose (Misc Panel) 185H


12/10/16 05:35: 


Anion Gap 8, Anisocytosis 3+, White Blood Count 9.0, Red Blood Count 3.22L, 

Hemoglobin 8.9L, Hematocrit 31.3L, Mean Corpuscular Volume 96.9H, Mean 

Corpuscular Hemoglobin 27.6, Mean Corpuscular Hemoglobin Concent 28.4L, Red 

Cell Distribution Width 22.1H, Platelet Count 178, Neutrophils (%) (Auto) 81.2H

, Lymphocytes (%) (Auto) 8.4L, Monocytes (%) (Auto) 6.4H, Eosinophils (%) (Auto

) 1.6, Basophils (%) (Auto) 0.3, Neutrophils # (Auto) 7.3, Lymphocytes # (Auto) 

0.8L, Monocytes # (Auto) 0.6, Eosinophils # (Auto) 0.1, Basophils # (Auto) 0.0, 

Blood Urea Nitrogen 19H, Creatinine 0.93, Sodium Level 139, Potassium Level 3.9

, Chloride Level 99, Carbon Dioxide Level 32, Calcium Level 8.0L, Glomerular 

Filtration Rate > 60.0, Hypochromasia 3+, Large Unclassified Cells # 0.2, Large 

Unclassified Cells % 2.1, Macrocytosis 2+, Magnesium Level 2.5H, Platelet 

Estimate NORMAL


CBC/BMP


 Laboratory Tests


12/10/16 05:35








Calcium Level 8.0 L, Red Blood Count 3.22 L, Mean Corpuscular Volume 96.9 H, 

Mean Corpuscular Hemoglobin 27.6, Mean Corpuscular Hemoglobin Concent 28.4 L, 

Red Cell Distribution Width 22.1 H, Neutrophils (%) (Auto) 81.2 H, Lymphocytes (

%) (Auto) 8.4 L, Monocytes (%) (Auto) 6.4 H, Eosinophils (%) (Auto) 1.6, 

Basophils (%) (Auto) 0.3, Neutrophils # (Auto) 7.3, Lymphocytes # (Auto) 0.8 L, 

Monocytes # (Auto) 0.6, Eosinophils # (Auto) 0.1, Basophils # (Auto) 0.0


FSBS





Laboratory Tests








Test


  12/9/16


12:14 12/9/16


16:44 12/9/16


20:01 Range/Units


 


 


Bedside Glucose (Misc Panel) 131 136 185   MG/DL











Current Medications


Current Medications





 Current Medications


Acetaminophen (Tylenol) 650 mg Q4HP  PRN PO MILD PAIN OR FEVER Last 

administered on 11/26/16at 22:32;  Start 11/25/16 at 16:45;  Stop 11/27/16 at 09

:27;  Status DC


Acetaminophen (Tylenol) 650 mg Q6HP  PRN PO MILD PAIN OR FEVER Last 

administered on 12/5/16at 05:07;  Start 11/27/16 at 09:30;  Stop 12/5/16 at 11:

26;  Status DC


Acetaminophen (Tylenol) 1,000 mg Q6HP  PRN PO PAIN / FEVER Last administered on 

12/8/16at 20:06;  Start 12/5/16 at 11:30;  Stop 1/4/17 at 11:29


Albuterol Sulfate (Proventil Neb) 2.5 mg Q4HP  PRN NEB SOB/WHEEZING;  Start 11/ 25/16 at 17:00;  Stop 11/26/16 at 11:45;  Status DC


Albuterol Sulfate (Proventil Neb) 2.5 mg RQ4H NEB  Last administered on 12/2/ 16at 03:44;  Start 11/29/16 at 12:00;  Stop 12/2/16 at 07:01;  Status DC


Albuterol Sulfate (Proventil, Ventolin Hfa) 2 puff Q4H  PRN INH SHORTNESS OF 

BREATH;  Start 11/25/16 at 17:00;  Stop 11/25/16 at 17:06;  Status DC


Albuterol/ Ipratropium (Duoneb (Ipr 0.5mg/Alb 2.5mg)) 3 ml Q2HP  PRN NEB SOB/

WHEEZING Last administered on 11/29/16at 07:06;  Start 11/26/16 at 11:45;  Stop 

11/29/16 at 10:54;  Status DC


Albuterol/ Ipratropium (Duoneb (Ipr 0.5mg/Alb 2.5mg)) 3 ml Q2HP  PRN NEB SOB/

WHEEZING Last administered on 12/9/16at 03:24;  Start 12/2/16 at 07:00;  Stop 1/ 1/17 at 06:59


Albuterol/ Ipratropium (Duoneb (Ipr 0.5mg/Alb 2.5mg)) 3 ml RQID NEB  Last 

administered on 12/9/16at 19:15;  Start 12/2/16 at 08:00;  Stop 1/1/17 at 07:59


Albuterol/ Ipratropium (Duoneb (Ipr 0.5mg/Alb 2.5mg)) 9 ml STK-MED ONCE As 

Ordered ;  Start 11/25/16 at 13:22;  Stop 11/25/16 at 13:23;  Status DC


Ascorbic Acid (Vitamin C) 500 mg BIDWM PO  Last administered on 12/10/16at 07:54

;  Start 12/1/16 at 08:00;  Stop 12/31/16 at 07:59


Aspirin (Ecotrin) 81 mg DAILY PO  Last administered on 12/10/16at 07:54;  Start 

11/26/16 at 09:00;  Stop 12/26/16 at 08:59


Atorvastatin Calcium (Lipitor) 40 mg DAILY PO  Last administered on 12/10/16at 

07:54;  Start 11/26/16 at 09:00;  Stop 12/26/16 at 08:59


Bisacodyl (Dulcolax) 10 mg Q12HP  PRN [place] CONSTIPATION;  Start 12/2/16 at 07:45;

  Stop 1/1/17 at 07:44


Ceftriaxone Sodium 2 gm 2 gm STK-MED ONCE As Ordered ;  Start 11/25/16 at 15:28

;  Stop 11/25/16 at 15:29;  Status DC


Ceftriaxone Sodium/Dextrose (Rocephin/ Dextrose 5% Mini-Bag Plus) 50 ml @  100 

mls/hr Q24H IV  Last administered on 12/7/16at 11:56;  Start 11/29/16 at 12:00;

  Stop 12/8/16 at 08:55;  Status DC


Dabigatran (Pradaxa) 150 mg BID PO  Last administered on 11/27/16at 10:06;  

Start 11/25/16 at 21:00;  Stop 11/27/16 at 14:02;  Status DC


Dabigatran (Pradaxa) 150 mg BID PO  Last administered on 12/10/16at 07:53;  

Start 11/28/16 at 09:00;  Stop 12/12/16 at 08:59


Dextrose (Dextrose 50%) 25 ml ASDIRECTED  PRN IV SEE LABEL COMMENTS;  Start 11/ 25/16 at 17:45;  Stop 12/25/16 at 17:44


Docusate Sodium (Colace) 100 mg BIDP  PRN PO CONSTIPATION Last administered on 

12/10/16at 07:53;  Start 11/27/16 at 09:30;  Stop 12/27/16 at 09:29


Ferrous Sulfate (Ferrous Sulfate) 325 mg BID PO  Last administered on 12/10/

16at 07:53;  Start 11/27/16 at 09:00;  Stop 12/27/16 at 08:59


Fluticasone Propionate (Flonase 0.05% Nasal Spray) 2 spray DAILY  PRN NA 

CONGESTION Last administered on 12/8/16at 20:03;  Start 11/25/16 at 17:00;  

Stop 12/25/16 at 16:59


Furosemide (Lasix) 20 mg Q6H IV  Last administered on 12/7/16at 06:06;  Start 11 /30/16 at 18:00;  Stop 12/7/16 at 10:55;  Status DC


Furosemide (Lasix) 40 mg DAILY@17 PO  Last administered on 11/29/16at 15:59;  

Start 11/28/16 at 17:00;  Stop 11/30/16 at 16:25;  Status DC


Furosemide (Lasix) 40 mg ONCE  ONCE PO  Last administered on 11/28/16at 12:47;  

Start 11/28/16 at 11:45;  Stop 11/28/16 at 11:46;  Status DC


Furosemide (Lasix) 80 mg DAILY PO  Last administered on 11/30/16at 08:13;  

Start 11/29/16 at 09:00;  Stop 11/30/16 at 16:25;  Status DC


Furosemide/ Dextrose (Lasix/Dextrose 5%) 250 ml @  10 mls/hr Q24H IV  Last 

administered on 12/9/16at 11:31;  Start 12/7/16 at 11:00;  Stop 1/6/17 at 10:59


Glucagon 1 mg 1 mg ASDIRECTED  PRN SC SEE LABEL COMMENTS;  Start 11/25/16 at 17:

45;  Stop 12/25/16 at 17:44


Glucose (Glucose) 16 GM ASDIRECTED  PRN PO SEE LABEL COMMENTS;  Start 11/25/16 

at 17:45;  Stop 12/25/16 at 17:44


Guaifenesin (Mucinex) 600 mg BID  PRN PO CHEST CONGESTION;  Start 11/25/16 at 17

:00;  Stop 12/25/16 at 16:59


Home Med (Med Rec Complete!)  ASDIRECTED XX ;  Start 11/25/16 at 15:15;  Stop 11 /25/16 at 15:15;  Status DC


Influenza Virus Vaccine (Fluzone High Dose) 0.5 ml ONCE  ONCE IM  Last 

administered on 11/27/16at 10:11;  Start 11/27/16 at 09:00;  Stop 11/27/16 at 09

:01;  Status DC


Insulin Human Lispro (HumaLOG INSULIN) SEE PROTOCOL TABLE AC SC  Last 

administered on 12/10/16at 07:58;  Start 11/26/16 at 07:30;  Stop 12/26/16 at 07

:29


Insulin Human Lispro (HumaLOG INSULIN) SEE PROTOCOL TABLE QHS SC ;  Start 11/25/ 16 at 21:00;  Stop 12/25/16 at 20:59


Iopamidol (Isovue-370 76%) 100 ml STK-MED ONCE As Ordered ;  Start 12/5/16 at 07

:41;  Stop 12/5/16 at 07:42;  Status DC


Lidocaine (Lidoderm Patch) 3 patch DAILY TD  Last administered on 12/10/16at 07:

52;  Start 12/5/16 at 09:00;  Stop 1/4/17 at 08:59


Lisinopril (Prinivil) 2.5 mg DAILY PO ;  Start 11/26/16 at 09:00;  Stop 11/26/ 16 at 09:00;  Status DC


Lisinopril 2.5 mg 2.5 mg DAILY PO  Last administered on 11/29/16at 08:33;  

Start 11/28/16 at 09:00;  Stop 11/29/16 at 11:06;  Status DC


Loratadine (Claritin) 10 mg DAILY  PRN PO ALLERGIES Last administered on 12/10/

16at 07:53;  Start 11/25/16 at 17:00;  Stop 12/25/16 at 16:59


Magnesium Hydroxide (Milk Of Magnesia) 30 ml Q4HP  PRN PO CONSTIPATION Last 

administered on 12/10/16at 07:55;  Start 12/2/16 at 07:45;  Stop 1/1/17 at 07:44


Magnesium Sulfate/ Dextrose/IV Miscellaneous Supplies (Mag Sulf 1gm/ 100ml (Mag 

Run)) 100 ml @  100 mls/hr ONCE  ONCE IV  Last administered on 11/26/16at 02:05

;  Start 11/26/16 at 02:00;  Stop 11/26/16 at 02:59;  Status DC


Methylprednisolone (SOLUmedrol) 125 mg STK-MED ONCE As Ordered ;  Start 11/25/ 16 at 13:18;  Stop 11/25/16 at 13:19;  Status DC


Metoprolol Tartrate (Lopressor) 12.5 mg BID PO  Last administered on 11/28/16at 

09:59;  Start 11/26/16 at 21:00;  Stop 11/28/16 at 16:55;  Status DC


Metoprolol Tartrate (Lopressor) 12.5 mg BID PO  Last administered on 12/10/16at 

07:53;  Start 12/1/16 at 09:00;  Stop 12/31/16 at 08:59


Metoprolol Tartrate (Lopressor) 25 mg BID PO ;  Start 11/25/16 at 21:00;  Stop 

11/26/16 at 11:51;  Status DC


Metoprolol Tartrate 25 mg 25 mg BID PO ;  Start 11/28/16 at 21:00;  Stop 11/29/ 16 at 11:06;  Status DC


Miscellaneous (Unresolved Patient Own Med Order) SEE LABEL COMMENTS UNRESOLVED 

XX  Last administered on 12/5/16at 18:33;  Start 12/5/16 at 00:01;  Stop 12/5/ 16 at 20:41;  Status DC


Multivitamins (Theragram-M) 1 tab DAILY PO  Last administered on 12/10/16at 07:

54;  Start 11/26/16 at 09:00;  Stop 12/26/16 at 08:59


Non-Formulary Medication breo-ellipta pls resume home d... BID INH ;  Start 12/5 /16 at 21:00;  Stop 12/5/16 at 21:00;  Status DC


Non-Formulary Medication (** See Comment Field Below **) REMOVE LIDODERM 

PATCHES DAILY@21 XX  Last administered on 12/9/16at 21:00;  Start 12/5/16 at 21:

00;  Stop 1/4/17 at 20:59


Nystatin (Mycostatin Powder, Nystop) apply under abd folds ... BID TOP  Last 

administered on 12/9/16at 21:26;  Start 12/8/16 at 21:00;  Stop 1/7/17 at 20:59


Ondansetron HCl (Zofran) 4 mg Q6HP  PRN IV NAUSEA OR VOMITING Last administered 

on 12/8/16at 19:12;  Start 11/25/16 at 16:45;  Stop 12/25/16 at 16:44


Ondansetron HCl (Zofran) 4 mg STK-MED ONCE As Ordered ;  Start 11/25/16 at 15:07

;  Stop 11/25/16 at 15:08;  Status DC


Oxycodone/ Acetaminophen (Percocet 5mg/ 325mg Tablet) 1 tab Q4HP  PRN PO 

MODERATE PAIN (PS 5-7) Last administered on 12/10/16at 07:55;  Start 11/25/16 

at 16:45;  Stop 12/16/16 at 16:44


Pantoprazole Sodium (Protonix) 40 mg DAILY PO  Last administered on 12/10/16at 

07:54;  Start 11/26/16 at 09:00;  Stop 12/26/16 at 08:59


Patient Own Medication Breo-ellipta 200/25: ONE PUFF... DAILY INH  Last 

administered on 12/9/16at 08:00;  Start 12/6/16 at 09:00;  Stop 1/5/17 at 08:59


Piperacillin Sod/ Tazobactam Sod 3.375 gm/Dextrose 50 ml @ 50 mls/hr Q8H IV ;  

Start 11/25/16 at 17:00;  Stop 11/25/16 at 17:00;  Status DC


Piperacillin Sod/ Tazobactam Sod/ Dextrose (Zosyn/Dextrose 5% Mini-Bag Plus) 50 

ml @ 50 mls/hr Q8H IV  Last administered on 11/29/16at 03:22;  Start 11/25/16 

at 20:00;  Stop 11/29/16 at 10:07;  Status DC


Polyethylene Glycol (Miralax) 1 pkt DAILYPRN  PRN PO CONSTIPATION Last 

administered on 12/5/16at 18:14;  Start 12/2/16 at 07:45;  Stop 1/1/17 at 07:44


Potassium Chloride (Micro-K Extencaps) 10 meq BID PO  Last administered on 12/2/ 16at 21:17;  Start 11/25/16 at 21:00;  Stop 12/3/16 at 06:58;  Status DC


Potassium Chloride (Micro-K Extencaps) 20 meq BID PO  Last administered on 12/10

/16at 07:55;  Start 12/3/16 at 09:00;  Stop 1/2/17 at 08:59


Sodium Biphosphate/ Sodium Phosphate (Fleet Enema) 1 ENEMA Q12HP  PRN [place] 

CONSTIPATION;  Start 12/2/16 at 07:45;  Stop 1/1/17 at 07:44


Sodium Chloride (Nacl 0.9%) 1,000 ml @  40 mls/hr Q24H IV  Last administered on 

11/26/16at 12:16;  Start 11/25/16 at 16:43;  Stop 11/27/16 at 09:26;  Status DC


Sodium Chloride (Saline Lock Flush) 2 ml ASDIRECTED  PRN IV SEE LABEL COMMENTS;

  Start 11/28/16 at 05:15;  Stop 12/28/16 at 05:14


Sodium Chloride (Saline Lock Flush) 2 ml SLF IV  Last administered on 12/8/16at 

13:11;  Start 11/28/16 at 06:00;  Stop 12/28/16 at 05:59


Tiotropium Bromide 1 inhalation 1 inhalation DAILY@08 INH  Last administered on 

12/9/16at 08:00;  Start 11/26/16 at 08:00;  Stop 12/26/16 at 07:59


Vancomycin HCl 1000 mg/IV Miscellaneous Supplies 1 each/ Dextrose 270 ml @  270 

mls/hr Q8H IV ;  Start 11/25/16 at 17:00;  Stop 11/25/16 at 17:00;  Status DC


Vancomycin HCl 1000 mg/IV Miscellaneous Supplies 1 each/ Dextrose 270 ml @  270 

mls/hr Q8H IV  Last administered on 11/28/16at 06:02;  Start 11/25/16 at 21:00;

  Stop 11/28/16 at 13:22;  Status DC


Vancomycin HCl/IV Miscellaneous Supplies/Dextrose (Vancomycin HCl/ Adapter-Vial 

Mate/ Dextrose 5%) 270 ml @  135 mls/hr Q8H IV  Last administered on 11/29/16at 

04:43;  Start 11/28/16 at 13:00;  Stop 11/29/16 at 10:07;  Status DC





Allergies


Coded Allergies:  


     Sulfa Drugs (Verified  Allergy, Unknown, 4/5/13)


     Sulfa Drugs Cross Reactors (Verified  Allergy, Unknown, 4/5/13)


     Heparin (Verified  Adverse Reaction, Intermediate, HIT, 6/9/16)








JOSE REINOSO MD Dec 10, 2016 08:35

## 2016-12-11 NOTE — IPNPDOC
Assessment/Plan


Date Seen


The patient was seen on 12/11/16.





Problems


Problems:  


(1) Acute on chronic diastolic CHF (congestive heart failure)


Status:  Acute


Problem Text:  continue diuresis , daily weights. 


will change to lasix bolus to see if can get more negative balance. 





(2) Acute and chronic respiratory failure with hypoxia


Status:  Acute


Problem Text:  multifactorial due to CAP and diastolic chf exacerbation and 

fluid


slowly improving with diuresis and antibiotics. 





(3) CAP (community acquired pneumonia)


Status:  Acute


Problem Text:  finished course on ceftriaxone. 





(4) Constipation


Status:  Resolved


Problem Text:  on bowel regimen 





(5) SINTIA (obstructive sleep apnea)


Status:  Chronic


(6) Morbid obesity


Status:  Chronic


(7) Diabetes


Status:  Chronic


(8) Hypertension


Status:  Chronic


(9) COPD (chronic obstructive pulmonary disease)


Status:  Chronic


Problem Text:  continue home meds.





(10) CAD (coronary artery disease)


Status:  Chronic


(11) Hyperlipidemia


Status:  Chronic


(12) Mitral stenosis and incompetence


Status:  Chronic


(13) Pulmonary hypertension


Status:  Chronic


(14) Afib


Status:  Chronic


Problem Text:  on pradaxa








Plan / VTE


VTE Prophylaxis Ordered?:  Yes





Plan / Urinary Catheter


Reason for insertion/continuin:  Other-document below





Subjective


CC/HPI


The patient is a 66-year-old female admitted with a reason for visit of Acute 

Respiratory Distress.


Events since last encounter


feels very weak and tired. continues to have about 1000 to 1200 negative 

balance daily however no change in weight. no fever or chills no chest pain , 

has intermittent episodes of SOB mostly at night.





Objective


General Exam:  : Alert: No Acute Distress


Eye Exam:  : Conjunctiva & lids normal: EOMI: PERRLANo: Sclera icteric


ENT Exam:  : Atraumatic: Mucous membr. moist/pink: Pharynx Normal


Neck Exam:  : SuppleNo: JVD, thyromegaly


Chest Exam:  : Diminished


Heart Exam:  : Normal S1: Normal S2: Rate Normal: Regular Rhythm


Abdomen Exam:  : Normal bowel sounds: SoftNo: Hepatospenomegaly, Tenderness


Extremity Exam:  : Edema


Vital Signs  I&O





 Vital Sign - Last 24 Hours








 12/10/16 12/10/16 12/10/16 12/10/16





 07:53 07:55 09:00 14:00


 


Temp    98.2


 


Pulse 100   94


 


Resp  20  18


 


B/P 136/88   118/71


 


Pulse Ox    94


 


O2 Delivery   Nasal Cannula Nasal Cannula


 


O2 Flow Rate   4.0 3.0


 


    





 12/10/16 12/10/16 12/10/16 12/10/16





 19:36 21:00 21:00 21:17


 


Temp   98.3 


 


Pulse   101 101


 


Resp 18  18 


 


B/P   138/69 138/69


 


Pulse Ox 4  90 


 


O2 Delivery Nasal Cannula Nasal Cannula Nasal Cannula 


 


O2 Flow Rate  4.0 3.0 


 


    





 12/11/16 12/11/16 12/11/16 





 00:54 01:24 05:35 


 


Temp   96.4 


 


Pulse   90 


 


Resp 20 18 16 


 


B/P   132/71 


 


Pulse Ox 4 4 96 


 


O2 Delivery Nasal Cannula Nasal Cannula Nasal Cannula 


 


O2 Flow Rate   3.0 














 I&O- Last 24 Hours up to 6 AM 


 


 12/11/16





 06:00


 


Intake Total 1660 ml


 


Output Total 1200 ml


 


Balance 460 ml











Laboratory Data


Labs 24H


 Laboratory Tests 2


12/10/16 11:44: Bedside Glucose (Misc Panel) 111


12/10/16 16:31: Bedside Glucose (Misc Panel) 144H


12/10/16 20:57: Bedside Glucose (Misc Panel) 178H


12/11/16 05:38: 


Anion Gap 7L, White Blood Count 8.7, Red Blood Count 3.29L, Hemoglobin 9.4L, 

Hematocrit 31.8L, Mean Corpuscular Volume 96.6H, Mean Corpuscular Hemoglobin 

28.4, Mean Corpuscular Hemoglobin Concent 29.4L, Red Cell Distribution Width 

22.4H, Platelet Count 183, Neutrophils (%) (Auto) 79.7H, Lymphocytes (%) (Auto) 

9.7L, Monocytes (%) (Auto) 5.7H, Eosinophils (%) (Auto) 2.7, Basophils (%) (Auto

) 0.2, Neutrophils # (Auto) 6.9, Lymphocytes # (Auto) 0.9L, Monocytes # (Auto) 

0.5, Eosinophils # (Auto) 0.2, Basophils # (Auto) 0.0, Blood Urea Nitrogen 20H, 

Creatinine 1.02, Sodium Level 139, Potassium Level 3.9, Chloride Level 99, 

Carbon Dioxide Level 33H, Calcium Level 8.3L, Glomerular Filtration Rate 57.7, 

Large Unclassified Cells # 0.2, Large Unclassified Cells % 2.0, Magnesium Level 

2.7H


CBC/BMP


 Laboratory Tests


12/11/16 05:38








Calcium Level 8.3 L, Red Blood Count 3.29 L, Mean Corpuscular Volume 96.6 H, 

Mean Corpuscular Hemoglobin 28.4, Mean Corpuscular Hemoglobin Concent 29.4 L, 

Red Cell Distribution Width 22.4 H, Neutrophils (%) (Auto) 79.7 H, Lymphocytes (

%) (Auto) 9.7 L, Monocytes (%) (Auto) 5.7 H, Eosinophils (%) (Auto) 2.7, 

Basophils (%) (Auto) 0.2, Neutrophils # (Auto) 6.9, Lymphocytes # (Auto) 0.9 L, 

Monocytes # (Auto) 0.5, Eosinophils # (Auto) 0.2, Basophils # (Auto) 0.0


FSBS





Laboratory Tests








Test


  12/10/16


11:44 12/10/16


16:31 12/10/16


20:57 Range/Units


 


 


Bedside Glucose (Misc Panel) 111 144 178   MG/DL











Current Medications


Current Medications





 Current Medications


Acetaminophen (Tylenol) 650 mg Q4HP  PRN PO MILD PAIN OR FEVER Last 

administered on 11/26/16at 22:32;  Start 11/25/16 at 16:45;  Stop 11/27/16 at 09

:27;  Status DC


Acetaminophen (Tylenol) 650 mg Q6HP  PRN PO MILD PAIN OR FEVER Last 

administered on 12/5/16at 05:07;  Start 11/27/16 at 09:30;  Stop 12/5/16 at 11:

26;  Status DC


Acetaminophen (Tylenol) 1,000 mg Q6HP  PRN PO PAIN / FEVER Last administered on 

12/8/16at 20:06;  Start 12/5/16 at 11:30;  Stop 1/4/17 at 11:29


Albuterol Sulfate (Proventil Neb) 2.5 mg Q4HP  PRN NEB SOB/WHEEZING;  Start 11/ 25/16 at 17:00;  Stop 11/26/16 at 11:45;  Status DC


Albuterol Sulfate (Proventil Neb) 2.5 mg RQ4H NEB  Last administered on 12/2/ 16at 03:44;  Start 11/29/16 at 12:00;  Stop 12/2/16 at 07:01;  Status DC


Albuterol Sulfate (Proventil, Ventolin Hfa) 2 puff Q4H  PRN INH SHORTNESS OF 

BREATH;  Start 11/25/16 at 17:00;  Stop 11/25/16 at 17:06;  Status DC


Albuterol/ Ipratropium (Duoneb (Ipr 0.5mg/Alb 2.5mg)) 3 ml Q2HP  PRN NEB SOB/

WHEEZING Last administered on 11/29/16at 07:06;  Start 11/26/16 at 11:45;  Stop 

11/29/16 at 10:54;  Status DC


Albuterol/ Ipratropium (Duoneb (Ipr 0.5mg/Alb 2.5mg)) 3 ml Q2HP  PRN NEB SOB/

WHEEZING Last administered on 12/9/16at 03:24;  Start 12/2/16 at 07:00;  Stop 1/ 1/17 at 06:59


Albuterol/ Ipratropium (Duoneb (Ipr 0.5mg/Alb 2.5mg)) 3 ml RQID NEB  Last 

administered on 12/10/16at 19:19;  Start 12/2/16 at 08:00;  Stop 1/1/17 at 07:59


Albuterol/ Ipratropium (Duoneb (Ipr 0.5mg/Alb 2.5mg)) 9 ml STK-MED ONCE As 

Ordered ;  Start 11/25/16 at 13:22;  Stop 11/25/16 at 13:23;  Status DC


Ascorbic Acid (Vitamin C) 500 mg BIDWM PO  Last administered on 12/10/16at 16:34

;  Start 12/1/16 at 08:00;  Stop 12/31/16 at 07:59


Aspirin (Ecotrin) 81 mg DAILY PO  Last administered on 12/10/16at 07:54;  Start 

11/26/16 at 09:00;  Stop 12/26/16 at 08:59


Atorvastatin Calcium (Lipitor) 40 mg DAILY PO  Last administered on 12/10/16at 

07:54;  Start 11/26/16 at 09:00;  Stop 12/26/16 at 08:59


Bisacodyl (Dulcolax) 10 mg Q12HP  PRN WI CONSTIPATION;  Start 12/2/16 at 07:45;

  Stop 1/1/17 at 07:44


Ceftriaxone Sodium 2 gm 2 gm STK-MED ONCE As Ordered ;  Start 11/25/16 at 15:28

;  Stop 11/25/16 at 15:29;  Status DC


Ceftriaxone Sodium/Dextrose (Rocephin/ Dextrose 5% Mini-Bag Plus) 50 ml @  100 

mls/hr Q24H IV  Last administered on 12/7/16at 11:56;  Start 11/29/16 at 12:00;

  Stop 12/8/16 at 08:55;  Status DC


Dabigatran (Pradaxa) 150 mg BID PO  Last administered on 11/27/16at 10:06;  

Start 11/25/16 at 21:00;  Stop 11/27/16 at 14:02;  Status DC


Dabigatran (Pradaxa) 150 mg BID PO  Last administered on 12/10/16at 21:16;  

Start 11/28/16 at 09:00;  Stop 12/12/16 at 08:59


Dextrose (Dextrose 50%) 25 ml ASDIRECTED  PRN IV SEE LABEL COMMENTS;  Start 11/ 25/16 at 17:45;  Stop 12/25/16 at 17:44


Docusate Sodium (Colace) 100 mg BIDP  PRN PO CONSTIPATION Last administered on 

12/10/16at 07:53;  Start 11/27/16 at 09:30;  Stop 12/27/16 at 09:29


Ferrous Sulfate (Ferrous Sulfate) 325 mg BID PO  Last administered on 12/10/

16at 21:16;  Start 11/27/16 at 09:00;  Stop 12/27/16 at 08:59


Fluticasone Propionate (Flonase 0.05% Nasal Spray) 2 spray DAILY  PRN NA 

CONGESTION Last administered on 12/8/16at 20:03;  Start 11/25/16 at 17:00;  

Stop 12/25/16 at 16:59


Furosemide (Lasix) 20 mg Q6H IV  Last administered on 12/7/16at 06:06;  Start 11 /30/16 at 18:00;  Stop 12/7/16 at 10:55;  Status DC


Furosemide (Lasix) 40 mg DAILY@17 PO  Last administered on 11/29/16at 15:59;  

Start 11/28/16 at 17:00;  Stop 11/30/16 at 16:25;  Status DC


Furosemide (Lasix) 40 mg ONCE  ONCE PO  Last administered on 11/28/16at 12:47;  

Start 11/28/16 at 11:45;  Stop 11/28/16 at 11:46;  Status DC


Furosemide (Lasix) 80 mg DAILY PO  Last administered on 11/30/16at 08:13;  

Start 11/29/16 at 09:00;  Stop 11/30/16 at 16:25;  Status DC


Furosemide/ Dextrose (Lasix/Dextrose 5%) 250 ml @  10 mls/hr Q24H IV  Last 

administered on 12/10/16at 12:18;  Start 12/7/16 at 11:00;  Stop 1/6/17 at 10:59


Glucagon 1 mg 1 mg ASDIRECTED  PRN SC SEE LABEL COMMENTS;  Start 11/25/16 at 17:

45;  Stop 12/25/16 at 17:44


Glucose (Glucose) 16 GM ASDIRECTED  PRN PO SEE LABEL COMMENTS;  Start 11/25/16 

at 17:45;  Stop 12/25/16 at 17:44


Guaifenesin (Mucinex) 600 mg BID  PRN PO CHEST CONGESTION;  Start 11/25/16 at 17

:00;  Stop 12/25/16 at 16:59


Home Med (Med Rec Complete!)  ASDIRECTED XX ;  Start 11/25/16 at 15:15;  Stop 11 /25/16 at 15:15;  Status DC


Influenza Virus Vaccine (Fluzone High Dose) 0.5 ml ONCE  ONCE IM  Last 

administered on 11/27/16at 10:11;  Start 11/27/16 at 09:00;  Stop 11/27/16 at 09

:01;  Status DC


Insulin Human Lispro (HumaLOG INSULIN) SEE PROTOCOL TABLE AC SC  Last 

administered on 12/10/16at 16:34;  Start 11/26/16 at 07:30;  Stop 12/26/16 at 07

:29


Insulin Human Lispro (HumaLOG INSULIN) SEE PROTOCOL TABLE QHS SC ;  Start 11/25/ 16 at 21:00;  Stop 12/25/16 at 20:59


Iopamidol (Isovue-370 76%) 100 ml STK-MED ONCE As Ordered ;  Start 12/5/16 at 07

:41;  Stop 12/5/16 at 07:42;  Status DC


Lidocaine (Lidoderm Patch) 3 patch DAILY TD  Last administered on 12/10/16at 07:

52;  Start 12/5/16 at 09:00;  Stop 1/4/17 at 08:59


Lisinopril (Prinivil) 2.5 mg DAILY PO ;  Start 11/26/16 at 09:00;  Stop 11/26/ 16 at 09:00;  Status DC


Lisinopril 2.5 mg 2.5 mg DAILY PO  Last administered on 11/29/16at 08:33;  

Start 11/28/16 at 09:00;  Stop 11/29/16 at 11:06;  Status DC


Loratadine (Claritin) 10 mg DAILY  PRN PO ALLERGIES Last administered on 12/10/

16at 07:53;  Start 11/25/16 at 17:00;  Stop 12/25/16 at 16:59


Magnesium Hydroxide (Milk Of Magnesia) 30 ml Q4HP  PRN PO CONSTIPATION Last 

administered on 12/10/16at 07:55;  Start 12/2/16 at 07:45;  Stop 1/1/17 at 07:44


Magnesium Sulfate/ Dextrose/IV Miscellaneous Supplies (Mag Sulf 1gm/ 100ml (Mag 

Run)) 100 ml @  100 mls/hr ONCE  ONCE IV  Last administered on 11/26/16at 02:05

;  Start 11/26/16 at 02:00;  Stop 11/26/16 at 02:59;  Status DC


Methylprednisolone (SOLUmedrol) 125 mg STK-MED ONCE As Ordered ;  Start 11/25/ 16 at 13:18;  Stop 11/25/16 at 13:19;  Status DC


Metoprolol Tartrate (Lopressor) 12.5 mg BID PO  Last administered on 11/28/16at 

09:59;  Start 11/26/16 at 21:00;  Stop 11/28/16 at 16:55;  Status DC


Metoprolol Tartrate (Lopressor) 12.5 mg BID PO  Last administered on 12/10/16at 

21:17;  Start 12/1/16 at 09:00;  Stop 12/31/16 at 08:59


Metoprolol Tartrate (Lopressor) 25 mg BID PO ;  Start 11/25/16 at 21:00;  Stop 

11/26/16 at 11:51;  Status DC


Metoprolol Tartrate 25 mg 25 mg BID PO ;  Start 11/28/16 at 21:00;  Stop 11/29/ 16 at 11:06;  Status DC


Miscellaneous (Unresolved Patient Own Med Order) SEE LABEL COMMENTS UNRESOLVED 

XX  Last administered on 12/5/16at 18:33;  Start 12/5/16 at 00:01;  Stop 12/5/ 16 at 20:41;  Status DC


Multivitamins (Theragram-M) 1 tab DAILY PO  Last administered on 12/10/16at 07:

54;  Start 11/26/16 at 09:00;  Stop 12/26/16 at 08:59


Non-Formulary Medication breo-ellipta pls resume home d... BID INH ;  Start 12/5 /16 at 21:00;  Stop 12/5/16 at 21:00;  Status DC


Non-Formulary Medication (** See Comment Field Below **) REMOVE LIDODERM 

PATCHES DAILY@21 XX  Last administered on 12/10/16at 21:17;  Start 12/5/16 at 21

:00;  Stop 1/4/17 at 20:59


Nystatin (Mycostatin Powder, Nystop) apply under abd folds ... BID TOP  Last 

administered on 12/10/16at 21:17;  Start 12/8/16 at 21:00;  Stop 1/7/17 at 20:59


Ondansetron HCl (Zofran) 4 mg Q6HP  PRN IV NAUSEA OR VOMITING Last administered 

on 12/10/16at 09:08;  Start 11/25/16 at 16:45;  Stop 12/25/16 at 16:44


Ondansetron HCl (Zofran) 4 mg STK-MED ONCE As Ordered ;  Start 11/25/16 at 15:07

;  Stop 11/25/16 at 15:08;  Status DC


Oxycodone/ Acetaminophen (Percocet 5mg/ 325mg Tablet) 1 tab Q4HP  PRN PO 

MODERATE PAIN (PS 5-7) Last administered on 12/11/16at 00:54;  Start 11/25/16 

at 16:45;  Stop 12/16/16 at 16:44


Pantoprazole Sodium (Protonix) 40 mg DAILY PO  Last administered on 12/10/16at 

07:54;  Start 11/26/16 at 09:00;  Stop 12/26/16 at 08:59


Patient Own Medication Breo-ellipta 200/25: ONE PUFF... DAILY INH  Last 

administered on 12/10/16at 08:53;  Start 12/6/16 at 09:00;  Stop 1/5/17 at 08:59


Piperacillin Sod/ Tazobactam Sod 3.375 gm/Dextrose 50 ml @ 50 mls/hr Q8H IV ;  

Start 11/25/16 at 17:00;  Stop 11/25/16 at 17:00;  Status DC


Piperacillin Sod/ Tazobactam Sod/ Dextrose (Zosyn/Dextrose 5% Mini-Bag Plus) 50 

ml @ 50 mls/hr Q8H IV  Last administered on 11/29/16at 03:22;  Start 11/25/16 

at 20:00;  Stop 11/29/16 at 10:07;  Status DC


Polyethylene Glycol (Miralax) 1 pkt DAILYPRN  PRN PO CONSTIPATION Last 

administered on 12/10/16at 16:27;  Start 12/2/16 at 07:45;  Stop 1/1/17 at 07:44


Potassium Chloride (Micro-K Extencaps) 10 meq BID PO  Last administered on 12/2/ 16at 21:17;  Start 11/25/16 at 21:00;  Stop 12/3/16 at 06:58;  Status DC


Potassium Chloride (Micro-K Extencaps) 20 meq BID PO  Last administered on 12/10

/16at 21:16;  Start 12/3/16 at 09:00;  Stop 1/2/17 at 08:59


Sodium Biphosphate/ Sodium Phosphate (Fleet Enema) 1 ENEMA Q12HP  PRN WI 

CONSTIPATION;  Start 12/2/16 at 07:45;  Stop 1/1/17 at 07:44


Sodium Chloride (Nacl 0.9%) 1,000 ml @  40 mls/hr Q24H IV  Last administered on 

11/26/16at 12:16;  Start 11/25/16 at 16:43;  Stop 11/27/16 at 09:26;  Status DC


Sodium Chloride (Saline Lock Flush) 2 ml ASDIRECTED  PRN IV SEE LABEL COMMENTS;

  Start 11/28/16 at 05:15;  Stop 12/28/16 at 05:14


Sodium Chloride (Saline Lock Flush) 2 ml SLF IV  Last administered on 12/8/16at 

13:11;  Start 11/28/16 at 06:00;  Stop 12/28/16 at 05:59


Tiotropium Bromide 1 inhalation 1 inhalation DAILY@08 INH  Last administered on 

12/10/16at 08:53;  Start 11/26/16 at 08:00;  Stop 12/26/16 at 07:59


Vancomycin HCl 1000 mg/IV Miscellaneous Supplies 1 each/ Dextrose 270 ml @  270 

mls/hr Q8H IV ;  Start 11/25/16 at 17:00;  Stop 11/25/16 at 17:00;  Status DC


Vancomycin HCl 1000 mg/IV Miscellaneous Supplies 1 each/ Dextrose 270 ml @  270 

mls/hr Q8H IV  Last administered on 11/28/16at 06:02;  Start 11/25/16 at 21:00;

  Stop 11/28/16 at 13:22;  Status DC


Vancomycin HCl/IV Miscellaneous Supplies/Dextrose (Vancomycin HCl/ Adapter-Vial 

Mate/ Dextrose 5%) 270 ml @  135 mls/hr Q8H IV  Last administered on 11/29/16at 

04:43;  Start 11/28/16 at 13:00;  Stop 11/29/16 at 10:07;  Status DC





Allergies


Coded Allergies:  


     Sulfa Drugs (Verified  Allergy, Unknown, 4/5/13)


     Sulfa Drugs Cross Reactors (Verified  Allergy, Unknown, 4/5/13)


     Heparin (Verified  Adverse Reaction, Intermediate, HIT, 6/9/16)








JOSE REINOSO MD Dec 11, 2016 07:03

## 2016-12-12 NOTE — IPN
DATE:  12/12/2016

 

SUBJECTIVE:  Ms. Raphael is a 66-year-old  female who was seen at the

bedside.  Patient denies chest pain, however, patient experience orthopnea and 
PND.

Patient denies overnight issues.  Patient denies fever, however, patient

complained about pain and erythema on the medial side of her foot bilaterally as

well as abdominal area.  Patient expressed that she sleeps on her sides with

elevation of the head and this is a chronic issue.

 

OBJECTIVE:  Vital signs:  Temperature 97.5, pulse 84, respiratory rate 22, blood

pressure 113/80, pulse oximetry 93% on 4 liters nasal cannula.

Total intake from yesterday 1190 mL, total output 1400 mL.  Today patient's

weight is 169 kg and yesterday patient's weight was 168.4 kg.

General appearance: Patient was awake, alert, oriented to time, place and 
person.

 

HEENT:  Normocephalic, atraumatic.  Oral mucosal is moist.

Neck:  Soft, supple.  Jugular venous distention (JVD) cannot be evaluated due to

patient being morbidly obese.

Lungs:  Patient has clear breath sounds.  No wheezing, rales.

Heart:  Normal S1, S2.  Irregular.

Abdomen:  Morbidly obese, tender to palpation on the lower left and right

abdominal quadrants.  Patient has erythema area on her lower abdomen possibly 
related to skin infection (Candidemia).  Positive bowel

sounds in all quadrants.

Extremities:  Patient has pitting edema in both lower extremities, +2 pulses in

both lower extremities. patient also has small area of erythema on the medial 
side of her feet which are painful to palpitation.however no skin break or 
lesions were noticed. 

 

LABORATORY DATA:  White blood cells 9.6, red blood cells 3.21, hemoglobin 9.1,

hematocrit 31.2, MCV 97.3, MCH 28.4, MCHC 29.2, RDW 22.3, platelet count 214.

Neutrophil percentage 80.9, lymphocyte percentage 29.7, monocyte percentage 6.6,

eosinophil percentage 2.5, basophil percentage 0.3, leukocyte percentage 2.

Sodium 139, potassium 4.5, chloride 99, carbon dioxide 33, anion gap 7, BUN 19,

creatinine 1.06.  Glomerular filtration rate 55.2, fasting glucose 135, calcium

8.7, magnesium 2.8.

 

ASSESSMENT AND PLAN:

1.  Acute on chronic diastolic congestive heart failure.  Patient has

echocardiogram which was done on 11/29/2016 which shows borderline dilated left

ventricle with mild to moderate left ventricle hypertrophy and/or at least 
mildly

reduced left ventricle systolic function as well as mitral inflow pattern and

tissue Doppler imaging review grade II diastolic dysfunction.  At this time,

patient will continue on Lasix 40 mg every 6 hours IV.  Patient's in and out

fluid shows patient has negative 210 for balance, however, it also shows that

patient is gaining weight.  I do not believe that the report is accurate.  We

will re-evaluate patient again tomorrow.  Also will continue patient on current

dosage of metoprolol tartrate.

 

2.  Acute exacerbation of the dyspnea with hypoxia.  This is multifactorial

possibly related to community acquired pneumonia, diastolic congestive heart

failure exacerbation as well as chronic obstructive pulmonary disease (COPD)

exacerbation.  Patient is on breathing treatment.  Patient has finished a course

of antibiotics.  At this time, we will continue patient on Lasix.  Patient is on

4 liters nasal cannula, however, patient's baseline is 3 liters.  We will

continue patient on the current medication.  Re-evaluate patient again.

 

3.  Community acquired pneumonia.  Patient has finished a course of ceftriaxone.

 

4.  Constipation.  We will continue patient on Dulcolax 10 mg

every 12 hours as needed constipation as well as Colace 100 mg twice daily as

needed constipation.

 

5.  Obstructive sleep apnea.  This is a chronic issue.

 

6.  Morbid obesity.  This is a chronic issue.

 

7.  Diabetes.  Patient is on a sliding scale as well as diabetic diet.

 

8.  Hypertension.  At this time, patient's blood pressure is stable.  Will

continue patient on Lasix as well as Lopressor 12.5 mg twice daily by mouth.

 

9.  Chronic obstructive pulmonary disease (COPD).  Will continue patient on

breathing treatments.  At this time, patient is stable. Patient is on 4 liters

nasal cannula.  However, patient's baseline is 3 liters nasal cannula 24 hours, 
7

days a week.

 

10.  Coronary artery disease.  Patient is on aspirin as well as Pradaxa.  
Patient

is also on Lopressor and Lipitor.

 

11.  Hyperlipidemia.  We will continue patient on Lipitor.

 

12.  Mitral stenosis.  Patient is on Lasix 40 mg every 6 hours

IV due to hypervolemia.  He is also on beta blocker.

 

13.  Primary hypertension.  This is a chronic issue.  At this time, patient is

stable.  The echo which was done on 11/29/2016 was unable to estimate the 
central

venous pressure and pulmonary artery pressure.  patient is on ACEI.

 

14.  Chronic atrial fibrillation.  Will continue patient on aspirin and Pradaxa.

Patient's rate is controlled.  At this time, we will continue patient on the

current dosage of beta blocker.

 

15.  Gastrointestinal reflux disease (GERD).  We will continue patient on

Protonix 40 mg daily.

 

16.  Allergy.  We will continue patient on Claritin 10 mg daily as needed

allergies.  However, at this time, patient does not have any allergy issues.

 

17.  Anemia.  We will continue patient on ferrous sulfate 325 mg by mouth twice

daily.  However, patient's hemoglobin has decreased to 9.1 and hematocrit has 
reduced to 31.2.  This is

possibly related to diuresis, however, we will continue monitoring patient.

 

18.  Acute kidney injury possibly secondary to hypervolemia as well as 
medication

(Lasix).  We will continue monitoring patient's creatinine level as well as

glomerular filtration rate.  However, at this time, patient is stable.



19. Erythema/painful area of the feet bilaterally: I believe these are pressure 
caused erythema due to patient posture when she sleep. 



20. Erythema of abdominal area: this possibly causes by candidemia. patient is 
on topical nystatin powder.  

 

My preceptor for this patient encounter was Dr. Cameron Healy. The preceptor was

physically present in the building during the encounter and was fully available.

As needed, all aspects of the patient interview, examination, medical decision

making process, and medical care plan development were reviewed and approved by

the preceptor. The preceptor is aware and concurs with the plan as stated in the

body of this note and will attest to such by his/her cosignature.

GRISELDA

## 2016-12-13 NOTE — IPN
DATE OF SERVICE:  12/13/2016

 

SUBJECTIVE:  Ms. Raphael is a 66-year-old  female who was seen and

examined at the bedside.  The patient complained about the pain in her lower

extremities and erythema bilaterally, right more than left.  The patient

continues to be on 4 liters nasal cannula, and the patient has been experiencing

orthopnea, as well as paroxysmal nocturnal dyspnea (PND).  However, this is a

chronic issue.

 

OBJECTIVE:

Vital signs:  Temperature 97, pulse 65, respiratory rate 20, blood pressure

135/67, pulse oximetry 93% on 4 liters nasal cannula.

Total intake from yesterday 1260 mL, total output 1350 mL.  Balance of fluid -90

mL.

General appearance:  The patient was sitting on the chair, in no acute distress.

The patient was awake, alert, and oriented to time, place, and person.

HEENT:  Normocephalic, atraumatic.  .

Lungs:  The patient has mild crackles on the base of the lung and no wheezing or

rales was noticed.

Heart:  Normal S1, S2.  Irregular.

Abdomen:  Morbidly obese.  Soft.  Nontender.  Bowel sounds in all quadrants.

Also, the patient has erythema on the lower abdominal quadrants, possibly

secondary to candidemia.

Extremities:  The patient has pitting edema in both lower extremities, +2 pulses

in both lower extremities.  The patient also has erythema on the prone side of

the feet bilaterally, right more than left.  It is warm and painful to

palpation.

 

LABORATORY DATA:

 Sodium 138, potassium 4.8, chloride 98, carbon dioxide 22, anion gap 8, BUN 20,

creatinine 1.16, glomerular filtration rate 49.8, fasting glucose 133, calcium

8.7, magnesium 2.7.

 

White blood cells 9.5, red blood cells 3.17, hemoglobin 9.2, hematocrit 31.2, MCV

98.3, MCH 28.9, MCHC 29.3, RDW 22.5, platelet count 222, neutrophil percentage

78.9, lymphocyte percentage 10.1, monocyte percentage 6.3, eosinophil percentage

2.3, basophil percentage 0.3, leukocyte percentage 2.1.

 

ASSESSMENT AND PLAN:

1.  Acute on chronic diastolic congestive heart failure.  The echocardiogram

which was done on 11/29/2016 shows borderline dilated left ventricle with mild to

moderate left ventricle hypertrophy and/or at least mildly reduced left ventricle

systolic function, as well as mitral inflow pattern, and the grade II diastolic

dysfunction.  At this time, we will continue the patient on Lasix 40 mg every 6

hours intravenous (IV).  Yesterday, the patient has a low negative balance

compared to day before.  We have consulted Dr. Alvares.  Dr. Alvares will manage

the diuresis.

 

2.  Acute exacerbations of the dyspnea and hypoxia.  This is possibly

multifactorial related to community-acquired pneumonia, diastolic congestive

heart failure, chronic obstructive pulmonary disease (COPD) exacerbation, as well

as cor. pulmonale/right heart failure.  The patient is on breathing treatment.

The patient has finished a course of antibiotic for possibility of

community-acquired pneumonia.  At this time, the patient is on Lasix.  Dr. Alvares

has been consulted, and he will manage the diuretic.  The patient is on 4 liters

nasal cannula, however, the patient's baseline is 3 liters nasal cannula. Will

continue the patient on the current medication.

 

3.  Community-acquired pneumonia.  The patient has finished antibiotic course.

 

4.  Constipation.  Will continue the patient on Dulcolax.

 

5.  Obstructive sleep apnea.  This is a chronic issue.

 

6.  Morbid obesity.  This is a chronic issue.

 

7.  Diabetes.  The patient is on a sliding scale, as well as diabetic diet.

 

8.  Hypertension.  Will continue the patient on Lasix and Lopressor.

 

9.  Chronic obstructive pulmonary disease (COPD).  The patient is on breathing

treatment.  Also, at this time, the patient is on 4 liters nasal cannula.  At

this time, the patient is stable.

 

10.  Coronary artery disease.  The patient is on aspirin, as well as Pradaxa.

The patient is also on Lopressor and Lipitor.

 

11.  Hyperlipidemia.  The patient is on Lipitor.

 

12.  Mitral stenosis.  The patient is on Lasix 40 mg every 6 hours IV due to

hypervolemia.  The patient is also on beta blocker.

 

13.  Primary hypertension.  This is a chronic issue.  At this time, the patient

is stable.  The echocardiogram which was done on 11/29/2016 shows it was unable

to estimate central venous pressure.  The patient has right heart failure.

 

14.  Chronic atrial fibrillation.  Will continue the patient on aspirin and

Pradaxa.  The patient's rate is controlled.  Will continue also the patient on

the current dosage of beta blocker.

 

15.  Gastrointestinal reflux disease (GERD).  Will continue the patient on

Protonix.

 

16.  Allergy.  Will continue the patient on Claritin as needed for allergy.

However, at this time, the patient has no sign or symptoms of allergy.

 

17.  Anemia.  The patient on ferrous sulfate 325 mg twice a day.  The patient's

hemoglobin and hematocrit are stable.  The patient had an occult blood test which

is negative.

 

18.  Acute kidney injury.  This is possibly secondary to hypervolemia, as well as

medications (Lasix and spironolactone).  At this time, we have consulted Dr. Alvares, who will manage the diuretics and hypervolemia.

 

19.  Erythema, periphery of the feet bilaterally.  At this time, I have started

the patient on ceftriaxone due to possibility of cellulitis on the right more

than left.  We will continue monitoring the patient for any abnormal symptoms.

 

20.  Erythema of abdomen.  This is possibly secondary to candidemia.  The patient

is on topical Nystatin powder.

 

21.  Abnormal liver finding on a CT of the chest shows possibility of the liver

cirrhosis.  I have ordered a liver ultrasound, which will perform tomorrow

morning.  The patient will be nothing by mouth for a test tonight.  However, at

this time, the patient has normal level of the total bilirubin.  Also, liver

function is normal.  Will continue to monitor the patient for any abnormal

symptoms.

 

My preceptor for this patient encounter was Dr. Cameron Healy.  The preceptor was

physically present in the building during the encounter and was fully available.

As needed, all aspects of the patient interview, examination, medical decision

making process, and medical care plan development were reviewed and approved by

the preceptor.  The preceptor is aware and concurs with the plan as stated in the

body of this note and will attest to such by his/her cosignature.

## 2016-12-13 NOTE — CR
DATE OF CONSULTATION: 12/13/2016

 

NEPHROLOGY CONSULTATION FOR:  Dr. Cameron Healy

 

REASON FOR CONSULTATION:

Worsening lower extremity edema and acute renal failure.

 

HISTORY OF PRESENT ILLNESS:

Mrs. Raphael is a 66-year-old morbidly obese female who was admitted to [facility] on 11/25/2016 due to worsening shortness of breath.  She has known

history of congestive heart failure, myocardial infarction, chronic obstructive

pulmonary disease (COPD), morbid obesity and cirrhosis of liver by a recent CT

scan.  She is also known to have right-sided heart failure by her echocardiogram.

She has developed worsening renal function while she has been diuresed.  She also

has significant bilateral lower extremity edema due to which nephrology

consultation was requested today and the patient is seen this morning.

 

PAST MEDICAL AND SURGICAL HISTORY:

Significant for:

1.  History of COPD with chronic oxygen dependency.

2.  History of diastolic congestive heart failure.

3.  History of right-sided heart failure.

4.  History of obstructive sleep apnea.

5.  History of coronary artery disease with prior myocardial infarction (MI),

status post angioplasty with stents.

6.  History of morbid obesity.

7.  History of hypertension.

8.  Dyslipidemia.

9.  History of ischemic colon, status post colon resection.

10.  Type 2 diabetes.

 

MEDICATIONS:

Home medications include Ventolin inhaler two puffs every 4 hours as needed for

shortness of breath, aspirin 81 mg daily, atorvastatin 40 mg daily, Pradaxa 150

mg twice a day, Breo Ellipta one puff inhalation daily, Lasix 80 mg daily, Lasix

40 mg at night, lisinopril 20 mg daily, metformin 500 mg twice a day, metoprolol

25 mg twice a day, multivitamin one tablet daily, potassium chloride 10 mEq twice

a day and Spiriva inhaler once day.

 

PAST SURGICAL HISTORY:

Significant for colon resection in 2010 for ischemic bowel, hiatal hernia repair

times two, coronary artery angioplasty with stents and a right breast

lumpectomy.

 

PERSONAL AND SOCIAL HISTORY:

The patient reports that she quit smoking last year.  She denies any alcohol or

drug use.

 

FAMILY HISTORY:

Negative for end-stage renal disease or coronary artery disease.

 

REVIEW OF SYSTEMS:

The patient denies any fever or chills.  She reports that she is able to get up

and walk a few steps at home;  however, has not been able to get up here in the

hospital.  She reports about 50 pounds weight gain.

Ears, nose and throat are unremarkable.  She has chronic oxygen use.  She denies

any nosebleed.

Cardiovascular system is significant for right-sided heart failure and lower

extremity edema.

Respiratory system is significant for COPD and obstructive sleep apnea.  She

denies any hemoptysis or pleuritic type of chest pain.

GI system is negative for vomiting or abdominal pain.  She has prior history of

colon resection due to ischemic colon.

 system is significant for decreased urine output.  She has a Murray catheter in

place.

Musculoskeletal system is significant for massive edema on lower extremity.  She

has morbid obesity.  She complains of pain in her right ankle.

Skin has erythema on her lower extremities.

Endocrine system is significant for diabetes.

Hematological system is significant for chronic anticoagulation.

Psychosocial system is negative for depression.

Neurological system is negative for seizures.

 

PHYSICAL EXAMINATION:

This a morbidly obese female lying in the bed on her side.  She is using oxygen

via nasal cannula.

Temperature 97.6 degrees Fahrenheit, heart rate 100 per minute and respiratory

rate 18 per minute.  Blood pressure 122/73 mmHg and oxygen saturation 90% on 4

liters oxygen.

Neck veins are difficult to be assessed.

Head is atraumatic.

Ears, nose and throat are unremarkable.

Heart sounds are tachycardiac, irregular and distant.

Lungs have moderate bilateral air entry.

Abdomen is obese and bowel sounds are present.  No organomegaly could be

palpated.

Extremities have massive edema on both lower extremities with erythema around her

ankles and lower legs.  Right leg feels warmer to temperature compared with the

left leg.

Neurologically she is awake, alert and oriented times three.  She has no focal

neurological deficit.

 

LABORATORY DATA:

Her hemoglobin is between 9.1 and 9.4 for the last few days.  WBC count is 9.5.

Platelets are 222.  Sodium is 138 and potassium 4.8.  BUN 20 and creatinine 1.16.

Glucose 133, calcium 8.7 and magnesium 2.7.  Urinalysis done on 11/27/2016 showed

only 1+ protein and no blood.  She had a vancomycin level drawn on 11/27/2016

which was 21.7.  She is currently not on vancomycin.

 

PROBLEMS:

1.  Acute kidney injury.  Probably related to infection.  She is still

significantly volume overloaded.  I would recommend to continue with diuresis and

monitor her kidney function.  She has a Murray catheter in place, and I do not

feel that there is any possibility of obstruction.  I would recommend getting a

CT scan of abdomen and pelvis if her condition does not improve.

 

2.  Bilateral lower extremity edema.  This probably related to volume overload.

I am concerned about possibility of intra-abdominal problem causing lymphatic and

venous obstruction.  CT scan of abdomen and pelvis should be considered if

feasible.  I recommend to continue with IV Lasix 40 mg every 6 hours.  I am going

to add spironolactone 25 mg twice a day.

 

3.  Right lower extremity cellulitis.  I recommend considering antibiotic as she

seems to have clinically cellulitis on her right lower extremity.  I would

suggest to avoid nephrotoxic medications including vancomycin and gentamicin.

 

I thank you for involving me in the care of Mrs. Raphael.  I will follow her along

with you.

## 2016-12-14 NOTE — REPUSA
CLINICAL HISTORY: Hydronephrosis. Acute renal failure.

TECHNIQUE: Multiple axial, sagittal and coronal CT images were obtained through the abdomen and pelvi
s without administration of oral or IV contrast material.

COMMENTS:

Comparison is made to prior exam performed on 2/5/2016.

No change in small left pleural effusion.

No change in passive atelectatic airspace disease of the lower lobes more on the left.

No change in moderate cardiomegaly.

No change in small sliding hiatal hernia.

No change in the hepatomegaly and irregularity of hepatic contour.

Generalized body anasarca has increased.

Moderate large bowel fecal stasis suggestive of constipation.

There is no intra or extrahepatic biliary ductal dilatation. The spleen is normal. The pancreas is of
 normal contour and attenuation characteristics. There is no evidence of adrenal mass.

The kidneys are normal in size, shape and configuration. No renal or ureteral calculi are identified.
 There is no hydroureter or hydronephrosis.

There is no evidence for appendicitis. There is no bowel wall thickening. No evidence for small or la
rge bowel obstruction.

There is no evidence of intrinsic or extrinsic bladder mass.

Images of the lung bases show no evidence of pleural or parenchymal mass. There are no pleural effusi
ons.

The bony structures are free of lytic or blastic lesions. Multilevel degenerative changes are seen in
volving the thoracolumbar spine.

Scattered calcifications are seen involving the aorta and major branches compatible with atherosclero
sis.

IMPRESSION: 

Increased ascites which remains mild.

Increased anasarca.

Indication cholelithiasis and diffuse thickening of the wall of the gallbladder.

Additional chronic, unchanged findings.

No hydronephrosis is seen.

Thank you for your kind referral of this patient.

     Electronically signed by KYLER JONES MD on 12/14/2016 07:03:45 AM ET

## 2016-12-14 NOTE — IPN
DATE:  12/14/2016

 

SUBJECTIVE: Ms. Raphael is a 66-year-old  female who was seen and

examined at the bedside. Patient continuing to complain about the pain in her

lower extremities, right more than left, especially on the prone side of the foot

bilaterally. Patient has erythema on both sides, right more than left, which we

believe is caused by cellulitis on the right lower extremity; however, left lower

extremity is asymptomatic at this time. Patient is on 4 liters nasal cannula.

Patient expressed that if she lies down flat, she is developing orthopnea and

paroxysmal nocturnal dyspnea (PND). Patient denies any overnight issues. Patient

expressed that she is concerned about ferrous sulfate, and she believes that this

medication is causing her to be in fluid overload, even though I spoke with her,

and I explained that this is false; however, she persistently is asking for this

medication to be held.

 

OBJECTIVE:

VITAL SIGNS: Temperature 97.2, pulse 63, respiratory rate 20, blood pressure

106/82, pulse oximetry 94 on 4 liters nasal cannula. Total intake 1350, total

output 2200 mL. Patient has -850 mL.

GENERAL APPEARANCE: Patient was sitting on the chair in no acute distress.

Patient was awake, alert, and oriented to time, place, and person.

HEENT: Normocephalic, atraumatic.

LUNGS: Patient has mild crackles at the bases of her lungs. Good air movement.

Patient did not have wheezing or rales.

HEART: Normal S1, S2, irregular.

ABDOMEN: Morbidly obese, soft, nontender. Positive bowel sounds in all quadrants.

Patient has erythema on the lower abdominal quadrant. This is possibly secondary

to candidemia.

EXTREMITIES: Patient has pitting edema in both lower extremities, +2 pulses in

both lower extremities. Patient has erythema on the prone side of the feet

bilaterally, right more than left. The erythema is warm and painful to palpation

on the right side; however, it is not painful to palpation on the left side.

 

LABORATORY DATA:

White blood cells 9.4, red blood cell 3.01, hemoglobin 8.6, hematocrit 30.1, MCV

99.8, MCH 28.5, MCHC 28.5, RDW 24.4, platelet count 231, neutrophil percentage

82.5, lymphocyte percentage 6.5, monocyte percentage 6.9, eosinophil percentage

1.9, basophil percentage 0.8, leukocyte percentage 1.2.

 

Sodium 140, potassium 4.2, chloride 98, carbon dioxide 35, anion gap 7, BUN 24,

creatinine 1.09, glomerular filtration rate 53.5, fasting glucose 130, calcium

8.3, magnesium 2.7.

 

PT 22.5, INR 1.97.

 

ASSESSMENT AND PLAN:

1.  Acute exacerbation of the chronic diastolic congestive heart failure. Patient

has grade 2 diastolic dysfunction. Patient is on Lasix 40 mg every 6 hours

intravenously. We have consulted Dr. Alvares yesterday , who started the patient

on spironolactone. Negative net fluid has increased compared to day before. Dr. Alvares will manage the diuresis.

 

2.  Acute exacerbation of the dyspnea and hypoxia. This is multifactorial. At

this time patient is on 4 liters nasal cannula; however, at baseline patient is

on 2 liters nasal cannula. Patient is on breathing treatments. Also we are

diuresing patient. Will continue monitoring patient for any abnormal symptoms.

 

3.  Acute kidney injury. Dr. Alvares has been consulted. Patient has a Murray in

place and is functional. Patient was on Lasix; however, Dr. Alvares also started

patient on spironolactone 25 mg twice a day and monitoring the kidney function.

Due to significant volume overload, Dr. Alvares has ordered CT of abdomen, which

showed increased ascites, which remains mild. Increased anasarca also indicated

cholelithiasis and diffuse thickening of the wall of the gallbladder; however, no

hydronephrosis was seen. At this point will continue with the current medication.

Patient has finished a course of antibiotic.

 

4.  Constipation. Patient is on Dulcolax.

 

5.  Obstructive sleep apnea. This is a chronic issue.

 

6.  Morbidly obese. This is a chronic issue.

 

7.  Diabetes. Patient is on sliding scale as well as diabetes diet.

 

8.  Hypertension Patient is on Lasix and Lopressor. Patient also started on

spironolactone by Dr. Alvares.

 

9.  Chronic obstructive pulmonary disease (COPD). Patient is on breathing

treatment. At this time patient is on 4 liters nasal cannula; however, patient

usually is on 3 liters at baseline.

 

10.  Coronary artery disease. Patient is on aspirin and Pradaxa as well as

Lipitor and Lopressor.

 

11.  Hyperlipidemia. Patient is on Lipitor.

 

12.  Mitral stenosis. Patient on Lasix. Also patient started on spironolactone.

Patient is also on beta blocker.

 

13.  Pulmonary hypertension. This is a chronic issue. Patient is stable at this

time. Patient has right heart failure. At this time will continue patient on

Lasix as well as spironolactone.

 

14.  Chronic atrial fibrillation. Patient is on aspirin and Pradaxa. Patient's

rate is controlled. Patient is on beta blocker as well.

 

15.  Gastroesophageal reflux disease (GERD). Will continue patient on Protonix.

 

16.  Allergies. Patient on Claritin as needed for allergy; however, patient does

not have signs or symptoms of allergies.

 

17.  Anemia. Patient is on ferrous sulfate 325 twice a day; however, patient

expressed concern regarding this medication causing current symptoms of the

patient. Even though I explained to her that ferrous sulfate is not causing her

symptoms, she persistently requests this medication to be stopped. I told her

that therefore we will hold the medication for two days, and we will recheck with

the patient again. The patient also has decrease of hemoglobin and hematocrit.

This is secondary to diuresis.

 

18.  Bilateral lower extremity edema. CT of abdomen and pelvis, which was

performed recently, did not show any obstruction. This is possibly related to

volume overload. Patient was on Lasix; however, patient was started on

spironolactone, and Dr. Alvares has been consulted. We will continue to monitor

patient for any abnormal symptoms.

 

19.  Abnormal liver function. CT of the chest, which was performed earlier, shows

possibility of liver cirrhosis. I have ordered a liver ultrasound, which was

performed this morning, which shows cholelithiasis and gallbladder wall

thickening and a slight irregular liver contour. No ascites was seen on the

ultrasound. Patient's PT is elevated; however, patient has a normal INR. Liver

function which was performed on 12/09/2016 was negative. I will re-evaluate the

liver function again tomorrow.

 

My preceptor for this patient encounter was Dr. Cameron Healy. The preceptor was

physically present in the building during the encounter and was fully available

as needed. All aspects of the patient interview, examination, medical decision

making process, and medical care plan development were reviewed and approved by

the preceptor. The preceptor is aware and concurs with the plan as stated in the

body of this note and will attest to such by his/her co-signature.

## 2016-12-14 NOTE — REP
RIGHT UPPER QUADRANT SONOGRAPHY:

 

HISTORY:  Possible cirrhosis.

 

Comparison CT study is from December 13, 2016.  Comparison sonography March 20, 2002.

 

FINDINGS:  Scan quality is inhibited by patient body habitus and limited

mobility.  Scanning through right upper quadrant of the abdomen demonstrates

shadowing calculi in the gallbladder and gallbladder wall thickening up to 0.7 cm

in thickness.  Common bile duct is normal measuring 0.6 cm in greatest diameter.

There is evidence of mild fatty infiltration of the liver.  Liver contour is

slightly micronodular.  No focal liver lesion is seen.  The liver does not appear

to be enlarged overall.  Limited views of the pancreas show no abnormalities.

Pancreas is partially obscured by gas and body habitus.  No free fluid is seen

although the CT study shows a small quantity of fluid.  No right renal

abnormality is seen.  Right kidney measures 11.6 x 6.5 x 5.6 cm.

 

IMPRESSION: Cholelithiasis with gallbladder wall thickening.  Slightly irregular

liver contour.  No ascites seen by ultrasound.

 

 

Signed by

Corona He MD 12/14/2016 02:48 P

## 2016-12-14 NOTE — IPN
DATE:  12/14/2016

 

SUBJECTIVE: Patient was seen and examined at the bedside today in the morning.

She was sitting on the sofa. Patient is still short of breath. She is wearing

nasal cannula. Her renal function is improving slightly with the diuresis, and

her urine output is also improving since yesterday, but patient still complains

of significant fluid overload.

 

REVIEW OF SYSTEMS: Patient denies any fever, chills, rigors, headache, chest

pain. She does report shortness of breath. Patient also reported abdominal

distention, bloating, swelling of the legs, and inability to walk.

 

OBJECTIVE:

VITAL SIGNS: Temperature is 97.2 degrees Fahrenheit, blood pressure is 106/82,

pulse is 63, respiratory rate of 18, saturating 94% on nasal cannula at 4 liters.

 

Intake and output: Urine output recorded so far is 2200 mL yesterday and 600 mL

so far today since overnight. Weight in the bed scale is 175.2 kg.

 

PHYSICAL EXAMINATION:

GENERAL: Patient is awake, alert, oriented times three, sitting in the sofa. She

is morbidly obese. Wearing a nasal cannula. Mild respiratory distress at this

time.

HEAD AND NECK: Extraocular muscles intact. Pupils equally round and reactive to

light. Neck is supple. I could not appreciate jugular venous distention (JVD)

because of body habitus.

CARDIOVASCULAR: S1, S2, irregular heart rate. No murmur, rub, or gallop.

RESPIRATORY: Decreased breath sounds at the bases. No rales or rhonchi.

ABDOMEN: Obese. Positive bowel sounds. I could not appreciate any organomegaly,

but patient has significant pitting edema of the abdominal wall, and she has

presacral edema as well.

EXTREMITIES: Patient has edema of the bilateral lower extremities because of

severe 3+ pitting edema, which goes all the way up to her thighs. I could not

feel the pulses.

CENTRAL NERVOUS SYSTEM: No focal neurological deficit. Power is 5/5 in all

extremities.

PSYCHIATRIC: Normal mood and affect.

SKIN: Erythema of the bilateral feet. Apart from that, she does not have any

other rashes or ulcers.

 

LABORATORY REVIEW:

CBC showed a WBC 9.4, hemoglobin 8.6, platelets are 231. INR is 1.97.

 

BMP showed sodium 140, potassium 4.2, chloride 98, bicarbonate 35, BUN 24,

creatinine 1.09, magnesium 2.7, calcium 8.3.

 

IMAGING: CAT scan of the abdomen and pelvis was done yesterday, and it showed

increased ascites, increased anasarca, cholelithiasis. No hydronephrosis of

kidneys. Ultrasound of the liver was also done today. It showed cholelithiasis

with gallbladder wall thickening. Slightly irregular liver contour.

 

CURRENT MEDICATIONS:

Patient's current medications were all reviewed by me. She is on Lasix 40 mg IV

every 6 hours. I added metolazone 5 mg by mouth daily, and she was also started

on spironolactone 25 mg by mouth twice a day yesterday.

 

ASSESSMENT:

A 66-year-old female who is morbidly obese. She has history of congestive heart

failure, chronic obstructive pulmonary disease (COPD), morbid obesity, and

cirrhosis of the liver. Nephrology service following the patient for management

of fluid overload in the setting of chronic kidney disease.

 

PLAN:

1.  Acute kidney injury. Most likely cardiorenal syndrome. Patient's creatinine

improved with mild diuresis yesterday. Continue the current diuresis. CAT scan of

the abdomen did not show any obstruction.

 

2.  Anasarca. Anasarca is secondary to a combination of congestive heart failure

and cirrhosis. Continue Lasix 40 mg IV every 6 hours. Continue spironolactone 25

mg by mouth twice a day. I have added metolazone 5 mg by mouth daily for

sequential nephron blockage and increased diuresis. If patient does not respond

to this regimen of diuretics, then she will be placed on IV Lasix drip.

 

3.  Lower extremity cellulitis. Patient is getting IV ceftriaxone at this time.

Management is as per primary team.

 

4.  Atrial fibrillation. It is okay to continue Pradaxa at this time. Patient is

also on low-dose metoprolol 12.5 mg by mouth twice a day. Heart rate is

controlled at this time.

 

5.  Iron deficiency anemia. Patient is being treated for cellulitis at this time.

I would not give her IV iron at this time. Once the infection is treated, patient

will be given IV Venofer. No urgent need for any blood transfusion or Procrit at

this time.

## 2016-12-15 NOTE — IPN
DATE:  12/25/2016

 

Mrs. Raphael is seen this morning on her bedside.  She is sitting in the chair and

reports feeling better.  However, still has pain in her legs and generalized

edema.  She denies any nausea or vomiting.  Her dyspnea is at about baseline and

she is using oxygen via nasal cannula.  She is currently being diuresed due to

massive generalized edema and also being treated for cellulitis on her lower

extremities particularly on her right leg.

 

PHYSICAL EXAMINATION:  Morbidly obese lady sitting in the chair.  Temperature is

96.5 degrees Fahrenheit, heart rate 88 per minute and respiratory rate 18 per

minute.  Blood pressure earlier today was 89/53; her most recent one is 132/79

mmHg and oxygen saturation 94% on 4 liters oxygen.  Neck veins are mildly

distended.  Head is atraumatic.  Pupils are equal and reactive to light and

sclera is anicteric.  Neck is supple and without any thyroid enlargement.

Trachea is midline and there are no palpable cervical lymph nodes.  This heart

sounds are irregular in rhythm.  Lungs with only moderate air entry bilaterally.

Abdomen is morbidly obese.  She has edema of abdominal wall and bowel sounds are

present.  Extremities have a 3+ to 4+ edema on both lower extremities with some

erythema, which is more prominent on the right leg than the left leg.

Neurologically, she is awake and alert without a focal deficit.  Intake and

output records from yesterday showed total intake 840 and output 3850 mL.

 

Today's labs show white blood cell (WBC) count 9.0, hemoglobin 8.4 and hematocrit

29.2.  Platelets 225.  Sodium 141 and potassium 3.7.  BUN is 23 and creatinine

1.06.  CO2 is 36 today.

 

PROBLEMS:

1.  Acute renal failure.  The patient has improved kidney function and stable now

at about baseline.

2. Generalized edema and congestive heart failure.  She is known to have systolic

congestive heart failure by echocardiogram.  However, she also has right-sided

heart failure.  She is being diuresed with a combination of IV Lasix, oral

spironolactone and metolazone.  At present, she is in need for ongoing diuresis

and negative fluid balance.  I have discussed with the patient the need for

long-term management of her volume status.  She will need to follow a low-sodium

diet and fluid restriction of 1500 per day.  Current diuretics are appropriate

with a negative balance of about 2 liters per day.

3.  Morbid obesity.  I have discussed the weight issue with the patient at length

and she understands the need for weight loss.  She will try to follow her dietary

restrictions as she is unable to exercise.

4.  Anemia.  She does have moderate anemia.  She does not need a transfusion at

this point.  I will hold off on the use erythropoietin due to potential risk for

fluid retention.  Once her volume status is corrected, then we will consider to

give her erythropoietin.

## 2016-12-16 NOTE — IPN
DATE:  12/16/2016

 

Mrs. Raphael is seen this morning on her bedside.  She is sitting in the chair and

reports feeling better.  The patient feels that her legs are not as swollen or

painful as they were a couple of days ago.  Her dyspnea is improving.  She denies

any nausea or vomiting.  She is being diuresed with a combination of diuretics

and has responded very well over the last few days.

 

PHYSICAL EXAMINATION:

Temperature 97.5 degrees Fahrenheit, heart rate 100 per minute and respiratory

rate 18 per minute.  Blood pressure 114/50 mmHg and oxygen saturation 93% on 4

liters oxygen.

Intake and output records from yesterday showed total intake 720 and output 5350

mL with a negative fluid balance of about 4.3 liters.  Her weight is down to 171

kg while she was 175.4 kg on 12/13/2016.

Her neck veins are difficult to be assessed sitting upright.

She is using oxygen via nasal cannula.

Head is atraumatic.

Ears, nose and throat are unremarkable.  Pupils are equal and reactive to light

and sclera is anicteric.

Neck is supple and there is no thyroid enlargement or abnormal cervical lymph

nodes.  Heart sounds are tachycardiac and irregular.

Lungs have diminished breath sounds at bases.

Abdomen is obese and nontender.  There is significant edema of abdominal wall.

Bowel sounds are normal.

Extremities have no cyanosis or clubbing.  She has significant edema of lower

extremities.

Skin has erythema on both lower legs.

Neurologically, she is awake, alert and oriented times three.  She has no focal

neurological deficit.

 

Today's labs show WBC count 9.1, hemoglobin 8.1 and hematocrit 28.1.  Sodium 139

and potassium 3.1.  CO2 is 40 and chloride 94.  BUN 21 and creatinine 1.0.

 

PROBLEMS:

1.  Decompensated congestive heart failure.  The patient has right-sided

congestive heart failure in addition to systolic congestive heart failure.  At

present, she remains quite decompensated, though she is diuresing very well.  We

would like to continue with negative fluid balance.  However, the rate will need

to be slowed down due to development of metabolic alkalosis and hypokalemia.  I

am stopping her metolazone, and we will continue with furosemide and

spironolactone as previously.  Acetazolamide 500 mg twice a day is being added.

I have discussed this with the patient.  She does not feel that she has a true

allergy to sulfa.  She reports that when she was 8 or 9 years old, her parents

told her about possible sulfa allergy; however, she never had any problems

through her life.  I have also discussed with the pharmacy, and we will go ahead

and give her acetazolamide while she is being monitored here in the hospital.  If

she has any possible allergic reactions, we will certainly stop it.

 

2.  Hypokalemia.  This is related to aggressive diuresis.  The patient will

continue with spironolactone 25 mg twice a day.  Her metolazone is being stopped

and we will add potassium chloride 40 mEq twice a day. Her electrolytes are being

monitored on a daily basis.

 

3.  Morbid obesity.  I have discussed with the patient about her weight issue,

and she understands that she needs to keep a strict control on her diet and try

to lose weight more aggressively.  At present, she is trying to cut down on her

caloric intake.

 

4.  Cellulitis lower extremities.  Her cellulitis is improving, and she remains

on intravenous antibiotics, including ceftriaxone.

## 2016-12-16 NOTE — IPN
DATE:  12/15/2016

 

SUBJECTIVE:  Ms. Raphael is a 66-year-old  female who was seen and

examined at bedside.  Today patient was sad and she had one episode of crying

because patient was notified that she has cirrhosis of the liver.  Patient 
denies

chest pain, however, patient expressed orthopnea and PND with lying flat.

Patient is still on 4 liters nasal cannula.  Patient denies overnight issues.

Patient also expressed that there was a misunderstanding regarding ferrous

sulfate and she would be interested to be started again on ferrous sulfate.

 

OBJECTIVE:  Vital signs:  Temperature 96.5, pulse 89, respiratory rate 18, blood

pressure 132/75, pulse oximetry 94 on 4 liters nasal cannula.

General appearance:  Patient was sitting in a chair, no acute distress. Patient

was awake, alert, oriented to time, place and person.

HEENT:  Normocephalic, atraumatic.

Heart:  Normal S1, S2.  Irregular.

Lungs:  Patient has fine crackles at the base of the lung.  Good air movement.

No wheezing or rales.

Abdomen:  Morbidly obese.  Nontender to palpation.  Positive bowel sounds in all

quadrants.  Patient has erythema on the lower abdominal quadrants.  This is

secondary to candidemia.

Extremities:  Patient has extremity pitting edema bilaterally, +2 pulses in both

lower extremities.  Patient has a erythema on the prone surface of the feet

bilaterally, right more than left.  Erythema area was tender to palpation,

however it has decreased compared to yesterday.

 

LABORATORY DATA:  White blood cells 9, red blood cells 2.96, hemoglobin 8.4,

hematocrit 29.2.  MCV 98.6, MCH 28.5, MCHC 28.9, RDW 24.1, platelet count 225,

neutrophil percentage 80.1, lymphocyte percentage 7.1, monocyte percentage 7.3,

eosinophil percentage 2.4, basophil percentage 1.1, leukocyte percentage 2.1.

Sodium 141, potassium 3.7, chloride 98, carbon dioxide 36, anion gap 7, BUN 23,

creatinine 1.06, glomerular filtration rate 55.2, fasting glucose 125, calcium

8.2, magnesium 2.7, total bilirubin 0.6, AST 16, ALT 15, alkaline phosphatase 72
,

total protein 6.6, albumin 2.9.

 

ASSESSMENT AND PLAN:

1.  Acute exacerbation of diastolic congestive heart failure.  The patient had

grade 2 diastolic dysfunction.  At this time, patient is on Lasix 40 mg every 6

hours as well as spironolactone.  Patient also was started on metolazone 5 mg by

mouth daily by nephrology.  At this time, patient as stable.

 

2.  Acute exacerbation of the dyspnea and hypoxia.  This is possibly

multifactorial.  At this time, patient is on 4 liters nasal cannula, however,

patient's baseline is 3 liters nasal cannula.  Patient is on breathing 
treatment.

Also we will continue diuresing the patient.

 

3.   Acute kidney injury.  Nephrology has started patient on metolazone.  Also

patient is in Lasix and spironolactone.  Patient's fluid balance was minus 3010

mL.   However, patient is still hypervolemic.  Diuresis controlled by

nephrologist.

 

4.  Constipation.  Patient is on Dulcolax as well as MiraLax daily as needed

constipation.  Patient is also on Colace.

 

5.  Obstructive sleep apnea.  This is a chronic issue.

 

6.  Morbid obesity.  This is a chronic issue.  We have spoke with the patient

regarding weight loss and healthy diet.

 

7.  Diabetes.  The patient is on a sliding scale, as well as diabetic diet

(consistent carbohydrate).

 

8.  Hypertension.  Patient is on Lasix and Lopressor.  Patient is also started 
on

spironolactone and metolazone by nephrologist.

 

9.  Chronic obstructive pulmonary disease (COPD).  The patient is on breathing

treatment.  Patient is on 4 liters nasal cannula.  At this time, however,

patient's baseline is 3 liters nasal cannula.

 

10.  Coronary artery disease.  The patient is on Plavix, aspirin, Lipitor and

Lopressor.

 

11.  Hyperlipidemia.  The patient is on Lipitor.

 

12.  Mitral stenosis.  The patient is on Lasix as well as spironolactone and 
beta

blocker.

 

13.  Primary hypertension.  This is a chronic issue.  We will continue patient 
on

Lasix and spironolactone and metolazone.

 

14.  Chronic atrial fibrillation.  Continue the patient on aspirin and Pradaxa.

The patient's rate is controlled.  Will continue patient on beta blocker.

 

15.  Gastrointestinal reflux disease (GERD).  Will continue the patient on

Protonix.

 

16.  Allergy.  Patient is on Claritin as needed for allergy.  However, at this

time, the patient is stable.

 

17.  Anemia.  Will continue patient on ferrous sulfate 325 mg twice a day.  Also

patient is on laxatives for constipation.

 

18.  Bilateral lower extremity edema.  CT of abdomen and pelvis which was

performed did not show any obstruction.  This is possibly hypovolemia related.

Patient is managed by nephrologist.  At this time, patient is on Lasix,

spironolactone, metolazone.

 

19.  Abnormal liver function.  CT scan of the abdomen and pelvis is concerning

for liver cirrhosis.  Liver ultrasound performed yesterday which shows

cholelithiasis and gallbladder wall thickening and irregular liver contour.  No

ascites was seen on the ultrasound.  Patient has elevated PT, however, INR is

normal.  Liver function test on 12/09/2016 was negative.  Also the liver 
function

test which was performed today was negative.  Patient can be followed as

outpatient for re-evaluation of the liver.

 

My preceptor for this patient encounter was Dr. Ro Aleman. The preceptor was

physically present in the building during the encounter and was fully available.

As needed, all aspects of the patient interview, examination, medical decision

making process, and medical care plan development were reviewed and approved by

the preceptor. The preceptor is aware and concurs with the plan as stated in the

body of this note and will attest to such by his/her cosignature.



I have both independently examined this patient as well as reviewed the note.  
I have discussed in detail with the resident the findings and plan of treatment 
as documented in the residents note. I will continue to follow the patient and 
offer further guidance to the patients care as necessary during this hospital 
stay.



Ro VILLALOBOS

## 2016-12-17 NOTE — IPNPDOC
Assessment/Plan


Date Seen


The patient was seen on 12/17/16.





Problems


Problems:  


(1) Anasarca


Status:  Acute


Problem Text:  due to diastolic congestive heart failure. 


on multiple diuretics. 





(2) Acute on chronic diastolic CHF (congestive heart failure)


Status:  Acute


Problem Text:  continue diuresis , daily weights. 


on lasix, diamox and spironolactone. 





(3) Acute and chronic respiratory failure with hypoxia


Status:  Acute


Problem Text:  multifactorial due to CAP and diastolic chf exacerbation and 

fluid


slowly improving with diuresis and antibiotics. 





(4) CAP (community acquired pneumonia)


Status:  Resolved


Problem Text:  finished course on ceftriaxone. 





(5) Constipation


Status:  Resolved


Problem Text:  on bowel regimen 





(6) SINTIA (obstructive sleep apnea)


Status:  Chronic


(7) Morbid obesity


Status:  Chronic


(8) Diabetes


Status:  Chronic


(9) Hypertension


Status:  Chronic


(10) COPD (chronic obstructive pulmonary disease)


Status:  Chronic


Problem Text:  continue home meds.





(11) CAD (coronary artery disease)


Status:  Chronic


(12) Hyperlipidemia


Status:  Chronic


(13) Mitral stenosis and incompetence


Status:  Chronic


(14) Pulmonary hypertension


Status:  Chronic


(15) Afib


Status:  Chronic


Problem Text:  on pradaxa





(16) Cellulitis


Status:  Acute


Problem Text:  on ceftriaxone





(17) Chronic anemia


Status:  Chronic


(18) Allergy


Status:  Chronic


Problem Text:  continue loratidine








Plan / VTE


VTE Prophylaxis Ordered?:  Yes





Plan / Urinary Catheter


Reason for insertion/continuin:  Other-document below





Subjective


Review of Systems


CC/HPI


The patient is a 66-year-old female admitted with a reason for visit of Acute 

Respiratory Distress.


Events since last encounter


Having good negative balance, denies any shortness of breath , leg swelling 

improving still with pain and tenderness no chest pain or cough , no abdominal 

pain , nausea or vomiting.





Objective


Physical Examination


General Exam:  Positive: Alert, No Acute Distress


Eye Exam:  Positive: Conjunctiva & lids normal, EOMI, PERRLA, 


   Negative: Sclera icteric


ENT Exam:  Positive: Atraumatic, Mucous membr. moist/pink, Pharynx Normal


Neck Exam:  Positive: Supple, 


   Negative: JVD, thyromegaly


Chest Exam:  Positive: Diminished


Heart Exam:  Positive: Normal S1, Normal S2, Rate Normal, Regular Rhythm


Abdomen Exam:  Positive: Normal bowel sounds, Soft, 


   Negative: Hepatospenomegaly, Tenderness


Extremity Exam:  Positive: Edema





Vital Signs/I&O





 Vital Signs








  Date Time  Temp Pulse Resp B/P Pulse Ox O2 Delivery O2 Flow Rate FiO2


 


12/17/16 06:00 97.5 74 23 120/65 98 Nasal Cannula 4.0 














 I&O- Last 24 Hours up to 6 AM 


 


 12/17/16





 06:00


 


Intake Total 1200 ml


 


Output Total 4875 ml


 


Balance -3675 ml











Laboratory Data


Labs 24H


 Laboratory Tests 2


12/17/16 05:31: 


Blood Urea Nitrogen 22H, Creatinine 1.00, Sodium Level 140, Potassium Level 3.2L

, Chloride Level 95L, Carbon Dioxide Level 37H, Calcium Level 8.6L, Aspartate 

Amino Transf (AST/SGOT) 14L, Alanine Aminotransferase (ALT/SGPT) 13, Alkaline 

Phosphatase 72, Total Bilirubin 0.5, Total Protein 6.8, Albumin 2.7L, Albumin/

Globulin Ratio 0.66L, Anion Gap 8, White Blood Count 9.2, Red Blood Count 2.81L

, Hemoglobin 8.0L, Hematocrit 28.6L, Mean Corpuscular Volume 101.8H, Mean 

Corpuscular Hemoglobin 28.5, Mean Corpuscular Hemoglobin Concent 28.0L, Red 

Cell Distribution Width 22.8H, Platelet Count 237, Neutrophils (%) (Auto) 80.5H

, Lymphocytes (%) (Auto) 7.7L, Monocytes (%) (Auto) 6.8H, Eosinophils (%) (Auto

) 2.0, Basophils (%) (Auto) 0.9, Neutrophils # (Auto) 7.5, Lymphocytes # (Auto) 

0.9L, Monocytes # (Auto) 0.6, Eosinophils # (Auto) 0.2, Basophils # (Auto) 0.1, 

Glomerular Filtration Rate 59.1, Large Unclassified Cells # 0.2, Large 

Unclassified Cells % 2.1


CBC/BMP


 Laboratory Tests


12/17/16 05:31








Calcium Level 8.6 L, Aspartate Amino Transf (AST/SGOT) 14 L, Alanine 

Aminotransferase (ALT/SGPT) 13, Alkaline Phosphatase 72, Total Bilirubin 0.5, 

Total Protein 6.8, Albumin 2.7 L, Red Blood Count 2.81 L, Mean Corpuscular 

Volume 101.8 H, Mean Corpuscular Hemoglobin 28.5, Mean Corpuscular Hemoglobin 

Concent 28.0 L, Red Cell Distribution Width 22.8 H, Neutrophils (%) (Auto) 80.5 

H, Lymphocytes (%) (Auto) 7.7 L, Monocytes (%) (Auto) 6.8 H, Eosinophils (%) (

Auto) 2.0, Basophils (%) (Auto) 0.9, Neutrophils # (Auto) 7.5, Lymphocytes # (

Auto) 0.9 L, Monocytes # (Auto) 0.6, Eosinophils # (Auto) 0.2, Basophils # (Auto

) 0.1








JOSE REINOSO MD Dec 17, 2016 08:33

## 2016-12-18 NOTE — IPNPDOC
Assessment/Plan


Date Seen


The patient was seen on 12/18/16.





Problems


Problems:  


(1) Cellulitis


Status:  Acute


Problem Text:  on ceftriaxone





(2) Anasarca


Status:  Acute


Problem Text:  due to diastolic congestive heart failure. 


on multiple diuretics. 





(3) Acute on chronic diastolic CHF (congestive heart failure)


Status:  Acute


Problem Text:  continue diuresis , daily weights. 


on lasix, diamox and spironolactone. 





(4) Acute and chronic respiratory failure with hypoxia


Status:  Acute


Problem Text:  multifactorial due to CAP and diastolic chf exacerbation and 

fluid


slowly improving with diuresis and antibiotics. 





(5) CAP (community acquired pneumonia)


Status:  Resolved


Problem Text:  finished course on ceftriaxone. 





(6) Constipation


Status:  Resolved


Problem Text:  on bowel regimen 





(7) SINTIA (obstructive sleep apnea)


Status:  Chronic


(8) Morbid obesity


Status:  Chronic


(9) Diabetes


Status:  Chronic


(10) Hypertension


Status:  Chronic


(11) COPD (chronic obstructive pulmonary disease)


Status:  Chronic


Problem Text:  continue home meds.





(12) CAD (coronary artery disease)


Status:  Chronic


(13) Hyperlipidemia


Status:  Chronic


(14) Mitral stenosis and incompetence


Status:  Chronic


(15) Pulmonary hypertension


Status:  Chronic


(16) Afib


Status:  Chronic


Problem Text:  on pradaxa





(17) Chronic anemia


Status:  Chronic


(18) Allergy


Status:  Chronic


Problem Text:  continue loratidine








Plan / VTE


VTE Prophylaxis Ordered?:  Yes





Plan / Urinary Catheter


Reason for insertion/continuin:  Other-document below





Subjective


Review of Systems


CC/HPI


The patient is a 66-year-old female admitted with a reason for visit of Acute 

Respiratory Distress.


Events since last encounter


no new complaints.





Objective


Physical Examination


General Exam:  Positive: Alert, No Acute Distress


Eye Exam:  Positive: Conjunctiva & lids normal, EOMI, PERRLA, 


   Negative: Sclera icteric


ENT Exam:  Positive: Atraumatic, Mucous membr. moist/pink, Pharynx Normal


Neck Exam:  Positive: Supple, 


   Negative: JVD, thyromegaly


Chest Exam:  Positive: Diminished


Heart Exam:  Positive: Normal S1, Normal S2, Rate Normal, Regular Rhythm


Abdomen Exam:  Positive: Normal bowel sounds, Soft, 


   Negative: Hepatospenomegaly, Tenderness


Extremity Exam:  Positive: Edema, Other (redness and inflammation), Swelling, 

Tenderness





Vital Signs/I&O





 Vital Signs








  Date Time  Temp Pulse Resp B/P Pulse Ox O2 Delivery O2 Flow Rate FiO2


 


12/18/16 06:00 97.5 88 20 139/74 91 Nasal Cannula 3.0 














 I&O- Last 24 Hours up to 6 AM 


 


 12/18/16





 06:00


 


Intake Total 1250 ml


 


Output Total 5600 ml


 


Balance -4350 ml











Laboratory Data


Labs 24H


 Laboratory Tests 2


12/17/16 11:47: 


Arterial Blood pH 7.415, Arterial Blood Partial Pressure CO2 54.9H, Arterial 

Blood Partial Pressure O2 128.4H, Arterial Blood Total CO2 36.1H, Arterial 

Blood HCO3 34.4H, Arterial Blood Base Excess 8.6H, Arterial Blood Oxygen 

Saturation 99.2H, Blood Gas Bicarbonate Standard 32.4H


12/18/16 05:38: 


Blood Urea Nitrogen 25H, Creatinine 1.14H, Sodium Level 139, Potassium Level 3.9

#, Chloride Level 97L, Carbon Dioxide Level 34H, Calcium Level 8.9, Aspartate 

Amino Transf (AST/SGOT) 19, Alanine Aminotransferase (ALT/SGPT) 14, Alkaline 

Phosphatase 73, Total Bilirubin 0.5, Total Protein 7.5, Albumin 2.8L, Albumin/

Globulin Ratio 0.60L, Anion Gap 8, White Blood Count 9.9, Red Blood Count 2.89L

, Hemoglobin 8.2L, Hematocrit 28.9L, Mean Corpuscular Volume 100.2H, Mean 

Corpuscular Hemoglobin 28.4, Mean Corpuscular Hemoglobin Concent 28.3L, Red 

Cell Distribution Width 21.2H, Platelet Count 277, Neutrophils (%) (Auto) 81.7H

, Lymphocytes (%) (Auto) 7.6L, Monocytes (%) (Auto) 6.1H, Eosinophils (%) (Auto

) 2.0, Basophils (%) (Auto) 0.3, Neutrophils # (Auto) 8.1H, Lymphocytes # (Auto

) 0.8L, Monocytes # (Auto) 0.6, Eosinophils # (Auto) 0.2, Basophils # (Auto) 0.0

, Glomerular Filtration Rate 50.8, Large Unclassified Cells # 0.2, Large 

Unclassified Cells % 2.2


CBC/BMP


 Laboratory Tests


12/18/16 05:38








Calcium Level 8.9, Aspartate Amino Transf (AST/SGOT) 19, Alanine 

Aminotransferase (ALT/SGPT) 14, Alkaline Phosphatase 73, Total Bilirubin 0.5, 

Total Protein 7.5, Albumin 2.8 L, Red Blood Count 2.89 L, Mean Corpuscular 

Volume 100.2 H, Mean Corpuscular Hemoglobin 28.4, Mean Corpuscular Hemoglobin 

Concent 28.3 L, Red Cell Distribution Width 21.2 H, Neutrophils (%) (Auto) 81.7 

H, Lymphocytes (%) (Auto) 7.6 L, Monocytes (%) (Auto) 6.1 H, Eosinophils (%) (

Auto) 2.0, Basophils (%) (Auto) 0.3, Neutrophils # (Auto) 8.1 H, Lymphocytes # (

Auto) 0.8 L, Monocytes # (Auto) 0.6, Eosinophils # (Auto) 0.2, Basophils # (Auto

) 0.0








JOSE REINOSO MD Dec 18, 2016 07:13

## 2016-12-18 NOTE — IPN
DATE:  12/17/2016

 

Mrs. Raphael is seen this morning on her bedside.  She is lying in the bed and

continues to discomfort in her legs and feet.  Her leg edema is improving with

diuresis.  She denies any nausea, vomiting, any worsening of dyspnea or chest

pain.  She does have chronic hypoxemia and currently she is on two liters oxygen.

 

 

Intake and output records from yesterday show a negative fluid balance of 4.7

liters.  Her weight is down to 168.6 kg today, and she was 175.4 kg on

12/13/2016.

Her lungs sound clear to auscultation.

Heart sounds are irregular.

Head is atraumatic.  Neck veins are difficult to be assessed.  Pupils are equal

and reactive to light and sclera is anicteric.  Ears, nose and throat are

unremarkable.

Abdomen is obese and nontender.

Extremities have no cyanosis or clubbing.  Lower extremity edema is improving.

However, she still has significant erythema on both legs and feet.

 

LABORATORY DATA:  Today's laboratories show WBC count 9.2, hemoglobin 8.0 and

hematocrit 28.6.

 

Sodium is 140 and potassium 3.2.  CO2 is down to 37, BUN 22 and creatinine 1.0.

 

 

A blood gas showed a pH of 7.41, pCO2 54.9, pO2 128.4, and bicarbonate 32.4.

 

PROBLEMS:

1.  Acute on chronic congestive heart failure.  The patient is diuresing very

nicely.  Her metolazone has been stopped due to massive negative fluid balance

and development of metabolic alkalosis.  She remains on IV Lasix, oral

spironolactone and Diamox.

 

2.  Hypokalemia.  Potassium level is improving and I have increased her potassium

supplement 240 mEq three times a day.  Electrolytes will be checked again

tomorrow morning.

 

3.  Acute on chronic kidney injury.  Kidney function is stable at present and she

is diuresing very nicely.

 

4.  Bilateral lower extremity cellulitis.  She is receiving IV antibiotics and

has been afebrile.

## 2016-12-19 NOTE — IPNPDOC
Assessment/Plan


Date Seen


The patient was seen on 12/19/16.





Problems


Problems:  


(1) Cellulitis


Status:  Acute


Problem Text:  on ceftriaxone





(2) Anasarca


Status:  Acute


Problem Text:  due to diastolic congestive heart failure. 


on multiple diuretics. 





(3) Acute on chronic diastolic CHF (congestive heart failure)


Status:  Acute


Problem Text:  continue diuresis , daily weights. 


on lasix, diamox and spironolactone. 





(4) Acute and chronic respiratory failure with hypoxia


Status:  Acute


Problem Text:  multifactorial due to CAP and diastolic chf exacerbation and 

fluid


slowly improving with diuresis and antibiotics. 





(5) CAP (community acquired pneumonia)


Status:  Resolved


Problem Text:  finished course on ceftriaxone. 





(6) Constipation


Status:  Resolved


Problem Text:  on bowel regimen 





(7) SINTIA (obstructive sleep apnea)


Status:  Chronic


(8) Morbid obesity


Status:  Chronic


(9) Diabetes


Status:  Chronic


(10) Hypertension


Status:  Chronic


(11) COPD (chronic obstructive pulmonary disease)


Status:  Chronic


Problem Text:  continue home meds.





(12) CAD (coronary artery disease)


Status:  Chronic


(13) Hyperlipidemia


Status:  Chronic


(14) Mitral stenosis and incompetence


Status:  Chronic


(15) Pulmonary hypertension


Status:  Chronic


(16) Afib


Status:  Chronic


Problem Text:  on pradaxa





(17) Chronic anemia


Status:  Chronic


(18) Allergy


Status:  Chronic


Problem Text:  continue loratidine





(19) Hematuria


Status:  Acute


Problem Text:  possibly from trama from power will monitor if continues may 

have to hold anticoagulants temporarily. 








Plan / VTE


VTE Prophylaxis Ordered?:  Yes





Plan / Urinary Catheter


Reason for insertion/continuin:  Other-document below





Subjective


Review of Systems


CC/HPI


The patient is a 66-year-old female admitted with a reason for visit of Acute 

Respiratory Distress.


Events since last encounter


having hematuria in power from this am patient believes that she may have 

pulled her power while turning and getting out of bed. also complains of 

hoarseness of voice and some dry sticky phlegm , no sore throat, no fever or 

chills, Feels tired and weak , leg pain and swelling improving.





Objective


Physical Examination


General Exam:  Positive: Alert, No Acute Distress


Eye Exam:  Positive: Conjunctiva & lids normal, EOMI, PERRLA, 


   Negative: Sclera icteric


ENT Exam:  Positive: Atraumatic, Mucous membr. moist/pink, Pharynx Normal


Neck Exam:  Positive: Supple, 


   Negative: JVD, thyromegaly


Chest Exam:  Positive: Diminished


Heart Exam:  Positive: Normal S1, Normal S2, Rate Normal, Regular Rhythm


Abdomen Exam:  Positive: Normal bowel sounds, Soft, 


   Negative: Hepatospenomegaly, Tenderness


Extremity Exam:  Positive: Edema, Other (redness and inflammation), Swelling, 

Tenderness





Vital Signs/I&O





 Vital Signs








  Date Time  Temp Pulse Resp B/P Pulse Ox O2 Delivery O2 Flow Rate FiO2


 


12/19/16 08:12  93  119/56    


 


12/19/16 06:00 98.7  20  91 Nasal Cannula 3.0 














 I&O- Last 24 Hours up to 6 AM 


 


 12/19/16





 06:00


 


Intake Total 1310 ml


 


Output Total 3250 ml


 


Balance -1940 ml











Laboratory Data


Labs 24H


 Laboratory Tests 2


12/19/16 06:17: 


Blood Urea Nitrogen 26H, Creatinine 1.20H, Sodium Level 139, Potassium Level 3.8

, Chloride Level 96L, Carbon Dioxide Level 35H, Calcium Level 8.8, Aspartate 

Amino Transf (AST/SGOT) 17, Alanine Aminotransferase (ALT/SGPT) 23, Alkaline 

Phosphatase 77, Total Bilirubin 0.4, Total Protein 7.5, Albumin 2.9L, Albumin/

Globulin Ratio 0.63L, Anion Gap 8, White Blood Count 10.9H, Red Blood Count 

2.91L, Hemoglobin 8.3L, Hematocrit 29.8L, Mean Corpuscular Volume 102.4H, Mean 

Corpuscular Hemoglobin 28.6, Mean Corpuscular Hemoglobin Concent 28.0L, Red 

Cell Distribution Width 21.5H, Platelet Count 318, Neutrophils (%) (Auto) 80.6H

, Lymphocytes (%) (Auto) 8.8L, Monocytes (%) (Auto) 5.9H, Eosinophils (%) (Auto

) 1.8, Basophils (%) (Auto) 0.4, Neutrophils # (Auto) 8.8H, Lymphocytes # (Auto

) 1.0L, Monocytes # (Auto) 0.6, Eosinophils # (Auto) 0.2, Basophils # (Auto) 0.0

, Glomerular Filtration Rate 47.8, Large Unclassified Cells # 0.3, Large 

Unclassified Cells % 2.6


CBC/BMP


 Laboratory Tests


12/19/16 06:17








Calcium Level 8.8, Aspartate Amino Transf (AST/SGOT) 17, Alanine 

Aminotransferase (ALT/SGPT) 23, Alkaline Phosphatase 77, Total Bilirubin 0.4, 

Total Protein 7.5, Albumin 2.9 L, Red Blood Count 2.91 L, Mean Corpuscular 

Volume 102.4 H, Mean Corpuscular Hemoglobin 28.6, Mean Corpuscular Hemoglobin 

Concent 28.0 L, Red Cell Distribution Width 21.5 H, Neutrophils (%) (Auto) 80.6 

H, Lymphocytes (%) (Auto) 8.8 L, Monocytes (%) (Auto) 5.9 H, Eosinophils (%) (

Auto) 1.8, Basophils (%) (Auto) 0.4, Neutrophils # (Auto) 8.8 H, Lymphocytes # (

Auto) 1.0 L, Monocytes # (Auto) 0.6, Eosinophils # (Auto) 0.2, Basophils # (Auto

) 0.0








JOSE REINOSO MD Dec 19, 2016 10:05

## 2016-12-19 NOTE — IPN
DATE:  12/16/2016

 

SUBJECTIVE:  Ms. Raphael is a 66-year-old  female who was seen and

examined at the bedside.  Patient expressed that she feels better today.  The

patient denies chest pain; however, patient has orthopnea and paroxysmal

nocturnal dyspnea (PND).  The patient also is still on 4 liters nasal cannula.

The patient expressed that she believes her lower extremity size has decreased

and her pain in her lower extremities has decreased as well.

 

OBJECTIVE:

VITAL SIGNS: Temperature 97.5, pulse 99, respiratory rate 22, blood pressure

114/50, pulse oximetry 93% on 4 liters nasal cannula. Total intake from 
yesterday

720, total output 5350 mL.

GENERAL APPEARANCE:  Patient was sitting on the chair in no acute distress.

Patient was awake, alert, and oriented to time, place, and person.

HEENT:  Normocephalic, atraumatic.  Pupils are equal.

HEART:  Irregular.  Normal S1, S2.

LUNGS:  Patient has fine crackles at the bases of the lungs.  Good air movement.

No wheezing or rales noticed.

ABDOMEN:  Morbidly obese, soft, nontender.  Positive bowel sounds in all

quadrants.  Patient continues to have mild erythema, mostly on the lower

abdominal area, pelvic and inguinal area related to candidemia.

EXTREMITIES:  Patient has lower extremity pitting edema bilaterally, +2 pulses 
in

both lower extremities.  Patient continues to have erythema; however, the

erythema has decreased in the lower extremities and is less painful compared to

yesterday to palpation.

 

LABORATORY DATA:   White blood cells 9.1, red blood cells 2.86, hemoglobin 8.1,

hematocrit 28.1, MCV 98.4, MCH 28.4, MCHC 28.9, RDW 22, platelet count 234,

neutrophil percentage 79.7, lymphocyte percentage 8.7, monocyte percentage 7.2,

eosinophil percentage 2.1, basophil percentage 0.2.

 

Sodium 139, potassium 3.1, chloride 94, carbon dioxide 40, anion gap 5, BUN 21,

creatinine 1, glomerular filtration 59.1, fasting glucose 112, calcium 8.4,

magnesium 2.3, total bilirubin 0.6, AST 16, ALT 14, alkaline phosphatase 69,

C-reactive protein 1.71, total protein 6.9, albumin 2.7.

 

ASSESSMENT AND PLAN:

1.  Chronic diastolic congestive heart failure.  Patient has grade 2 diastolic

heart failure.  Will continue patient on spironolactone as well as Lasix;

however, nephrology has stopped metolazone.

2.  Acute exacerbation of dyspnea and hypoxia.  This is multifactorial.  At this

time, will continue patient on nasal cannula.  Also will continue diuresing the

patient, which is managed by nephrology.

3.  Acute kidney injury.  Dr. Alvares has seen the patient yesterday.  The 
patient

at this time is on spironolactone as well as Lasix; however, nephrology has

stopped metolazone.  Patient continues to have net negative fluid; however,

patient is hypervolemic.

4.  Constipation.  Continue patient on Dulcolax, MiraLAX, Colace as needed for

constipation.

5.  Obstructive sleep apnea.  This is a chronic issue.

6.  Morbidly obese.  This is a chronic issue.

7.  Diabetes.  Continue patient on sliding scale as well as consistent

carbohydrate diet.

8.  Hypokalemia.  This is possibly related to Lasix.  Patient is started on

potassium by nephrology.

9.  Hypertension.  Patient is on Lasix, Lopressor and spironolactone.

10.  Chronic obstructive pulmonary disease (COPD).  Will continue patient on his

breathing treatment and nasal cannula.

11.  Coronary artery disease.  Patient is on aspirin, Pradaxa, Lipitor and

Lopressor.

12.  Hyperlipidemia.  Continue patient on Lipitor.

13.  Mitral stenosis.  Patient is on Lasix, spironolactone and beta blocker.

14.  Pulmonary hypertension.  This is a chronic issue.  Continue patient on

spironolactone and Lasix.

15.  Chronic atrial fibrillation.  Will continue patient on Pradaxa, aspirin and

beta blocker.

16.  Gastroesophageal reflux disease (GERD).  Will continue patient on Protonix.

17.  Allergies.  Patient is on Claritin; however, patient has not been

symptomatic.

18.  Anemia.  Patient is on ferrous sulfate.  Patient is also on laxatives.  The

patient today's hemoglobin has decreased to 8.1 compared to yesterday which was

8.4 and hematocrit decreased to 28.1 compared to yesterday which was 29.2.  This

is possibly secondary to diuretics.  Will continue monitoring hemoglobin and

hematocrit level.

19.  Bilateral lower extremity edema.  This is possibly related to hypervolemia.

Patient is on diuresis.  Patient is managed by nephrology.

20.  Abnormal liver finding on CT.  CT of the chest performed earlier shows

possibility of liver cirrhosis.  Liver ultrasound shows slight irregular liver

contour; however, liver function test was normal yesterday and is normal today.

However patient's PT was elevated with a normal INR.  The patient can be 
followed

as an outpatient.

21.  Cellulitis of the right foot.  At this time, patient's erythema area has

decreased.  Will continue patient on ceftriaxone.

22.  Deep vein thrombosis prophylaxis.  Patient is on Pradaxa.

 

My preceptor for this patient encounter was Dr. Ro Aleman.  The preceptor was

physically present in the building during the encounter and was fully available

as needed.  All aspects of the patient interview, examination, medical decision

making process, and medical care plan development were reviewed and approved by

the preceptor.  The preceptor is aware and concurs with the plan as stated in 
the

body of this note and will attest to such by his/her co-signature.



I have both independently examined this patient as well as reviewed the note.  
I have discussed in detail with the resident the findings and plan of treatment 
as documented in the residents note. I will continue to follow the patient and 
offer further guidance to the patients care as necessary during this hospital 
stay.



Ro VILLALOBOS

## 2016-12-19 NOTE — IPN
DATE: 12/18/2016

 

Ms. Raphael is seen this morning on her bedside.  She continues to have complaint

of pain and burning in her legs and feet.  Her leg edema and erythema is

essentially unchanged since yesterday.  Her dyspnea has improved and she

continues to use oxygen at 3 liters via nasal cannula.  She denies any nausea or

vomiting.

 

PHYSICAL EXAMINATION:

Temperature 97.5 degrees Fahrenheit, heart rate 95 per minute and respiratory

rate 20 per minute.  Blood pressure 112/67 mmHg and oxygen saturation 91% on 3

liters oxygen.  Head is atraumatic.  Ears, nose and throat are unremarkable.

Neck is supple and without any thyroid enlargement.  Neck veins are difficult to

be assessed.  Heart:  Sounds are irregular and somewhat tachycardiac.  Lungs with

moderate bilateral air entry.  Abdomen:  Obese and nontender.  There is edema of

abdominal wall.  Extremities have no cyanosis or clubbing.  Bilateral lower leg

cellulitis and edema is unchanged.  Neurologically she is awake, alert and

oriented times three.

 

LABORATORY DATA:

Today's labs show WBC count 9.9, hemoglobin 8.2 and hematocrit 28.9.  Platelets

277. Sodium 139 and potassium 3.9.  BUN 25 and creatinine 1.14.  CO2 is down to

34.

 

PROBLEM LIST:

1. Acute kidney injury superimposed on chronic kidney disease.  The patient does

have mild underlying chronic kidney disease.  Slight worsening of kidney function

is related to her volume status as she is being aggressively diuresed.

 

2. Acute on chronic congestive heart failure.  Her volume status is improving as

she is diuresing very well.  For last 3 days she has been 4.5 liters negative

fluid balance in each day.  Overall she has lost at least about 10 kg in last 5

days.  Will continue with current diuretics and monitor her electrolytes on a

daily basis.

 

3. Hypokalemia and metabolic alkalosis.  These are related to aggressive

diuresis.  She is now on potassium supplement and acetazolamide.  I am going to

cut down her potassium supplement to 20 mEq three times a day instead of 40 mEq.

We will continue with acetazolamide 500 mg twice a day for another 24-48 hours.

 

4.  Anemia.  Her anemia is essentially unchanged.  She received Aranesp dose and

we will continue to monitor her.

 

5. Cellulitis. Patient remains on antibiotics and we will continue to monitor.

## 2016-12-20 NOTE — REP
PORTABLE CHEST:

 

AP portable view of the chest is performed and compared to a prior study of

12/08/2016.

 

The heart appears enlarged once again and there are diffuse interstitial

opacities which appear stable. There are bilateral lower lung parenchymal

opacities which are unchanged. There is no definite change in the

cardiomediastinal silhouette.

 

IMPRESSION:

 

Stable exam with no change compared to 12/08/2016.

 

 

Signed by

Donn Kuo MD 12/20/2016 04:15 P

## 2016-12-20 NOTE — IPN
DATE:  12/19/2016

 

Mr. Raphael is seen this afternoon on her bedside.  The patient reports that she

had great difficulty in walking and her oxygen saturation dropped down to 70%.

She is just not feeling good and reports worsening pains and aches.  She denies

any nausea or vomiting.  However, her dyspnea is worse today.  She has been

diuresing very well and has remained in negative fluid balance.

 

PHYSICAL EXAMINATION: The patient is sitting in the chair and seems quite

tachypneic.  Temperature is 96.9 degrees Fahrenheit, heart rate 76 per minute and

respiratory rate 22 per minute.  Blood pressure 128/60 mmHg and oxygen saturation

is 100% on 3 liters oxygen.  Intake and output records from yesterday showed

total intake 1500 and output 2850.  Her neck veins are difficult to be assessed.

Ears, nose and throat are unremarkable.  Pupils equal and reactive to light and

sclera is anicteric.  Head is atraumatic.  Neck is supple and without any thyroid

enlargement.  Trachea is midline.  Lungs have diminished breath sounds

bilaterally.  Heart:  Sounds are irregular in rhythm.  Abdomen:  Obese and

nontender.  Abdominal wall edema is unchanged.  Lower extremity edema is now

limited to below-the-knee area and she still has erythema on both legs and feet.

Neurologically she is awake, alert and oriented times three. She has no focal

neurological deficit.

 

Today's labs show WBC count 10.9, hemoglobin 8.3 and hematocrit 29.8.  Platelets

318.  Sodium 139 and potassium 3.8.  CO2 35, chloride 96, BUN 26 and creatinine

1.2.  Glucose 140 and calcium 8.8.

 

PROBLEMS:

1.  Acute kidney injury superimposed on chronic kidney disease.  The patient has

been in significant negative fluid balance.  Slight increase in BUN and

creatinine is related to negative fluid balance.  We will continue to monitor her

kidney function closely.

 

2.  Metabolic alkalosis and hypokalemia.  Her alkalosis has improved and

hypokalemia has also improved.  Acetazolamide is being stopped.  She will

continue with Lasix and spironolactone as previously.  Her potassium supplement

will also be continued.  I am going to cut down the Lasix dose to 40 mg every 8

hours.

 

3.  Anemia.  Her anemia is essentially unchanged.  We will continue to monitor

without any intervention.

 

4.  Shortness of breath.  This is multifactorial and related to her morbid

obesity and congestive heart failure.  Volume status has improved significantly

over the last few days with loss of about 12 kg weight.  She remains on chronic

oxygen supplementation.

## 2016-12-21 NOTE — IPN
DATE:  12/20/2016

 

SUBJECTIVE:  Ms. Raphael is a 66-year-old  female who was seen and

examined at the bedside.  Patient expressed that yesterday she had one episode 
of

oxygen desatting around the 70s when she was ambulating; however, when she sat

down her oxygen saturation came back to a normal level.  At this time, the

patient is on 4 liters nasal cannula.  The patient denies overnight issues.  The

patient denies chest pain; however, the patient expressed that she has orthopnea

and paroxysmal nocturnal dyspnea (PND).  The patient expressed that she feels 
her

lower extremities got better today compared to yesterday; however, patient

continues to have lower extremity edema and continues to have lower extremity

erythema.  However, the pain has decreased compared to yesterday.  The patient

denies nausea or vomiting.

 

PHYSICAL EXAMINATION:

VITAL SIGNS: Temperature 97.5, pulse 99, respiratory rate 21, blood pressure

131/76, pulse oximetry 89% on 2 liters nasal cannula.

GENERAL APPEARANCE:  Patient was sitting on the chair in no acute distress.

Patient was awake, alert, and oriented to time, place, and person.

HEENT:  Normocephalic, atraumatic.  Pupils are equal.  Oral mucosa moist.

NECK:  Soft.  Supple.  Jugular venous distention (JVD) cannot be evaluated due 
to

morbidly obese.

LUNGS:  Good air movement.  No wheezing.

HEART:  Irregular heart sounds.

ABDOMEN:  Morbidly obese.  Positive bowel sounds in all quadrants.  No 
tenderness

to palpation.

EXTREMITIES:  Lower extremity edema bilaterally, +2 pulses in both lower

extremities.  Patient has erythema in both lower extremities, possibly due to

cellulitis.

 

LABORATORY DATA:   White blood cells 11.7, red blood cells 2.37, hemoglobin 7.8,

hematocrit 28.1, , MCH 28.4, MCHC 27.6, RDW 23.3, platelet count 297,

neutrophil percentage 83.3, lymphocyte percentage 6.3, monocyte percentage 5.7,

eosinophil percentage 2, basophil percentage 0.9, leukocyte percentage 1.8.

 

Sodium 140, potassium 3.7, chloride 98, carbon dioxide 34, anion gap 8, BUN 31,

creatinine 1.13, glomerular filtration 51.3, fasting glucose 137, lactic acid

1.3, calcium 8.6, total bilirubin 0.5, AST 19, ALT 18, alkaline phosphatase 72,

total protein 7, albumin 2.9.

 

Blood culture is pending.

 

IMAGING:  Liver ultrasound which was performed on 12/14/2016 shows 
cholelithiasis

with gallbladder wall thickening, slightly irregular liver contour, no ascites

seen on the ultrasound.

 

ASSESSMENT AND PLAN:

1.  Leukocytosis.  Today, patient's white blood cells have increased to 11.7.  
We

have ordered urinalysis, urine culture, blood culture and chest x-ray.  Result 
is

pending at this time.  Also we have ordered lactic acid level; however, the

result is pending.  Patient is on ceftriaxone due to possibility of cellulitis;

however, we are monitoring the patient for other possibilities.

2.  Cellulitis.  Patient is on ceftriaxone at this time.  According to the

patient, his erythema has decreased; however, patient continues to have pain

mostly on the right foot with palpation; however, pain has decreased.

3.  Anasarca.  This is possibly secondary to diastolic congestive heart failure.

At this time, the patient is on diuretics (Lasix 40 mg every 8 hours IV as well

as spironolactone 25 mg twice a day by mouth).

4.  Acute on chronic diastolic congestive heart failure.  Will continue patient

diuresis.  Will continue monitoring patient's weight.  Patient so far has total

negative balance.  Also continue patient on fluid restriction (1500 mL).  

5.Community acquired pneumonia.  It has resolved.  Patient completed a course of

ceftriaxone.

6.  Constipation.  It has resolved.  Patient is on bowel regimen.

7.  Obstructive sleep apnea.  This is a chronic issue.  At this time the patient

is stable.

8.  Morbidly obese.  Chronic.

9.  Diabetes.  This is a chronic issue.  Patient is on sliding scale as well as

consistent carbohydrate diet.

10.  Hypertension.  This is a chronic issue.  Will continue patient on Lasix and

spironolactone as well as Lopressor.

11.  Chronic obstructive pulmonary disease (COPD).  Will continue patient on

breathing treatment.  Patient is also on nasal cannula; however, patient is at

her baseline (2 liters).

12.  Coronary artery disease.  This is a chronic issue.  Will continue patient 
on

aspirin, Pradaxa.  Patient is also on beta blocker and Lipitor.

13.  Hyperlipidemia.  Patient is on Lipitor.

14.  Mitral stenosis and incompetence.  At this time, we are diuresing the

patient. 15.  Pulmonary hypertension.  This is a chronic issue.  Will continue

with diuresing patient.

16.  Atrial fibrillation.  Patient is on Pradaxa, as well as, aspirin.  Patient

also is on beta blocker.

17.  Chronic anemia.  This is a chronic issue.  Patient is on ferrous sulfate as

well as Aranesp 100 mcg by mouth.  Today, patient's hemoglobin and hematocrit 
has

decreased.  We will transfuse 2 units of red blood cells.

18.  Allergies.  This is a chronic issue.  Patient is asymptomatic.  Will

continue patient on loratadine.

19.  Hematuria.  Today patient continued to have mild hematuria.  We have

discontinued the Murray today.  We held the Pradaxa dose for this morning.  We

will reevaluate patient again.

20.  Deep vein thrombosis (DVT) prophylaxis.  Patient is on Pradaxa.

21.  Cough and sore throat.  I started patient on Cepacol Q3HP as needed for 
sore

throat.

 

My preceptor for this patient encounter was Dr. Shanks.  The preceptor was

physically present in the building during the encounter and was fully available.

As needed, all aspects of the patient interview, examination, medical decision

making process, and medical care plan development were reviewed and approved by

the preceptor.  The preceptor is aware and concurs with the plan as stated in 
the

body of this note and will attest to such by his/her cosignature.



I, Tamara Shanks, have both independently examined this patient as well as 
reviewed the documentation.  I have discussed in detail with the resident the 
findings and plan of treatment as documented in the residents documentation. I 
will continue to follow the patient and offer further guidance to the patients 
care as necessary during this hospital stay.
GRISELDA

## 2016-12-21 NOTE — IPN
DATE:  12/21/2016

 

SUBJECTIVE:  Ms. Raphael is a 66-year-old  female who was seen and

examined at the bedside.  Patient denies any chest pain, however, patient has

orthopnea and paroxysmal nocturnal dyspnea (PND).  The patient at this time is 
on

2 liters nasal cannula.  Patient says that she feels better today and patient 
has

received 2 units of red blood cells yesterday.  The patient denies overnight

issues.

 

OBJECTIVE:

VITAL SIGNS: Temperature 97.2, pulse 91, respiratory rate 19, blood pressure

115/55, pulse oximetry 95% on 4 liters nasal cannula.

GENERAL APPEARANCE:  Patient was sitting on the chair in no acute distress.

Patient was awake, alert, and oriented to time, place, and person.

HEENT:  Normocephalic, atraumatic.

HEART:  Irregular.  Normal S1, S2.

LUNGS:  Patient has distended breath sounds at the base of the lung.  Patient 
has

however good air movement .

ABDOMEN:  Morbidly obese, soft, nontender.  Positive bowel sounds in all

quadrants.

EXTREMITIES:  Patient has lower extremity edema bilaterally, as well as, +2

pulses in both lower extremities.  Patient continues to have erythema

bilaterally; however it has decreased compared to yesterday, as well as less

painful to palpation compared to yesterday.

 

LABORATORY DATA:   White blood cells 13.2,, red blood cells 3.07, hemoglobin 8.7
,

hematocrit 31.3, , MCH 28.5, MCHC 27.9, RDW 24.5, platelet count 291,

neutrophil percentage 83.3, lymphocyte percentage 6.6, monocyte percentage 6.1,

eosinophil percentage 1.6, basophil percentage 0.9, leukocyte percentage 1.6.

 

Sodium 137, potassium 3.9, chloride 98, carbon dioxide 31, anion gap 8, BUN 39,

creatinine 1.23, glomerular filtration 46.5, fasting glucose 144, calcium 8.8,

total bilirubin 0.9, AST 21, ALT 16, alkaline phosphatase 74, total protein 7.6,

albumin 3.

 

Urine culture is pending at this time.

 

Urinalysis (UA) from yesterday with urine color yellow, urine appearance hazy,

urine pH 5, urine specific gravity 1.016, urine protein 2+, urine glucose

negative, urine ketone negative, urine blood 3+, urine nitrite negative, urine

bilirubin negative, urine urobilinogen 2, urine leukocyte esterase 2+, urine

white blood cells too many to count, urine red blood cells too many to count,

urine hyaline casts 0, urine bacteria 2+, urine epithelial cells 0.

 

ASSESSMENT AND PLAN:

1.  Abnormal UA.  UA which was performed yesterday was indicative of urinary

tract infection (UTI); however the urine culture is pending at this time.  We

have started patient on Cefepime and we have stopped ceftriaxone.  Also we have

discontinued the Murray and patient is stable at this time.

2.  Shortness of breath.  At this time, patient is on 4 liters nasal cannula

which is increased compared to her baseline.  This is multifocal, which include

her congestive heart failure, as well as, obesity and history of community

acquired pneumonia.  Patient is on diuretic medication.  Patient has finished a

course for community acquired pneuomnia.  Chest x-ray which was performed

yesterday was negative for any new pathology.

3.  Cellulitis.  The patient still continues to have lower extremity erythema

bilaterally.  Patient was on ceftriaxone, however, due to positive UA and

possibility of UTI, we have changed it Cefepime.  Also we have ordered blood

culture and result is pending at this time.  We will continue to monitor patient

for any abnormal symptoms.

4.  Anasarca.  This is possibly secondary to diastolic congestive heart failure.

The patient is on diuretics at this time.  The patient's liver function is 
normal

at this time.

5.  Acute on chronic diastolic congestive heart failure.  Patient is on diuretic

(Lasix 40 mg every 8 hours IV), as well as, Spironolactone 25 mg twice a day by

mouth.  We will continue monitoring input and output.

6.  Acute on chronic respiratory failure with hypoxia.  This is multifactorial,

possibly secondary to exacerbation of congestive heart failure as well as

community acquired pneumonia.  The patient is on nasal cannula at this time on 4

liters which is higher than her usual baseline.  The patient is also on 
diuretics

and antibiotic.  Will continue monitoring patient.

7.  Community acquired pneumonia.  The patient has finished has a course of

Ceftriaxone.

8.  Constipation.  Patient is on bowel regimen.

9.  Obstructive sleep apnea.  This is a chronic issue.

10.  Morbidly obese.  This is a chronic issue.

11.  Diabetes.  Will continue patient on sliding scale as well as consistent

carbohydrate diet.

12.  Hypertension.  Patient is stable.  This is a chronic issue.  Will continue

patient on Lasix and spironolactone.  Patient is also on beta blocker.

13.  Chronic obstructive pulmonary disease (COPD).  Patient is on breathing

treatment as well as oxygen nasal cannula.

14.  Coronary artery disease.  Patient is on statin, as well as beta blocker.  
At

this time, patient is also on Pradaxa.

15.  Hyperlipidemia.  Patient is on statin.

16.  Mitral stenosis.  This is a chronic issue.

17.  Pulmonary hypertension.  Patient is on Lasix and spironolactone.

18.  Atrial fibrillation.  Will continue patient on Pradaxa, as well as beta

blocker.  Patient's rate is controlled.

19.  Chronic anemia.  Patient received 2 units of red blood cells yesterday.

20.  Anemia.  Will continue patient on ferrous sulfate.  The patient is stable.

21.  Allergies.  Patient is on loratadine; however, patient does not have any

allergy signs or symptoms.

22.  Hematuria.  Patient's Murray was removed yesterday.  UA which was collected

yesterday shows patient has hematuria.  We will continue monitoring patient for

any abnormal symptoms.  Also will continue patient's hemoglobin and hematocrit.

However, at this time, patient is stable.

23.  Deep vein thrombosis (DVT) prophylaxis.  Patient is on Pradaxa.

 

My preceptor for this patient encounter was Dr. Shanks.  The preceptor was

physically present in the building during the encounter and was fully available.

As needed, all aspects of the patient interview, examination, medical decision

making process, and medical care plan development were reviewed and approved by

the preceptor.  The preceptor is aware and concurs with the plan as stated in 
the

body of this note and will attest to such by his/her cosignature.





I, Tamara Shanks, have both independently examined this patient as well as 
reviewed the documentation.  I have discussed in detail with the resident the 
findings and plan of treatment as documented in the residents documentation. I 
will continue to follow the patient and offer further guidance to the patients 
care as necessary during this hospital stay.
GRISELDA

## 2016-12-21 NOTE — IPN
DATE: 12/20/2016

 

SUBJECTIVE:  Ms. Raphael is seen this morning on her bedside.  She is sitting in

the chair.  She reports feeling better compared with yesterday.  She still has

shortness of breath and peripheral edema.  However, she reports that her pains

and aches are better.  She did not try to walk today.  However, she did her

exercise in the chair.  She denies any cough or hemoptysis.  She has no fever or

chills.  She has no nausea or vomiting.

 

PHYSICAL EXAMINATION

VITAL SIGNS:  Temperature is 97.5 degrees Fahrenheit, heart rate 82 per minute

and respiratory rate 20 per minute.  She is sitting in the chair.

HEENT: Head is atraumatic.  Ears, nose and throat are unremarkable.

NECK:  Neck veins are not visible.  Trachea is midline and there is no thyroid

enlargement.

HEART:  Sounds are distant and irregular.

RESPIRATORY:  Lungs have moderate bilateral air entry.

ABDOMEN: Obese and nontender.

EXTREMITIES:  Have no cyanosis or clubbing.  Lower extremity edema and erythema

are limited to below the knees.

 

Intake and output records from yesterday showed total intake 1130, and output

3900.

 

LABORATORY DATA:

Today's labs show WBC count 11.6, hemoglobin 7.8 and hematocrit 27.  Sodium 140

and potassium 3.7.  BUN 31 and creatinine 1.13.  Calcium level is 8.6 and lactic

acid 1.3.

 

PROBLEMS:

1. Acute kidney injury superimposed on chronic kidney disease.  Kidney function

is stable and slight improvement since yesterday noted.  We will continue to

monitor her kidney function on a daily basis.

2. Acute on chronic systolic and diastolic congestive heart failure.  Volume

status has improved significantly over the last several days.  She has diuresed

at least about 11 kg since December 13.  We will continue with current diuretic

and aim for a negative fluid balance of about 1-2 liters per day now.  Her

metolazone has been stopped and acetazolamide has been stopped.  Her Lasix dose

has been changed to 40 mg every eight hours and she continues with spironolactone

25 mg twice a day.

3. Anemia.  Her anemia is worsening and I have discussed with hospitalist service

and recommended transfusion of two units of packed red blood cells.

4. Generalized weakness and deconditioning.  The patient has morbid obesity which

is her major limiting factor, along with back pain and chronic hypoxemia.  She

will require occupational and physical therapy.

## 2016-12-22 NOTE — REP
BILATERAL LOWER EXTREMITY DUPLEX VEINS:

 

HISTORY:  Swelling.

 

The examination is limited secondary to the patient's body habitus.  There are no

definite filling defects in the deep venous system.  The deep venous system is

patent.

 

IMPRESSION:

 

There is no definite deep venous thrombosis.

 

Left lower extremity:

 

The examination is limited secondary to the patient's body habitus.  There are no

definite filling defects in the deep venous system.  The deep venous system is

patent.

 

IMPRESSION:

 

There is no definite deep venous thrombosis.

 

 

Signed by

Brent Cordero MD 12/22/2016 04:03 P

## 2016-12-22 NOTE — IPN
DATE:  12/21/2016

 

Ms. Raphael is seen this morning on her bedside.  She is sitting in the chair at

the time of my visit.  She reports improved dyspnea.  She denies any nausea or

vomiting.  She does have some leg edema.

 

PHYSICAL EXAMINATION:

Temperature 97.2 degrees Fahrenheit, heart rate 90 per minute and respiratory

rate 18 per minute.  Blood pressure 115/55 mmHg and oxygen saturation 95%.  She

has been on 3-4 liters of oxygen via nasal cannula.  Her head is atraumatic.

Pupils are equal and reactive to light and sclera is anicteric.  Ears, nose and

throat are unremarkable.  Neck is supple and jugular venous distention (JVD) is

difficult to be assessed.  There is no thyroid enlargement and trachea is

midline.  Heart sounds are distant.  Lungs with moderate bilateral air entry.

Abdomen is obese and nontender.  Abdominal wall edema is present.  Extremities

have no cyanosis or clubbing.  Lower extremity edema is limited to below the

knees and chronic erythema and stasis changes are present.  Neurologically she is

awake, alert and oriented times three.  She has no focal deficit.

 

Intake and output records from yesterday are negative by 1 liter.  Her weight is

down to 161 kg, while her maximum weight was 175 kg on 12/13/2016 and

12/14/2016.

 

PROBLEMS:

1.  Acute on chronic congestive heart failure.  The patient has known history of

combined congestive heart failure.  She has diuresed very well.  She does have

some right-sided heart failure and we have slowed down on the diuretics.

Currently she is receiving IV Lasix 80 mg and we can switch her to oral torsemide

100 mg twice a day. She will continue with spironolactone 25 mg twice a day.

 

2.  Acute kidney injury superimposed on chronic kidney disease.  Her kidney

function has been mostly stable with slight fluctuations in BUN and creatinine.

At present we will monitor without any other intervention.  Slight increase in

BUN and creatinine is anticipated with diuresis.

 

3.  Anemia.  She received 2 units of packed red blood cells (RBCs) and has slight

improvement in her anemia with hemoglobin up to 8.7.  She has been given Aranesp

100 mcg every Monday, which will be continued.

 

4.  Morbid obesity and chronic obstructive pulmonary disease (COPD).  Her obesity

is a chronic issue.  Chronic obstructive pulmonary disease (COPD) seems to be

much improved.

## 2016-12-23 NOTE — IPN
DATE:  12/23/2016

 

SUBJECTIVE:   Ms. Raphael is a 66-year-old  female who was seen and

examined at the bedside.  The patient denies overnight issues.  The patient

denies chest pain; however, the patient has orthopnea and paroxysmal nocturnal

dyspnea. At the time of visit, the patient was on 2 liters nasal cannula, which

is her baseline.  The patient expressed that her lower extremity pain has

decreased compared to yesterday.

 

OBJECTIVE:

VITAL SIGNS:  Temperature 96.8, pulse 72, respiratory rate 20, blood pressure

126/61, pulse oximetry 96% on 3 liters nasal cannula.

GENERAL APPEARANCE:   The patient was sitting in a chair.  No acute distress.

The patient was awake, alert and oriented to time, place and person.

HEENT:  Normocephalic, atraumatic.

HEART:  Irregular.  Normal S1, S2.

LUNGS:  Clear breath sounds.  Good air movement.

ABDOMEN:  Soft, nontender.  Positive bowel sounds in all quadrants.

EXTREMITIES:  The patient has lower extremity edema, +2 pulses in both lower

extremities.  The patient has tenderness to palpation of the lower extremities,

especially on prone side of the foot bilaterally.  The patient still has lower

extremity erythema bilaterally; however, it has decreased compared to yesterday.

 

 

LABORATORY DATA:

White blood cells 11.3, red blood cells 3.02, hemoglobin 8.9, hematocrit 29.7,

MCV 98.6, MCH 29.6, MCHC 30, RDW 23, platelet count 267, neutrophil percentage

83.5, lymphocyte percentage 7.2, monocyte percentage 5.9, eosinophil percentage

1.4, basophil percentage 0.3, leukocyte percentage 1.5.

 

Sodium 140, potassium 4.1, chloride 101, carbon dioxide 30, anion gap 9, BUN 39,

creatinine 1.24, GFR 46.1, fasting glucose 129, calcium 8.6, phosphorous 3.4,

magnesium 2.4, total bilirubin 0.8, AST 23, ALT 17, alkaline phosphatase 80,

total protein 7.5, albumin 3.2.

 

ASSESSMENT AND PLAN:

1.  Vaginal candidiasis.  This has improved.  The patient denies any pruritus.

Yesterday, the patient received one dose of Diflucan 150 mg by mouth.  At this

time, the patient is stable.

 

2.  Dyspnea.  At this time, the patient is on 2 liters nasal cannula, which is

her baseline.  Her dyspnea is multifactorial, including congestive heart failure

(CHF), as well as obesity and history of community acquired pneumonia.  The

patient is on diuretics.  At this time, the patient is stable.

 

3.  Cellulitis.  The patient has lower extremity edema, as well as erythema.  At

this time, the patient is on cefepime.  Due to possibility of deep vein

thrombosis (DVT), I have ordered lower extremity ultrasound bilaterally 
yesterday

and the results were negative.  We will continue the patient on current

antibiotic dosage.

 

4.  Anasarca.  This is probably secondary to diastolic congestive heart failure.

The patient is on diuretics (torsemide and spironolactone).   The patient's 
liver

function is normal today.  We will continue to monitor the patient for any

abnormal symptoms.

 

5.  Acute on chronic diastolic congestive heart failure (CHF).  The patient is 
on

torsemide, as well as spironolactone.  We are monitoring the patient's input and

output.  Yesterday, the patient had negative fluid balance.  Diuretics are

managed by nephrologist.

 

6.  Acute on chronic respiratory failure with hypoxia.  This is multifactorial

secondary to exacerbation of the congestive heart failure, as well as history of

community acquired pneumonia.  At this time, the patient is on her baseline (3

liters nasal cannula).

 

7.  Community acquired pneumonia.  The patient has finished a course of

antibiotic (ceftriaxone).

 

8.  Constipation.  The patient is on bowel regimen.

 

9.  Obstructive sleep apnea.  This is a chronic issue.

 

10.  Morbid obesity.

 

11.  Diabetes.  The patient is on sliding scale, as well as consistent

carbohydrate diet.

 

12.  Hypertension.  The patient is on spironolactone, torsemide, as well as

Lopressor.

 

13.  Chronic obstructive pulmonary disease (COPD).  The patient is on breathing

treatments, also, the patient is on 3 liters nasal cannula, which is her

baseline.

 

14.  Coronary artery disease. The patient is on aspirin, statin, and beta

blocker.   The patient is also on Pradaxa.

 

15.  Hyperlipidemia.  The patient is on a statin.

 

16.  Mitral stenosis.  This is a chronic issue.

 

17.  Pulmonary hypertension.  The patient is on torsemide and spironolactone.

 

18.  Atrial fibrillation.  The patient's rate is controlled.  The patient is on

Pradaxa, aspirin, as well as beta blocker (Lopressor 12.5 mg twice a day by

mouth).

 

19.  Chronic anemia.  At this time, the patient is on ferrous sulfate 325 mg

twice a day by mouth. Hemoglobin and hematocrit are stable at this time.

 

20.  Allergies.  The patient is on loratadine; however, the patient does not 
have

any signs of symptoms of allergies.

 

21.  Hematuria.  The patient is stable at this time.  No hematuria reported.  At

this time, we will continue monitoring hemoglobin and hematocrit.

 

22.  Deep vein thrombosis (DVT) prophylaxis.  We will continue the patient on

Pradaxa.

 

 

My preceptor for this patient encounter was Dr. Shanks.  The preceptor was

physically present in the building during the encounter and was fully available.

As needed, all aspects of the patient interview, examination, medical decision

making process, and medical care plan development were reviewed and approved by

the preceptor.  The preceptor is aware and concurs with the plan as stated in 
the

body of this note and will attest to such by his/her cosignature.



I, Tamara Shanks, have both independently examined this patient as well as 
reviewed the documentation.  I have discussed in detail with the resident the 
findings and plan of treatment as documented in the residents documentation. I 
will continue to follow the patient and offer further guidance to the patients 
care as necessary during this hospital stay.
GRISELDA

## 2016-12-24 NOTE — IPN
DATE:  12/22/2016

 

Ms. Raphael is seen this morning on her bedside.  She is sitting in the chair.

She reports feeling slightly better today.  She is able to walk to the bathroom

with the help of walker.  She has been partially incontinent of urine.  She

denies any nausea or vomiting.  She still has some hematuria.  There is no fever

or chills.  Her dyspnea persists and leg edema is essentially unchanged over last

24-48 hours.

 

PHYSICAL EXAMINATION

Temperature 97 degrees Fahrenheit, heart rate 100 per minute and respiratory rate

20 per minute.  Blood pressure 107/55 mmHg and oxygen saturation is low 90s on 4

liters oxygen.  Head is atraumatic.  Pupils are equal and reactive to light.

Sclera is anicteric.  She has no oral thrush or ulcers.  Ears, nose and throat

are unremarkable.  Neck veins are not distended sitting upright.  She has no

thyroid enlargement and trachea is midline.  Heart sounds are irregular in

rhythm.  Lungs have moderate bilateral air entry without any wheezing.  Abdomen

is obese and nontender.  Abdominal wall edema is present.  Extremities have no

cyanosis or clubbing.  She has bilateral lower extremity edema and erythema.

Neurologically she is awake, alert and oriented times three.  She has no focal

deficit.

 

Today's labs show WBC count 12.5, hemoglobin 9.0, hematocrit 30.2.  Sodium 138

and potassium 3.8.  CO2 is up to 35, BUN 38 and creatinine 1.23.  Glucose 130 and

calcium 8.5.

 

PROBLEMS:

1.  Acute on chronic congestive heart failure.  Volume status is only moderately

decompensated.  She has lost significant weight over the last 10 days.  Her

diuretic dose has been cut down due to development of metabolic alkalosis.  She

is now on torsemide 100 mg b.i.d. and spironolactone 25 mg b.i.d.  Today is the

first full day of oral diuretics.  We will monitor her output and make

adjustments as needed.

 

2.  Acute renal failure superimposed on chronic kidney disease.  No change over

last 24 hours.  This is probably her baseline kidney function.  Her electrolytes

are reasonable.

 

3.  Metabolic alkalosis.  She has mild metabolic alkalosis related to her COPD

and diuresis.  She was given acetazolamide for a few days, which has been now

stopped.

 

4.  Anemia.  Her anemia has improved slightly with transfusion of 2 units of

packed RBCs.  She remains on Aranesp and iron supplement.

 

5.  Morbid obesity and bilateral lower extremity edema.  She has multiple risk

factors for edema including morbid obesity, cirrhosis of liver, right-sided heart

failure and chronic kidney disease.  I feel that she would benefit from either

wrapping her legs or elevating them.  She also needs to get up and do physical

therapy.  We will continue diuresis while monitoring her kidney function and

electrolytes.  I do not feel that she should be diuresed to aggressively due to

risk of electrolyte and metabolic abnormalities.

## 2016-12-25 NOTE — IPN
DATE:  12/24/2016

 

SUBJECTIVE:  Ms. Raphael is a 66-year-old  female who was seen and

examined at the bedside.  Patient denies chest pain, however patient has

orthopnea and paroxysmal nocturnal dyspnea (PND).  At the time of visit, patient

was on 2 liters nasal cannula.  Patient expressed that she has discomfort with

ambulation, mostly on her feet, and patient continues to have erythema

bilaterally in her lower extremities.

 

OBJECTIVE:

VITAL SIGNS:  Temperature 96.5, pulse 91, respiratory rate 18, blood pressure

130/69, pulse oximetry 93% on 3 liters of nasal cannula.

GENERAL APPEARANCE:  Patient was sitting in the chair in no acute distress.

Patient was awake, alert, and oriented to time, place, and person.

HEENT:  Normocephalic, atraumatic.

HEART:  Irregular, normal S1, S2.

LUNGS:  Clear breath sounds bilaterally.  Good air movement.

ABDOMEN:  Soft, nontender, obese.  Positive bowel sounds in all quadrants.

EXTREMITIES:  Patient has lower extremity edema and +2 pulses in both lower

extremities.  Patient has tenderness to palpation in both lower extremities,

mostly on the prone surface of the feet bilaterally as well as anterior leg

bilaterally.  The erythema has increased on the left lower extremity compared to

right lower extremity.

 

LABORATORY DATA:

WBC 10.3, RBC 2.83, hemoglobin 8.4, hematocrit 28.7, .3, MCH 29.7, MCHC

29.3, RDW 23, platelet count 263, neutrophil percentage 81.7, lymphocyte

percentage 7.8, monocyte percentage 6.4, eosinophil percentage 1.7, basophil

percentage 0.4, leukocyte percentage 2.1.

 

Sodium 138, potassium 3.9, chloride 99, carbon dioxide 31, anion gap 8, BUN 40,

creatinine 1.19, glomerular filtration rate 48.3, fasting glucose 142, calcium

8.9, magnesium 2.3, total bilirubin 0.7, AST 19, ALT 18, alkaline phosphatase 76
,

total protein 7.7, albumin 2.9.

 

ASSESSMENT AND PLAN:

1.  Dyspnea.  At this time patient is at her baseline at 3 liters nasal cannula.

This is possibly multifactorial, which includes congestive heart failure,

obesity, as well as community-acquired pneumonia.  At this time patient is on

diuretics (torsemide 100 mg by mouth twice a day as well as spironolactone 25 mg

by mouth twice a day).  Patient at this time is stable.

 

2.  Cellulitis.  At this time patient is on cefepime.  I have ordered ultrasound

of her lower extremity bilaterally due to possibility of deep venous thrombosis

(DVT) which was negative.  Will continue with the current dosage of cefepime.

 

3. Vaginal candidiasis.  It has resolved.  At this time patient is stable.  No

pruritus.

 

4.  Anasarca.  This is possibly secondary to diastolic congestive heart failure.

Will continue patient on torsemide as well as spironolactone.  Patient's liver

function is normal today.

 

5.  Acute on chronic diastolic congestive heart failure.  Patient is on 
torsemide

on spironolactone.  Also, she is on Lopressor 12.5 mg by mouth twice a day.  
Will

continue monitoring intake and output.  Yesterday, patient's fluid balance was

negative 1815 mL.  Will continue monitoring patient for any abnormal symptoms.

 

6.  Acute on chronic respiratory failure with hypoxia.  This is multifactorial

including diastolic congestive heart failure as well as history of

community-acquired pneumonia and obesity as well as history of anemia.  At this

time patient is at her baseline (3 liters nasal cannula).

 

7.  Community-acquired pneumonia.  This is a chronic issue and it has resolved.

Patient finished a course of ceftriaxone.

 

8.  Constipation.  Patient is on bowel regimen.

 

9.  Obstructive sleep apnea.  This is a chronic issue.

 

10.  Morbid obesity.

 

11.  Diabetes.  Will continue patient on sliding scale as well as consistent

carbohydrate diet.

 

12.  Hypertension.  Patient is on spironolactone, torsemide, as well as

Lopressor.

 

13.  Chronic obstructive pulmonary disease (COPD).  Patient is at her baseline (
3

liters nasal cannula).  Also we will continue patient on breathing treatment.

 

14.  Coronary artery disease.  Patient is on aspirin, statin, beta blocker, and

Pradaxa.

 

15.  Hyperlipidemia.  Patient is on a statin.

 

16.  Mitral valve stenosis.  This is a chronic issue.

 

17.  Pulmonary hypertension.  Patient is on torsemide and spironolactone.

 

18.  Atrial fibrillation.  Rate is controlled.  Will continue patient on Pradaxa
,

aspirin, and beta blocker (Lopressor 12.5 mg twice a day).

 

19.  Chronic anemia.  Patient is on ferrous sulfate 325 mg, also she is on

Aranesp 100 mcg subcutaneously on Mondays, however we have increased the dosage

to 200 mcg subcutaneously on Mondays.  Will continue monitoring hemoglobin and

hematocrit.

 

20.  Allergies.  Patient is on loratadine, however patient does not have any

signs or symptoms of allergies at this moment.

 

21.  Hematuria.  Hemoglobin and hematocrit are stable at this time.  Patient 
does

not have any hematuria.

 

22.  Deep venous thrombosis (DVT) prophylaxis.  Patient is on Pradaxa.

 

 

My preceptor for this patient encounter was Dr. Tamara Shanks. The preceptor was

physically present in the building during the encounter and was fully available.

As needed, all aspects of the patient interview, examination, medical decision

making process, and medical care plan development were reviewed and approved by

the preceptor. The preceptor is aware and concurs with the plan as stated in the

body of this note and will attest to such by his cosignature.



I, Tamara Shanks, have both independently examined this patient as well as 
reviewed the documentation.  I have discussed in detail with the resident the 
findings and plan of treatment as documented in the residents documentation. I 
will continue to follow the patient and offer further guidance to the patients 
care as necessary during this hospital stay.
GRISELDA

## 2016-12-25 NOTE — IPNPDOC
Text Note


Date of Service


The patient was seen on 12/25/16 at 11:06.





NOTE


Subjective:


Patient is a 66 year old female with a PMHx of CHF (ECHO 2/2016 with normal-

near normal EF, Grade 2 Diastolic dysfunction), COPD (on home O2 at 3 liters), 

DM2, DLP, HTN, atrial fibrillation (on anticoagulation), history of left 

pleural effusion (small to moderate) who presented to the ER with complaints of 

shortness of breath. She was admitted for HCAP. She has completed course of 

antibiotics. Her hospital course was prolonged, with CHF exacerbation requiring 

supplemental oxygen. She continues diuresis with an adjusted dose of diuretics. 





Patient has been seen and examined at the bedside. She is at her baseline 

amount of oxygen. She notes that her swelling persists, but she puts out a good 

amount of urine and her daily weight have continued to go down. She denies SOB, 

cough, fever/ chills, palpitations, chest pain. She denies dysuria. 





Objective:


Vitals (See below)


General: Sitting up in bed, no acute distress, AAOx3


HEENT: NC, AT


CVS: Irregularly irregular, +S1S2


Lungs: Fair air entry bilaterally, No wheezing / rhonchi / rales appreciated


Abdomen: Soft, ND, NT, +BSx4


Extremities: +PPx4, 2+ pitting edema, b/l erythema and tenderness (no warmth, 

or drainage)





Assessment and plan:


1. Dyspnea  likely 2/2 chronic hypoxic respiratory failure  2/2 chronic COPD, 

obesity, deconditioning, Diastolic CHF exacerbation


- currently at her baseline level of supplemental oxygen (3 L NC)


- physical still reveals 2+ pitting edema bilaterally 


- continues with physical therapy


- keep negative fluid balance (minimum of -1 liter per day), daily weights, 


- continues diuresis with torsemide and spironolactone


- Nephrology (Dr. Medeiros) following  appreciate their input 





2. Leukocytosis  possibly 2/2 urinary tract infection, less likely 2/2 

cellulitis


- Both legs appear to have erythema - which has been resolving


- Duplex US negative for DVT


- Leukocytosis has trended down


- UA with 2+ LE, TNTC WBC and 2+ bacteria


- s/p power catheter 


- c/w Cefepime (Day #5)





3. Normocytic anemia


- Hg baseline of 10-11; currently Hg at 8


- Has experienced hematuria; but notes that this has resolved


- s/p Iron infusion, will receive darbepoetin tomorrow 





4. s/p Sepsis - possibly 2/2 pneumonia (CAP)


- completed course of broad spectrum antibiotics  





5. Chronic COPD


- no evidence of COPD exacerbation


- physical exam does not reveal any wheezing


- will continue with inhaled therapy from home and duoneb PRN





6. Diastolic CHF, Compensated


- ECHO 2/2015 - with normal / near-normal EF, Grade 2 diastolic dysfunction


- Currently does not appear to have fluid overload on physical exam





5. NIDDM2


- c/w insulin sliding scale





6. DLP


- c/w atorvastatin





7. HTN


- c/w metoprolol tartrate 12.5mg BID  with holding parameters





8. SINTIA - not on CPAP


- Will go for repeat sleep study in December for CPAP





9. History of MI 


- c/w ASA, Atorvastatin


- Metoprolol dose has been decreased (with holding parameters) 


- Lisinopril have been discontinued 





10. Atrial fibrillation


- c/w rate control with Metoprolol 12.5 mg PO BID (with holding parameters) 


- full anticoagulation with Pradaxa





11. GI prophylaxis


- c/w protonix 





12. DVT prophylaxis


- c/w pradaxa





VS,Fishbone, I+O


VS, Fishbone, I+O


 Laboratory Tests


12/25/16 05:53








Calcium Level 8.6 L, Aspartate Amino Transf (AST/SGOT) 17, Alanine 

Aminotransferase (ALT/SGPT) 18, Alkaline Phosphatase 70, Total Bilirubin 0.8, 

Total Protein 7.2, Albumin 2.8 L, Red Blood Count 2.71 L, Mean Corpuscular 

Volume 102.7 H, Mean Corpuscular Hemoglobin 29.5, Mean Corpuscular Hemoglobin 

Concent 28.8 L, Red Cell Distribution Width 22.8 H, Neutrophils (%) (Auto) 82.4 

H, Lymphocytes (%) (Auto) 7.8 L, Monocytes (%) (Auto) 6.0 H, Eosinophils (%) (

Auto) 1.5, Basophils (%) (Auto) 0.5, Neutrophils # (Auto) 9.1 H, Lymphocytes # (

Auto) 0.9 L, Monocytes # (Auto) 0.7, Eosinophils # (Auto) 0.2, Basophils # (Auto

) 0.0








 Vital Signs








  Date Time  Temp Pulse Resp B/P Pulse Ox O2 Delivery O2 Flow Rate FiO2


 


12/25/16 07:58  97  111/59    


 


12/25/16 05:55 97.6  18  94 Room Air  


 


12/24/16 20:55       3.0 














 I&O- Last 24 Hours up to 6 AM 


 


 12/25/16





 06:00


 


Intake Total 1170 ml


 


Output Total 4150 ml


 


Balance -2980 ml














LATOYA WREN MD Dec 25, 2016 11:08

## 2016-12-25 NOTE — IPN
DATE:  12/24/2016

 

SUBJECTIVE:  The patient was seen and examined at the bedside today in the

morning.  The patient was feeling much better.  Her shortness of breath is also

better today.  She reports that her leg edema is getting better.  She is having

good urine output with the diuretic regimen that she is on.

 

REVIEW OF SYSTEMS: The patient denies any fever, chills, rigors, headache, or

chest pain.  She does report some shortness of breath with mild exertion.  She

denies any constipation, diarrhea, pain in abdomen.  She does report  leg edema,

but it is getting better at this time.  The rest of the review of systems is

negative.

 

OBJECTIVE:

VITAL SIGNS: Temperature is 97.7 degrees Fahrenheit.  Blood pressure is 121/70.

Pulse is 95.  Respiratory rate of 18.  Saturating 91% on nasal cannula at 3

liters per minute.

 

INTAKE/OUTPUT:  Urine output is 3.1 liters yesterday and 1650 mL so far today

since overnight.  She is in negative fluid balance every day, negative 2 liters

from yesterday.

 

PHYSICAL EXAMINATION:

GENERAL:  Patient is awake, alert and oriented times three sitting on the sofa in

no apparent distress, wearing nasal cannula.

HEAD AND NECK EXAM:  Extraocular muscles intact.  Pupils equally round and

reactive to light.  Neck is supple.  Mucous membranes are moist.  There is no

jugular venous distention (JVD).

CARDIOVASCULAR:  S1, S2.  Regular rate.  No murmur, rub or gallop.

RESPIRATORY:  Chest is clear to auscultation bilaterally.  No rales or rhonchi.

There are decreased breath sounds at the bases.  The patient is morbidly obese.

ABDOMEN:  Abdomen is soft.  Obese.  Protuberant.  I could not appreciate any

organomegaly because of body habitus, but I did see some abdominal wall edema.

EXTREMITIES:  The patient has bilateral erythematous changes in the lower

extremities.  She has 2+ pitting edema of the bilateral lower extremities up to

the knees.

CENTRAL NERVOUS SYSTEM:  No focal neurological deficit.  Power is 5/5 in all

extremities.

SKIN:  Positive redness and chronic venous stasis changes in both legs.

PSYCHIATRIC:  Normal mood and affect.

 

LABORATORY REVIEW:  CBC showed a WBC of 10.3, hemoglobin 8.4, platelets of 264.

BMP showed sodium 138, potassium 3.9, chloride 99, bicarbonate 31, BUN 40,

creatinine 1.19 and it was 1.24 yesterday.  Calcium is 8.9.  Magnesium is 2.3.

 

CURRENT MEDICATIONS:  Patient's current medications were all reviewed by me.  She

continues to be on Cefepime 1 gram IV every 12 hours.  She was started on IV

Venofer 500 mg times one dose because of iron deficiency anemia.  Her dose of

Aranesp was increased to 200 mcg subcutaneous on Monday.  Her iron tablet was

decreased to 325 mg by mouth daily.  The rest of the medications are the same as

compared with yesterday.

 

ASSESSMENT:  66-year-old female with past medical history of morbid obesity,

chronic systolic and diastolic congestive heart failure, nephrology service

following the patient for acute renal failure superimposed on chronic kidney

disease and volume overload.

 

PLAN:

1.  Acute kidney injury superimposed on chronic kidney disease.  The patient's

renal function is improving with diuresis.  It is most likely cardiorenal

syndrome because of severe right heart failure and fluid overload.  Continue

diuretics at this time.

2.  Acute on chronic congestive heart failure.  The patient has a negative fluid

balance every day with the oral diuretics that she is on.  Continue current dose

of torsemide 100 mg by mouth twice a day and Spironolactone 25 mg by mouth twice

a day.

3.  Anemia secondary to iron deficiency and chronic kidney disease.  I have

ordered IV Venofer 500 mg IV times one dose and Aranesp dose has been increased

to 200 mcg every Monday.

4.  Metabolic alkalosis.  The patient's bicarbonate continues to be around 30-31.

She has obesity hypoventilation and chronic obstructive pulmonary disease as well

and because of that she is chronically alkalotic as well.  Continue the diuretics

at this time.

5.  Lower extremity edema and cellulitis.  I see the patient is being given IV

antibiotics at this time.  Continue the antibiotics as per primary team.

Continue the diuretics. The patient will also benefit from compression dressings

of the bilateral lower extremities.

 

Plan of care was discussed with the medical team, Dr. SALLY Shanks and with the

medical resident as well.

## 2016-12-25 NOTE — IPN
DATE:  12/23/2016

 

SUBJECTIVE:  Patient was seen and examined today at the bedside early in the

morning.  She had just come back from the restroom and she was complaining of

shortness of breath.  She was wearing nasal cannula at 3 liters per minute, but

patient requested that her oxygen be turned higher.  Bedside oxygen saturation

was checked and she was saturating around 91%.  The patient was reassured that

her oxygen rate was adequate for her.  Last 24 hours events were noted as well.

The patient is making progress.  She is making a good amount of urine with the

oral diuretics at this time.  Her renal function with the current dose of

diuretics is stable.

 

REVIEW OF SYSTEMS:  The patient denies any fever or chills, rigors, headaches or

chest pain.  She does report shortness of breath on mild exertion.  She denies

any nausea, vomiting, pain in abdomen, constipation.  She does report lower

extremity edema as well, but edema is getting better with the diuretics as

reported by patient.  Rest of review of systems is negative.

 

OBJECTIVE:

VITAL SIGNS:  Temperature 98.1 degrees Fahrenheit, blood pressure 131/57, pulse

100, respiratory rate 16, saturating 93% on nasal cannula at 3 liters.  Intake

and output:  Urine output recorded as 2.4 liters yesterday and 1175 mL so far

today since overnight.  Weight on the bed scale is 167.2 kg.

 

PHYSICAL EXAMINATION:

GENERAL:  Patient is awake, alert and oriented times three, sitting in the sofa.

She is morbidly obese.  Mild respiratory distress.

HEAD AND NECK EXAM:  Extraocular muscles intact.  Pupils equally round and

reactive to light.  Neck is supple.  There is no jugular venous distention (JVD).

Mucous membranes are moist.

CARDIOVASCULAR:  S1, S2.  Regular rate.  No murmur, rub or gallop.

RESPIRATORY:  Chest is clear to auscultation bilaterally.  There are decreased

breath sounds at the bases.  There are no rales or rhonchi.

ABDOMEN:  Obese.  Soft.  Positive bowel sounds.  Nontender.  I could not

appreciate any organomegaly because of patient's body habitus.

EXTREMITIES:  Patient has erythema of the bilateral lower extremities because of

her chronic edema.  She has 2+ of pitting edema of the bilateral lower

extremities up to her thighs.

CENTRAL NERVOUS SYSTEM:  No focal neurological deficit.  Power is 5/5 in all

extremities.

PSYCHIATRIC:  Patient is slightly agitated at this time because she is

complaining of shortness of breath.

 

LAB REVIEW:  CBC showed a white blood cell (WBC) of 11.3, hemoglobin 8.9, and

platelets 263.  BMP showed a sodium of 140, potassium 4.1, chloride 101,

bicarbonate 30, BUN 39, creatinine 1.24 and it was 1.23 yesterday, calcium 8.6,

phosphorus 3.5, magnesium 2.4.

 

IMAGING:  Vascular ultrasound done yesterday showed no evidence of deep vein

thrombosis (DVT).

 

CURRENT  MEDICATIONS:  Patient's current medications were all reviewed by me and

pertinent medications include

-  calcium gluconate IV 1 gram which was ordered by me today

-  torsemide 100 mg by mouth twice a day

-  spironolactone 25 mg by mouth twice a day

 

ASSESSMENT:  66-year-old female who is morbidly obese being followed up by

nephrology for acute on chronic congestive heart failure, fluid overload, and

electrolyte abnormalities.

 

PLAN:

1.  Acute on chronic congestive heart failure.  The patient has combined systolic

and diastolic dysfunction.  She has right heart failure as well.  She is being

gently diuresed.  Her oral diuretics were adjusted two days ago.  Continue

current dose of torsemide 100 mg twice a day and spironolactone 25 mg twice a day

as well.

2.  Metabolic alkalosis.  The patient's bicarbonate is 30 at this time, which is

acceptable.  Patient has history of chronic obstructive pulmonary disease (COPD)

as well.  Continue the current diuretics at this time.  No need of acetazolamide.

 

3.  Hypocalcemia.  Hypocalcemia is secondary to aggressive diuresis.  I already

ordered calcium gluconate 1 gram IV infusion today morning.

4.  Acute kidney injury superimposed on chronic kidney disease.  Patient's

creatinine has been stable with the current dose of diuretics.  Her GFR is around

46 at this time.  Continue to monitor for now.

5.  Anemia and chronic kidney disease.  Patient's hemoglobin is 8.9 at this time.

Continue current dose of Aranesp 100 mcg subcutaneous on Monday.

## 2016-12-26 NOTE — IPNPDOC
Text Note


Date of Service


The patient was seen on 12/26/16 at 13:54.





NOTE


Subjective:


Patient is a 66 year old female with a PMHx of CHF (ECHO 2/2016 with normal-

near normal EF, Grade 2 Diastolic dysfunction), COPD (on home O2 at 3 liters), 

DM2, DLP, HTN, atrial fibrillation (on anticoagulation), history of left 

pleural effusion (small to moderate) who presented to the ER with complaints of 

shortness of breath. She was admitted for HCAP. She has completed course of 

antibiotics. Her hospital course was prolonged, with CHF exacerbation requiring 

supplemental oxygen. She continues diuresis with an adjusted dose of diuretics. 





Patient has been seen and examined at the bedside. Patient is currently at her 

baseline amount of suppplemental oxygen. She denies hematuria for 2 days. 

Denies fever or chills. Reports progress with physical therapy.





Objective:


Vitals (See below)


General: Sitting up in bed, no acute distress, AAOx3


HEENT: NC, AT


CVS: Irregularly irregular, +S1S2


Lungs: Fair air entry bilaterally, No wheezing / rhonchi / rales appreciated


Abdomen: Soft, ND, NT, +BSx4


Extremities: +PPx4,1+ pitting edema, b/l erythema and tenderness (no warmth, or 

drainage)





Assessment and plan:


1. Dyspnea  likely 2/2 chronic hypoxic respiratory failure  2/2 chronic COPD, 

obesity, deconditioning, Diastolic CHF exacerbation


- currently at her baseline level of supplemental oxygen (3 L NC)


- physical still reveals 2+ pitting edema bilaterally 


- continues with physical therapy; still has not been cleared


- keep negative fluid balance (minimum of -1 liter per day), daily weights, 


- continues diuresis with torsemide and spironolactone


- Nephrology (Dr. Medeiros) following  appreciate their input 





2. Leukocytosis  possibly 2/2 urinary tract infection, less likely 2/2 

cellulitis


- Both legs appear to have erythema - which has been resolving


- Reports dysuria and hematuria has resolved


- Duplex US negative for DVT


- Leukocytosis has trended down


- UA with 2+ LE, TNTC WBC and 2+ bacteria


- s/p power catheter 


- c/w Cefepime (Day #6)





3. Normocytic anemia


- Hg baseline of 10-11; currently Hg at 7.5


- Has experienced hematuria; but notes that this has resolved


- s/p Iron infusion and Darbepoetin 200 yesterday


- Will transfuse 1 unit PRBC today


- will f/u post transfusion CBC 





4. s/p Sepsis - possibly 2/2 pneumonia (CAP)


- completed course of broad spectrum antibiotics  





5. Chronic COPD


- no evidence of COPD exacerbation


- physical exam does not reveal any wheezing


- will continue with inhaled therapy from home and duoneb PRN





6. Diastolic CHF, Compensated


- ECHO 2/2015 - with normal / near-normal EF, Grade 2 diastolic dysfunction


- Currently does not appear to have fluid overload on physical exam





5. NIDDM2


- c/w insulin sliding scale





6. DLP


- c/w atorvastatin





7. HTN


- c/w metoprolol tartrate 12.5mg BID  with holding parameters





8. SINTIA - not on CPAP


- Will go for repeat sleep study in December for CPAP





9. History of MI 


- c/w ASA, Atorvastatin


- Metoprolol dose has been decreased (with holding parameters) 


- Lisinopril have been discontinued 





10. Atrial fibrillation


- c/w rate control with Metoprolol 12.5 mg PO BID (with holding parameters) 


- full anticoagulation with Pradaxa





11. GI prophylaxis


- c/w protonix 





12. DVT prophylaxis


- c/w pradaxa





Disposition:


- Awaiting clearance from PT/OT


- c/w Diuresis with existing diuretic regimen; dosed by Nephrology


- close follow up with Hg and Cr





VS,Fishbone, I+O


VS, Fishbone, I+O


 Laboratory Tests


12/26/16 06:09








Calcium Level 8.3 L, Aspartate Amino Transf (AST/SGOT) 25, Alanine 

Aminotransferase (ALT/SGPT) 19, Alkaline Phosphatase 63, Total Bilirubin 0.8, 

Total Protein 7.0, Albumin 2.7 L, Red Blood Count 2.54 L, Mean Corpuscular 

Volume 103.3 H, Mean Corpuscular Hemoglobin 29.4, Mean Corpuscular Hemoglobin 

Concent 28.5 L, Red Cell Distribution Width 25.0 H, Neutrophils (%) (Auto) 84.0 

H, Lymphocytes (%) (Auto) 6.9 L, Monocytes (%) (Auto) 5.4 H, Eosinophils (%) (

Auto) 1.9, Basophils (%) (Auto) 4.0 H, Neutrophils # (Auto) 12.0 H, Lymphocytes 

# (Auto) 1.2 L, Monocytes # (Auto) 0.8, Eosinophils # (Auto) 0.3, Basophils # (

Auto) 0.6 H








 Vital Signs








  Date Time  Temp Pulse Resp B/P Pulse Ox O2 Delivery O2 Flow Rate FiO2


 


12/26/16 10:33  90 18 94/58 92 Nasal Cannula 3.0 


 


12/26/16 06:00 97.9       














 I&O- Last 24 Hours up to 6 AM 


 


 12/26/16





 06:00


 


Intake Total 1190 ml


 


Output Total 3550 ml


 


Balance -2360 ml














LATOYA WREN MD Dec 26, 2016 13:58

## 2016-12-27 NOTE — IPN
DATE:  12/25/2016

 

SUBJECTIVE:  The patient was seen and examined at the bedside. Today, she is

actually sitting on the sofa. She denies any active complaints apart from her

baseline shortness of breath requiring 3 liters of oxygen around the clock, and

she also complains of lower extremity edema and cellulitis, which is slowly

improving with diuretics and antibiotics.

 

REVIEW OF SYSTEMS: The patient denies any fevers, chills, rigors, headache,

nausea, vomiting, chest pain. She does report baseline shortness of breath. She

denies any constipation, diarrhea, nausea, vomiting. She does report lower

extremity edema and cellulitis. The rest of the review of systems is negative.

 

OBJECTIVE:

VITAL SIGNS: Temperature is 97.6 degrees Fahrenheit, blood pressure is 111/59,

pulse is 97, respiratory rate of 18, saturating 94% on room air.

 

INTAKE/OUTPUT: Urine output recorded yesterday is 4.1 liters, urine output so far

today since overnight is 1350 mL. The patient is 2.8 liters negative yesterday

and around 930 mL negative so far. Weight on the bed scale is 167.3 kg.

 

PHYSICAL EXAMINATION:

GENERAL: The patient is awake, alert, oriented times three, sitting on the sofa

in no apparent distress.

HEAD AND NECK EXAM: Extraocular muscles intact. Pupils equally round and reactive

to light. Neck is supple. There is no jugular venous distention (JVD). Mucous

membranes are moist.

CARDIOVASCULAR: S1, S2. Regular rate. No murmur, rub or gallop.

RESPIRATORY:  Chest is clear to auscultation bilaterally and breath sounds are

decreased at the bases. Otherwise, there are no active rales or rhonchi.

ABDOMEN: Morbidly obese, soft, positive bowel sounds, nontender. I could not

appreciate any organomegaly because of patient's body habitus.

EXTREMITIES:  The patient has erythema of the bilateral lower extremities because

of cellulitis, and she has 2+ pitting edema of the legs, upper thighs.

CENTRAL NERVOUS SYSTEM: No focal neurological deficit. Power is 5/5 in all

extremities.

PSYCHIATRIC: Normal mood and affect.

 

LAB REVIEW: CBC showed a WBC of 11, hemoglobin is 8, platelets are 257. BMP

showed sodium 141, potassium 4.1, chloride 101, bicarbonate 32, BUN 40,

creatinine 1.17, calcium is 8.6, magnesium 2.3. Looking at patient's iron studies

done on 11/26/2016, the patient was iron deficient.

 

CURRENT MEDICATIONS: The patient's current medications were all reviewed by me

and significant changes in the medication include iron sucrose 500 mg IV that was

ordered by me yesterday and there is no other change in the current medications

at this time.

 

ASSESSMENT: A 66-year-old female who is morbidly obese, has congestive heart

failure, chronic obstructive pulmonary disease (COPD), home oxygen dependent,

being followed by nephrology for fluid overload and electrolyte abnormalities.

 

PLAN:

1. Acute on chronic congestive heart failure. The patient is aggressively being

diuresed. She is still fluid overloaded, but she is having a good urine output

with current dose of torsemide 100 mg by mouth twice a day and spironolactone 25

mg by mouth twice a day; continue current dose. The patient is around 2 liters

negative every day with the current regimen, and she is tolerating well. Blood

pressure is stable.

 

2. Iron deficiency anemia. The patient's hemoglobin was slowly dropping and her

iron levels were low. I have given her one dose of Venofer 500 mg IV yesterday,

and I increased her dose of darbepoetin to 300 mcg subcu every Sunday. She will

get the first dose today.

 

3. Acute kidney injury superimposed on chronic kidney disease. The patient has

cardiorenal syndrome. Her creatinine is improving with the current dose of

diuretics. Continue to diurese the patient at this time. Renal function is

gradually improving.

 

4. Cellulitis of the bilateral lower extremities. The patient is currently on

cefepime 1 gram IV every 12 hours. Continue current doses and last dose will be

given on 12/28/2016.

## 2016-12-27 NOTE — IPNPDOC
Assessment/Plan


Date Seen


The patient was seen on 12/27/16.





Problems


Problems:  


(1) Anasarca


Status:  Acute


Problem Text:  due to diastolic congestive heart failure. 


on multiple diuretics. 





(2) Cellulitis


Status:  Acute


Problem Text:  on cefepime currently was on ceftriaxone before. 





(3) Acute on chronic diastolic CHF (congestive heart failure)


Status:  Acute


Problem Text:  continue diuresis , daily weights. 


on lasix, diamox and spironolactone. 





(4) Acute and chronic respiratory failure with hypoxia


Status:  Acute


Problem Text:  multifactorial due to CAP and diastolic chf exacerbation and 

fluid


slowly improving with diuresis and antibiotics. 





(5) CAP (community acquired pneumonia)


Status:  Resolved


Problem Text:  finished course on ceftriaxone. 





(6) Constipation


Status:  Resolved


Problem Text:  on bowel regimen 





(7) SINTIA (obstructive sleep apnea)


Status:  Chronic


(8) Morbid obesity


Status:  Chronic


(9) Diabetes


Status:  Chronic


(10) Hypertension


Status:  Chronic


(11) COPD (chronic obstructive pulmonary disease)


Status:  Chronic


Problem Text:  continue home meds.





(12) CAD (coronary artery disease)


Status:  Chronic


(13) Hyperlipidemia


Status:  Chronic


(14) Mitral stenosis and incompetence


Status:  Chronic


(15) Pulmonary hypertension


Status:  Chronic


(16) Afib


Status:  Chronic


Problem Text:  on pradaxa





(17) Chronic anemia


Status:  Chronic


Problem Text:  normocytic received venofer, prbc and darbapoietin





(18) Allergy


Status:  Chronic


Problem Text:  continue loratidine





(19) Hematuria


Status:  Resolved


Problem Text:  possibly from trama from power will monitor if continues may 

have to hold anticoagulants temporarily. 





(20) Chronic respiratory failure with hypoxia


Status:  Chronic


Problem Text:  now at baseline level of oxygen by nasal canula





(21) RADHA (acute kidney injury)


Status:  Acute


Problem Text:  due to aggressive diuresis will continue to monitor.


diuretic dosage decreased today 








Plan / VTE


VTE Prophylaxis Ordered?:  Yes





Plan / Urinary Catheter


Reason for insertion/continuin:  Other-document below





Subjective


Review of Systems


CC/HPI


The patient is a 66-year-old female admitted with a reason for visit of Acute 

Respiratory Distress.





Objective


Physical Examination


General Exam:  Positive: Alert, No Acute Distress


Eye Exam:  Positive: Conjunctiva & lids normal, EOMI, PERRLA, 


   Negative: Sclera icteric


ENT Exam:  Positive: Atraumatic, Mucous membr. moist/pink, Pharynx Normal


Neck Exam:  Positive: Supple, 


   Negative: JVD, thyromegaly


Chest Exam:  Positive: Diminished


Heart Exam:  Positive: Normal S1, Normal S2, Rate Normal, Regular Rhythm


Abdomen Exam:  Positive: Normal bowel sounds, Soft, 


   Negative: Hepatospenomegaly, Tenderness


Extremity Exam:  Positive: Edema, Other (redness and inflammation), Swelling, 

Tenderness





Vital Signs/I&O





 Vital Signs








  Date Time  Temp Pulse Resp B/P Pulse Ox O2 Delivery O2 Flow Rate FiO2


 


12/27/16 09:00   18   Nasal Cannula 3.0 


 


12/27/16 06:00 97.9 93  113/64 93   














 I&O- Last 24 Hours up to 6 AM 


 


 12/27/16





 06:00


 


Intake Total 1330 ml


 


Output Total 4480 ml


 


Balance -3150 ml











Laboratory Data


Labs 24H


 Laboratory Tests 2


12/26/16 16:41: 


Anisocytosis 3+, Atypical Lymphocytes 1, White Blood Count 14.2H, Red Blood 

Count 2.85L, Hemoglobin 8.6L, Hematocrit 29.7L, Mean Corpuscular Volume 104.4H, 

Mean Corpuscular Hemoglobin 30.3, Mean Corpuscular Hemoglobin Concent 29.0L, 

Red Cell Distribution Width 23.6H, Platelet Count 269, Neutrophils (%) (Auto) , 

Lymphocytes (%) (Auto) , Monocytes (%) (Auto) , Eosinophils (%) (Auto) , 

Basophils (%) (Auto) , Neutrophils # (Auto) , Lymphocytes # (Auto) , Monocytes 

# (Auto) , Eosinophils # (Auto) , Basophils # (Auto) , Clumped Platelets SMALL 

AMT, Large Unclassified Cells # , Large Unclassified Cells % , Lymphocytes (

Manual) 15L, Macrocytosis 3+, Monocytes (Manual) 10H, Neutrophils 74, Nucleated 

Red Blood Cells 2H, Platelet Estimate INVALID, Polychromasia 1+


12/26/16 16:53: Bedside Glucose (Misc Panel) 142H


12/26/16 20:26: Bedside Glucose (Misc Panel) 175H


12/27/16 06:02: 


White Blood Count 14.5H, Red Blood Count 2.80L, Hemoglobin 8.5L, Hematocrit 

29.3L, Mean Corpuscular Volume 104.7H, Mean Corpuscular Hemoglobin 30.2, Mean 

Corpuscular Hemoglobin Concent 28.8L, Red Cell Distribution Width 24.1H, 

Platelet Count 263, Neutrophils (%) (Auto) 82.4H, Lymphocytes (%) (Auto) 7.0L, 

Monocytes (%) (Auto) 6.5H, Eosinophils (%) (Auto) 1.6, Basophils (%) (Auto) 0.8

, Neutrophils # (Auto) 11.9H, Lymphocytes # (Auto) 1.0L, Monocytes # (Auto) 0.9H

, Eosinophils # (Auto) 0.2, Basophils # (Auto) 0.1, Large Unclassified Cells # 

0.3, Large Unclassified Cells % 1.8, Blood Urea Nitrogen 41H, Creatinine 1.29H, 

Sodium Level 140, Potassium Level 4.6, Chloride Level 100, Carbon Dioxide Level 

33H, Calcium Level 8.7L, Aspartate Amino Transf (AST/SGOT) 20, Alanine 

Aminotransferase (ALT/SGPT) 17, Alkaline Phosphatase 70, Total Bilirubin 1.1H, 

Total Protein 7.2, Albumin 2.9L, Albumin/Globulin Ratio 0.67L, Anion Gap 7L, 

Glomerular Filtration Rate 44.0L, Magnesium Level 2.3, Whole Blood Ionized 

Calcium 4.4L


CBC/BMP


 Laboratory Tests


12/26/16 16:41








Red Blood Count 2.85 L, Mean Corpuscular Volume 104.4 H, Mean Corpuscular 

Hemoglobin 30.3, Mean Corpuscular Hemoglobin Concent 29.0 L, Red Cell 

Distribution Width 23.6 H, Neutrophils (%) (Auto) , Lymphocytes (%) (Auto) , 

Monocytes (%) (Auto) , Eosinophils (%) (Auto) , Basophils (%) (Auto) , 

Neutrophils # (Auto) , Lymphocytes # (Auto) , Monocytes # (Auto) , Eosinophils 

# (Auto) , Basophils # (Auto) 





12/27/16 06:02








Red Blood Count 2.80 L, Mean Corpuscular Volume 104.7 H, Mean Corpuscular 

Hemoglobin 30.2, Mean Corpuscular Hemoglobin Concent 28.8 L, Red Cell 

Distribution Width 24.1 H, Neutrophils (%) (Auto) 82.4 H, Lymphocytes (%) (Auto

) 7.0 L, Monocytes (%) (Auto) 6.5 H, Eosinophils (%) (Auto) 1.6, Basophils (%) (

Auto) 0.8, Neutrophils # (Auto) 11.9 H, Lymphocytes # (Auto) 1.0 L, Monocytes # 

(Auto) 0.9 H, Eosinophils # (Auto) 0.2, Basophils # (Auto) 0.1, Calcium Level 

8.7 L, Aspartate Amino Transf (AST/SGOT) 20, Alanine Aminotransferase (ALT/SGPT

) 17, Alkaline Phosphatase 70, Total Bilirubin 1.1 H, Total Protein 7.2, 

Albumin 2.9 L


Microbiology





 Microbiology


12/20/16 Blood Culture - Final, Complete


           NO GROWTH AFTER 5 DAYS


12/20/16 Urine Culture - Final, Complete








JOSE REINOSO MD Dec 27, 2016 12:00

## 2016-12-28 NOTE — IPN
DATE:  12/27/2016

 

SUBJECTIVE: Patient was seen and examined at the bedside today morning. She

complained of very dry mouth and off and on cramping of the bilateral upper

extremities. She otherwise continues to have good diuretic response with the

current dose of diuretics. Renal function is slightly worse as compared with

yesterday.

 

REVIEW OF SYSTEMS: Patient denies any fevers, chills, rigors, headache, nausea,

vomiting, or chest pain. She does report baseline shortness of breath, and she

requires oxygen. She denies any pain in abdomen, constipation, or diarrhea.

Patient does report dry mouth and cramping of the upper extremities. She also

reports lower extremity edema, which is improving at this time.

 

OBJECTIVE: Vital signs: Temperature is 97.7 degrees Fahrenheit, blood pressure is

144/81, pulse is 108, respiratory rate of 18, saturating 90% on nasal cannula at

3 liters per minute. Intake and output: Urine output is 3.5 liters yesterday and

2.3 liters so far today since overnight. Patient is in negative fluid balance

every day persistently, almost 2 liters every 24 hours. Weight in the bed scale

is 164.5 kg.

 

PHYSICAL EXAMINATION:

HEAD AND NECK: Extraocular muscles intact. Pupils equally round and reactive to

light. Neck is supple with no jugular venous distention (JVD).

CARDIOVASCULAR: S1, S2, regular rate. No murmur, rub, or gallop.

RESPIRATORY: Chest is clear to auscultation bilaterally. Bilateral equal air

entry. No rales or rhonchi.

ABDOMEN: Soft. Positive bowel sounds. It is very obese, protuberant. She has

abdominal wall edema.

EXTREMITIES: Patient has bilateral erythema in the legs because of cellulitis,

and she has 2+ pitting edema of the bilateral lower extremities up to thighs.

CENTRAL NERVOUS SYSTEM: No focal neurological deficit. Power is 5/5 in all

extremities.

SKIN: Positive redness and chronic venous stasis changes in both legs.

PSYCHIATRIC: Normal mood and affect.

 

LABORATORY RE VIEW:

CBC shows WBC 14.5, hemoglobin 8.5, platelets are 263. BMP showed sodium 140,

potassium 4.6, chloride 100, bicarbonate 33, BUN 41, creatinine 1.29, calcium

8.7. Ionized calium was 4.4, magnesium 2.3.

 

Microbiology: Blood cultures and urine culture are negative so far.

 

CURRENT MEDICATIONS: Patient's current medications were all reviewed by me. She

was given calcium gluconate 1 gram intravenous (IV) today morning for

hypocalcemia. I have decreased the dose of torsemide to 100 mg in the morning and

50 mg in the evening. She continues to be on spironolactone 25 mg by mouth twice

a day.

 

ASSESSMENT:

A 66-year-old female with past medical history of morbid obesity, chronic

systolic and diastolic congestive heart failure. Nephrology service following the

patient for acute renal failure superimposed on chronic kidney disease and volume

overload.

 

PLAN:

1. Acute kidney injury superimposed on chronic kidney disease. Patient's renal

function is slightly worse today. Looks like she is slightly volume depleted. I

am going to decrease the dose of diuretics today.  Acute kidney injury was

secondary to cardiorenal syndrome.

 

2.  Acute on chronic congestive heart failure. Patient is having very good

diuretic response, but because of metabolic alkalosis, cramping, and slightly

worsening kidney function, I am going to decrease the dose of diuretics. Continue

torsemide 100 mg in the morning, 50 mg in the evening, and spironolactone 25 mg

twice a day.

 

3.  Anemia secondary to iron deficiency. Patient is status post IV iron infusion.

She also got 1 unit or packed red blood cells (PRBC) transfusion yesterday, and

she got a dose of Aranesp 200 mcg. Hemoglobin is 8.5. Continue to monitor for

now.

 

4.  Metabolic alkalosis. Patient has chronic metabolic alkalosis secondary to

chronic obstructive pulmonary disease (COPD), but her bicarbonate is slowly going

up, which might be secondary to contraction and volume depletion. Decrease

diuretic dose as mentioned above.

 

5.  Lower extremity edema and cellulitis. Continue diuresis. Continue cefepime.

Last dose will be tomorrow. Patient would benefit from wrapping of the lower

extremities.

## 2016-12-28 NOTE — IPNPDOC
Assessment/Plan


Date Seen


The patient was seen on 12/28/16.





Problems


Problems:  


(1) Anasarca


Status:  Acute


Problem Text:  due to diastolic congestive heart failure. 


on multiple diuretics. 





(2) Cellulitis


Status:  Acute


Problem Text:  on cefepime currently was on ceftriaxone before. 





(3) Acute on chronic diastolic CHF (congestive heart failure)


Status:  Acute


Problem Text:  continue diuresis , daily weights. 


on lasix, diamox and spironolactone. 





(4) Acute and chronic respiratory failure with hypoxia


Status:  Acute


Problem Text:  multifactorial due to CAP and diastolic chf exacerbation and 

fluid


slowly improving with diuresis and antibiotics. 





(5) CAP (community acquired pneumonia)


Status:  Resolved


Problem Text:  finished course on ceftriaxone. 





(6) Constipation


Status:  Resolved


Problem Text:  on bowel regimen 





(7) SINTIA (obstructive sleep apnea)


Status:  Chronic


(8) Morbid obesity


Status:  Chronic


(9) Diabetes


Status:  Chronic


(10) Hypertension


Status:  Chronic


(11) COPD (chronic obstructive pulmonary disease)


Status:  Chronic


Problem Text:  continue home meds.





(12) CAD (coronary artery disease)


Status:  Chronic


(13) Hyperlipidemia


Status:  Chronic


(14) Mitral stenosis and incompetence


Status:  Chronic


(15) Pulmonary hypertension


Status:  Chronic


(16) Afib


Status:  Chronic


Problem Text:  on pradaxa





(17) Chronic anemia


Status:  Chronic


Problem Text:  normocytic received venofer, prbc and darbapoietin





(18) Allergy


Status:  Chronic


Problem Text:  continue loratidine





(19) Hematuria


Status:  Resolved


Problem Text:  possibly from trama from power will monitor if continues may 

have to hold anticoagulants temporarily. 





(20) Chronic respiratory failure with hypoxia


Status:  Chronic


Problem Text:  now at baseline level of oxygen by nasal canula





(21) RADHA (acute kidney injury)


Status:  Acute


Problem Text:  due to aggressive diuresis will continue to monitor.


diuretic dosage decreased today 








Plan / VTE


VTE Prophylaxis Ordered?:  Yes





Plan / Urinary Catheter


Reason for insertion/continuin:  Other-document below





Subjective


Review of Systems


CC/HPI


The patient is a 66-year-old female admitted with a reason for visit of Acute 

Respiratory Distress.


Events since last encounter


no new complaints, working with PT. feeling lighter, no fever or chills, sob at 

baseline down to home oxygen level, no fever or chills, no chest pain , leg 

redness unchanged.





Objective


Physical Examination


General Exam:  Positive: Alert, No Acute Distress


Eye Exam:  Positive: Conjunctiva & lids normal, EOMI, PERRLA, 


   Negative: Sclera icteric


ENT Exam:  Positive: Atraumatic, Mucous membr. moist/pink, Pharynx Normal


Neck Exam:  Positive: Supple, 


   Negative: JVD, thyromegaly


Chest Exam:  Positive: Diminished


Heart Exam:  Positive: Normal S1, Normal S2, Rate Normal, Regular Rhythm


Abdomen Exam:  Positive: Normal bowel sounds, Soft, 


   Negative: Hepatospenomegaly, Tenderness


Extremity Exam:  Positive: Edema, Other (redness and inflammation), Swelling, 

Tenderness





Vital Signs/I&O





 Vital Signs








  Date Time  Temp Pulse Resp B/P Pulse Ox O2 Delivery O2 Flow Rate FiO2


 


12/28/16 08:06  96  122/73    


 


12/28/16 06:00 97.0  18  93 Nasal Cannula 3.0 














 I&O- Last 24 Hours up to 6 AM 


 


 12/28/16





 06:00


 


Intake Total 1990 ml


 


Output Total 3370 ml


 


Balance -1380 ml











Laboratory Data


Labs 24H


 Laboratory Tests 2


12/27/16 11:41: Bedside Glucose (Misc Panel) 165H


12/27/16 16:34: Bedside Glucose (Misc Panel) 139H


12/27/16 20:54: Bedside Glucose (Misc Panel) 166H


12/28/16 06:43: 


White Blood Count 13.4H, Red Blood Count 2.86L, Hemoglobin 8.7L, Hematocrit 

30.1L, Mean Corpuscular Volume 105.3H, Mean Corpuscular Hemoglobin 30.6, Mean 

Corpuscular Hemoglobin Concent 29.1L, Red Cell Distribution Width 24.1H, 

Platelet Count 243, Neutrophils (%) (Auto) 81.6H, Lymphocytes (%) (Auto) 7.5L, 

Monocytes (%) (Auto) 6.3H, Eosinophils (%) (Auto) 2.2, Basophils (%) (Auto) 0.6

, Neutrophils # (Auto) 11.0H, Lymphocytes # (Auto) 1.0L, Monocytes # (Auto) 0.8

, Eosinophils # (Auto) 0.3, Basophils # (Auto) 0.1, Large Unclassified Cells # 

0.2, Large Unclassified Cells % 1.8


12/28/16 06:44: 


Blood Urea Nitrogen 43H, Creatinine 1.35H, Sodium Level 139, Potassium Level 4.3

, Chloride Level 98, Carbon Dioxide Level 34H, Calcium Level 8.4L, Aspartate 

Amino Transf (AST/SGOT) 22, Alanine Aminotransferase (ALT/SGPT) 19, Alkaline 

Phosphatase 67, Total Bilirubin 1.0, Total Protein 7.3, Albumin 2.9L, Albumin/

Globulin Ratio 0.66L, Anion Gap 7L, C-Reactive Protein, Quantitative 1.14H, 

Glomerular Filtration Rate 41.8L, Magnesium Level 2.3


CBC/BMP


 Laboratory Tests


12/28/16 06:43








Red Blood Count 2.86 L, Mean Corpuscular Volume 105.3 H, Mean Corpuscular 

Hemoglobin 30.6, Mean Corpuscular Hemoglobin Concent 29.1 L, Red Cell 

Distribution Width 24.1 H, Neutrophils (%) (Auto) 81.6 H, Lymphocytes (%) (Auto

) 7.5 L, Monocytes (%) (Auto) 6.3 H, Eosinophils (%) (Auto) 2.2, Basophils (%) (

Auto) 0.6, Neutrophils # (Auto) 11.0 H, Lymphocytes # (Auto) 1.0 L, Monocytes # 

(Auto) 0.8, Eosinophils # (Auto) 0.3, Basophils # (Auto) 0.1





12/28/16 06:44








Calcium Level 8.4 L, Aspartate Amino Transf (AST/SGOT) 22, Alanine 

Aminotransferase (ALT/SGPT) 19, Alkaline Phosphatase 67, Total Bilirubin 1.0, 

Total Protein 7.3, Albumin 2.9 L


FSBS





Laboratory Tests








Test


  12/27/16


11:41 12/27/16


16:34 12/27/16


20:54 Range/Units


 


 


Bedside Glucose (Misc Panel) 165 139 166   MG/DL








Microbiology





 Microbiology


12/20/16 Blood Culture - Final, Complete


           NO GROWTH AFTER 5 DAYS


12/20/16 Urine Culture - Final, Complete








JOSE REINOSO MD Dec 28, 2016 10:44

## 2016-12-28 NOTE — IPN
DATE:  12/26/2016

 

SUBJECTIVE:  The patient was seen and examined at the bedside today.  She is

feeling better.  She continues to be on oxygen via nasal cannula.  She is

responding very well to oral diuretics.  She continues to be on in negative fluid

balance every day.  She has a very good urine output with the current dose of

diuretics.  However, she does complain of some dry mouth because of the

diuretics, but she denies any leg cramps or abdominal cramps, and her blood

pressure continues to be stable.

 

REVIEW OF SYSTEMS: The patient reports some dry mouth, but she denies any chest

pain.  She does report baseline shortness of breath and she requires nasal

cannula at 3 liters per minute.  She denies any pain in the abdomen,

constipation, or diarrhea.  She continues to have lower extremity edema and

erythema, which is slowly getting better.  The rest of the review of systems is

negative.

 

OBJECTIVE:

VITAL SIGNS: Temperature is 97.9 degrees Fahrenheit, blood pressure is 123/60,

pulse is 97, respiratory rate of 18, saturating 92% on nasal cannula at 3 liters

per minute.

 

INTAKE/OUTPUT: Urine output recorded is 4.3 liters yesterday and so far she has

made 1780 mL of urine so far since overnight.  Weight on the bed scale is 164.1

kg.

 

PHYSICAL EXAMINATION:

GENERAL: The patient is awake, alert, oriented times three, sitting on the sofa,

no apparent distress, wearing oxygen.

HEAD AND NECK EXAM: Extraocular muscles intact. Pupils equally round and reactive

to light. Neck is supple. There is no jugular venous distention (JVD). Mucous

membranes are moist.

CARDIOVASCULAR: S1, S2. Regular rate. No murmur, rub or gallop.

RESPIRATORY:  Chest is clear to auscultation bilaterally.  Bilateral equal air

entry.  No rales or rhonchi.

ABDOMEN: Abdomen is morbidly obese, soft, protuberant, positive bowel sounds,

nontender.  Positive abdominal wall edema.

EXTREMITIES:  The patient has erythema of the bilateral legs because of

cellulitis, and she has 2+ pitting edema of the bilateral lower extremities up to

the knees.

CENTRAL NERVOUS SYSTEM: No focal neurological deficit. Power is 5/5 in all

extremities.

SKIN:  The patient has positive redness and erythema of the bilateral legs and

some blisters in the legs because of edema.

PSYCHIATRIC: Normal mood and affect.

 

LAB REVIEW: CBC showed a WBC of 14.2, hemoglobin is 7.5, platelets are 248.

 

BMP showed sodium 139, potassium 4.1, chloride 100, bicarbonate 31, BUN 39,

creatinine 1.17, calcium is 8.3, albumin is 2.7.

 

CURRENT MEDICATIONS: The patient's current medications were all reviewed by me.

She continues to be on intravenous cefepime, and her diuretics are still

torsemide 100 mg by mouth twice a day and spironolactone 25 mg by mouth twice a

day.

 

ASSESSMENT: 66-year-old female with past medical history of morbidly obesity,

chronic systolic and diastolic congestive heart failure.  Nephrology service

following the patient for acute renal failure superimposed on chronic kidney

disease and volume overload.

 

PLAN:

1. Acute kidney injury superimposed on chronic kidney disease.  The patient's

renal function is improving with aggressive diuresis so it was most likely

cardiorenal syndrome.  Continue aggressive diuresis at this time.

 

2. Acute on chronic congestive heart failure (CHF).  The patient has very good

response to the diuretics at this time.  Continue current dose of torsemide 100

mg twice a day and spironolactone 25 mg by mouth twice a day.  The patient is

well adherent to 1500 mL of fluid intake every day and she is in negative fluid

balance of almost 3 liters every day with the current diuretic regimen, and she

is tolerating it well. Continue the current dose.

 

3. Anemia secondary to iron deficiency.  The patient was given Venofer 500 mg

intravenously yesterday and she was also given a dose of Aranesp 300 mcg

yesterday as well.  However, her hemoglobin is down to 7.5 today.  It is okay to

give her 1 unit of packed red blood cell transfusion, and then we shall wait for

the IV iron infusion, Aranesp, to start functioning.

 

4.  Lower extremity edema and cellulitis.  She continues to be on intravenous

antibiotics.  Cultures are negative so far.  Edema is slowly getting better with

the oral diuretics.

 

Plan of care was discussed with the hospitalist team, Dr. Tamara Shanks.

## 2016-12-29 NOTE — IPN
DATE:  12/28/2016

 

SUBJECTIVE:  Patient was seen and examined at the bedside today, she was sitting

in the sofa.  She reports that her hand cramps are better today, but she still

reports shortness of breath on mild exertion.

 

REVIEW OF SYSTEMS:  Patient denies any fever, chills, rigors.  She denies any

chest pain.  She does report baseline of shortness of breath and shortness of

breath on mild exertion.  She denies any nausea or vomiting, but she does report

decreased appetite and patient reports persistent lower extremity edema and

erythema.  Rest of review of systems is negative.

 

OBJECTIVE:

VITAL SIGNS:  Temperature 98.2 degrees Fahrenheit, blood pressure 122/73, pulse

96, respiratory rate 18, saturating at 92% on nasal cannula at 3 liters.

INTAKE/OUTPUT:  Urine output recorded is 3600 mL yesterday and around 2.6 liters

so far today since overnight.  Weight in the bed scale is 165 kg and she

continues to be persistently in negative fluid balance, she was 1.7 liters

negative yesterday and 1.5 liters negative so far today.

 

PHYSICAL EXAMINATION:

GENERAL:  Patient is awake, alert, oriented times three, sitting in the sofa, no

apparent distress, wearing nasal cannula.

HEAD and NECK:  Extraocular muscles intact.  Pupils equally round and reactive to

light.  Neck is supple.  There is no jugular venous distention (JVD).

CARDIOVASCULAR:  S1, S2, regular rate.  No murmur, rub, or gallop.

RESPIRATORY:  Chest is clear to auscultation bilaterally.  Bilateral equal air

entry.  No rales or rhonchi.

ABDOMEN:  Morbidly obese, soft, protruberant, positive bowel sounds, positive

abdominal wall edema.

EXTREMITIES:  Patient has erythema of the bilateral legs because of cellulitis.

She has 2+ pitting edema of the bilateral lower extremities up to the knees.

CENTRAL NERVOUS SYSTEM (CNS):  No focal neurological deficit.  Power is 5/5 in

all extremities.

SKIN:  Patient has some blisters on the left leg, they are covered by dressing,

and erythema of both legs as mentioned above.

PSYCHIATRIC:  Normal mood and affect.

 

LABORATORY REVIEW:

CBC showed WBC 13.4, hemoglobin 8.7, platelets 243.

 

BMP showed sodium 139, potassium 4.3, chloride 98, bicarbonate 34, BUN 43,

creatinine 1.35, it was 1.29 yesterday, calcium is 8.4, ionized calcium is not

done today, magnesium 2.3.

 

CURRENT MEDICATIONS:  Patient's current medications were all reviewed by me.  Her

cefepime has been stopped now.  She has finished a 1 week course of antibiotics

and her diuretics were changed yesterday to torsemide 100 mg in the morning, 50

mg in the evening.

 

ASSESSMENT:  66-year-old female with past medical history of morbid obesity,

chronic systolic and diastolic congestive heart failure, nephrology service

following the patient for management of acute renal failure superimposed on

chronic kidney disease and volume overload.

 

PLAN:

1.  Acute kidney injury superimposed on chronic kidney disease.  Patient's renal

function slightly bumped up today because of aggressive diuresis.  I have already

decreased the dose of torsemide yesterday.  Continue to follow for now.  If renal

function continues to deteriorate, then I would further decrease the diuretic

dose.

 

2.  Acute on chronic congestive heart failure.  Patient's diuretic regimen is

torsemide 100 mg in the morning, 50 mg in the evening, and spironolactone 25 mg

by mouth twice a day.  She has significant urine output with the current regimen.

She is in negative fluid balance every day.  Continue to monitor daily urine

output and daily weight.

 

3.  Anemia secondary to iron deficiency.  Patient is status post one unit packed

red blood cells (PRBCs) transfusion during this week, 500 of Venofer, and she was

also given a dose of Aranesp.  Hemoglobin is trending up.  Continue to monitor

for now.

 

4.  Lower extremity edema and cellulitis.  She is status post 1 week of

antibiotics.  She has blisters on the lower extremities which have dressing.

Patient would benefit from compression dressings as well.

## 2016-12-30 NOTE — IPN
DATE: 12/29/2016

 

SUBJECTIVE: The patient was seen and examined at the bedside today. She is

actually sitting in the sofa today. She feels much better. Her shortness of

breath is at her baseline and her renal function is stable as compared with

yesterday and she continues to diurese well with the current diuretic regimen.

 

REVIEW OF SYSTEMS: The patient denies any fever, chills, rigors, headache,

nausea, vomiting, any chest pain. She does have baseline shortness of breath and

she is wearing nasal cannula. She denies any constipation, pain in abdomen or

diarrhea. She reports persistent lower extremity edema and weeping of blisters

from the leg. The rest of the review of systems is negative.

 

OBJECTIVE: VITAL SIGNS: Temperature is 97.1 degrees Fahrenheit, blood pressure is

118/57, pulse is 98, respiratory rate of 18, saturating 92% on nasal cannula at 3

liters per minute. Intake and output: Urinary output recorded as 3.7 liters

yesterday and 400 mL of water so far today since overnight. The patient is in

negative 2 liters of fluid balance according to Intake and output (I and O).

Weight on the bed scale, however, continues to be around 165.5.

 

PHYSICAL EXAMINATION:

GENERAL: The patient is awake, alert and oriented times three. Sitting on the

sofa. No apparent distress.

HEAD/NECK: Extraocular muscles intact. Pupils equally round and reactive to

light.  Neck is supple. There is no jugular venous distention (JVD).

CARDIOVASCULAR: S1, S2, regular rate.  No murmur, rub or gallop.

RESPIRATORY: The patient is wearing nasal cannula. Chest is clear to auscultation

bilaterally. Bilateral equal air entry. No rales or rhonchi.

ABDOMEN: Soft. Obese. Positive bowel sounds. Protuberant abdomen. Positive

abdominal wall edema.

EXTREMITIES: The patient has bilateral leg edema of the legs because of recent

cellulitis and she has some ulcerations on the left leg covered by a dressing at

this time. She has 2+ pitting edema of the bilateral legs up to the thighs.

CENTRAL NERVOUS SYSTEM: No focal neurological deficit. Power is 5/5 in all

extremities.

SKIN: Positive redness and venous stasis changes along with blisters and

ulcerations of the left leg.

 

LABORATORY RESULTS: CBC showed a WBC 12.3, hemoglobin 8.9, platelets at 271. BMP

shows sodium 139, potassium 4.4, chloride 99, bicarbonate 31, BUN 44, creatinine

is 1.38. It was 1.35 yesterday. Calcium was 8.6, whole blood ionized calcium was

4.5. Magnesium is 2.4. Albumin is 2.9.

 

CURRENT MEDICATIONS: The patient's medications are all reviewed by me. She is not

on antibiotics at this moment and there is no other change in the diuretic

regimen at this time either.

 

ASSESSMENT: 66-year-old female with past medical history of morbid obesity,

chronic systolic and diastolic congestive heart failure. Nephrology service

following the patient for management of fluid overload.

 

PLAN:

1. Acute on chronic congestive heart failure. The patient is having good diuretic

response to the current regimen of torsemide 100 mg in the morning, 50 in the

evening, and spironolactone 25 mg by mouth twice a day. She is in negative fluid

balance, almost 2 liters every day according to Intake and output (I and O), but

her weight is still stable at around 165. Continue the current dose of diuretic

regimen.

2. Acute kidney injury superimposed on chronic kidney disease. The patient's

renal function is stable. Creatinine keeps on fluctuating between 1.2 and 1.3.

Continue the aggressive diuresis. Acute renal failure was secondary to

cardiorenal syndrome.

 

3. Anemia secondary to iron deficiency. The patient is status post IV iron

infusion. She was given one dose of Aranesp 300 mcg this week. Next dose will be

given next week.

 

4. Lower extremity edema and cellulitis. Continue the current diuretics. She is

status post one dose of antibiotics. The patient would benefit from compression

dressings on bilateral lower extremities.

## 2017-01-01 NOTE — IPN
DATE:  12/30/2016

 

SUBJECTIVE: The patient was seen and examined at the bedside today in the

morning. There is a foul smelling coming in the room, apparently from her fungal

rashes in the skin folds. The patient denies any active complaints at this time.

Her renal function is stable.

 

REVIEW OF SYSTEMS: The patient denies any fevers, chills, rigors, headache,

nausea, vomiting, chest pain. She has baseline shortness of breath requiring

oxygen. She does complain that in the last 24 hours, she had one episodes of

cramping in the upper extremities, and she does report a dry mouth because of the

diuretics. She denies any pain in the abdomen, constipation or diarrhea. She does

report weeping ulcers from the left leg. The rest of the review of systems is

negative.

 

OBJECTIVE:

VITAL SIGNS: Temperature is 96.6 degrees Fahrenheit. Blood pressure is 107/51,

pulse is 94, respiratory rate of 18, saturating 96% on nasal cannula.

 

INTAKE/OUTPUT: Urine output recorded as 1.9 liters yesterday and 1400 mL so far

today since overnight.

 

PHYSICAL EXAMINATION:

GENERAL: The patient is awake, alert, oriented times three, sitting on the sofa,

wearing nasal cannula, in no apparent distress but there is a foul smell coming

from the patient.

HEAD AND NECK EXAM: Extraocular muscles intact. Pupils equally round and reactive

to light. Neck is supple. There is no jugular venous distention (JVD).

CARDIOVASCULAR: S1, S2, regular rate. No murmur, rub or gallop.

RESPIRATORY: Chest is clear to auscultation bilaterally. Bilateral equal air

entry. No rales or rhonchi.

ABDOMEN: Abdomen is soft, obese, protuberant. Positive bowel sounds. Positive

abdominal wall edema.

EXTREMITIES: The patient has erythema of the bilateral legs because of

cellulitis. Left leg is wrapped in dressings. She has 2+ pitting edema of the

bilateral lower extremities up to knees.

CENTRAL NERVOUS SYSTEM: No focal neurological deficit. Power is 5/5 in all

extremities.

SKIN: The patient has some blisters and weeping ulcers on the left leg, which are

covered in dressing. There is erythema of the bilateral lower extremities and the

patient also has a fungal rash in the skin folds in the abdomen, groin area and

below the breast.

PSYCHIATRIC: Normal mood and affect.

 

LAB REVIEW: CBC showed a WBC of 10.5, hemoglobin 8.5, platelets are 242. BMP

showed sodium 141, potassium 4.2, chloride 102, bicarbonate 31, BUN 44,

creatinine 1.35, calcium 8.3, magnesium 2.4, albumin 2.8.

 

CURRENT MEDICATIONS:  The patient's current medications were all reviewed by  me.

There is no change in the medications today as compared with yesterday.

 

ASSESSMENT: A 66-year-old female with a past medical history of morbid obesity,

chronic systolic and diastolic heart failure. Nephrology service is following the

patient for management of acute renal failure superimposed on chronic kidney

disease and volume overload.

 

PLAN:

1. Acute kidney injury superimposed on chronic kidney disease. The patient's

renal function is stable. Her creatinine is fluctuating between 1.2 and 1.3.

Continue the aggressive diuresis at this time. Cause of acute kidney injury (RADHA)

is most likely cardiorenal syndrome.

 

2. Acute on chronic congestive heart failure. The patient continues to diurese

well with current dose of her torsemide 100 mg in the morning, 50 mg in the

evening and spironolactone 25 mg by mouth twice a day. The patient still is

significantly fluid overloaded. I would continue the current dose of diuretics as

long as her labs stay stable.

 

3. Anemia secondary to iron deficiency. The patient got one dose of Aranesp 300

mcg subcu last Sunday. The patient will get the next dose this Sunday as well.

Hemoglobin is still below target.

 

4. Lower extremity edema and cellulitis. The patient is status post 1-week of

antibiotics. Continue current wound care of blisters with dressings.

 

5. Fungal rash. Continue Nystatin powder at this time. If the rash does not

improve, then the patient would need to start oral fluconazole.

## 2017-01-02 NOTE — IPN
DATE:  01/01/2017

 

NOTE: Please disregard my previously dictated note for today as I was interrupted

while dictating, and record this note for 01/01/2017.

 

SUBJECTIVE:  The patient was seen and examined at the bedside today. She was

sitting on the sofa. She reports that she made a lot of urine yesterday and she

feels a little bit weak. Her urine output was around five liters yesterday.

 

REVIEW OF SYSTEMS:  The patient denies any fevers, chills, rigors, headaches,

nausea, vomiting. She does report some shortness of breath which is baseline for

her. She denies any diarrhea, pain abdomen, constipation. She does report lower

extremity edema, and she is still complaining of blisters in the left leg which

are leaking some fluid.  Rest of review of systems is negative.

 

OBJECTIVE:

VITAL SIGNS: Temperature is 97 degrees Fahrenheit. Blood pressure is 110/55,

pulse is 97, respiratory rate of 18, saturating 95% on nasal cannula.

INTAKE AND OUTPUT: Urine output recorded was five liters yesterday, 1.7 liters so

far today. Weight in the bed scale is 164 kg.

 

PHYSICAL EXAMINATION:

GENERAL: The patient is awake, alert, and oriented times three. Sitting on the

sofa in no apparent distress.

HEAD/NECK: Extraocular muscles intact. Pupils equal, round, and reactive to

light. Neck is supple. There is no jugular venous distention (JVD).

CARDIOVASCULAR: S1, S2, regular rate. No murmur, rub, or gallop.

RESPIRATORY: Chest is clear to auscultation bilaterally. Bilateral equal air

entry. No rales or rhonchi.

ABDOMEN: Soft, obese, protuberant. Positive abdominal wall edema.

EXTREMITIES: The patient has erythema of the bilateral lower extremities. She has

2+ pitting edema of the extremities, and the left leg has some blisters which are

leaking some fluid as well.

CENTRAL NERVOUS SYSTEM (CNS): No focal neurological deficit. Power is 5/5 in all

extremities.

PSYCHIATRIC: Normal mood and affect.

SKIN: Positive erythema of the lower extremities with chronic venous stasis

changes and blisters of the left leg.

 

LABORATORY DATA:

CBC is from 12/30. BMP done today showed sodium 138, potassium 3.7, chloride 97,

bicarbonate 32, BUN 44, creatinine 1.22, calcium 8.6, phosphorus 4, albumin 3.2.

 

CURRENT MEDICATIONS:  The patient's medications were all reviewed by me, and

pertinent change in the medication includes torsemide 50 mg by mouth twice a day

and metolazone 2.5 mg by mouth daily.

 

ASSESSMENT:

A 66-year-old female with past medical history of morbid obesity, chronic

systolic and diastolic congestive heart failure. Nephrology service following the

patient for management of acute kidney injury and fluid overload.

 

PLAN:

1. Acute kidney injury superimposed on chronic kidney disease. The patient's

renal function is stable. Her creatinine fluctuates between 1.2 and 1.3. Continue

the aggressive diuresis at this time.

2. Acute on chronic congestive heart failure. The patient was diuresing well, but

her weight was not going down, and leg edema is still the same, so I added

metolazone 2.5 mg by mouth daily, and I have decreased her dose of torsemide to

50 mg by mouth twice a day. The patient made five liters of urine yesterday.

Continue the current dose of diuretics at this time.

3. Anemia and iron deficiency. The patient got intravenous (IV) iron last week. I

would also give her a dose of Aranesp 300 mcg subcutaneously today.

4. Lower extremity edema and cellulitis. Continue the aggressive diuretics at

this time. The patient would benefit from compression dressing of the bilateral

lower extremities.

## 2017-01-02 NOTE — IPN
DATE:  12/31/2016

 

SUBJECTIVE: Patient was seen and examined at the bedside today. She was sitting

on the sofa. She still complains of bilateral lower extremity edema and weeping

blisters, especially from the left leg. There are no labs available today.

 

REVIEW OF SYSTEMS: Patient denies any fever, chills, rigors, headache, nausea,

vomiting, or chest pain. She does report baseline shortness of breath, and she

reports persistently lower extremity edema and weeping of blister sites from the

left leg. The rest of the review of systems is negative.

 

OBJECTIVE:

VITAL SIGNS: Temperature is 98 degrees Fahrenheit, blood pressure is 118/62,

pulse is 86, respiratory rate of 18, saturating 94% on nasal cannula at three

liters. Intake and output: Urine output recorded as 2.8 liters yesterday and 1900

mL so far today since overnight. Weight on the bed scale is 166.6 kg.

GENERAL: Patient is awake, alert, and oriented times three, sitting on the sofa

in no apparent distress.

HEAD AND NECK EXAM: Extraocular muscles are intact. Pupils equally round and

reactive to light. Neck is supple. There is no jugular venous distention (JVD).

 

CARDIOVASCULAR: S1, S2 regular rate. No murmur, rub, or gallop.

RESPIRATORY: Patient is wearing nasal cannula. Chest is clear to auscultation

bilaterally. No rales or rhonchi.

ABDOMEN: Soft, obese. Positive bowel sounds. Positive edema of the abdominal

wall.

EXTREMITIES: Patient has bilateral lower extremity edema with erythema. Edema is

pitting all the way up to the thighs. There are blisters on the left leg.

CENTRAL NERVOUS SYSTEM: No focal neurological deficit. Power is 5/5 in all

extremities.

SKIN: Positive erythema at the bilateral lower extremities, and she also has

fungal rash in the skin folds of the abdomen.

 

LAB REVIEW: CBC showed a WBC of 10.5, hemoglobin 8.5, platelets of 242.

 

BMP showed creatinine of 1.35, but this BMP was from yesterday.

 

There are no labs available today.

 

CURRENT MEDICATIONS: Patient's current medications were all reviewed by me. There

are no changes in the medication apart from the addition of metolazone 2.5 mg by

mouth daily in the regimen.

 

ASSESSMENT: 66-year-old female with past medical history of morbid obesity,

chronic systolic and diastolic congestive heart failure. Nephrology service is

following the patient for management of fluid overload.

 

PLAN:

1. Acute-on-chronic congestive heart failure. Patient was having good urine

output, but despite a urine output of around 3-4 liters every day patient's

weight is still the same and her lower extremity edema is not improving. I am

going to continue torsemide 100 mg by mouth in the morning, 50 mg in the evening

with Aldactone 25 mg by mouth twice a day, but I am going to add a low-dose of

metolazone 2.5 mg by mouth daily.

 

2. Acute kidney injury superimposed on chronic kidney disease. Patient's acute

kidney injury (RADHA) is most likely secondary to cardiorenal syndrome. Patient's

creatinine has been stable with the current dose of diuretics. Continue the

aggressive diuresis at this time.

 

3. Anemia secondary to iron deficiency. Patient will get a dose of Aranesp 300

mcg subcutaneous tomorrow. She was also given Venofer in the last week.

 

4. Lower extremity edema and cellulitis. Patient is status post IV antibiotic for

the lower extremity cellulitis and edema. She has blisters on the leg. Management

is as per wound care nursing. Patient would benefit from wrapping of the

bilateral lower extremities.

## 2017-01-03 NOTE — IPN
DATE:  01/02/2017

 

SUBJECTIVE:  Patient was seen and examined at the bedside today.  She still

reports fluid leakage from both legs.  Otherwise, she had a very good urine

output yesterday.  Urine output was around 5.7 liters and her weight is down as

compared with yesterday and kidney number is stable.

 

REVIEW OF SYSTEMS:  Patient denies any fevers, chills, rigors, headache, nausea,

vomiting, chest pain, shortness of breath, pain in abdomen, constipation or

diarrhea.  She reports persistent lower extremity edema and leakage of fluid from

the left leg blisters.

 

OBJECTIVE:  Vital signs:  Temperature is 97.2 degrees Fahrenheit, blood pressure

is 115/70, pulse is 78, respiratory rate of 18, saturating 92% on nasal cannula

at 3 liters per minute.  Intake and output:  Urine output recorded was 5.7 liters

yesterday, 2.4 liters so far today since overnight.  Weight on the bed scale is

160.3 kg.  It was 164 kg yesterday.

 

PHYSICAL EXAMINATION:

General:  Patient is awake, alert, oriented times three, sitting on the sofa in

no apparent distress.

Head and neck exam:  Extraocular muscles intact.  Pupils equal, round and

reactive to light.  Neck is supple.  There is no jugular venous distention (JVD).

 

Cardiovascular:  S1, S2, regular rate.  No murmur, rub or gallop.

Respiratory:  Chest is clear to auscultation bilaterally.  Bilateral equal air

entry.  Patient is breathing nasal cannula at 3 liters per minute.

Abdomen:  Soft, obese, positive bowel sounds, positive abdominal wall edema.

Extremities:  Patient has bilateral lower extremity edema and erythema.  Edema is

spreading up to the thighs and it is almost 2+ and there are weeping blisters on

the left leg.

Central nervous system: No focal neurological deficit.  Power is 5/5 in all

extremities.

 

LAB REVIEW:  CBC showed WBC of 8.2, hemoglobin 8.1, platelet 220.  BMP showed

sodium 140, potassium 0.7, chloride 97, bicarbonate 37.  BUN 45, creatinine 1.22.

Calcium 8.8.

 

CURRENT MEDICATIONS: Patient's medications were all reviewed by me.  Pertinent

change in the medications include torsemide 50 mg by mouth twice daily,

spironolactone continues to be 25 mg twice daily and she has been started on

metolazone 2.5 mg by mouth daily.

 

ASSESSMENT:  66-year-old female with past medical history of morbid obesity,

chronic systolic and diastolic congestive heart failure.  Nephrology service

following the patient for management of fluid overload.

 

PLAN:

1.  Acute on chronic congestive heart failure.  Patient is having very good urine

output with the current regimen.  Since she is responding well to the combination

of torsemide and metolazone, I decreased the dose of torsemide to 50 mg by mouth

twice daily.  Continue the current regimen at this time.  Continue 24 hour intake

and output and daily weights.

 

2.  Metabolic alkalosis.  Metabolic alkalosis is secondary to aggressive

diuresis.  I have decreased the torsemide dose to 50 mg by mouth today and I will

follow the labs tomorrow.  Continue the current regimen at this time.

 

3.  Anemia and chronic kidney disease.  Patient got a dose of Aranesp yesterday.

Hopefully hemoglobin is going to respond to this.  She does not need any blood

transfusion at this time.

 

4.  Lower extremity edema, cellulitis.  Patient is status post IV antibiotics for

lower extremity edema.  Continue aggressive diuresis.  Rest of the management is

as per primary team.

## 2017-01-05 NOTE — IPNPDOC
Assessment/Plan


Date Seen


The patient was seen on 1/5/17.





Problems


Problems:  


(1) Chronic anemia


Status:  Chronic


Problem Text:  normocytic received venofer, prbc and darbapoietin


will transfuse 2 units  1/5/17





(2) Anasarca


Status:  Acute


Response to Treatment:  Improving


Problem Text:  due to diastolic congestive heart failure. 


on multiple diuretics. 





(3) Cellulitis


Status:  Acute


Problem Text:  on cefepime currently was on ceftriaxone before. 





(4) Acute on chronic diastolic CHF (congestive heart failure)


Status:  Acute


Problem Text:  continue diuresis , daily weights. 


on lasix, diamox and spironolactone. 





(5) Acute and chronic respiratory failure with hypoxia


Status:  Acute


Problem Text:  multifactorial due to CAP and diastolic chf exacerbation and 

fluid


slowly improving with diuresis and antibiotics. 





(6) CAP (community acquired pneumonia)


Status:  Resolved


Problem Text:  finished course on ceftriaxone. 





(7) Constipation


Status:  Resolved


Problem Text:  on bowel regimen 





(8) SINTIA (obstructive sleep apnea)


Status:  Chronic


(9) Morbid obesity


Status:  Chronic


(10) Diabetes


Status:  Chronic


(11) Hypertension


Status:  Chronic


(12) COPD (chronic obstructive pulmonary disease)


Status:  Chronic


Problem Text:  continue home meds.





(13) CAD (coronary artery disease)


Status:  Chronic


(14) Hyperlipidemia


Status:  Chronic


(15) Mitral stenosis and incompetence


Status:  Chronic


(16) Pulmonary hypertension


Status:  Chronic


(17) Afib


Status:  Chronic


Problem Text:  on pradaxa





(18) Allergy


Status:  Chronic


Problem Text:  continue loratidine





(19) Hematuria


Status:  Resolved


Problem Text:  possibly from trama from power will monitor if continues may 

have to hold anticoagulants temporarily. 





(20) Chronic respiratory failure with hypoxia


Status:  Chronic


Problem Text:  now at baseline level of oxygen by nasal canula





(21) RADHA (acute kidney injury)


Status:  Acute


Problem Text:  due to aggressive diuresis will continue to monitor.


diuretic dosage decreased today 








Plan / VTE


VTE Prophylaxis Ordered?:  Yes





Plan / Urinary Catheter


Reason for insertion/continuin:  Other-document below





Subjective


Review of Systems


CC/HPI


The patient is a 66-year-old female admitted with a reason for visit of Acute 

Respiratory Distress.


General:  Reports: Fatigue, Malaise


Constitutional:  Reports: Weakness





Objective


Physical Examination


General Exam:  Positive: Alert, No Acute Distress


Eye Exam:  Positive: Conjunctiva & lids normal, EOMI, PERRLA, 


   Negative: Sclera icteric


ENT Exam:  Positive: Atraumatic, Mucous membr. moist/pink, Pharynx Normal


Neck Exam:  Positive: Supple, 


   Negative: JVD, thyromegaly


Chest Exam:  Positive: Diminished


Heart Exam:  Positive: Normal S1, Normal S2, Rate Normal, Regular Rhythm


Abdomen Exam:  Positive: Normal bowel sounds, Soft, 


   Negative: Hepatospenomegaly, Tenderness


Extremity Exam:  Positive: Edema, Other (redness and inflammation), Swelling, 

Tenderness





Vital Signs/I&O





 Vital Signs








  Date Time  Temp Pulse Resp B/P Pulse Ox O2 Delivery O2 Flow Rate FiO2


 


1/5/17 09:08   18     


 


1/5/17 08:04  81  112/55    


 


1/5/17 07:00 98.0    95 Nasal Cannula 2.0 














 I&O- Last 24 Hours up to 6 AM 


 


 1/5/17





 05:59


 


Intake Total 960 ml


 


Output Total 3800 ml


 


Balance -2840 ml











Laboratory Data


Labs 24H


 Laboratory Tests 2


1/4/17 16:38: Bedside Glucose (Misc Panel) 153H


1/4/17 18:58: Bedside Glucose (Misc Panel) 363H


1/4/17 19:07: Bedside Glucose (Misc Panel) 179H


1/5/17 06:46: Bedside Glucose (Misc Panel) 139H


1/5/17 06:59: 


Anion Gap 8, Blood Urea Nitrogen 55H, Creatinine 1.42H, Sodium Level 139, 

Potassium Level 3.8, Chloride Level 95L, Carbon Dioxide Level 36H, Calcium 

Level 9.0, Glomerular Filtration Rate 39.4L


1/5/17 11:46: Bedside Glucose (Misc Panel) 218H


CBC/BMP


 Laboratory Tests


1/5/17 06:59








Calcium Level 9.0, Red Blood Count 2.34 L, Mean Corpuscular Volume 104.4 H, 

Mean Corpuscular Hemoglobin 30.7, Mean Corpuscular Hemoglobin Concent 29.4 L, 

Red Cell Distribution Width 19.9 H


FSBS





Laboratory Tests








Test


  1/4/17


16:38 1/4/17


18:58 1/4/17


19:07 1/5/17


06:46 Range/Units


 


 


Bedside Glucose (Misc Panel) 153 363 179 139   MG/DL














Test


  1/5/17


11:46 


  


  


  Range/Units


 


 


Bedside Glucose (Misc Panel) 218      MG/DL














BENITA PROCTOR DO Jan 5, 2017 14:17

## 2017-01-05 NOTE — IPN
DATE:  01/05/2017

 

SUBJECTIVE:  Patient was seen and examined at the bedside today in the morning.

The patient is getting an IV line because her hemoglobin dropped and she is going

to get a blood transfusion.  She continues to have good urine output.  Her renal

function is stable.

 

REVIEW OF SYSTEMS:  Patient denies any fevers, chills, rigors, headache, or chest

pain.  She does have a baseline shortness of breath.  She denies any pain in

abdomen, constipation or diarrhea.  She reports persistent lower extremity edema

and oozing of fluid from the left leg.  The rest of review of systems is

negative.

 

OBJECTIVE:  Vital signs:  Temperature is 98 degrees Fahrenheit, blood pressure is

112/55, pulse is 81, respiratory rate of 18, saturating 95% on nasal cannula at 2

liters per minute.  Intake and output:  Urine output recorded as 4.9 liters

yesterday.  Weight on the bed scale is 160.2 kg.

 

PHYSICAL EXAMINATION:

General:  Patient is awake, alert, oriented times three, laying in bed, in no

apparent distress.

Head and Neck Exam:  Extraocular muscles intact.  Pupils equal, round and

reactive to light.  Neck is supple.  There is no jugular venous distention (JVD).

 

Cardiovascular:  S1, S2, regular rate.  No murmur, rub or gallop.

Respiratory:  Chest is clear to auscultation bilaterally.  Bilateral equal air

entry.  Abdomen:  Soft, obese, positive bowel sounds, positive abdominal wall

edema.

Extremities:  Patient has bilateral lower extremity edema and erythema with

blisters on the left leg and edema is about 2+ and it is all the way up to the

knees.

Central Nervous System:  No focal neurological deficit.  Power is 5/5 in all

extremities.

 

LAB REVIEW:  CBC showed WBC of 8.5, hemoglobin 7.2, platelets 230.  BMP showed

sodium 139, potassium 3.8, chloride 95, bicarbonate 36, BUN 55, creatinine 1.4,

calcium 9.

 

CURRENT MEDICATIONS:  Patient's medications were all reviewed by me.  There is no

change in the medications at this time.

 

ASSESSMENT:  66-year-old female with past medical history of morbid obesity,

chronic systolic and diastolic congestive heart failure.  Nephrology service

following the patient for management of fluid overload.

 

PLAN:

1.  Anemia and chronic kidney disease.  Patient's hemoglobin has dropped to 7.2.

She is going to get two units of packed red blood cell (RBC) transfusion today.

She continues to get Aranesp 300 mcg subcutaneous every Sunday.  When patient is

discharged, she can be switched to Procrit 20,000 units subcutaneous every 2

weeks.

2.  Acute on chronic congestive heart failure.  Patient is having good response

to torsemide 50 mg twice a day, metolazone 2.5 mg daily and spironolactone 25 mg

by mouth twice a day.  Continue current dose.  Patient is having around 5-6

liters of urine every day and she is slowly losing all the fluid weight.

3.  Metabolic alkalosis.  Metabolic alkalosis is secondary to aggressive diuresis

and chronic COPD.  Her bicarbonate level is around 34 to 36 at this time.  I will

continue the current dose of diuretics.  If bicarbonate level gets worse, then

diuretic dose will need to be decreased.

## 2017-01-06 NOTE — IPNPDOC
Assessment/Plan


Date Seen


The patient was seen on 1/6/17.





Problems


Problems:  


(1) Chronic anemia


Status:  Chronic


Problem Text:  normocytic received venofer, prbc and darbapoietin


transfuse 2 units  1/5/17


will check repeat H&H and occult blood





(2) Anasarca


Status:  Acute


Response to Treatment:  Improving


Problem Text:  due to diastolic congestive heart failure. 


on multiple diuretics. 





(3) Cellulitis


Status:  Acute


Problem Text:  on cefepime currently was on ceftriaxone before. 





(4) Acute on chronic diastolic CHF (congestive heart failure)


Status:  Acute


Problem Text:  continue diuresis , daily weights. 


on lasix, diamox and spironolactone. 





(5) Acute and chronic respiratory failure with hypoxia


Status:  Acute


Problem Text:  multifactorial due to CAP and diastolic chf exacerbation and 

fluid


slowly improving with diuresis and antibiotics. 





(6) CAP (community acquired pneumonia)


Status:  Resolved


Problem Text:  finished course on ceftriaxone. 





(7) Constipation


Status:  Resolved


Problem Text:  on bowel regimen 





(8) SINTIA (obstructive sleep apnea)


Status:  Chronic


(9) Morbid obesity


Status:  Chronic


(10) Diabetes


Status:  Chronic


(11) Hypertension


Status:  Chronic


(12) COPD (chronic obstructive pulmonary disease)


Status:  Chronic


Problem Text:  continue home meds.





(13) CAD (coronary artery disease)


Status:  Chronic


(14) Hyperlipidemia


Status:  Chronic


(15) Mitral stenosis and incompetence


Status:  Chronic


(16) Pulmonary hypertension


Status:  Chronic


(17) Afib


Status:  Chronic


Problem Text:  on pradaxa





(18) Allergy


Status:  Chronic


Problem Text:  continue loratidine





(19) Hematuria


Status:  Resolved


Problem Text:  possibly from trama from power will monitor if continues may 

have to hold anticoagulants temporarily. 





(20) Chronic respiratory failure with hypoxia


Status:  Chronic


Problem Text:  now at baseline level of oxygen by nasal canula





(21) RADHA (acute kidney injury)


Status:  Acute


Problem Text:  due to aggressive diuresis will continue to monitor.


diuretic dosage decreased today 








Plan / VTE


VTE Prophylaxis Ordered?:  Yes





Plan / Urinary Catheter


Reason for insertion/continuin:  Other-document below





Subjective


Review of Systems


CC/HPI


The patient is a 66-year-old female admitted with a reason for visit of Acute 

Respiratory Distress.


Constitutional:  Denies: Chills, Fever, Malaise, Night Sweats, Weakness


Pulmonary:  Denies: Cough, Dyspnea





Objective


Physical Examination


General Exam:  Positive: Alert, No Acute Distress


Eye Exam:  Positive: Conjunctiva & lids normal, EOMI, PERRLA, 


   Negative: Sclera icteric


ENT Exam:  Positive: Atraumatic, Mucous membr. moist/pink, Pharynx Normal


Neck Exam:  Positive: Supple, 


   Negative: JVD, thyromegaly


Chest Exam:  Positive: Diminished


Heart Exam:  Positive: Normal S1, Normal S2, Rate Normal, Regular Rhythm


Abdomen Exam:  Positive: Normal bowel sounds, Soft, 


   Negative: Hepatospenomegaly, Tenderness


Extremity Exam:  Positive: Edema, Other (redness and inflammation), Swelling, 

Tenderness





Vital Signs/I&O





 Vital Signs








  Date Time  Temp Pulse Resp B/P Pulse Ox O2 Delivery O2 Flow Rate FiO2


 


1/6/17 14:00 98.0 103 17 99/54 92 Nasal Cannula 2.0 














 I&O- Last 24 Hours up to 6 AM 


 


 1/6/17





 05:59


 


Intake Total 1464 ml


 


Output Total 1600 ml


 


Balance -136 ml











Laboratory Data


Labs 24H


 Laboratory Tests 2


1/5/17 20:33: Bedside Glucose (Misc Panel) 168H


1/6/17 07:03: Bedside Glucose (Misc Panel) 174H


1/6/17 12:47: Bedside Glucose (Misc Panel) 141H


1/6/17 16:47: Bedside Glucose (Misc Panel) 199H


CBC/BMP


 Laboratory Tests


1/6/17 06:38








Red Blood Count 2.69 L, Mean Corpuscular Volume 102.3 H, Mean Corpuscular 

Hemoglobin 30.3, Mean Corpuscular Hemoglobin Concent 29.6 L, Red Cell 

Distribution Width 21.6 H





1/6/17 16:59








FSBS





Laboratory Tests








Test


  1/5/17


20:33 1/6/17


07:03 1/6/17


12:47 1/6/17


16:47 Range/Units


 


 


Bedside Glucose (Misc Panel) 168 174 141 199   MG/DL








Microbiology





 Microbiology


1/6/17 Stool Occult Blood (JAZZ) - Final, Complete








BENITA PROCTOR DO Jan 6, 2017 19:34

## 2017-01-06 NOTE — IPN
DATE:  01/06/2017

 

SUBJECTIVE:  The patient was seen and examined at the bedside today in the

morning. She was given two units of packed red blood cells transfusion yesterday.

Her hemoglobin is better. Her creatinine is slightly higher than yesterday, but

she is having good urine output and her leg edema is getting better.

 

REVIEW OF SYSTEMS: The patient denies any fever, chills, rigors, headache,

nausea, vomiting, chest pain. She does have mild baseline shortness of breath

requiring oxygen. She reports her leg edema is getting better, but she

persistently complains of fluid leakage from the left leg. The rest of the review

of systems is negative.

 

OBJECTIVE:

VITAL SIGNS: Temperature is 97.9 degrees Fahrenheit, blood pressure is 96/51,

pulse is 90, respiratory rate of 14, saturating 91% on nasal cannula.

 

INTAKE/OUTPUT: Urine output is not recorded well. Her urine output recorded is

1.6 liters yesterday and 600 mL so far today since overnight. Her weight on the

bed scale reported is 161.2 kg.

 

PHYSICAL EXAMINATION:

GENERAL: The patient is morbidly obese, awake, alert, oriented times three,

sitting in the sofa in no apparent distress.

HEAD AND NECK EXAM: Extraocular muscles intact. Pupils equally round and reactive

to light. Neck is supple. There is no jugular venous distention (JVD).

CARDIOVASCULAR: S1, S2, regular rate. No murmur, rub or gallop.

RESPIRATORY: Chest is clear to auscultation bilaterally. Bilateral equal air

entry. No rales or rhonchi. The patient is wearing oxygen.

ABDOMEN: Soft, obese, positive bowel sounds and positive abdominal wall edema.

EXTREMITIES:  The patient has bilateral lower extremity edema and erythema with

oozing of the fluid from the left leg, but her edema is significantly getting

better with the current dose of diuretics.

CENTRAL NERVOUS SYSTEM: No focal neurological deficit. Power is 5/5 in all

extremities.

 

LAB REVIEW: CBC showed a WBC of 9.9, hemoglobin 8.1; it is better than yesterday

- it was 7.2 yesterday. Platelets are 239. BMP showed sodium 139, potassium 3.8,

chloride 95, bicarbonate 36, BUN 55, creatinine 1.4; it was 1.3 yesterday.

Calcium is 9.

 

Microbiology: Stool for occult blood is pending today.

 

CURRENT MEDICATIONS: The patient's current medications were all reviewed by me.

There is no change in the medications at this time.

 

ASSESSMENT: A 66-year-old female with a past medical history of morbid obesity,

chronic systolic and diastolic congestive heart failure, nephrology service

following the patient for management of fluid overload.

 

PLAN:

1. Acute on chronic congestive heart failure and fluid overload. The patient

continues to have good urine output with the current dose of torsemide,

metolazone and spironolactone. Continue current dose.

 

2. Metabolic alkalosis. The patient's alkalosis is secondary to chronic

obstructive pulmonary disease (COPD) and oxygen dependence and part of it is

because of diuretics. Her serum bicarbonate level is fluctuating between 35-36. I

would continue the current dose of diuretics at this time.

 

3. Anemia in chronic kidney disease. The patient's fecal occult blood is sent; it

is pending. There is no evidence of any blood loss anywhere. The patient got two

units of packed red blood cells transfusion yesterday. No need of transfusion

today. Continue current dose of Aranesp 300 mcg subcu on Sunday. When the patient

goes home, she should be placed on Procrit 20,000 units every 2 weeks, and if her

hemoglobin does not come up, then she can be given Procrit 20,000 units every

week as well.

 

4. Bilateral lower extremity edema and blisters. The patient's edema is

significantly getting better with the current aggressive dose of diuretics. The

patient would benefit from compression dressings of the bilateral lower

extremities. Continue wound care as per nursing.

 

Plan of care was discussed with the hospitalist team, Dr. Cameron Healy.

## 2017-01-07 NOTE — IPNPDOC
Assessment/Plan


Date Seen


The patient was seen on 1/7/17.





Problems


Problems:  


(1) Chronic anemia


Status:  Chronic


Problem Text:  normocytic received venofer, prbc and darbapoietin


transfuse 2 units  1/5/17


first occult blood positive will repeat


pt may need to be evaluated by GI


will repeat labs


pt is on pradaxa for afib if her H&H continue to drop will hold pradaxa





(2) Anasarca


Status:  Acute


Response to Treatment:  Improving


Problem Text:  due to diastolic congestive heart failure. 


on multiple diuretics. 





(3) Cellulitis


Status:  Acute


Response to Treatment:  Improving





(4) Acute on chronic diastolic CHF (congestive heart failure)


Status:  Acute


Problem Text:  continue diuresis , daily weights. 








(5) Acute and chronic respiratory failure with hypoxia


Status:  Acute


Problem Text:  multifactorial due to CAP and diastolic chf exacerbation and 

fluid


slowly improving with diuresis and antibiotics. 





(6) CAP (community acquired pneumonia)


Status:  Resolved


Problem Text:  finished course on ceftriaxone. 





(7) Constipation


Status:  Resolved


Problem Text:  on bowel regimen 





(8) SINTIA (obstructive sleep apnea)


Status:  Chronic


(9) Morbid obesity


Status:  Chronic


(10) Diabetes


Status:  Chronic


(11) Hypertension


Status:  Chronic


(12) COPD (chronic obstructive pulmonary disease)


Status:  Chronic


Problem Text:  continue home meds.





(13) CAD (coronary artery disease)


Status:  Chronic


(14) Hyperlipidemia


Status:  Chronic


(15) Mitral stenosis and incompetence


Status:  Chronic


(16) Pulmonary hypertension


Status:  Chronic


(17) Afib


Status:  Chronic


Problem Text:  on pradaxa





(18) Allergy


Status:  Chronic


Problem Text:  continue loratidine





(19) Hematuria


Status:  Resolved


Problem Text:  possibly from trama from power 





(20) Chronic respiratory failure with hypoxia


Status:  Chronic


Problem Text:  now at baseline level of oxygen by nasal canula





(21) RADHA (acute kidney injury)


Status:  Acute


Response to Treatment:  Improving


Problem Text:  due to aggressive diuresis will continue to monitor.


diuretic dosage decreased today 








Plan / VTE


VTE Prophylaxis Ordered?:  Yes





Plan / Urinary Catheter


Reason for insertion/continuin:  Other-document below





Subjective


Review of Systems


CC/HPI


The patient is a 66-year-old female admitted with a reason for visit of Acute 

Respiratory Distress.


Events since last encounter


pt seen and examined, she states she feels better





Objective


Physical Examination


General Exam:  Positive: Alert, No Acute Distress


Eye Exam:  Positive: Conjunctiva & lids normal, EOMI, PERRLA, 


   Negative: Sclera icteric


ENT Exam:  Positive: Atraumatic, Mucous membr. moist/pink, Pharynx Normal


Neck Exam:  Positive: Supple, 


   Negative: JVD, thyromegaly


Chest Exam:  Positive: Diminished


Heart Exam:  Positive: Normal S1, Normal S2, Rate Normal, Regular Rhythm


Abdomen Exam:  Positive: Normal bowel sounds, Soft, 


   Negative: Hepatospenomegaly, Tenderness


Extremity Exam:  Positive: Edema, Other (redness and inflammation), Swelling, 

Tenderness





Vital Signs/I&O





 Vital Signs








  Date Time  Temp Pulse Resp B/P Pulse Ox O2 Delivery O2 Flow Rate FiO2


 


1/7/17 11:34   18     


 


1/7/17 08:25  89  108/58    


 


1/7/17 08:13     88   


 


1/7/17 06:00 97.1     Nasal Cannula 2.0 














 I&O- Last 24 Hours up to 6 AM 


 


 1/7/17





 06:00


 


Intake Total 1000 ml


 


Output Total 5900 ml


 


Balance -4900 ml











Laboratory Data


Labs 24H


 Laboratory Tests 2


1/6/17 16:47: Bedside Glucose (Misc Panel) 199H


1/6/17 20:17: Bedside Glucose (Misc Panel) 132H


1/7/17 06:24: Bedside Glucose (Misc Panel) 132H


1/7/17 11:32: Bedside Glucose (Misc Panel) 162H


1/7/17 13:18: 


Albumin 3.4, Blood Urea Nitrogen 56H, Creatinine 1.33H, Sodium Level 137, 

Potassium Level 3.2L, Chloride Level 93L, Carbon Dioxide Level 35H, Anion Gap 9

, Calcium Level 9.3, Glomerular Filtration Rate 42.5L, Phosphorus Level 3.1


CBC/BMP


 Laboratory Tests


1/6/17 16:59








1/7/17 13:18








Anion Gap 9


FSBS





Laboratory Tests








Test


  1/6/17


16:47 1/6/17


20:17 1/7/17


06:24 1/7/17


11:32 Range/Units


 


 


Bedside Glucose (Misc Panel) 199 132 132 162   MG/DL








Microbiology





 Microbiology


1/7/17 Stool Occult Blood (JAZZ) - Final, Complete


         


1/6/17 Stool Occult Blood (JAZZ) - Final, Complete








BENITA PROCTOR DO Jan 7, 2017 13:57

## 2017-01-08 NOTE — IPN
DATE:  01/07/2017

 

SUBJECTIVE:  The patient was seen and examined at the bedside today in the

morning.  I do not have the labs available today, but patient reports that she

had a lot of cramps of her upper extremities yesterday.  She was not given any

more blood transfusion yesterday.  She is otherwise hemodynamically stable.

 

REVIEW OF SYSTEMS:  The patient denies any fever, chills, rigors, headache,

nausea, vomiting, chest pain.  She has baseline shortness of breath requiring

oxygen.  She denies any pain in abdomen or constipation.  She reports her lower

extremity edema is significantly getting better.  However, she reported a lot of

cramps of her upper extremities.  All other review of systems is negative.

 

OBJECTIVE:

VITAL SIGNS: Temperature is 97.1 degrees Fahrenheit, blood pressure is 108/58,

pulse is 89, respiratory rate of 18, saturating 90% on nasal cannula at 2

liters.

INTAKE/OUTPUT:  Urine output recorded as 4.2 liters yesterday and 3.7 liters so

far today since overnight.

 

PHYSICAL EXAMINATION:

GENERAL:  The patient is awake, alert, oriented times three, sitting in the sofa

in no apparent distress.

HEAD AND NECK EXAM:  Extraocular muscles intact.  Pupils equally round and

reactive to light.  Neck is supple.  There is no jugular venous distention (JVD).

 

CARDIOVASCULAR:  S1, S2, regular rate.  No murmur, rub or gallop.

RESPIRATORY:  Chest is clear to auscultation bilaterally.  Bilateral equal air

entry.  No rales or rhonchi.

ABDOMEN:  Soft, obese.  Positive bowel sounds.  Nontender.  No organomegaly.

EXTREMITIES:  The patient has bilateral lower extremity edema which is almost 1+

and pitting up to the knees.  I believe her edema is slightly better as compared

with two days ago, but she persistently has lower extremity erythema and left leg

ulcerations.

CENTRAL NERVOUS SYSTEM:  No focal neurological deficit.  Power is 5/5 in all

extremities.

 

LAB REVIEW:  Hemoglobin yesterday was 8.8, hematocrit was 29.6.  There is no BMP

available today.

 

CURRENT MEDICATIONS:  The patient's current medications were all reviewed by me.

Pertinent changes in the medications include her torsemide dose has been

decreased to 40 mg by mouth twice a day.

 

ASSESSMENT:  66-year-old female with past medical history of morbid obesity,

chronic systolic and diastolic congestive heart failure, nephrology service

following the patient for management of fluid overload.

 

PLAN:

1.  Acute on chronic congestive heart failure. The patient's fluid overload is

getting better.  I have decreased the torsemide dose today to 40 mg by mouth

twice a day.  Continue metolazone 2.5 mg by mouth daily and spironolactone 25 mg

by mouth twice a day.  The patient was having a lot of upper extremity cramps and

that is why the torsemide dose is being decreased.

 

2.  Anemia and chronic kidney disease.  The patient got two units of packed red

blood cells transfusions two days ago.  Her fecal occult blood is positive.

Management is as per primary team.  The patient will probably need GI evaluation

to rule out GI bleed.

 

3.  Bilateral lower extremity edema and blisters.  The patient continues to have

good urine output, more than 4 liters every day, with the current dose of

diuretics, but because of the upper extremity cramps, I have decreased the dose

of torsemide to 40 mg twice a day.  Continue the current dose of metolazone and

spironolactone.  Oozing from the legs is getting better since the leg edema is

improving.  Wound care and rest of the management as per primary team.

## 2017-01-08 NOTE — IPNPDOC
Assessment/Plan


Date Seen


The patient was seen on 1/8/17.





Problems


Problems:  


(1) Chronic anemia


Status:  Chronic


Problem Text:  normocytic received venofer, prbc and darbapoietin


transfuse 2 units  1/5/17


first occult blood positive will repeat


pt may need to be evaluated by GI


will repeat labs


pt is on pradaxa for afib if her H&H continue to drop will hold pradaxa





(2) Anasarca


Status:  Acute


Response to Treatment:  Improving


Problem Text:  due to diastolic congestive heart failure. 


on multiple diuretics. 





(3) Cellulitis


Status:  Acute


Response to Treatment:  Improving





(4) Acute on chronic diastolic CHF (congestive heart failure)


Status:  Acute


Problem Text:  continue diuresis , daily weights. 








(5) Acute and chronic respiratory failure with hypoxia


Status:  Acute


Problem Text:  multifactorial due to CAP and diastolic chf exacerbation and 

fluid


slowly improving with diuresis and antibiotics. 





(6) CAP (community acquired pneumonia)


Status:  Resolved


Problem Text:  finished course on ceftriaxone. 





(7) Constipation


Status:  Resolved


Problem Text:  on bowel regimen 





(8) SINTIA (obstructive sleep apnea)


Status:  Chronic


(9) Morbid obesity


Status:  Chronic


(10) Diabetes


Status:  Chronic


(11) Hypertension


Status:  Chronic


(12) COPD (chronic obstructive pulmonary disease)


Status:  Chronic


Problem Text:  continue home meds.





(13) CAD (coronary artery disease)


Status:  Chronic


(14) Hyperlipidemia


Status:  Chronic


(15) Mitral stenosis and incompetence


Status:  Chronic


(16) Pulmonary hypertension


Status:  Chronic


(17) Afib


Status:  Chronic


Problem Text:  on pradaxa





(18) Allergy


Status:  Chronic


Problem Text:  continue loratidine





(19) Hematuria


Status:  Resolved


Problem Text:  possibly from trama from power 





(20) Chronic respiratory failure with hypoxia


Status:  Chronic


Problem Text:  now at baseline level of oxygen by nasal canula





(21) RADHA (acute kidney injury)


Status:  Acute


Response to Treatment:  Improving


Problem Text:  due to aggressive diuresis will continue to monitor.


diuretic dosage decreased today 








Plan / VTE


VTE Prophylaxis Ordered?:  Yes





Plan / Urinary Catheter


Reason for insertion/continuin:  Other-document below





Subjective


Review of Systems


CC/HPI


The patient is a 66-year-old female admitted with a reason for visit of Acute 

Respiratory Distress.





Objective


Physical Examination


General Exam:  Positive: Alert, No Acute Distress


Eye Exam:  Positive: Conjunctiva & lids normal, EOMI, PERRLA, 


   Negative: Sclera icteric


ENT Exam:  Positive: Atraumatic, Mucous membr. moist/pink, Pharynx Normal


Neck Exam:  Positive: Supple, 


   Negative: JVD, thyromegaly


Chest Exam:  Positive: Diminished


Heart Exam:  Positive: Normal S1, Normal S2, Rate Normal, Regular Rhythm


Abdomen Exam:  Positive: Normal bowel sounds, Soft, 


   Negative: Hepatospenomegaly, Tenderness


Extremity Exam:  Positive: Edema, Other (redness and inflammation), Swelling, 

Tenderness





Vital Signs/I&O





 Vital Signs








  Date Time  Temp Pulse Resp B/P Pulse Ox O2 Delivery O2 Flow Rate FiO2


 


1/8/17 21:07  99  123/83    


 


1/8/17 19:40     91 Nasal Cannula 2.0 


 


1/8/17 14:35   18     


 


1/8/17 14:00 97.9       














 I&O- Last 24 Hours up to 6 AM 


 


 1/8/17





 05:59


 


Intake Total 1080 ml


 


Output Total 5650 ml


 


Balance -4570 ml











Laboratory Data


Labs 24H


 Laboratory Tests 2


1/8/17 06:31: Bedside Glucose (Misc Panel) 129H


1/8/17 08:07: 


Albumin 3.0L, Blood Urea Nitrogen 54H, Creatinine 1.35H, Sodium Level 139, 

Potassium Level 3.6, Chloride Level 95L, Carbon Dioxide Level 35H, Anion Gap 9, 

Calcium Level 9.0, Glomerular Filtration Rate 41.8L, Magnesium Level 2.2, 

Phosphorus Level 4.4#


1/8/17 12:02: Bedside Glucose (Misc Panel) 158H


1/8/17 17:35: Bedside Glucose (Misc Panel) 169H


1/8/17 19:54: Bedside Glucose (Misc Panel) 140H


CBC/BMP


 Laboratory Tests


1/8/17 08:07








Anion Gap 9, Red Blood Count 2.93 L, Mean Corpuscular Volume 102.7 H, Mean 

Corpuscular Hemoglobin 30.3, Mean Corpuscular Hemoglobin Concent 29.5 L, Red 

Cell Distribution Width 20.3 H


FSBS





Laboratory Tests








Test


  1/8/17


06:31 1/8/17


12:02 1/8/17


17:35 1/8/17


19:54 Range/Units


 


 


Bedside Glucose (Misc Panel) 129 158 169 140   MG/DL








Microbiology





 Microbiology


1/7/17 Stool Occult Blood (JAZZ) - Final, Complete


         


1/6/17 Stool Occult Blood (JAZZ) - Final, Complete








BENITA PROCTOR DO Jan 8, 2017 22:31

## 2017-01-09 NOTE — ROOR
________________________________________________________________________________

Patient Name: Mimi Raphael              Procedure Date: 1/9/2017 5:49 PM

MRN: [id number]                          Account Number: [id number]

YOB: 1950                Age: 66

Room: Main OR                          Gender: Female

Note Status: Finalized                 

________________________________________________________________________________

 

Procedure:           Upper GI endoscopy

Indications:         Iron deficiency anemia, Suspected upper gastrointestinal 

                     bleeding, Suspected upper gastrointestinal bleeding in 

                     patient with chronic blood loss

Providers:           Rubio Peterson MD

Referring MD:        2. Inpatient 2. Inpatient

Requesting Provider: 

Medicines:           Monitored Anesthesia Care

Complications:       No immediate complications.

________________________________________________________________________________

Procedure:           Pre-Anesthesia Assessment:

                     - The heart rate, respiratory rate, oxygen saturations, 

                     blood pressure, adequacy of pulmonary ventilation, and 

                     response to care were monitored throughout the procedure.

                     The Endoscope was introduced through the mouth, and 

                     advanced to the second part of duodenum. The upper GI 

                     endoscopy was accomplished without difficulty. The 

                     patient tolerated the procedure well.

                                                                                

Findings:

     The Z-line was regular and was found 40 cm from the incisors.

     No other significant abnormalities were identified in a careful 

     examination of the esophagus.

     Two diminutive no bleeding angioectasias were found in the gastric body.

     The exam was otherwise without abnormality.

     The exam of the duodenum was otherwise normal.

                                                                                

Impression:          - Z-line regular, 40 cm from the incisors.

                     - Two non-bleeding angioectasias in the stomach.

                     - The examination was otherwise normal.

                     - No specimens collected.

                     - The examination was otherwise normal.

Recommendation:      - Patient has a contact number available for emergencies. 

                     The signs and symptoms of potential delayed complications 

                     were discussed with the patient. Return to normal 

                     activities tomorrow. Written discharge instructions were 

                     provided to the patient.

                     - High fiber diet.

                     - Discharge patient to home.

                     - Continue present medications.

                     - Return to referring physician.

                     - The findings and recommendations were discussed with 

                     the patient.

                                                                                

 

Rubio Peterson MD

____________________

Rubio Peterson MD

1/9/2017 6:32:25 PM

This report has been signed electronically.

Number of Addenda: 0

 

Note Initiated On: 1/9/2017 5:49 PM

Estimated Blood Loss:

     Estimated blood loss: none.

## 2017-01-09 NOTE — IPNPDOC
Assessment/Plan


Date Seen


The patient was seen on 1/9/17.





Problems


Problems:  


(1) Chronic anemia


Status:  Acute


Problem Text:  normocytic received venofer, prbc and darbapoietin


transfuse 2 units  1/5/17


pradaxa is stopped


s/p EGD no active bleed





(2) Anasarca


Status:  Acute


Response to Treatment:  Improving


Problem Text:  due to diastolic congestive heart failure. 


on multiple diuretics. 





(3) Cellulitis


Status:  Resolved


Response to Treatment:  Improving





(4) Acute on chronic diastolic CHF (congestive heart failure)


Status:  Acute


Problem Text:  continue diuresis , daily weights. 








(5) Acute and chronic respiratory failure with hypoxia


Status:  Acute


Problem Text:  multifactorial due to CAP and diastolic chf exacerbation and 

fluid


slowly improving with diuresis and antibiotics. 





(6) CAP (community acquired pneumonia)


Status:  Resolved


Problem Text:  finished course on ceftriaxone. 





(7) Constipation


Status:  Resolved


Problem Text:  on bowel regimen 





(8) SINTIA (obstructive sleep apnea)


Status:  Chronic


(9) Morbid obesity


Status:  Chronic


(10) Diabetes


Status:  Chronic


(11) Hypertension


Status:  Chronic


(12) COPD (chronic obstructive pulmonary disease)


Status:  Chronic


Problem Text:  continue home meds.





(13) CAD (coronary artery disease)


Status:  Chronic


(14) Hyperlipidemia


Status:  Chronic


(15) Mitral stenosis and incompetence


Status:  Chronic


(16) Pulmonary hypertension


Status:  Chronic


(17) Afib


Status:  Chronic


Problem Text:  pradaxa stopped


will speak with cardiology regarding furthur recommendations





(18) Allergy


Status:  Chronic


Problem Text:  continue loratidine





(19) Hematuria


Status:  Resolved


Problem Text:  possibly from trama from power 





(20) Chronic respiratory failure with hypoxia


Status:  Chronic


Problem Text:  now at baseline level of oxygen by nasal canula





(21) RADHA (acute kidney injury)


Status:  Acute


Response to Treatment:  Improving


Problem Text:  due to aggressive diuresis will continue to monitor.


diuretic dosage decreased today 








Plan / VTE


VTE Prophylaxis Ordered?:  Yes





Plan / Urinary Catheter


Reason for insertion/continuin:  Other-document below





Subjective


Review of Systems


CC/HPI


The patient is a 66-year-old female admitted with a reason for visit of Acute 

Respiratory Distress.


Skin:  Denies: Breakdown, Lesions, Rash


Pulmonary:  Denies: Cough, Dyspnea





Objective


Physical Examination


General Exam:  Positive: Alert, No Acute Distress


Eye Exam:  Positive: Conjunctiva & lids normal, EOMI, PERRLA, 


   Negative: Sclera icteric


ENT Exam:  Positive: Atraumatic, Mucous membr. moist/pink, Pharynx Normal


Neck Exam:  Positive: Supple, 


   Negative: JVD, thyromegaly


Chest Exam:  Positive: Diminished


Heart Exam:  Positive: Normal S1, Normal S2, Rate Normal, Regular Rhythm


Abdomen Exam:  Positive: Normal bowel sounds, Soft, 


   Negative: Hepatospenomegaly, Tenderness


Extremity Exam:  Positive: Edema, Other (redness and inflammation), Swelling, 

Tenderness





Vital Signs/I&O





 Vital Signs








  Date Time  Temp Pulse Resp B/P Pulse Ox O2 Delivery O2 Flow Rate FiO2


 


1/9/17 21:33   22   Nasal Cannula 2.0 


 


1/9/17 21:32  93  109/52    


 


1/9/17 18:50 97.4    96   














 I&O- Last 24 Hours up to 6 AM 


 


 1/9/17





 06:00


 


Intake Total 1560 ml


 


Output Total 2400 ml


 


Balance -840 ml











Laboratory Data


Labs 24H


 Laboratory Tests 2


1/9/17 06:28: 


Albumin 2.8L, Blood Urea Nitrogen 50H, Creatinine 1.25H, Sodium Level 139, 

Potassium Level 3.5, Chloride Level 95L, Carbon Dioxide Level 35H, Anion Gap 9, 

Calcium Level 8.7L, Glomerular Filtration Rate 45.6, Phosphorus Level 4.2


1/9/17 10:21: 


Activated Partial Thromboplast Time 49.8H, Prothromb Time International Ratio 

1.62, Prothrombin Time 19.3H


1/9/17 12:43: Bedside Glucose (Misc Panel) 146H


1/9/17 17:09: Bedside Glucose (Misc Panel) 143H


1/9/17 20:32: Bedside Glucose (Misc Panel) 168H


CBC/BMP


 Laboratory Tests


1/9/17 06:28








Anion Gap 9, Red Blood Count 2.57 L, Mean Corpuscular Volume 100.9 H, Mean 

Corpuscular Hemoglobin 30.6, Mean Corpuscular Hemoglobin Concent 30.4 L, Red 

Cell Distribution Width 18.5 H


FSBS





Laboratory Tests








Test


  1/9/17


12:43 1/9/17


17:09 1/9/17


20:32 Range/Units


 


 


Bedside Glucose (Misc Panel) 146 143 168   MG/DL








Microbiology





 Microbiology


1/7/17 Stool Occult Blood (JAZZ) - Final, Complete


         


1/6/17 Stool Occult Blood (JAZZ) - Final, Complete








BENITA PROCTOR DO Jan 9, 2017 22:45

## 2017-01-09 NOTE — IPN
DATE OF SERVICE:  01/09/2017

 

SUBJECTIVE:  Patient was seen this morning at bedside.  No acute overnight 
issues.  She states that she is feeling okay.  No complaints of chest pain or 
increased shortness of breath.   Lower extremities continue to have lower 
extremity edema, but oozing is improved. Patient previously had heme-positive 
stool, but does not appreciate any blood in her stool currently.  She does 
report that she had "bleeding in her throat"



OBJECTIVE:

VITAL SIGNS: Temperature 97.9, pulse 84, respiratory rate 18, blood pressure

106/55, pulse oximetry 97% on 2 liters.

INTAKE AND OUTPUT: Patient has had a net negative of 1840 mL in the previous 24

hours.  Current documented weight is 159 kg.  She has had a significant amount 
of

negative balance.

GENERAL:  Patient is alert and oriented, in no acute distress, lying in bed

comfortably. HEENT:  Normocephalic, atraumatic.  Extraocular muscles are intact.

Moist mucosa.

NECK:  Supple.  Jugular veins are up to her ear.

HEART:  Normal S1-S2.  Irregular.  I did not appreciate a murmur.

LUNGS:  Fair air movement bilaterally; however, diminished.  No distinct rhonchi

or wheezing appreciated.

ABDOMEN:  Morbidly obese.  Soft.  Nontender.

EXTREMITIES:  Patient has 2+ lower extremity edema with minimal drainage.  Legs 
are also dry and scaly.

SKIN: Ecchymoses present on right arm.

NEUROLOGIC:  No focal deficits.

 

LABORATORY DATA:

WBC 8.8, hemoglobin 7.9, hematocrit 25.9, platelet count 236. Sodium 139, 
potassium 3.5, chloride 95, carbon dioxide 35, anion gap 9, BUN 50,

creatinine 1.25.  GFR 45.6, fasting glucose 129, calcium 8.7, phosphorus 4.2, 
albumin 2.8.

 

ASSESSMENT AND PLAN:  Ms. Raphael is a 66-year-old female with past medical

history significant for morbid obesity, chronic systolic and diastolic 
congestive

heart failure, being followed for congestive heart failure (CHF) and fluid

overload.

 

1.  Acute on chronic systolic and diastolic congestive heart failure and fluid

overload.  Patient has been diuresed for several days within a net negative 1840

mL in the previous 24 hours.  No adjustments are being made to her medications 
as

she remains on torsemide 40 mg twice daily with metolazone 2.5 mg daily,

spironolactone 25 mg twice daily.  Renal function is improved.  Electrolytes are

stable.  Volume status does not appear to be quite compensated at this time and

will continue with current regimen.  Continue to monitor intake and output and

daily weights.

 

2.  Anemia and chronic kidney disease.  Hemoglobin is currently 7.9.  Will need

to monitor this closely.  Transfuse if needed.  She is status post two packs of

red blood cells on 01/05/2017.  Pradaxa has been held given her heme-positive

stool.  There appears to be a gastrointestinal (GI) consult in place for 
possible

GI bleed.

 

3.  Bilateral lower extremity edema.  Improving with current regimen of

diuretics.  Continue to monitor closely and keep legs elevated.

 

4.  Morbid obesity, which complicates care.

 

Thank you for allowing us to participate in the care of Ms. Raphael.

 

 

My preceptor for this patient encounter was Dr. Arsen Alvares.  The preceptor 
was

physically present in the building during the encounter and was fully available

as needed.  All aspects of the patient interview, examination, medical

decision-making process, and medical care plan development were reviewed and

approved by the preceptor.  The preceptor is aware and concurs with the plan as

stated in the body of this note and will attest to such by his/her co-signature.

GRISELDA

## 2017-01-10 NOTE — IPNPDOC
Assessment/Plan


Date Seen


The patient was seen on 1/10/17.





Problems


Problems:  


(1) Chronic anemia


Status:  Chronic


Response to Treatment:  Stable


Problem Text:  normocytic anemia received venofer, prbc and darbapoietin


Recently transfused 2 units on 1/5/17


s/p EGD no active bleed on 1/9/17 by Dr. Peterson of GI-he has recommended 

resumption of her previous medications


We will restart the patient on Pradaxa 150 mg twice a day given the patient's 

high chads Vasc score


We will continue to monitor her hemoglobin level.





(2) Anasarca


Status:  Acute


Response to Treatment:  Improving


Problem Text:  due to diastolic congestive heart failure. 


Patient has been managed on multiple diuretics including torsemide, 

spironolactone, metolazone by nephrology


She has been maintained on a negative fluid balance level over the last few 

days and her volume status has improved considerably.





(3) CAP (community acquired pneumonia)


Status:  Resolved


Problem Text:  Status post completion of trial of ceftriaxone. 





(4) Constipation


Status:  Resolved


Problem Text:  on bowel regimen 





(5) SINTIA (obstructive sleep apnea)


Status:  Chronic


(6) Morbid obesity


Status:  Chronic


(7) Diabetes


Status:  Chronic


(8) Hypertension


Status:  Chronic


(9) COPD (chronic obstructive pulmonary disease)


Status:  Chronic


Problem Text:  continue home meds.


Respiratory status has returned back to baseline status after adequate diuresis 

and trial of antibiotics for community acquired pneumonia.





(10) CAD (coronary artery disease)


Status:  Chronic


(11) Hyperlipidemia


Status:  Chronic


(12) Mitral stenosis and incompetence


Status:  Chronic


(13) Pulmonary hypertension


Status:  Chronic


(14) Afib


Status:  Chronic


Problem Text:  Continue on Dabigatran as there was no source of bleed found on 

EGD on 1/9/2017





(15) Allergy


Status:  Chronic


Problem Text:  continue loratidine








Plan / VTE


VTE Prophylaxis Ordered?:  Yes





Plan / Urinary Catheter


Reason for insertion/continuin:  Other-document below





Subjective


Review of Systems


CC/HPI


The patient is a 66-year-old female admitted with a reason for visit of Acute 

Respiratory Distress.


General:  Denies: Chills, Night Sweats


Constitutional:  Denies: Chills, Fever


ENT:  Denies: Ear Pain, Head Aches


Pulmonary:  Denies: Cough, Dyspnea


Cardiovascular:  Denies: Chest Pain, Palpitations


Gastrointestinal:  Denies: Abdominal Pain, Nausea, Vomiting


Hematologic:  Denies: Bleeding Excessively, Bruising





Objective


Physical Examination


General Exam:  Positive: Alert, No Acute Distress


Eye Exam:  Positive: Conjunctiva & lids normal, EOMI, PERRLA, 


   Negative: Sclera icteric


ENT Exam:  Positive: Atraumatic, Mucous membr. moist/pink, Pharynx Normal


Neck Exam:  Positive: Supple, 


   Negative: JVD, thyromegaly


Chest Exam:  Positive: Diminished


Heart Exam:  Positive: Normal S1, Normal S2, Rate Normal, Regular Rhythm


Abdomen Exam:  Positive: Normal bowel sounds, Soft, 


   Negative: Hepatospenomegaly, Tenderness


Extremity Exam:  Positive: Other (chronic venous static changes noted.), 

Swelling, 


   Negative: Tenderness





Vital Signs/I&O





 Vital Signs








  Date Time  Temp Pulse Resp B/P Pulse Ox O2 Delivery O2 Flow Rate FiO2


 


1/10/17 14:55   18     


 


1/10/17 14:00 98.4 94  129/61 93 Nasal Cannula 2.0 














 I&O- Last 24 Hours up to 6 AM 


 


 1/10/17





 06:00


 


Intake Total 960 ml


 


Output Total 2100 ml


 


Balance -1140 ml











Laboratory Data


Labs 24H


 Laboratory Tests 2


1/9/17 20:32: Bedside Glucose (Misc Panel) 168H


1/10/17 06:17: 


Albumin 3.1L, Blood Urea Nitrogen 44H, Creatinine 1.49H, Sodium Level 137, 

Potassium Level 3.6, Chloride Level 93L, Carbon Dioxide Level 37H, Anion Gap 7L

, Calcium Level 8.8, Glomerular Filtration Rate 37.3L, Phosphorus Level 4.0


1/10/17 11:37: Bedside Glucose (Misc Panel) 153H


1/10/17 16:48: Bedside Glucose (Misc Panel) 140H


CBC/BMP


 Laboratory Tests


1/10/17 06:17








Anion Gap 7 L, Red Blood Count 2.75 L, Mean Corpuscular Volume 99.4 H, Mean 

Corpuscular Hemoglobin 30.4, Mean Corpuscular Hemoglobin Concent 30.6 L, Red 

Cell Distribution Width 19.3 H


FSBS





Laboratory Tests








Test


  1/9/17


20:32 1/10/17


11:37 1/10/17


16:48 Range/Units


 


 


Bedside Glucose (Misc Panel) 168 153 140   MG/DL








Microbiology





 Microbiology


1/7/17 Stool Occult Blood (JAZZ) - Final, Complete


         


1/6/17 Stool Occult Blood (JAZZ) - Final, Complete








NELLY MONSON MD Aleksey 10, 2017 17:29

## 2017-01-10 NOTE — IPNPDOC
Date of Service/Time


Nov 25, 2016 at 13:00





Progress Note


DATE OF SERVICE:  01/10/2017


 


SUBJECTIVE:  Patient was seen this morning at bedside.  No acute overnight 

issues.  She states that she is feeling okay.  No complaints of maria teresa blood. 

She had an EGD done yesterday which did not show any active bleeding. No 

complaints of chest pain or increased shortness of breath.   Lower extremities 

continue to have lower extremity edema, no oozing this morning. No fevers or 

chills. No nausea, vomiting, abdominal pain or diarrhea.





OBJECTIVE:


VITAL SIGNS: Temperature 97.4, pulse 90, respiratory rate 18, blood pressure 104

/52, pulse oximetry 91% on 2 liters.


INTAKE AND OUTPUT: Patient has had a net negative of 660 mL in the previous 24 

hours.  Current documented weight is 159.3 kg.


GENERAL:  Patient is alert and oriented, in no acute distress, sitting up in 

chair. 


HEENT:  Normocephalic, atraumatic.  Extraocular muscles are intact. Moist 

mucosa.


NECK:  Supple.  Jugular veins are elevated.


HEART:  Normal S1-S2.  Irregularly irregular.  I did not appreciate a murmur.


LUNGS:  Fair air movement bilaterally; however, diminished.  No distinct 

rhonchi or wheezing appreciated.


ABDOMEN:  Morbidly obese.  Soft.  Nontender. Bowel sounds are present.


EXTREMITIES:  Patient has 1+ lower extremity edema. No cyanosis. Feet are warm 

and dry. She does have some erythema and dryness of lower extremity.


SKIN: Ecchymoses present on bilateral upper extremity.


NEUROLOGIC:  No focal deficits.


 


LABORATORY DATA:











Anion Gap 7 L, Red Blood Count 2.75 L, Mean Corpuscular Volume 99.4 H, Mean 

Corpuscular Hemoglobin 30.4, Mean Corpuscular Hemoglobin Concentration 30.6 L, 

Red Cell Distribution Width 19.3 H, Albumin 3.1L, Calcium Level 8.8, Phosphorus 

Level 4.0





 


ASSESSMENT AND PLAN:  Ms. Raphael is a 66-year-old female with past medical 

history significant for morbid obesity, chronic systolic and diastolic 

congestive heart failure, being followed for congestive heart failure (CHF) and 

fluid overload.


 


1.  Acute on chronic systolic and diastolic congestive heart failure and fluid 

overload.  Patient has been diuresing well. Creatinine is slightly more 

elevated today. We will continue to monitor this. No adjustments are being made 

to her medications as she remains on torsemide 40 mg twice daily with 

metolazone 2.5 mg daily, spironolactone 25 mg twice daily.  Electrolytes are 

stable. Volume status has improved.  Continue to monitor intake and output and 

daily weights.


 


2.  Anemia and chronic kidney disease.  Hemoglobin is currently 8.4. No signs 

of any maria teresa bleeding. She is status post two packs of red blood cells on 01/05/ 2017.  Pradaxa has been held given her heme-positive stool. Upper EGD did not 

show any active bleeding.


 


3.  Bilateral lower extremity edema.  Improving with current regimen of 

diuretics.  Continue to elevate legs.


 


4.  Morbid obesity, which certainly complicates care.





VS, I&O, 24H, Fishbone


VS, I&O, 24H, Fishbone








GME ATTESTATION


GME ATTESTATION


My preceptor for this patient encounter was physically present in the building 

during the encounter and was fully available. As needed, all aspects of the 

patient interview, examination, medical decision making process, and medical 

care plan development were reviewed and approved by the preceptor. Preceptor is 

aware and concurs with the plan as stated in the body of this note and will 

attest to such by his/her cosignature.








HUSEYIN HOWELL DO Aleksey 10, 2017 09:36

## 2017-01-11 NOTE — CR
DATE OF CONSULTATION:   2017

 

66-year white female admitted to [facility] on 2016.  The

patient has numerous multiple medical problems, includin.  History of myocardial infarction, status post two stents placed.

2.  Congestive heart failure (CHF).

3.  Chronic obstructive pulmonary disease (COPD).

4.  Diabetes mellitus.

5.  Hypercholesterolemia.

6.  Hypertension.

7.  Morbid obesity.

 

The patient is being seen for recent chronic anemia requiring at least 5 units of

packed cells over the last 5-7 days.

 

MEDICATIONS:

Include:

- Ventolin

- aspirin

- atorvastatin

- Pradaxa 150 mg by mouth twice a day for atrial fibrillation

- Flonase

- Lasix

- Breo

- Lisinopril

- famotidine

- metformin for diabetes

 

ALLERGIES:  The patient is allergic to SULFA.

 

PAST MEDICAL HISTORY:

Positive for:

1.  COPD on 3 liters per minute

2.  Diastolic congestive heart failure grade 2.

3.  Obstructive sleep apnea.

4.  Coronary artery disease with two stents.

5.  Dyslipidemia.

6.  Hypertension.

7.  Morbid obesity.

8.  History of bowel resection for ischemic bowel.

 

PAST SURGICAL HISTORY:

1.  Colon resection secondary to ischemia.

2.  Hiatal hernia repair times two.

3.  Right lumpectomy.

 

SOCIAL HISTORY:  The patient denies cigarettes since .  Alcohol:  The

patient's last drink was .

 

REVIEW OF SYSTEMS: Noncontributory to the above problem.

 

Laboratory studies show on 2017 white count 8800, hemoglobin and hematocrit

are 8 and 26.  The patient has be given 5 units of packed cells.  BUN is 50,

creatinine 1.25, albumin 2.8, INR was 1.62 down from 1.97.  The patient is not on

anticoagulation.

 

CT scan of the abdomen and pelvis on 2016 showed hepatomegaly with

irregular contour of the liver, ascites and gallstones.

 

PHYSICAL EXAMINATION:

GENERWAL:  She is a morbidly obese white female in no acute distress.

CHEST:  Chest is clear to auscultation.

CARDIOVASCULAR:  Cardiovascular exam showed an irregular rhythm.  No murmurs or

gallops.  No physiological split, S1, S2.

ABDOMEN:  Soft, nontender.  No masses, guarding, rebound.  Hepatosplenomegaly.

Bowel sounds positive.

 

ANALYSIS:

1.  GI bleeding of unknown etiology.  At the present time, we will to set the

patient up for upper endoscopy in the main operating room to rule out varices and

hypertensive portal gastropathy.

2.  Continue to transfuse.

3.  Discuss with cardiology the possible need for Pradaxa and/or possibility of

switching to something else to put her at less risk for bleeding.

## 2017-01-11 NOTE — IPNPDOC
Assessment/Plan


Date Seen


The patient was seen on 1/11/17.





Problems


Problems:  


(1) Chronic anemia


Status:  Chronic


Response to Treatment:  Stable


Problem Text:  normocytic anemia received venofer, prbc and darbapoietin


Recently transfused 2 units on 1/5/17


s/p EGD no active bleed on 1/9/17 by Dr. Peterson of GI-he has recommended 

resumption of her previous medications


We will restart the patient on Pradaxa 150 mg twice a day given the patient's 

high chads Vasc score


We will continue to monitor her hemoglobin level.





(2) Anasarca


Status:  Acute


Response to Treatment:  Improving


Problem Text:  due to diastolic congestive heart failure. 


Patient has been managed on multiple diuretics including torsemide, 

spironolactone, metolazone by nephrology


She has been maintained on a negative fluid balance level over the last few 

days and her volume status has improved considerably.





(3) CAP (community acquired pneumonia)


Status:  Resolved


Problem Text:  Status post completion of trial of ceftriaxone. 





(4) Constipation


Status:  Resolved


Problem Text:  on bowel regimen 





(5) SINTIA (obstructive sleep apnea)


Status:  Chronic


(6) Morbid obesity


Status:  Chronic


(7) Diabetes


Status:  Chronic


(8) Hypertension


Status:  Chronic


(9) COPD (chronic obstructive pulmonary disease)


Status:  Chronic


Problem Text:  continue home meds.


Respiratory status has returned back to baseline status after adequate diuresis 

and trial of antibiotics for community acquired pneumonia.





(10) CAD (coronary artery disease)


Status:  Chronic


(11) Hyperlipidemia


Status:  Chronic


(12) Mitral stenosis and incompetence


Status:  Chronic


(13) Pulmonary hypertension


Status:  Chronic


(14) Afib


Status:  Chronic


Problem Text:  Continue on Dabigatran as there was no source of bleed found on 

EGD on 1/9/2017





(15) Allergy


Status:  Chronic


Problem Text:  continue loratidine








Plan / VTE


VTE Prophylaxis Ordered?:  Yes





Plan / Urinary Catheter


Reason for insertion/continuin:  Other-document below





Subjective


Review of Systems


CC/HPI


The patient is a 66-year-old female admitted with a reason for visit of Acute 

Respiratory Distress.


General:  Denies: Chills, Night Sweats


Constitutional:  Denies: Chills, Fever


Eyes:  Denies: Pain, Vision change


ENT:  Denies: Ear Pain, Head Aches


Skin:  Denies: Lesions, Rash


Pulmonary:  Denies: Cough, Dyspnea


Cardiovascular:  Denies: Chest Pain, Palpitations


Gastrointestinal:  Denies: Nausea, Vomiting


Genitourinary:  Denies: Dysuria, Frequency


Hematologic:  Denies: Bleeding Excessively, Bruising





Objective


Physical Examination


General Exam:  Positive: Alert, Cooperative, No Acute Distress


Eye Exam:  Positive: Conjunctiva & lids normal, EOMI, PERRLA, 


   Negative: Sclera icteric


ENT Exam:  Positive: Atraumatic, Mucous membr. moist/pink, Pharynx Normal


Neck Exam:  Positive: Supple, 


   Negative: JVD, thyromegaly


Chest Exam:  Positive: Diminished


Heart Exam:  Positive: Normal S1, Normal S2, Rate Normal, Regular Rhythm


Abdomen Exam:  Positive: Normal bowel sounds, Soft, 


   Negative: Hepatospenomegaly, Tenderness


Extremity Exam:  Positive: Other (chronic venous static changes noted.), 

Swelling, 


   Negative: Tenderness





Vital Signs/I&O





 Vital Signs








  Date Time  Temp Pulse Resp B/P Pulse Ox O2 Delivery O2 Flow Rate FiO2


 


1/11/17 16:45   16     


 


1/11/17 14:00 97.9 85  118/59 91 Nasal Cannula 2.0 














 I&O- Last 24 Hours up to 6 AM 


 


 1/11/17





 06:00


 


Intake Total 1440 ml


 


Output Total 3400 ml


 


Balance -1960 ml











Laboratory Data


Labs 24H


 Laboratory Tests 2


1/10/17 20:38: Bedside Glucose (Misc Panel) 236H


1/11/17 06:20: 


Albumin 3.0L, Blood Urea Nitrogen 46H, Creatinine 1.32H, Sodium Level 136, 

Potassium Level 3.8, Chloride Level 93L, Carbon Dioxide Level 37H, Anion Gap 6L

, Calcium Level 8.8, Glomerular Filtration Rate 42.9L, Phosphorus Level 3.7


1/11/17 11:43: Bedside Glucose (Misc Panel) 194H


CBC/BMP


 Laboratory Tests


1/11/17 06:20








Anion Gap 6 L, Red Blood Count 2.80 L, Mean Corpuscular Volume 99.8 H, Mean 

Corpuscular Hemoglobin 31.2, Mean Corpuscular Hemoglobin Concent 31.2 L, Red 

Cell Distribution Width 17.6 H


FSBS





Laboratory Tests








Test


  1/10/17


20:38 1/11/17


11:43 Range/Units


 


 


Bedside Glucose (Misc Panel) 236 194   MG/DL








Microbiology





 Microbiology


1/7/17 Stool Occult Blood (JAZZ) - Final, Complete


         


1/6/17 Stool Occult Blood (JAZZ) - Final, Complete








NELLY MONSON MD Jan 11, 2017 16:59

## 2017-01-11 NOTE — IPNPDOC
Date of Service/Time


Nov 25, 2016 at 13:00





Progress Note


DATE OF SERVICE:  01/11/2017


 


SUBJECTIVE:  Patient was seen this morning at bedside.  No acute overnight 

issues.  She states that she is feeling well. She was all packed up and ready 

to go home however she found out that she does not have power in her home. She 

uses an electric heater and therefore must stay another day in the hospital. 

She denies any acute complaints. No chest pain or shortness of breath. No nausea

, vomiting, abdominal pain or diarrhea. No fevers or chills. No complaints of 

recurrent bleeding. Lower extremity edema improved.





OBJECTIVE:





 Vital Signs








  Date Time  Temp Pulse Resp B/P Pulse Ox O2 Delivery O2 Flow Rate FiO2


 


1/11/17 08:17  79  90/44    


 


1/11/17 08:00      Nasal Cannula 2.0 


 


1/11/17 06:00 98.2  15  97   














 I&O- Last 24 Hours up to 6 AM 


 


 1/11/17





 06:00


 


Intake Total 1440 ml


 


Output Total 3400 ml


 


Balance -1960 ml








GENERAL:  Patient is alert and oriented, in no acute distress, sitting up in 

chair. 


HEENT:  Normocephalic, atraumatic.  Extraocular muscles are intact. Moist 

mucosa.


NECK:  Supple.  Jugular veins are not significantly elevated.


HEART:  Normal S1-S2.  Irregularly irregular.  I did not appreciate a murmur.


LUNGS:  Good air movement bilaterally. No distinct rales, rhonchi or wheezing 

appreciated.


ABDOMEN:  Morbidly obese.  Soft.  Nontender. Bowel sounds are present.


EXTREMITIES:  Patient has 1+ lower extremity edema. No cyanosis. Feet are warm 

and dry. She does have some erythema and dryness of lower extremity.


NEUROLOGIC:  No focal deficits.


 


LABORATORY DATA:


1/11/17 06:20








Albumin 3.0L, Anion Gap 6L, Calcium Level 8.8, Glomerular Filtration Rate 42.9L

, Phosphorus Level 3.7, Red Blood Count 2.80 L, Mean Corpuscular Volume 99.8 H, 

Mean Corpuscular Hemoglobin 31.2, Mean Corpuscular Hemoglobin Concentration 

31.2 L, Red Cell Distribution Width 17.6 H





 


ASSESSMENT AND PLAN:  Ms. Raphael is a 66-year-old female with past medical 

history significant for morbid obesity, chronic systolic and diastolic 

congestive heart failure, being followed for congestive heart failure (CHF) and 

fluid overload.


 


1.  Acute on chronic systolic and diastolic congestive heart failure and fluid 

overload.  Patient has been diuresing well. Volume status appears better 

compensated. Renal function improved. No adjustments are being made to her 

medications as she remains on torsemide 40 mg twice daily, metolazone 2.5 mg 

daily, and spironolactone 25 mg twice daily.  Electrolytes are stable.


 


2.  Anemia in chronic kidney disease.  Hemoglobin is currently stable. She is 

status post two units of red blood cells on 01/05/2017. 


 


3.  Bilateral lower extremity edema.  Improving with current regimen of 

diuretics.  Continue to elevate legs.


 


4.  Morbid obesity, which complicates care. 





DISPOSITION: Volume status and renal function are stable. Patient is able to be 

discharged to follow-up with nephrology in 2 weeks.





GME ATTESTATION


GME ATTESTATION


My preceptor for this patient encounter was physically present in the building 

during the encounter and was fully available. As needed, all aspects of the 

patient interview, examination, medical decision making process, and medical 

care plan development were reviewed and approved by the preceptor. Preceptor is 

aware and concurs with the plan as stated in the body of this note and will 

attest to such by his/her cosignature.








HUSEYIN HOWELL DO Jan 11, 2017 12:08

## 2017-01-12 NOTE — IPNPDOC
Date of Service/Time


Nov 25, 2016 at 13:00





Progress Note


DATE OF SERVICE:  01/12/2017


 


SUBJECTIVE:  Patient was seen this morning at bedside.  No acute overnight 

issues.  She states that she is feeling well. Her power is back on and it looks 

like she will be able to go home today. She denies any acute complaints. No 

chest pain or shortness of breath. No nausea, vomiting, abdominal pain or 

diarrhea. No fevers or chills. No complaints of recurrent bleeding. Lower 

extremity edema improved.





OBJECTIVE:


Vital Signs








  Date Time  Temp Pulse Resp B/P Pulse Ox O2 Delivery O2 Flow Rate FiO2


 


1/12/17 09:00  89  92/51    


 


1/12/17 08:00      Nasal Cannula 2.0 


 


1/12/17 06:00 98.0  18  90   














 I&O- Last 24 Hours up to 6 AM 


 


 1/12/17





 06:00


 


Intake Total 840 ml


 


Output Total 3400 ml


 


Balance -2560 ml





 


GENERAL:  Patient is alert and oriented, in no acute distress, sitting up in 

chair. 


HEENT:  Normocephalic, atraumatic.  Extraocular muscles are intact. Moist 

mucosa.


NECK:  Supple.  Jugular veins are not significantly elevated.


HEART:  Normal S1-S2.  Irregularly irregular.  I did not appreciate a murmur.


LUNGS:  Good air movement bilaterally. No distinct rales, rhonchi or wheezing 

appreciated.


ABDOMEN:  Morbidly obese.  Soft.  Nontender. Bowel sounds are present.


EXTREMITIES:  Patient continues to have lower extremity edema, but improved. No 

cyanosis. Feet are warm and dry.


NEUROLOGIC:  No focal deficits.


 


LABORATORY DATA:


1/12/17 06:42








Red Blood Count 2.68 L, Mean Corpuscular Volume 99.7 H, Mean Corpuscular 

Hemoglobin 30.0, Mean Corpuscular Hemoglobin Concent 30.1 L, Red Cell 

Distribution Width 17.3 H, Albumin 3.0L, Anion Gap 6L, Calcium Level 8.5L, 

Glomerular Filtration Rate 46.5, Phosphorus Level 3.7





 


ASSESSMENT AND PLAN:  Ms. Raphael is a 66-year-old female with past medical 

history significant for morbid obesity, chronic systolic and diastolic 

congestive heart failure, being followed for congestive heart failure (CHF) and 

fluid overload.


 


1.  Acute on chronic systolic and diastolic congestive heart failure and fluid 

overload.  Patient has been diuresing well. Volume status appears better 

compensated. Renal function improved. No adjustments are being made to her 

medications as she remains on torsemide 40 mg twice daily, metolazone 2.5 mg 

daily, and spironolactone 25 mg twice daily.  She should be discharged with 

this regimen.


 


2.  Hypokalemia. Potassium is decreased at 3.0. Additional oral supplementation 

was given. She is to continue with potassium 20 mg 3 times a day.





3. Anemia in chronic kidney disease.  Hemoglobin is slightly decreased at 8.0. 

No intervention needed at this time. She is status post two units of red blood 

cells on 01/05/2017. 


 


4.  Bilateral lower extremity edema.  Improving with current regimen of 

diuretics.  Continue to elevate legs.





DISPOSITION: Volume status and renal function are stable. Patient can be 

discharged to follow-up with nephrology in 2 weeks.





GME ATTESTATION


GME ATTESTATION


My preceptor for this patient encounter was physically present in the building 

during the encounter and was fully available. As needed, all aspects of the 

patient interview, examination, medical decision making process, and medical 

care plan development were reviewed and approved by the preceptor. Preceptor is 

aware and concurs with the plan as stated in the body of this note and will 

attest to such by his/her cosignature.








HUSEYIN HOWELL DO Jan 12, 2017 13:01

## 2017-01-12 NOTE — IPNPDOC
Assessment/Plan


Date Seen


The patient was seen on 1/12/17.





Problems


Problems:  


(1) Chronic anemia


Status:  Chronic


Response to Treatment:  Stable


Problem Text:  normocytic anemia received venofer, prbc and darbapoietin


Recently transfused 2 units on 1/5/17


s/p EGD no active bleed on 1/9/17 by Dr. Peterson of GI-he has recommended 

resumption of her previous medications


Restarted the patient on Pradaxa 150 mg twice a day given the patient's high 

chads Vasc score and no active bleeding noted on EGD


We will continue to monitor her hemoglobin level.





(2) Anasarca


Status:  Chronic


Response to Treatment:  Improving


Problem Text:  due to diastolic congestive heart failure. 


Patient has been managed on multiple diuretics including torsemide, 

spironolactone, metolazone by nephrology


She has been maintained on a negative fluid balance level over the last few 

days and her volume status has improved considerably.





(3) CAP (community acquired pneumonia)


Status:  Resolved


Problem Text:  Status post completion of trial of ceftriaxone. 





(4) Constipation


Status:  Resolved


Problem Text:  on bowel regimen 





(5) SINTIA (obstructive sleep apnea)


Status:  Chronic


(6) Morbid obesity


Status:  Chronic


(7) Diabetes


Status:  Chronic


(8) Hypertension


Status:  Chronic


(9) COPD (chronic obstructive pulmonary disease)


Status:  Chronic


Problem Text:  continue home meds.


Respiratory status has returned back to baseline status after adequate diuresis 

and trial of antibiotics for community acquired pneumonia.





(10) CAD (coronary artery disease)


Status:  Chronic


(11) Hyperlipidemia


Status:  Chronic


(12) Mitral stenosis and incompetence


Status:  Chronic


(13) Pulmonary hypertension


Status:  Chronic


(14) Afib


Status:  Chronic


Problem Text:  Continue on Dabigatran as there was no source of bleed found on 

EGD on 1/9/2017





(15) Allergy


Status:  Chronic


Problem Text:  continue loratidine








Plan / VTE


VTE Prophylaxis Ordered?:  Yes





Plan / Urinary Catheter


Reason for insertion/continuin:  Other-document below





Subjective


Review of Systems


CC/HPI


The patient is a 66-year-old female admitted with a reason for visit of Acute 

Respiratory Distress.


General:  Denies: Chills, Night Sweats


Constitutional:  Denies: Chills, Fever


Eyes:  Denies: Pain, Vision change


ENT:  Denies: Ear Pain, Head Aches


Skin:  Denies: Lesions, Rash


Pulmonary:  Denies: Cough, Dyspnea


Cardiovascular:  Denies: Chest Pain, Palpitations


Gastrointestinal:  Denies: Abdominal Pain, Nausea, Vomiting





Objective


Physical Examination


General Exam:  Positive: Alert, Cooperative, No Acute Distress


Eye Exam:  Positive: Conjunctiva & lids normal, EOMI, PERRLA, 


   Negative: Sclera icteric


ENT Exam:  Positive: Atraumatic, Mucous membr. moist/pink, Pharynx Normal


Neck Exam:  Positive: Supple, 


   Negative: JVD, thyromegaly


Chest Exam:  Positive: Diminished


Heart Exam:  Positive: Normal S1, Normal S2, Rate Normal, Regular Rhythm


Abdomen Exam:  Positive: Normal bowel sounds, Soft, 


   Negative: Hepatospenomegaly, Tenderness


Extremity Exam:  Positive: Other (chronic venous static changes noted.), 

Swelling, 


   Negative: Tenderness





Vital Signs/I&O





 Vital Signs








  Date Time  Temp Pulse Resp B/P Pulse Ox O2 Delivery O2 Flow Rate FiO2


 


1/12/17 13:09   18  91   


 


1/12/17 09:00  89  92/51    


 


1/12/17 08:00      Nasal Cannula 2.0 


 


1/12/17 06:00 98.0       














 I&O- Last 24 Hours up to 6 AM 


 


 1/12/17





 06:00


 


Intake Total 840 ml


 


Output Total 3400 ml


 


Balance -2560 ml











Laboratory Data


Labs 24H


 Laboratory Tests 2


1/11/17 17:14: Bedside Glucose (Misc Panel) 153H


1/11/17 20:45: Bedside Glucose (Misc Panel) 178H


1/12/17 06:42: 


Albumin 3.0L, Blood Urea Nitrogen 49H, Creatinine 1.23H, Sodium Level 136, 

Potassium Level 3.0#L, Chloride Level 94L, Carbon Dioxide Level 36H, Anion Gap 

6L, Calcium Level 8.5L, Glomerular Filtration Rate 46.5, Phosphorus Level 3.7


1/12/17 12:06: Bedside Glucose (Misc Panel) 175H


CBC/BMP


 Laboratory Tests


1/12/17 06:42








Anion Gap 6 L, Red Blood Count 2.68 L, Mean Corpuscular Volume 99.7 H, Mean 

Corpuscular Hemoglobin 30.0, Mean Corpuscular Hemoglobin Concent 30.1 L, Red 

Cell Distribution Width 17.3 H


FSBS





Laboratory Tests








Test


  1/11/17


17:14 1/11/17


20:45 1/12/17


12:06 Range/Units


 


 


Bedside Glucose (Misc Panel) 153 178 175   MG/DL








Microbiology





 Microbiology


1/7/17 Stool Occult Blood (JAZZ) - Final, Complete


         


1/6/17 Stool Occult Blood (JAZZ) - Final, Complete








NELLY MONSON MD Jan 12, 2017 14:35

## 2017-01-13 NOTE — IPNPDOC
Assessment/Plan


Date Seen


The patient was seen on 1/13/17.





Problems


Problems:  


(1) Chronic anemia


Status:  Chronic


Response to Treatment:  Stable


Problem Text:  normocytic anemia received venofer, prbc and darbapoietin


Recently transfused 2 units on 1/5/17, however has received 5 units total since 

12/20/2016


s/p EGD no active bleed on 1/9/17 by Dr. Peterson of GI


The patient's hemoglobin has drifted down to 7.7 today. We will order an 

additional 2 units of packed red blood cells.


She has had no active bleeding since her last transfusion, however she has had 

2 stool occult positive samples


Given the fact that the patient has had multiple blood transfusions over the 

last 3 weeks, with positive stool occult samples we will discontinue the patient

's Pradaxa. I have discussed this with the patient's cardiologist Dr. Fishman 

and he agrees with the above. The patient will need an outpatient colonoscopy 

to further assess the etiology of her anemia. At which time, she can be 

reassessed for resumption of her anticoagulation.


I did explain to the patient in detail regarding the risks associated with 

continuing anticoagulation which includes death from bleeding or a cardiac 

event from anemia. In addition, I did also explain to the patient that being 

off anticoagulation increases her risk of a stroke. However, at this time the 

patient is at higher risk for bleed as evidenced by her perpetual anemia, and 

thus the anticoagulation needs to be stopped at this time. The patient 

verbalized understanding of this and states that she will follow up as an 

outpatient for further investigation of her GI bleeding.





(2) Anasarca


Status:  Chronic


Response to Treatment:  Improving


Problem Text:  due to diastolic congestive heart failure. 


Patient has been managed on multiple diuretics including torsemide, 

spironolactone, metolazone by nephrology


She has been maintained on a negative fluid balance level over the last few 

days and her volume status has improved considerably.





(3) CAP (community acquired pneumonia)


Status:  Resolved


Problem Text:  Status post completion of trial of ceftriaxone. 





(4) Constipation


Status:  Resolved


Problem Text:  on bowel regimen 





(5) SINTIA (obstructive sleep apnea)


Status:  Chronic


(6) Morbid obesity


Status:  Chronic


(7) Diabetes


Status:  Chronic


(8) Hypertension


Status:  Chronic


(9) COPD (chronic obstructive pulmonary disease)


Status:  Chronic


Problem Text:  continue home meds.


Respiratory status has returned back to baseline status after adequate diuresis 

and trial of antibiotics for community acquired pneumonia.





(10) CAD (coronary artery disease)


Status:  Chronic


(11) Hyperlipidemia


Status:  Chronic


(12) Mitral stenosis and incompetence


Status:  Chronic


(13) Pulmonary hypertension


Status:  Chronic


(14) Afib


Status:  Chronic


Problem Text:  Pradaxa discontinued as noted above





(15) Allergy


Status:  Chronic


Problem Text:  continue loratidine








Plan / VTE


VTE Prophylaxis Ordered?:  Yes





Plan / Urinary Catheter


Reason for insertion/continuin:  Other-document below





Subjective


Review of Systems


CC/HPI


The patient is a 66-year-old female admitted with a reason for visit of Acute 

Respiratory Distress.


General:  Denies: Chills, Night Sweats


Constitutional:  Denies: Chills, Fever


Eyes:  Denies: Pain, Vision change


ENT:  Denies: Ear Pain, Head Aches


Skin:  Denies: Lesions, Rash


Pulmonary:  Denies: Cough, Dyspnea


Cardiovascular:  Denies: Chest Pain, Palpitations


Gastrointestinal:  Denies: Nausea, Vomiting


Hematologic:  Denies: Bleeding Excessively, Bruising





Objective


Physical Examination


General Exam:  Positive: Alert, Cooperative, No Acute Distress


Eye Exam:  Positive: Conjunctiva & lids normal, EOMI, PERRLA, 


   Negative: Sclera icteric


ENT Exam:  Positive: Atraumatic, Mucous membr. moist/pink, Pharynx Normal


Neck Exam:  Positive: Supple, 


   Negative: JVD, thyromegaly


Chest Exam:  Positive: Diminished


Heart Exam:  Positive: Normal S1, Normal S2, Rate Normal, Regular Rhythm


Abdomen Exam:  Positive: Normal bowel sounds, Soft, 


   Negative: Hepatospenomegaly, Tenderness


Extremity Exam:  Positive: Other (chronic venous static changes noted.), 

Swelling, 


   Negative: Tenderness





Vital Signs/I&O





 Vital Signs








  Date Time  Temp Pulse Resp B/P Pulse Ox O2 Delivery O2 Flow Rate FiO2


 


1/13/17 14:25   20     


 


1/13/17 14:00 98.2 90  93/50 100 Nasal Cannula 2.0 














 I&O- Last 24 Hours up to 6 AM 


 


 1/13/17





 06:00


 


Intake Total 1320 ml


 


Output Total 5600 ml


 


Balance -4280 ml











Laboratory Data


Labs 24H


 Laboratory Tests 2


1/12/17 16:48: Bedside Glucose (Misc Panel) 183H


1/12/17 21:56: Bedside Glucose (Misc Panel) 158H


1/13/17 06:33: Bedside Glucose (Misc Panel) 144H


1/13/17 06:37: 


Albumin 3.0L, Blood Urea Nitrogen 52H, Creatinine 1.31H, Sodium Level 139, 

Potassium Level 3.5, Chloride Level 96L, Carbon Dioxide Level 34H, Anion Gap 9, 

Calcium Level 8.5L, Glomerular Filtration Rate 43.2L, Phosphorus Level 4.1


1/13/17 08:57: Bedside Glucose (Misc Panel) 204H


1/13/17 12:10: Bedside Glucose (Misc Panel) 159H


CBC/BMP


 Laboratory Tests


1/13/17 06:37








Anion Gap 9





1/13/17 06:38








Red Blood Count 2.61 L, Mean Corpuscular Volume 98.4 H, Mean Corpuscular 

Hemoglobin 29.5, Mean Corpuscular Hemoglobin Concent 30.0 L, Red Cell 

Distribution Width 18.7 H


FSBS





Laboratory Tests








Test


  1/12/17


16:48 1/12/17


21:56 1/13/17


06:33 1/13/17


08:57 Range/Units


 


 


Bedside Glucose (Misc Panel) 183 158 144 204   MG/DL














Test


  1/13/17


12:10 


  


  


  Range/Units


 


 


Bedside Glucose (Misc Panel) 159      MG/DL








Microbiology





 Microbiology


1/7/17 Stool Occult Blood (JAZZ) - Final, Complete


         


1/6/17 Stool Occult Blood (JAZZ) - Final, Complete








NELLY MONSON MD Jan 13, 2017 15:04

## 2017-01-13 NOTE — IPNPDOC
Date of Service/Time


Nov 25, 2016 at 13:00





Progress Note


DATE OF SERVICE:  01/13/2017


 


SUBJECTIVE:  Patient was seen this morning at bedside. She states that she is 

feeling well. There are still some home issues with her gas stove and water 

that needs to be sorted out before she can go home. She denies any acute 

complaints. No chest pain or shortness of breath. She is on her baseline oxygen 

requirement. No nausea, vomiting, abdominal pain or diarrhea. No fevers or 

chills. 





OBJECTIVE:


Vital Signs








  Date Time  Temp Pulse Resp B/P Pulse Ox O2 Delivery O2 Flow Rate FiO2


 


1/13/17 09:28  68  88/46    


 


1/13/17 06:00 98.4  16  98 Nasal Cannula 2.0 














 I&O- Last 24 Hours up to 6 AM 


 


 1/13/17





 06:00


 


Intake Total 1320 ml


 


Output Total 5600 ml


 


Balance -4280 ml





 


GENERAL:  Patient is alert and oriented, in no acute distress, sitting up in 

chair. 


HEENT:  Normocephalic, atraumatic.  Extraocular muscles are intact. Moist 

mucosa.


NECK:  Supple.  Jugular veins are not significantly elevated.


HEART:  Normal S1-S2.  Irregularly irregular.  I did not appreciate a murmur.


LUNGS:  Good air movement bilaterally. No distinct rales, rhonchi or wheezing 

appreciated.


ABDOMEN:  Morbidly obese.  Soft.  Nontender. Bowel sounds are present.


EXTREMITIES:  Patient continues to have lower extremity edema, but improved. No 

cyanosis. Feet are warm and dry.


NEUROLOGIC:  No focal deficits.


 


LABORATORY DATA:


1/13/17 06:37














1/13/17 06:38








Red Blood Count 2.61 L, Mean Corpuscular Volume 98.4 H, Mean Corpuscular 

Hemoglobin 29.5, Mean Corpuscular Hemoglobin Concentration 30.0 L, Red Cell 

Distribution Width 18.7 H, Anion Gap 9





 


ASSESSMENT AND PLAN:  Ms. Raphael is a 66-year-old female with past medical 

history significant for morbid obesity, chronic systolic and diastolic 

congestive heart failure, being followed for congestive heart failure (CHF) and 

fluid overload.


 


1.  Acute on chronic systolic and diastolic congestive heart failure and fluid 

overload.  Patient has been diuresing well. Volume status appears fairly 

compensated. Renal function is stable. Patient should be discharged on current 

regimen of torsemide 40 mg twice daily, metolazone 2.5 mg daily, and 

spironolactone 25 mg twice daily.


 


2.  Hypokalemia. Resolved. She should be on potassium 20 mg three times a day 

at home.





3. Anemia in chronic kidney disease.  Hemoglobin is decreased at 7.7. She 

should receive 2 units of packed red blood cells today. No signs of gross 

bleeding, however she is on Pradaxa . She also received two units of red blood 

cells on 01/05/2017. 


 


4.  Bilateral lower extremity edema.  Improving with current regimen of 

diuretics.  Continue to elevate legs.





DISPOSITION: Volume status and renal function are stable. Once home situation 

is resolved, she can be discharged to follow-up with nephrology in 2 weeks.





GME ATTESTATION


GME ATTESTATION


My preceptor for this patient encounter was physically present in the building 

during the encounter and was fully available. As needed, all aspects of the 

patient interview, examination, medical decision making process, and medical 

care plan development were reviewed and approved by the preceptor. Preceptor is 

aware and concurs with the plan as stated in the body of this note and will 

attest to such by his/her cosignature.








HUSEYIN HOWELL DO Jan 13, 2017 10:46

## 2017-01-14 NOTE — IPNPDOC
Assessment/Plan


Date Seen


The patient was seen on 1/14/17.





Problems


Problems:  


(1) Chronic anemia


Status:  Chronic


Response to Treatment:  Stable


Problem Text:  normocytic anemia received venofer, prbc and darbapoietin


Recently transfused 2 units on 1/5/17, and has received 5 units total since 12/ 20/2016


s/p EGD no active bleed on 1/9/17 by Dr. Peterson of GI 


The patient's hemoglobin drifted down to 7.7 on 1/13. Given 2 units of PRB's (7 

Total Units of PRBC's now given during admission)


She has had no active bleeding since her last transfusion, however she has had 

2 stool occult positive samples


We have discontinued the Pradaxa after discussing with Cardiology and 

explaining the risks and benefits associated with this with the patient


Hgb stable this AM





(2) Anasarca


Status:  Chronic


Response to Treatment:  Improving


Problem Text:  due to diastolic congestive heart failure. 


Patient has been managed on multiple diuretics including torsemide, 

spironolactone, metolazone by nephrology


She has been maintained on a negative fluid balance level over the last few 

days and her volume status has improved considerably.





(3) CAP (community acquired pneumonia)


Status:  Resolved


Problem Text:  Status post completion of trial of ceftriaxone. 





(4) Constipation


Status:  Resolved


Problem Text:  on bowel regimen 





(5) SINTIA (obstructive sleep apnea)


Status:  Chronic


(6) Morbid obesity


Status:  Chronic


(7) Diabetes


Status:  Chronic


(8) Hypertension


Status:  Chronic


(9) COPD (chronic obstructive pulmonary disease)


Status:  Chronic


Problem Text:  continue home meds.


Respiratory status has returned back to baseline status after adequate diuresis 

and trial of antibiotics for community acquired pneumonia.





(10) CAD (coronary artery disease)


Status:  Chronic


(11) Hyperlipidemia


Status:  Chronic


(12) Mitral stenosis and incompetence


Status:  Chronic


(13) Pulmonary hypertension


Status:  Chronic


(14) Afib


Status:  Chronic


Problem Text:  Pradaxa discontinued as noted above





(15) Allergy


Status:  Chronic


Problem Text:  continue loratidine








Plan / VTE


VTE Prophylaxis Ordered?:  Yes





Plan / Urinary Catheter


Reason for insertion/continuin:  Other-document below





Subjective


Review of Systems


CC/HPI


The patient is a 66-year-old female admitted with a reason for visit of Acute 

Respiratory Distress.


General:  Denies: Chills, Night Sweats


Constitutional:  Denies: Chills, Fever


Eyes:  Denies: Pain, Vision change


ENT:  Denies: Ear Pain, Head Aches


Skin:  Denies: Lesions, Rash


Pulmonary:  Denies: Cough, Dyspnea


Cardiovascular:  Denies: Chest Pain, Palpitations


Gastrointestinal:  Denies: Abdominal Pain, Nausea, Vomiting


Hematologic:  Denies: Bleeding Excessively, Bruising





Objective


Physical Examination


General Exam:  Positive: Alert, Cooperative, No Acute Distress


Eye Exam:  Positive: Conjunctiva & lids normal, EOMI, PERRLA, 


   Negative: Sclera icteric


ENT Exam:  Positive: Atraumatic, Mucous membr. moist/pink, Pharynx Normal


Neck Exam:  Positive: Supple, 


   Negative: JVD, thyromegaly


Chest Exam:  Positive: Diminished


Heart Exam:  Positive: Normal S1, Normal S2, Rate Normal, Regular Rhythm


Abdomen Exam:  Positive: Normal bowel sounds, Soft, 


   Negative: Hepatospenomegaly, Tenderness


Extremity Exam:  Positive: Other (chronic venous static changes noted.), 

Swelling, 


   Negative: Tenderness





Vital Signs/I&O





 Vital Signs








  Date Time  Temp Pulse Resp B/P Pulse Ox O2 Delivery O2 Flow Rate FiO2


 


1/14/17 09:00  88  105/54    


 


1/14/17 07:25      Nasal Cannula 2.0 


 


1/14/17 06:00 97.9  18  94   














 I&O- Last 24 Hours up to 6 AM 


 


 1/14/17





 06:00


 


Intake Total 1820 ml


 


Output Total 5100 ml


 


Balance -3280 ml











Laboratory Data


Labs 24H


 Laboratory Tests 2


1/13/17 12:10: Bedside Glucose (Misc Panel) 159H


1/13/17 16:59: Bedside Glucose (Misc Panel) 358H


1/13/17 20:52: Bedside Glucose (Misc Panel) 135H


1/14/17 05:29: 


Albumin 2.9L, Blood Urea Nitrogen 58H, Creatinine 1.28H, Sodium Level 136, 

Potassium Level 3.4L, Chloride Level 94L, Carbon Dioxide Level 35H, Anion Gap 7L

, Calcium Level 8.6L, Glomerular Filtration Rate 44.4L, Phosphorus Level 3.9


CBC/BMP


 Laboratory Tests


1/14/17 05:29








Anion Gap 7 L, Red Blood Count 2.84 L, Mean Corpuscular Volume 97.0 H, Mean 

Corpuscular Hemoglobin 30.9, Mean Corpuscular Hemoglobin Concent 31.9 L, Red 

Cell Distribution Width 18.3 H


FSBS





Laboratory Tests








Test


  1/13/17


12:10 1/13/17


16:59 1/13/17


20:52 Range/Units


 


 


Bedside Glucose (Misc Panel) 159 358 135   MG/DL








Microbiology





 Microbiology


1/7/17 Stool Occult Blood (JAZZ) - Final, Complete


         


1/6/17 Stool Occult Blood (JAZZ) - Final, Complete








NELLY MONSON MD Jan 14, 2017 11:52

## 2017-01-14 NOTE — IPNPDOC
Date/Time Seen


The patient was seen on 1/14/17 at 12:22.





Progress Note


DATE OF SERVICE:  01/14/2017


 


SUBJECTIVE:  Patient was seen this morning at bedside. She states that she didn'

t sleep well last night. She has some dry heaves, but none this morning. No 

emesis, abdominal pain or diarrhea. No fevers or chills. No chest pain or 

shortness of breath. She is on her baseline oxygen requirement. States that she 

has been urinating well.





OBJECTIVE:


Vital Signs








  Date Time  Temp Pulse Resp B/P Pulse Ox O2 Delivery O2 Flow Rate FiO2


 


1/14/17 09:00  88  105/54    


 


1/14/17 07:25      Nasal Cannula 2.0 


 


1/14/17 06:00 97.9  18  94   














 I&O- Last 24 Hours up to 6 AM 


 


 1/14/17





 06:00


 


Intake Total 1820 ml


 


Output Total 5100 ml


 


Balance -3280 ml








GENERAL:  Patient is alert and oriented, in no acute distress, sitting up in 

chair. 


HEENT:  Normocephalic, atraumatic.  Extraocular muscles are intact. Moist 

mucosa.


NECK:  Supple.  Jugular veins are not significantly elevated.


HEART:  Normal S1-S2.  Irregularly irregular.  I did not appreciate a murmur.


LUNGS: Diminished breath sounds, however clear.


ABDOMEN:  Morbidly obese.  Soft.  Nontender. Bowel sounds are present.


EXTREMITIES: She continues to have some lower extremity edema with chronic 

stasis changes. 


NEUROLOGIC:  No focal deficits.


 


LABORATORY DATA:


1/14/17 05:29








Red Blood Count 2.84 L, Mean Corpuscular Volume 97.0 H, Mean Corpuscular 

Hemoglobin 30.9, Mean Corpuscular Hemoglobin Concent 31.9 L, Red Cell 

Distribution Width 18.3 H, Albumin 2.9L, Anion Gap 7L, Calcium Level 8.6L, 

Glomerular Filtration Rate 44.4L, Phosphorus Level 3.9





 


ASSESSMENT AND PLAN:  Ms. Raphael is a 66-year-old female with past medical 

history significant for morbid obesity, chronic systolic and diastolic 

congestive heart failure, being followed for congestive heart failure (CHF) and 

fluid overload.


 


1.  Acute on chronic systolic and diastolic congestive heart failure and fluid 

overload.  Patient continues to diuresis, a little too well given that she has 

been on the same regimen. Renal function and volume status stable. She remains 

on torsemide 40 mg twice daily, metolazone 2.5 mg daily, and spironolactone 25 

mg twice daily. Lower extremity edema continues to improve.


 


2.  Mild Hypokalemia. Oral potassium supplementation given.





3. Anemia in chronic kidney disease. She is status post 2 units of packed red 

blood cells on 1/13. Hemoglobin is currently stable. Given her positive fecal 

occult blood test, she is no longer on Pradaxa. 





4. Hypertension. Blood pressure is stable. She is also on metoprolol in 

addition to torsemide, metolazone and spironolactone.





DISPOSITION: Once stable for discharge, she can follow-up with nephrology in 2 

weeks.





GME ATTESTATION


GME ATTESTATION


My preceptor for this patient encounter was physically present in the building 

during the encounter and was fully available. As needed, all aspects of the 

patient interview, examination, medical decision making process, and medical 

care plan development were reviewed and approved by the preceptor. Preceptor is 

aware and concurs with the plan as stated in the body of this note and will 

attest to such by his/her cosignature.








HUSYEIN HOWELL DO Jan 14, 2017 12:35

## 2017-01-15 NOTE — IPNPDOC
Assessment/Plan


Date Seen


The patient was seen on 1/15/17.





Problems


Problems:  


(1) Chronic anemia


Status:  Chronic


Response to Treatment:  Stable


Problem Text:  normocytic anemia received venofer, prbc and darbapoietin


Recently transfused 2 units on 1/5/17, and has received 5 units total since 12/ 20/2016


s/p EGD no active bleed on 1/9/17 by Dr. Peterson of GI 


The patient's hemoglobin drifted down to 7.7 on 1/13. Given 2 units of PRBC's (

7 Total Units of PRBC's now given since admission)


She has had no active bleeding since her last transfusion, however she has had 

2 stool occult positive samples


We will repeat her stool occult sample today


We have discontinued the Pradaxa after discussing with Cardiology and 

explaining the risks and benefits associated with this with the patient


Discussed the need for colonoscopy with Dr. Peterson of gastroenterology, we 

will tentatively schedule her for the scope tomorrow 





(2) Anasarca


Status:  Chronic


Response to Treatment:  Improving


Problem Text:  due to diastolic congestive heart failure. 


Patient has been managed on multiple diuretics including torsemide, 

spironolactone, metolazone by nephrology


She has been maintained on a negative fluid balance level over the last few 

days and her volume status has improved considerably.





(3) CAP (community acquired pneumonia)


Status:  Resolved


Problem Text:  Status post completion of trial of ceftriaxone. 





(4) Constipation


Status:  Resolved


Problem Text:  on bowel regimen 





(5) SINTIA (obstructive sleep apnea)


Status:  Chronic


(6) Morbid obesity


Status:  Chronic


(7) Diabetes


Status:  Chronic


(8) Hypertension


Status:  Chronic


(9) COPD (chronic obstructive pulmonary disease)


Status:  Chronic


Problem Text:  continue home meds.


Respiratory status has returned back to baseline status after adequate diuresis 

and trial of antibiotics for community acquired pneumonia.





(10) CAD (coronary artery disease)


Status:  Chronic


(11) Hyperlipidemia


Status:  Chronic


(12) Mitral stenosis and incompetence


Status:  Chronic


(13) Pulmonary hypertension


Status:  Chronic


(14) Afib


Status:  Chronic


Problem Text:  Pradaxa discontinued as noted above





(15) Allergy


Status:  Chronic


Problem Text:  continue loratidine








Plan / VTE


VTE Prophylaxis Ordered?:  Yes





Plan / Urinary Catheter


Reason for insertion/continuin:  Other-document below





Subjective


Review of Systems


CC/HPI


The patient is a 66-year-old female admitted with a reason for visit of Acute 

Respiratory Distress.


General:  Denies: Chills, Night Sweats


Constitutional:  Denies: Chills, Fever


Eyes:  Denies: Pain, Vision change


ENT:  Denies: Ear Pain, Head Aches


Pulmonary:  Denies: Cough, Dyspnea


Cardiovascular:  Denies: Chest Pain, Palpitations


Gastrointestinal:  Denies: Abdominal Pain, Nausea, Vomiting





Objective


Physical Examination


General Exam:  Positive: Alert, Cooperative, No Acute Distress


Eye Exam:  Positive: Conjunctiva & lids normal, EOMI, PERRLA


ENT Exam:  Positive: Atraumatic, Mucous membr. moist/pink, Pharynx Normal


Neck Exam:  Positive: Supple


Chest Exam:  Positive: Diminished


Heart Exam:  Positive: Normal S1, Normal S2, Rate Normal, Regular Rhythm


Abdomen Exam:  Positive: Normal bowel sounds, Soft


Extremity Exam:  Positive: Other, Swelling





Vital Signs/I&O





 Vital Signs








  Date Time  Temp Pulse Resp B/P Pulse Ox O2 Delivery O2 Flow Rate FiO2


 


1/15/17 09:33   18   Room Air  


 


1/15/17 09:00  92  105/50    


 


1/15/17 06:00 97.4    98  2.0 














 I&O- Last 24 Hours up to 6 AM 


 


 1/15/17





 06:00


 


Intake Total 900 ml


 


Output Total 3150 ml


 


Balance -2250 ml











Laboratory Data


Labs 24H


 Laboratory Tests 2


1/14/17 12:09: Bedside Glucose (Misc Panel) 140H


1/14/17 17:10: Bedside Glucose (Misc Panel) 169H


1/14/17 20:22: Bedside Glucose (Misc Panel) 255H


1/15/17 06:29: Bedside Glucose (Misc Panel) 146H


1/15/17 08:17: 


Albumin 3.1L, Blood Urea Nitrogen 60H, Creatinine 1.30H, Sodium Level 137, 

Potassium Level 3.5, Chloride Level 95L, Carbon Dioxide Level 36H, Anion Gap 6L

, Calcium Level 8.8, Glomerular Filtration Rate 43.6L, Phosphorus Level 3.4


CBC/BMP


 Laboratory Tests


1/15/17 08:17








Anion Gap 6 L, Red Blood Count 2.86 L, Mean Corpuscular Volume 98.9 H, Mean 

Corpuscular Hemoglobin 29.8, Mean Corpuscular Hemoglobin Concent 30.1 L, Red 

Cell Distribution Width 19.3 H


FSBS





Laboratory Tests








Test


  1/14/17


12:09 1/14/17


17:10 1/14/17


20:22 1/15/17


06:29 Range/Units


 


 


Bedside Glucose (Misc Panel) 140 169 255 146   MG/DL








Microbiology





 Microbiology


1/7/17 Stool Occult Blood (JAZZ) - Final, Complete


         


1/6/17 Stool Occult Blood (JAZZ) - Final, Complete








NELLY MONSON MD Aleksey 15, 2017 11:47

## 2017-01-16 NOTE — IPNPDOC
Assessment/Plan


Date Seen


The patient was seen on 1/16/17.





Problems


Problems:  


(1) Chronic anemia


Status:  Chronic


Response to Treatment:  Stable


Problem Text:  normocytic anemia received venofer, prbc and darbapoietin


Recently transfused 2 units on 1/5/17, and has received 5 units total since 12/ 20/2016


s/p EGD no active bleed on 1/9/17 by Dr. Peterson of GI 


The patient's hemoglobin drifted down to 7.7 on 1/13. Given 2 units of PRBC's (

7 Total Units of PRBC's now given since admission)


She has had no active bleeding since her last transfusion, however she has had 

2 stool occult positive samples


Repeat Stool Occult study pending from 1/15


We have discontinued the Pradaxa after discussing with Cardiology and 

explaining the risks and benefits associated with this with the patient


Patient scheduled for Colonoscopy today





(2) Anasarca


Status:  Chronic


Response to Treatment:  Improving


Problem Text:  due to diastolic congestive heart failure. 


Patient has been managed on multiple diuretics including torsemide, 

spironolactone, metolazone by nephrology


She has been maintained on a negative fluid balance level over the last few 

days and her volume status has improved considerably.





(3) CAP (community acquired pneumonia)


Status:  Resolved


Problem Text:  Status post completion of trial of ceftriaxone. 





(4) Constipation


Status:  Resolved


Problem Text:  on bowel regimen 





(5) SINTIA (obstructive sleep apnea)


Status:  Chronic


(6) Morbid obesity


Status:  Chronic


(7) Diabetes


Status:  Chronic


(8) Hypertension


Status:  Chronic


(9) COPD (chronic obstructive pulmonary disease)


Status:  Chronic


Problem Text:  continue home meds.


Respiratory status has returned back to baseline status after adequate diuresis 

and trial of antibiotics for community acquired pneumonia.





(10) CAD (coronary artery disease)


Status:  Chronic


(11) Hyperlipidemia


Status:  Chronic


(12) Mitral stenosis and incompetence


Status:  Chronic


(13) Pulmonary hypertension


Status:  Chronic


(14) Afib


Status:  Chronic


Problem Text:  Pradaxa discontinued as noted above





(15) Allergy


Status:  Chronic


Problem Text:  continue loratidine








Plan / VTE


VTE Prophylaxis Ordered?:  Yes





Plan / Urinary Catheter


Reason for insertion/continuin:  Other-document below





Subjective


Review of Systems


CC/HPI


The patient is a 66-year-old female admitted with a reason for visit of Acute 

Respiratory Distress.


General:  Denies: Chills, Night Sweats


Constitutional:  Denies: Chills, Fever


Eyes:  Denies: Pain, Vision change


ENT:  Denies: Ear Pain, Head Aches


Skin:  Denies: Lesions, Rash


Pulmonary:  Denies: Cough, Dyspnea


Cardiovascular:  Denies: Chest Pain, Palpitations


Gastrointestinal:  Denies: Nausea, Vomiting


Genitourinary:  Denies: Dysuria, Frequency





Objective


Physical Examination


General Exam:  Positive: Alert, Cooperative, No Acute Distress


Eye Exam:  Positive: Conjunctiva & lids normal, EOMI, PERRLA


ENT Exam:  Positive: Atraumatic, Mucous membr. moist/pink, Pharynx Normal


Neck Exam:  Positive: Supple


Chest Exam:  Positive: Diminished


Heart Exam:  Positive: Normal S1, Normal S2, Rate Normal, Regular Rhythm


Abdomen Exam:  Positive: Normal bowel sounds, Soft


Extremity Exam:  Positive: Other, Swelling





Vital Signs/I&O





 Vital Signs








  Date Time  Temp Pulse Resp B/P Pulse Ox O2 Delivery O2 Flow Rate FiO2


 


1/16/17 09:34   18     


 


1/16/17 08:38  89  103/55    


 


1/16/17 06:00 97.5    95 Nasal Cannula 2.0 














 I&O- Last 24 Hours up to 6 AM 


 


 1/16/17





 06:00


 


Intake Total 3200 ml


 


Output Total 5600 ml


 


Balance -2400 ml











Laboratory Data


Labs 24H


 Laboratory Tests 2


1/15/17 16:51: Bedside Glucose (Misc Panel) 157H


1/16/17 06:36: 


Anion Gap 7L, Blood Urea Nitrogen 54H, Creatinine 1.13H, Sodium Level 135L, 

Potassium Level 3.2L, Chloride Level 91L, Carbon Dioxide Level 37H, Calcium 

Level 8.9, Glomerular Filtration Rate 51.3, Magnesium Level 2.0


1/16/17 11:33: Bedside Glucose (Misc Panel) 136H


CBC/BMP


 Laboratory Tests


1/16/17 06:36








Calcium Level 8.9, Red Blood Count 2.89 L, Mean Corpuscular Volume 97.2 H, Mean 

Corpuscular Hemoglobin 30.7, Mean Corpuscular Hemoglobin Concent 31.6 L, Red 

Cell Distribution Width 17.8 H


FSBS





Laboratory Tests








Test


  1/15/17


16:51 1/16/17


11:33 Range/Units


 


 


Bedside Glucose (Misc Panel) 157 136   MG/DL








Microbiology





 Microbiology


1/7/17 Stool Occult Blood (JAZZ) - Final, Complete


         


1/6/17 Stool Occult Blood (JAZZ) - Final, Complete








NELLY MONSON MD Jan 16, 2017 13:27

## 2017-01-16 NOTE — IPN
DATE:  01/16/2017

 

SUBJECTIVE: Patient as seen and examined at the bedside today in the morning. 
She

was sitting on the bedside commode, because she is getting the bowel prep done,

and she was to get the colonoscopy done today in the afternoon. Patient's renal

function continues to be stable. She continues to be on the same dose of

diuretics, and she is diuresing well with that dose.

 

REVIEW OF SYSTEMS: Patient denies any fevers, chills, rigors, headache, nausea,

vomiting, chest pain. She does report baseline shortness of breath. She denies

any nausea or vomiting, but she is having diarrhea because of the bowel prep 
that

she is receiving for colonoscopy. She reports that the lower extremity edema is

significantly better at this time, and leg blisters and ulcers are also

improving. Rest of review of systems is negative.

 

OBJECTIVE:

VITAL SIGNS: Temperature is 98.2 degrees Fahrenheit, blood pressure is 103/55,

pulse is 89, respiratory rate of 18, saturating 95% on nasal cannula. Intake and

output: Urine output recorded suicidal ideation 5100 mL yesterday and 1500 mL so

far today since overnight.

 

PHYSICAL EXAMINATION:

GENERAL: Patient is awake, alert, oriented times three, sitting on the commode 
at

this time. No apparent distress.

HEAD AND NECK: Extraocular muscles intact. Pupils equally round and reactive to

light. Neck is supple. There is no jugular venous distention (JVD).

CARDIOVASCULAR: S1, S2, regular rate. No murmur, rub, or gallop.

RESPIRATORY: Chest is clear to auscultation bilaterally. Bilateral equal air

entry.

ABDOMEN: Soft, obese, nontender. Hyperactive bowel sounds because of the bowel

prep.

EXTREMITIES: Patient has 1+ edema of the bilateral lower extremities; however,

the blisters and swelling are significantly better as compared with one week ago

when I last saw the patient.

CENTRAL NERVOUS SYSTEM: No focal neurological deficit. Power is 5/5 in all

extremities.

PSYCHIATRIC: Normal mood and affect.

 

LABORATORY REVIEW:

CBC showed a WBC 11.1, hemoglobin is 8.9, platelets are 283.

 

BMP showed sodium 135, potassium 3.2, chloride 91, bicarbonate 27, BUN 54,

creatinine is 1.13, calcium 8.9, magnesium is 2.

 

CURRENT MEDICATIONS: Patient's medications were all reviewed by me. There is no

change in the diuretic regimen at this time. She continues to be on metolazone

2.5 mg by mouth daily, torsemide 40 mg by mouth twice a day, spironolactone 25 
mg

by mouth twice a day.

 

ASSESSMENT:

A 66-year-old female with past medical history of morbid obesity, chronic

systolic and diastolic congestive heart failure, chronic oxygen dependence.

Nephrology service following the patient for management of congestive heart

failure, fluid overload.

 

PLAN:

1.  Acute on chronic congestive heart failure and fluid overload. Patient

continues to diurese very well with the current dose of torsemide 40 mg twice a

day, metolazone 2.5 mg daily, and spironolactone 25 mg by mouth twice a day.

Patient is slowly getting alkalotic with the current dose of diuretics, and she

is in negative fluid balance for the last few days. I would slowly titrate down

the dose of diuretics at this time now.

 

2.  Hypokalemia. Hypokalemia is secondary to aggressive diuresis. Patient was

already given a dose of potassium chloride 40 mEq by mouth times one dose today

morning by the primary team.

 

3.  Recurrent anemia with positive fecal occult blood test. Patient is status

post multiple transfusions during this admission, and fecal occult test done

multiple times during this admission came out to be positive. Her 
anticoagulation

was also held. Patient is getting the bowel prep done today for colonoscopy.

Continue current dose of Aranesp 300 mg subcutaneous on Sundays.

 

4.  Atrial fibrillation. Patient's heart rate is well controlled with current 
dose of

metoprolol. Pradaxa is on hold because of positive fecal occult blood test and

anemia. Rest of the management is as per primary team.

[facility]

## 2017-01-16 NOTE — ROOR
________________________________________________________________________________

Patient Name: Mimi Raphael              Procedure Date: 1/16/2017 1:42 PM

MRN: [id number]                          Account Number: [id number]

YOB: 1950                Age: 66

                                       Gender: Female

Note Status: Finalized                 

________________________________________________________________________________

 

Procedure:           Colonoscopy to Anastomosis

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

Providers:           Rubio Peterson MD

Referring MD:        2. Inpatient 2. Inpatient

Requesting Provider: 

Medicines:           Monitored Anesthesia Care

Complications:       No immediate complications.

________________________________________________________________________________

Procedure:           Pre-Anesthesia Assessment:

                     - The heart rate, respiratory rate, oxygen saturations, 

                     blood pressure, adequacy of pulmonary ventilation, and 

                     response to care were monitored throughout the procedure.

                     The Colonoscope was introduced through the anus and 

                     advanced to the ileocolonic anastomosis. The colonoscopy 

                     was performed without difficulty. The patient tolerated 

                     the procedure well. The quality of the bowel preparation 

                     was fair.

                                                                                

Findings:

     The perianal and digital rectal examinations were normal.

     Non-bleeding internal hemorrhoids were found during retroflexion. The 

     hemorrhoids were small and Grade I (internal hemorrhoids that do not 

     prolapse).

     Scattered small-mouthed diverticula were found in the recto-sigmoid 

     colon, in the sigmoid colon and in the descending colon.

     The exam was otherwise without abnormality on direct and retroflexion 

     views.

                                                                                

Impression:          - Non-bleeding internal hemorrhoids.

                     - Diverticulosis in the recto-sigmoid colon, in the 

                     sigmoid colon and in the descending colon.

                     - The examination was otherwise normal on direct and 

                     retroflexion views.

                     - No specimens collected.

                     - The examination was otherwise normal.

Recommendation:      - Patient has a contact number available for emergencies. 

                     The signs and symptoms of potential delayed complications 

                     were discussed with the patient. Return to normal 

                     activities tomorrow. Written discharge instructions were 

                     provided to the patient.

                     - Continue present medications.

                     - [facility].

                     - Return to referring physician.

                     - The findings and recommendations were discussed with 

                     the patient's family.

                                                                                

 

Rubio Peterson MD

____________________

Rubio Peterson MD

1/16/2017 4:32:20 PM

This report has been signed electronically.

Number of Addenda: 0

 

Note Initiated On: 1/16/2017 1:42 PM

Estimated Blood Loss:

     Estimated blood loss: none.

## 2017-01-17 NOTE — IPNPDOC
Assessment/Plan


Date Seen


The patient was seen on 1/17/17.





Problems


Problems:  


(1) Chronic anemia


Status:  Chronic


Response to Treatment:  Stable


Problem Text:  normocytic anemia received venofer, prbc and darbapoietin


Recently transfused 2 units on 1/5/17, and has received 5 units total since 12/ 20/2016


s/p EGD no active bleed on 1/9/17 , s/p colonoscopy 1/17/17 wnl by Dr. Peterson of GI 


(7 Total Units of PRBC's now given since admission)


She has had no active bleeding since her last transfusion, however she has had 

2 stool occult positive samples


We have discontinued the Pradaxa after discussing with Cardiology and 

explaining the risks and benefits associated with this with the patient, 

followup with cardiology as outpatient to determine when to restart 

anticoagulation


f/u hh transfuse as needed





(2) Anasarca


Status:  Chronic


Response to Treatment:  Improving


Problem Text:  due to diastolic congestive heart failure. 


Patient has been managed on multiple diuretics including torsemide, 

spironolactone, metolazone by nephrology


She has been maintained on a negative fluid balance level over the last few 

days and her volume status has improved considerably.


diuretics as per renal, f/u bun, cr mildly increased





(3) CAP (community acquired pneumonia)


Status:  Resolved


Problem Text:  Status post completion of trial of ceftriaxone. 





(4) Constipation


Status:  Resolved


Problem Text:  on bowel regimen 





(5) SINTIA (obstructive sleep apnea)


Status:  Chronic


Problem Text:  cpap





(6) Morbid obesity


Status:  Chronic


Problem Text:  complicating care





(7) Diabetes


Status:  Chronic


Problem Text:  insulin as per protocol;, f.u FS





(8) Hypertension


Status:  Chronic


(9) COPD (chronic obstructive pulmonary disease)


Status:  Chronic


Problem Text:  continue home meds.


Respiratory status has returned back to baseline status after adequate diuresis 

and trial of antibiotics for community acquired pneumonia.





(10) CAD (coronary artery disease)


Status:  Chronic


Problem Text:  c/w  asa, statin, betablocker





(11) Hyperlipidemia


Status:  Chronic


Problem Text:  statin





(12) Mitral stenosis and incompetence


Status:  Chronic


Problem Text:  outpatient





(13) Pulmonary hypertension


Status:  Chronic


(14) Afib


Status:  Chronic


Problem Text:  Pradaxa discontinued as noted above


beta blocker


cardiology as outpatient





(15) Allergy


Status:  Chronic


Problem Text:  continue loratidine





(16) CKD (chronic kidney disease)


Status:  Chronic


Problem Text:  f/u renal, f/u cr and bun








Plan / VTE


VTE Prophylaxis Ordered?:  Yes (SCD)





Plan / Urinary Catheter


Reason for insertion/continuin:  Other-document below





Disposition


pfs





Subjective


Review of Systems


CC/HPI


The patient is a 66-year-old female admitted with a reason for visit of Acute 

Respiratory Distress.


Events since last encounter


No acute events overnight, denied sob, fever, chill. no hematemesis, melena, 

reported hard stool


General:  Denies: Chills, Fatigue


Constitutional:  Denies: Chills, Fever


Eyes:  Denies: Pain, Vision change


ENT:  Denies: Ear Pain, Head Aches


Skin:  Denies: Rash


Pulmonary:  Denies: Cough, Dyspnea


Cardiovascular:  Denies: Chest Pain, Palpitations


Gastrointestinal:  Denies: Nausea, Vomiting


Genitourinary:  Denies: Dysuria, Frequency


Hematologic:  Denies: Bleeding Excessively, Bruising


Endocrine:  Denies: Polydipsia, Polyuria





Objective


Physical Examination


General Exam:  Positive: Alert, Cooperative, No Acute Distress


Eye Exam:  Positive: Conjunctiva & lids normal, EOMI, PERRLA


ENT Exam:  Positive: Atraumatic, Mucous membr. moist/pink, Pharynx Normal


Neck Exam:  Positive: Supple


Chest Exam:  Positive: Diminished


Heart Exam:  Positive: Normal S1, Normal S2, Rate Normal, Regular Rhythm


Abdomen Exam:  Positive: Normal bowel sounds, Soft


Extremity Exam:  Positive: Other, Swelling





Vital Signs/I&O





 Vital Signs








  Date Time  Temp Pulse Resp B/P Pulse Ox O2 Delivery O2 Flow Rate FiO2


 


1/17/17 14:00 98.6 89 20 121/83 95 Nasal Cannula 2.0 














 I&O- Last 24 Hours up to 6 AM 


 


 1/17/17





 06:00


 


Intake Total 1260 ml


 


Output Total 250 ml


 


Balance 1010 ml











Laboratory Data


Labs 24H


 Laboratory Tests 2


1/16/17 19:34: Bedside Glucose (Misc Panel) 312H


1/17/17 06:00: Bedside Glucose (Misc Panel) 171H


1/17/17 08:54: 


Anion Gap 9, Blood Urea Nitrogen 47H, Creatinine 1.41H, Sodium Level 136, 

Potassium Level 3.6, Chloride Level 94L, Carbon Dioxide Level 33H, Calcium 

Level 9.1, Glomerular Filtration Rate 39.7L, Magnesium Level 1.9


1/17/17 12:07: Bedside Glucose (Misc Panel) 151H


1/17/17 17:03: Bedside Glucose (Misc Panel) 240H


CBC/BMP


 Laboratory Tests


1/17/17 08:54








Calcium Level 9.1


FSBS





Laboratory Tests








Test


  1/16/17


19:34 1/17/17


06:00 1/17/17


12:07 1/17/17


17:03 Range/Units


 


 


Bedside Glucose (Misc Panel) 312 171 151 240   MG/DL








Microbiology





 Microbiology


1/7/17 Stool Occult Blood (JAZZ) - Final, Complete








LE SOLITARIO MD Jan 17, 2017 18:04

## 2017-01-17 NOTE — IPNPDOC
Date/Time Seen


The patient was seen on 1/17/17 at 11:07.





Progress Note





DATE OF ENCOUNTER:  01/17/2017


 


SUBJECTIVE:  Patient was seen this morning at bedside. She states that she is a 

little tired, but otherwise doing well. No acute overnight events. She had 

several bowel movements yesterday due to colonoscopy prep.





Review of systems is negative for chest pain, palpitations, increased shortness 

of breath, nausea, vomiting, abdominal pain, fever, chills, headache, 

dizziness. She is on her baseline oxygen requirement. States that she has been 

urinating well, no dysuria or hematuria.





OBJECTIVE:


Vital Signs








  Date Time  Temp Pulse Resp B/P Pulse Ox O2 Delivery O2 Flow Rate FiO2


 


1/17/17 08:21  96  131/59    


 


1/17/17 06:31 97.9  20  93 Nasal Cannula 2.0 














 I&O- Last 24 Hours up to 6 AM 


 


 1/17/17





 06:00


 


Intake Total 1260 ml


 


Output Total 250 ml


 


Balance 1010 ml








GENERAL:  Patient is alert and oriented, in no acute distress, sitting up in 

chair. 


HEENT:  Normocephalic, atraumatic.  Extraocular muscles are intact. Moist 

mucosa.


NECK:  Supple.  Jugular veins are not elevated.


HEART:  Normal S1-S2.  Irregularly irregular.  I did not appreciate a murmur.


LUNGS: Clear to auscultation bilaterally.


ABDOMEN:  Morbidly obese.  Soft.  Nontender. Bowel sounds are present.


EXTREMITIES: Lower extremity edema significantly improved.


SKIN: Dry and scaly in the lower extremities.


NEUROLOGIC:  No focal deficits.


 


LABORATORY DATA:


1/17/17 08:54








Calcium Level 9.1, Anion Gap 9, Glomerular Filtration Rate 39.7L, Magnesium 

Level 1.9





 


ASSESSMENT AND PLAN:  Ms. Raphael is a 66-year-old female with past medical 

history significant for morbid obesity, chronic systolic and diastolic 

congestive heart failure, being followed for congestive heart failure (CHF) and 

fluid overload.


 


1.  Acute on chronic systolic and diastolic congestive heart failure and fluid 

overload.  Patient continues to diurese well. Her creatinine worsened a little 

bit secondary to the diarrhea she was having due to colonoscopy prep. We will 

skip her evening dose of torsemide and resumed her on it tomorrow. She is to 

remain on torsemide 40 mg twice daily, spironolactone 25 mg twice daily. 

Metolazone is being changed to 2.5 mg every 48 hours. We will continue to 

monitor her volume status.


 


2.  Mild Hypokalemia, secondary to the diuresis. This has resolved. Oral 

potassium supplementation given 3 times daily.





3. Recurrent anemia. She is status post 2 units of packed red blood cells on 1/ 13. She has received a total of 7 units throughout her hospitalization. Her 

fecal occult blood test was positive and she underwent colonoscopy on 1/16 

which just showed nonbleeding internal hemorrhoids and diverticulosis. Upper 

endoscopy done on /9 showed two nonbleeding angiectasia in the stomach. No 

source for acute bleeding. She receives Aranesp once a week as well as iron 

supplementation daily. Pradaxa has been held due to this, which she takes for 

atrial fibrillation.





4. Hypertension. Blood pressure is stable. She is also on metoprolol in 

addition to torsemide, metolazone and spironolactone.





GME ATTESTATION


GME ATTESTATION


My preceptor for this patient encounter was physically present in the building 

during the encounter and was fully available. As needed, all aspects of the 

patient interview, examination, medical decision making process, and medical 

care plan development were reviewed and approved by the preceptor. Preceptor is 

aware and concurs with the plan as stated in the body of this note and will 

attest to such by his/her cosignature.





ATTENDING NOTE


Nephrology Attending:





Pt was seen and examined this morning. volume status is getting optimized. 

change Metolazone to alternate days now. I agree with above assessment and 

Recommendations.








HUSEYIN HOWELL DO Jan 17, 2017 11:22


PATTI AMBROCIO MD Jan 17, 2017 21:19

## 2017-01-18 NOTE — IPNPDOC
Date/Time Seen


The patient was seen on 1/18/17 at 17:59.





Progress Note


DATE OF ENCOUNTER:  01/18/2017


 


SUBJECTIVE:  Patient was seen this morning at bedside. She states that she 

feels well and may go home tomorrow. No acute overnight events. 





Review of systems is negative for chest pain, palpitations, increased shortness 

of breath, nausea, vomiting, abdominal pain, diarrhea, fever, chills, headache, 

dizziness. She is on her baseline oxygen requirement. States that she has been 

urinating well, no dysuria or hematuria. Reports that cramps in her hands have 

improved.





OBJECTIVE:





 Vital Signs








  Date Time  Temp Pulse Resp B/P Pulse Ox O2 Delivery O2 Flow Rate FiO2


 


1/18/17 15:19   18     


 


1/18/17 09:00  84  99/55    


 


1/18/17 09:00      Nasal Cannula 2.0 


 


1/18/17 06:00 97.2    97   














 I&O- Last 24 Hours up to 6 AM 


 


 1/18/17





 06:00


 


Intake Total 1540 ml


 


Output Total 2725 ml


 


Balance -1185 ml








GENERAL:  Patient is alert and oriented, in no acute distress, sitting up in 

chair. 


HEENT:  Normocephalic, atraumatic.  Extraocular muscles are intact. Moist 

mucosa.


NECK:  Supple.  Jugular veins are not elevated.


HEART:  Normal S1-S2.  Irregularly irregular.  I did not appreciate a murmur.


LUNGS: Clear to auscultation bilaterally.


ABDOMEN:  Morbidly obese.  Soft.  Nontender. Bowel sounds are present.


EXTREMITIES: Lower extremity edema significantly improved. Positive pedal 

pulses bilaterally.


SKIN: Dry and scaly in the lower extremities.


NEUROLOGIC:  No focal deficits. Moving all extremities.


 


LABORATORY DATA:


1/18/17 10:27








Red Blood Count 3.08 L, Mean Corpuscular Volume 97.3 H, Mean Corpuscular 

Hemoglobin 31.0, Mean Corpuscular Hemoglobin Concent 31.9 L, Red Cell 

Distribution Width 16.8 H, Albumin 3.1L, Anion Gap 11, Calcium Level 8.8, 

Glomerular Filtration Rate 44.4L, Magnesium Level 2.0, Phosphorus Level 3.5





 


ASSESSMENT AND PLAN:  Ms. Raphael is a 66-year-old female with past medical 

history significant for morbid obesity, chronic systolic and diastolic 

congestive heart failure, being followed for congestive heart failure (CHF) and 

fluid overload.


 


1.  Acute on chronic systolic and diastolic congestive heart failure and fluid 

overload.  Patient continues to diurese well. Her creatinine has improved. She 

remains on torsemide 40 mg twice daily, spironolactone 25 mg twice daily. 

Metolazone has been changed to 2.5 mg every 48 hours. Continue to monitor her 

volume status, which is stable at this time.


 


2.  Mild Hypokalemia. Oral potassium supplementation given 3 times daily.





3. Recurrent anemia. She is status post 2 units of packed red blood cells on 1/ 13. She has received a total of 7 units throughout her hospitalization. Her 

fecal occult blood test was positive and she underwent colonoscopy on 1/16 

which just showed nonbleeding internal hemorrhoids and diverticulosis. Upper 

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 No 

source for acute bleeding. She has received Aranesp and continues on iron 

supplementation daily. Pradaxa has been held due to this, which she takes for 

atrial fibrillation.





4. Hypertension. She is also on metoprolol in addition to torsemide, metolazone 

and spironolactone.





GME ATTESTATION


GME ATTESTATION


My preceptor for this patient encounter was physically present in the building 

during the encounter and was fully available. As needed, all aspects of the 

patient interview, examination, medical decision making process, and medical 

care plan development were reviewed and approved by the preceptor. Preceptor is 

aware and concurs with the plan as stated in the body of this note and will 

attest to such by his/her cosignature.





ATTENDING NOTE


Nephrology:





Pt was seen today AM. No labs available. Volume status is optimized. Cont 

current diuretic regimen.








HUSEYIN HOWELL DO Jan 18, 2017 18:10


PATTI AMBROCIO MD Jan 18, 2017 18:27

## 2017-01-18 NOTE — IPNPDOC
Assessment/Plan


Date Seen


The patient was seen on 1/18/17.





Problems


Problems:  


(1) Chronic anemia


Status:  Chronic


Response to Treatment:  Stable


Problem Text:  normocytic anemia received venofer, prbc and darbapoietin


Recently transfused 2 units on 1/5/17, and has received 5 units total since 12/ 20/2016


s/p EGD no active bleed on 1/9/17 , s/p colonoscopy 1/17/17 wnl by Dr. Peterson of GI 


(7 Total Units of PRBC's now given since admission)


She has had no active bleeding since her last transfusion, however she has had 

2 stool occult positive samples


We have discontinued the Pradaxa after discussing with Cardiology and 

explaining the risks and benefits associated with this with the patient, 

followup with cardiology as outpatient to determine when to restart 

anticoagulation


f/u hh transfuse as needed





(2) Anasarca


Status:  Chronic


Response to Treatment:  Improving


Problem Text:  due to diastolic congestive heart failure. 


Patient has been managed on multiple diuretics including torsemide, 

spironolactone, metolazone by nephrology


She has been maintained on a negative fluid balance level over the last few 

days and her volume status has improved considerably.


diuretics as per renal, f/u bun, cr mildly increased





(3) CAP (community acquired pneumonia)


Status:  Resolved


Problem Text:  Status post completion of trial of ceftriaxone. 





(4) Constipation


Status:  Resolved


Problem Text:  on bowel regimen 





(5) SINTIA (obstructive sleep apnea)


Status:  Chronic


Problem Text:  cpap





(6) Morbid obesity


Status:  Chronic


Problem Text:  complicating care





(7) Diabetes


Status:  Chronic


Problem Text:  insulin as per protocol;, f.u FS





(8) Hypertension


Status:  Chronic


(9) COPD (chronic obstructive pulmonary disease)


Status:  Chronic


Problem Text:  continue home meds.


Respiratory status has returned back to baseline status after adequate diuresis 

and trial of antibiotics for community acquired pneumonia.





(10) CAD (coronary artery disease)


Status:  Chronic


Problem Text:  c/w  asa, statin, betablocker





(11) Hyperlipidemia


Status:  Chronic


Problem Text:  statin





(12) Mitral stenosis and incompetence


Status:  Chronic


Problem Text:  outpatient





(13) Pulmonary hypertension


Status:  Chronic


(14) Afib


Status:  Chronic


Problem Text:  Pradaxa discontinued as noted above


beta blocker


cardiology as outpatient





(15) Allergy


Status:  Chronic


Problem Text:  continue loratidine





(16) CKD (chronic kidney disease)


Status:  Chronic


Problem Text:  f/u renal, f/u cr and bun








Plan / VTE


VTE Prophylaxis Ordered?:  Yes (SCD)





Plan / Urinary Catheter


Reason for insertion/continuin:  Other-document below





Disposition


likely DC tomorrow





Subjective


Review of Systems


CC/HPI


The patient is a 66-year-old female admitted with a reason for visit of Acute 

Respiratory Distress.


Events since last encounter


no acute events, denied chest pain, sob, fever, chill.


Constitutional:  Denies: Chills, Fever


Eyes:  Denies: Vision change


ENT:  Denies: Head Aches


Skin:  Denies: Lesions, Rash


Pulmonary:  Reports: Cough, 


   Denies: Dyspnea


Cardiovascular:  Denies: Chest Pain, Palpitations


Gastrointestinal:  Denies: Nausea, Vomiting


Genitourinary:  Denies: Dysuria, Frequency


Hematologic:  Denies: Bleeding Excessively, Bruising





Objective


Physical Examination


General Exam:  Positive: Alert, Cooperative, No Acute Distress


Eye Exam:  Positive: Conjunctiva & lids normal, EOMI, PERRLA


ENT Exam:  Positive: Atraumatic, Mucous membr. moist/pink, Pharynx Normal


Neck Exam:  Positive: Supple


Chest Exam:  Positive: Diminished


Heart Exam:  Positive: Normal S1, Normal S2, Rate Normal, Regular Rhythm


Abdomen Exam:  Positive: Normal bowel sounds, Soft


Extremity Exam:  Positive: Other, Swelling





Vital Signs/I&O





 Vital Signs








  Date Time  Temp Pulse Resp B/P Pulse Ox O2 Delivery O2 Flow Rate FiO2


 


1/18/17 15:19   18     


 


1/18/17 09:00  84  99/55    


 


1/18/17 09:00      Nasal Cannula 2.0 


 


1/18/17 06:00 97.2    97   














 I&O- Last 24 Hours up to 6 AM 


 


 1/18/17





 06:00


 


Intake Total 1540 ml


 


Output Total 2725 ml


 


Balance -1185 ml











Laboratory Data


Labs 24H


 Laboratory Tests 2


1/17/17 19:55: Bedside Glucose (Misc Panel) 189H


1/18/17 08:01: Bedside Glucose (Misc Panel) 227H


1/18/17 10:27: 


Albumin 3.1L, Blood Urea Nitrogen 52H, Creatinine 1.28H, Sodium Level 136, 

Potassium Level 3.4L, Chloride Level 91L, Carbon Dioxide Level 34H, Anion Gap 11

, Calcium Level 8.8, Glomerular Filtration Rate 44.4L, Magnesium Level 2.0, 

Phosphorus Level 3.5


1/18/17 11:40: Bedside Glucose (Misc Panel) 114


1/18/17 17:05: Bedside Glucose (Misc Panel) 172H


CBC/BMP


 Laboratory Tests


1/18/17 10:27








Anion Gap 11, Red Blood Count 3.08 L, Mean Corpuscular Volume 97.3 H, Mean 

Corpuscular Hemoglobin 31.0, Mean Corpuscular Hemoglobin Concent 31.9 L, Red 

Cell Distribution Width 16.8 H


FSBS





Laboratory Tests








Test


  1/17/17


19:55 1/18/17


08:01 1/18/17


11:40 1/18/17


17:05 Range/Units


 


 


Bedside Glucose (Misc Panel) 189 227 114 172   MG/DL














LE SOLITARIO MD Jan 18, 2017 18:53

## 2017-01-19 NOTE — IPNPDOC
Date/Time Seen


The patient was seen on 1/19/17 at 11:40.





Progress Note


DATE OF ENCOUNTER:  01/19/2017


 


SUBJECTIVE:  Patient was seen this morning at bedside. She states that she 

feels well and is going home today. No acute overnight events. 





Review of systems is negative for chest pain, palpitations, increased shortness 

of breath, nausea, vomiting, abdominal pain, diarrhea, fever, chills, headache, 

dizziness. She is on her baseline oxygen requirement. States that she has been 

urinating well, no dysuria or hematuria. Reports that cramps in her hands have 

resolved.





OBJECTIVE:


Vital Signs








  Date Time  Temp Pulse Resp B/P Pulse Ox O2 Delivery O2 Flow Rate FiO2


 


1/19/17 08:09  94  95/50    


 


1/19/17 07:49      Nasal Cannula 2.0 


 


1/19/17 06:00 97.2  18  93   














 I&O- Last 24 Hours up to 6 AM 


 


 1/19/17





 06:00


 


Intake Total 360 ml


 


Output Total 2600 ml


 


Balance -2240 ml








GENERAL:  Patient is alert and oriented, in no acute distress, sitting up in 

chair. 


HEENT:  Normocephalic, atraumatic.  Extraocular muscles are intact. Moist 

mucosa.


NECK:  Supple.  Jugular veins are not elevated.


HEART:  Normal S1-S2.  Irregularly irregular.  I did not appreciate a murmur.


LUNGS: Clear to auscultation bilaterally.


ABDOMEN:  Morbidly obese.  Soft.  Nontender. Bowel sounds are present.


EXTREMITIES: Minimal lower extremity edema. Positive pedal pulses bilaterally.


NEUROLOGIC:  No focal deficits. Cranial nerves II through XII are grossly 

intact. Moving all extremities.


 


LABORATORY DATA:


1/19/17 06:27








Calcium Level 9.0, Red Blood Count 3.07 L, Mean Corpuscular Volume 96.3 H, Mean 

Corpuscular Hemoglobin 29.9, Mean Corpuscular Hemoglobin Concent 31.1 L, Red 

Cell Distribution Width 16.6 H





 


ASSESSMENT AND PLAN:  Ms. Raphael is a 66-year-old female with past medical 

history significant for morbid obesity, chronic systolic and diastolic 

congestive heart failure, being followed for congestive heart failure (CHF) and 

fluid overload.


 


1.  Acute on chronic systolic and diastolic congestive heart failure and fluid 

overload.  Patient continues to diurese well. Her creatinine has improved. She 

remains on torsemide 40 mg twice daily, spironolactone 25 mg twice daily. 

Metolazone has been changed to 2.5 mg every 48 hours. Continue to monitor her 

volume status, which is stable at this time. She was instructed to weigh 

herself and continue to monitor her weight. If she has greater than 3-4 pound 

weight gain in 1-2 days, she should call the office.


 


2.  Hypokalemia. Oral potassium supplementation given. Her potassium regimen 

has been changed to 40 mg twice daily.





3. Recurrent anemia. She is status post 2 units of packed red blood cells on 1/ 13. She has received a total of 7 units throughout her hospitalization. Her 

fecal occult blood test was positive and she underwent colonoscopy on 1/16 

which just showed nonbleeding internal hemorrhoids and diverticulosis. Upper 

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 No 

source for acute bleeding. She has received Aranesp and continues on iron 

supplementation daily. Pradaxa has been held due to this, which she takes for 

atrial fibrillation. Continued monitoring as outpatient. 





4. Hypertension. She is also on metoprolol in addition to torsemide, metolazone 

and spironolactone.





DISPOSITION: She will be discharged home. She should follow up with nephrology 

in 1-2 weeks. Continue to monitor her weight and continue with fluid 

restriction at home. Take medications as instructed.





GME ATTESTATION


GME ATTESTATION


My preceptor for this patient encounter was physically present in the building 

during the encounter and was fully available. As needed, all aspects of the 

patient interview, examination, medical decision making process, and medical 

care plan development were reviewed and approved by the preceptor. Preceptor is 

aware and concurs with the plan as stated in the body of this note and will 

attest to such by his/her cosignature.





ATTENDING NOTE


Nephrology:





Pt was examined this AM during work rounds. Diuretic dose adjusted. Volume 

status is optimized. Renal function is stable. OK to discharge from nephrology 

standpoint.








HUSEYIN HOWELL DO Jan 19, 2017 11:47


PATTI AMBROCIO MD Jan 19, 2017 21:43

## 2017-01-20 NOTE — DSES
DATE OF ADMISSION:  11/25/2016

DATE OF DISCHARGE:  01/19/2017

 

PRIMARY CARE PROVIDER:  [facility] clinic, Dr. Olivas.

 

NEPHROLOGIST:  Dr. Alvares and Dr. Medeiros.

 

GASTROENTEROLOGIST:  Dr. Peterson

 

FINAL DIAGNOSES:

1.  Anemia.

2. Anasarca.

3.  Fluid overload due to diastolic congestive heart failure (CHF).

4. Community-acquired bacterial pneumonia.

5. Constipation.

6.  Obstructive sleep apnea.

7.  Morbid obesity.

8.  Diabetes.

9.  Hypertension.

10. Deconditioning.

11.  Chronic obstructive pulmonary disease (COPD).

12.  Coronary artery disease.

13.  Dyslipidemia.

14.  Mitral valve stenosis and incompetence.

15.  Pulmonary hypertension.

16.  Atrial fibrillation.

17.  Allergies.

18.  Chronic kidney disease (CKD).

 

HISTORY OF PRESENT ILLNESS:  This is a 66-year-old female with past medical

history of myocardial infarction, congestive heart failure with diastolic

dysfunction, COPD, diabetes, dyslipidemia, hypertension presented to [facility] on 11/25/2016 with worsening shortness of breath.  Stated that she

has had increasing shortness of breath, especially with any type of movement and

symptom has been progressively worsening.  Patient started having increased

oxygen requirement.  Baseline, patient is on 3 liter oxygen at home for COPD.

Denies any sputum production. Denies worsening cough.  Denies any wheezing.

Patient has worsening lower extremity swelling, anasarca.  Denies any fever or

chill.  Also having increasing diarrhea for the past week.  At baseline, patient

has bowel movement once a week.  Patient started having 2-3 bowel movements a

day, mostly watery, minimal form.  Last hospitalization June 2014.  Denies any

sick contact.  Patient was hypotensive on admission.  Respiratory rate was

tachypneic and hypoxic.  IV fluids was give with improvement in blood pressure.

Subsequently, patient was admitted.

 

HOSPITAL COURSE:  Patient initially was admitted.  Treated for sepsis secondary

to community-acquired pneumonia.  Patient completed antibiotic course.  Also have

progressively worsening normocytic anemia.  During the hospital course, patient

was transfused a total of 7 units packed red blood cells (PRBC).  Dr. Peterson

from gastroenterology was consulted for fecal occult positive gastrointestinal

(GI) bleed status post esophagogastroduodenoscopy (EGD) and colonoscopy with no

active bleeding found.  The case was discussed with cardiology.  Recommend

withholding Pradaxa.  Patient's hemoglobin and hematocrit subsequently

stabilized.  Patient also developed on admission anasarca with acute diastolic

congestive heart failure.  Patient was diuresed.  Diuretic was adjusted.

Nephrology was consulted.  Given patient's history of CKD and not responding very

well to diuretics, patient was on torsemide, spironolactone, metolazone as per

nephrology.  Electrolytes was followed and modified.  Patient completed treatment

of antibiotics for community-acquired bacterial pneumonia, and oxygen saturation

for acute on chronic hypoxic respiratory failure oxygen requirements continually

weaned.  Bowel regimen was provided.  Continuous positive airway pressure (CPAP)

was continued.  Patient was morbidly obese with deconditioning complicating care.

Insulin was provided.  Fingerstick was followed.  Blood pressure was monitored.

Home medication were continued.  Kidney function was also monitored.  Deep venous

thrombosis (DVT) prophylaxis was provided.  Furthermore, patient and family

services (PFS) was consulted given patient has significant issues at home with

heating and pipe bursting.  Patient does not have a safe environment to return

to.  As of right now, it is confirmed that patient's home environment has

improved.  Patient's hemoglobin and hematocrit is stable with stable respiratory

status and stable kidney function.  Subsequently, patient is ready for discharge

for further care and followup as outpatient.

 

VITAL SIGNS Temperature 97.2, pulse 94, respiration 18, blood pressure 109/53,

pulse oximetry 93% on 2 liter nasal cannula.

 

LABORATORY DATA:  WBC 9.2, hemoglobin and hematocrit 9.2/29.6, platelets 270.

Chemistry:  Sodium 134, potassium 3.2, chloride 94, bicarbonate 32, BUN 48,

creatinine 1.19.

 

DISCHARGE MEDICATION:

- vitamin C 500 mg by mouth daily

- ferrous sulfate 325 mg by mouth daily

- metolazone 2.5 mg by mouth every 48 hours

- metoprolol 12.5 mg by mouth twice a day

- nystatin powder twice a day

- potassium chloride 40 mEq by mouth twice a day

- As per nephrology, Procrit 20,000 units intravenously every 2 weeks injection.

- senna 8.6/50 mg by mouth twice a day

- spironolactone 25 mg by mouth twice a day

- torsemide 40 mg by mouth daily

- Ventolin inhaler two puffs every 4 hours as needed

- aspirin 81 mg by mouth daily

- atorvastatin 40 mg by mouth daily

- Flonase nasal spray daily

- Breo inhalation daily

- Mucinex 600 mg by mouth twice a day

- loratadine 10 mg by mouth daily as needed

- metformin 500 mg by mouth twice a day

- multivitamin one tablet by mouth daily

- Spiriva inhalation once daily

- Pradaxa has been stopped pending followup with cardiology for further care.

 

DISCHARGE INSTRUCTIONS:  Patient is instructed to followup with primary care

provider in 7 days and cardiology in 7 days to determine when to restart Pradaxa

and followup electrolytes given patient's actively diuresed.  Followup with

nephrology as outpatient as well.  Return to the hospital if symptoms worsen.

## 2017-01-24 ENCOUNTER — HOSPITAL ENCOUNTER (OUTPATIENT)
Dept: HOSPITAL 53 - M SFHCPLAZ | Age: 67
End: 2017-01-24
Attending: INTERNAL MEDICINE
Payer: COMMERCIAL

## 2017-01-24 DIAGNOSIS — E11.9: ICD-10-CM

## 2017-01-24 DIAGNOSIS — D64.9: Primary | ICD-10-CM

## 2017-01-24 DIAGNOSIS — I50.30: ICD-10-CM

## 2017-01-24 LAB
ALBUMIN SERPL BCG-MCNC: 3.2 GM/DL (ref 3.2–5.2)
ALBUMIN/GLOB SERPL: 0.64 {RATIO} (ref 1–1.93)
ALP SERPL-CCNC: 80 U/L (ref 45–117)
ALT SERPL W P-5'-P-CCNC: 41 U/L (ref 12–78)
ANION GAP SERPL CALC-SCNC: 9 MEQ/L (ref 8–16)
AST SERPL-CCNC: 29 U/L (ref 15–37)
BASOPHILS # BLD AUTO: 0.1 K/MM3 (ref 0–0.2)
BASOPHILS NFR BLD AUTO: 1.2 % (ref 0–1)
BILIRUB SERPL-MCNC: 0.6 MG/DL (ref 0.2–1)
BUN SERPL-MCNC: 30 MG/DL (ref 7–18)
CALCIUM SERPL-MCNC: 9 MG/DL (ref 8.8–10.2)
CHLORIDE SERPL-SCNC: 101 MEQ/L (ref 98–107)
CO2 SERPL-SCNC: 28 MEQ/L (ref 21–32)
CREAT SERPL-MCNC: 1.01 MG/DL (ref 0.55–1.02)
EOSINOPHIL # BLD AUTO: 0.2 K/MM3 (ref 0–0.5)
EOSINOPHIL NFR BLD AUTO: 2.9 % (ref 0–3)
ERYTHROCYTE [DISTWIDTH] IN BLOOD BY AUTOMATED COUNT: 17.1 % (ref 11.5–14.5)
EST. AVERAGE GLUCOSE BLD GHB EST-MCNC: 91 MG/DL (ref 60–110)
GFR SERPL CREATININE-BSD FRML MDRD: 58.4 ML/MIN/{1.73_M2} (ref 45–?)
GLUCOSE SERPL-MCNC: 86 MG/DL (ref 80–110)
LARGE UNSTAINED CELL #: 0.2 K/MM3 (ref 0–0.4)
LARGE UNSTAINED CELL %: 1.9 % (ref 0–4)
LYMPHOCYTES # BLD AUTO: 0.9 K/MM3 (ref 1.5–4.5)
LYMPHOCYTES NFR BLD AUTO: 9.4 % (ref 24–44)
MCH RBC QN AUTO: 28.4 PG (ref 27–33)
MCHC RBC AUTO-ENTMCNC: 29.2 G/DL (ref 32–36.5)
MCV RBC AUTO: 97.2 FL (ref 80–96)
MONOCYTES # BLD AUTO: 0.4 K/MM3 (ref 0–0.8)
MONOCYTES NFR BLD AUTO: 5.3 % (ref 0–5)
NEUTROPHILS # BLD AUTO: 6.1 K/MM3 (ref 1.8–7.7)
NEUTROPHILS NFR BLD AUTO: 79.2 % (ref 36–66)
PLATELET # BLD AUTO: 362 K/MM3 (ref 150–450)
POTASSIUM SERPL-SCNC: 3.8 MEQ/L (ref 3.5–5.1)
PROT SERPL-MCNC: 8.2 GM/DL (ref 6.4–8.2)
SODIUM SERPL-SCNC: 138 MEQ/L (ref 136–145)
WBC # BLD AUTO: 7.8 K/MM3 (ref 4–10)

## 2017-01-31 ENCOUNTER — HOSPITAL ENCOUNTER (OUTPATIENT)
Dept: HOSPITAL 53 - M LAB REF | Age: 67
End: 2017-01-31
Attending: INTERNAL MEDICINE
Payer: COMMERCIAL

## 2017-01-31 DIAGNOSIS — D50.9: Primary | ICD-10-CM

## 2017-01-31 LAB
FERRITIN SERPL-MCNC: 76 NG/ML (ref 8–252)
IRON SATN MFR SERPL: 10.1 % (ref 13.2–37.4)
TIBC SERPL-MCNC: 366 UG/DL (ref 250–450)

## 2017-01-31 PROCEDURE — 82728 ASSAY OF FERRITIN: CPT

## 2017-01-31 PROCEDURE — 83550 IRON BINDING TEST: CPT

## 2017-02-14 ENCOUNTER — HOSPITAL ENCOUNTER (OUTPATIENT)
Dept: HOSPITAL 53 - M SHH | Age: 67
Discharge: HOME | End: 2017-02-14
Attending: INTERNAL MEDICINE
Payer: COMMERCIAL

## 2017-02-14 DIAGNOSIS — D64.9: Primary | ICD-10-CM

## 2017-02-14 LAB
ERYTHROCYTE [DISTWIDTH] IN BLOOD BY AUTOMATED COUNT: 16.4 % (ref 11.5–14.5)
MCH RBC QN AUTO: 29.6 PG (ref 27–33)
MCHC RBC AUTO-ENTMCNC: 30.6 G/DL (ref 32–36.5)
MCV RBC AUTO: 96.7 FL (ref 80–96)
PLATELET # BLD AUTO: 278 K/MM3 (ref 150–450)
WBC # BLD AUTO: 8.8 K/MM3 (ref 4–10)

## 2017-05-23 ENCOUNTER — HOSPITAL ENCOUNTER (EMERGENCY)
Dept: HOSPITAL 53 - M ED | Age: 67
Discharge: HOME | End: 2017-05-23
Payer: COMMERCIAL

## 2017-05-23 VITALS — BODY MASS INDEX: 47.09 KG/M2 | HEIGHT: 66 IN | WEIGHT: 293 LBS

## 2017-05-23 VITALS — DIASTOLIC BLOOD PRESSURE: 64 MMHG | SYSTOLIC BLOOD PRESSURE: 140 MMHG

## 2017-05-23 DIAGNOSIS — D64.9: ICD-10-CM

## 2017-05-23 DIAGNOSIS — I10: ICD-10-CM

## 2017-05-23 DIAGNOSIS — Z87.891: ICD-10-CM

## 2017-05-23 DIAGNOSIS — Z79.82: ICD-10-CM

## 2017-05-23 DIAGNOSIS — E78.5: ICD-10-CM

## 2017-05-23 DIAGNOSIS — Z79.01: ICD-10-CM

## 2017-05-23 DIAGNOSIS — Z79.899: ICD-10-CM

## 2017-05-23 DIAGNOSIS — Z79.51: ICD-10-CM

## 2017-05-23 DIAGNOSIS — R06.02: ICD-10-CM

## 2017-05-23 DIAGNOSIS — I48.91: ICD-10-CM

## 2017-05-23 DIAGNOSIS — E66.9: ICD-10-CM

## 2017-05-23 DIAGNOSIS — Z88.8: ICD-10-CM

## 2017-05-23 DIAGNOSIS — I45.10: ICD-10-CM

## 2017-05-23 DIAGNOSIS — I25.10: ICD-10-CM

## 2017-05-23 DIAGNOSIS — R07.89: ICD-10-CM

## 2017-05-23 DIAGNOSIS — E11.9: ICD-10-CM

## 2017-05-23 DIAGNOSIS — Z99.81: ICD-10-CM

## 2017-05-23 DIAGNOSIS — J44.9: Primary | ICD-10-CM

## 2017-05-23 DIAGNOSIS — R94.31: ICD-10-CM

## 2017-05-23 DIAGNOSIS — Z88.2: ICD-10-CM

## 2017-05-23 DIAGNOSIS — Z95.5: ICD-10-CM

## 2017-05-23 LAB
ALBUMIN SERPL BCG-MCNC: 3.5 GM/DL (ref 3.2–5.2)
ALBUMIN/GLOB SERPL: 0.85 {RATIO} (ref 1–1.93)
ALP SERPL-CCNC: 96 U/L (ref 45–117)
ALT SERPL W P-5'-P-CCNC: 18 U/L (ref 12–78)
ANION GAP SERPL CALC-SCNC: 5 MEQ/L (ref 8–16)
AST SERPL-CCNC: 15 U/L (ref 15–37)
BASOPHILS # BLD AUTO: 0 K/MM3 (ref 0–0.2)
BASOPHILS NFR BLD AUTO: 0.4 % (ref 0–1)
BILIRUB CONJ SERPL-MCNC: 0.3 MG/DL (ref 0–0.2)
BILIRUB SERPL-MCNC: 0.9 MG/DL (ref 0.2–1)
BUN SERPL-MCNC: 21 MG/DL (ref 7–18)
CALCIUM SERPL-MCNC: 9 MG/DL (ref 8.8–10.2)
CHLORIDE SERPL-SCNC: 102 MEQ/L (ref 98–107)
CO2 SERPL-SCNC: 31 MEQ/L (ref 21–32)
CREAT SERPL-MCNC: 0.97 MG/DL (ref 0.55–1.02)
EOSINOPHIL # BLD AUTO: 0.2 K/MM3 (ref 0–0.5)
EOSINOPHIL NFR BLD AUTO: 2.7 % (ref 0–3)
ERYTHROCYTE [DISTWIDTH] IN BLOOD BY AUTOMATED COUNT: 16 % (ref 11.5–14.5)
GFR SERPL CREATININE-BSD FRML MDRD: > 60 ML/MIN/{1.73_M2} (ref 45–?)
GLUCOSE SERPL-MCNC: 131 MG/DL (ref 80–110)
LARGE UNSTAINED CELL #: 0.1 K/MM3 (ref 0–0.4)
LARGE UNSTAINED CELL %: 1.4 % (ref 0–4)
LYMPHOCYTES # BLD AUTO: 1 K/MM3 (ref 1.5–4.5)
LYMPHOCYTES NFR BLD AUTO: 11.8 % (ref 24–44)
MCH RBC QN AUTO: 30.6 PG (ref 27–33)
MCHC RBC AUTO-ENTMCNC: 32 G/DL (ref 32–36.5)
MCV RBC AUTO: 95.4 FL (ref 80–96)
MONOCYTES # BLD AUTO: 0.5 K/MM3 (ref 0–0.8)
MONOCYTES NFR BLD AUTO: 6.9 % (ref 0–5)
NEUTROPHILS # BLD AUTO: 6 K/MM3 (ref 1.8–7.7)
NEUTROPHILS NFR BLD AUTO: 76.9 % (ref 36–66)
PLATELET # BLD AUTO: 207 K/MM3 (ref 150–450)
POTASSIUM SERPL-SCNC: 3.6 MEQ/L (ref 3.5–5.1)
PROT SERPL-MCNC: 7.6 GM/DL (ref 6.4–8.2)
SODIUM SERPL-SCNC: 138 MEQ/L (ref 136–145)
WBC # BLD AUTO: 7.8 K/MM3 (ref 4–10)

## 2017-05-23 PROCEDURE — 83690 ASSAY OF LIPASE: CPT

## 2017-05-23 PROCEDURE — 71275 CT ANGIOGRAPHY CHEST: CPT

## 2017-05-23 PROCEDURE — 80048 BASIC METABOLIC PNL TOTAL CA: CPT

## 2017-05-23 PROCEDURE — 36415 COLL VENOUS BLD VENIPUNCTURE: CPT

## 2017-05-23 PROCEDURE — 85025 COMPLETE CBC W/AUTO DIFF WBC: CPT

## 2017-05-23 PROCEDURE — 80076 HEPATIC FUNCTION PANEL: CPT

## 2017-05-23 PROCEDURE — 82550 ASSAY OF CK (CPK): CPT

## 2017-05-23 PROCEDURE — 93005 ELECTROCARDIOGRAM TRACING: CPT

## 2017-05-23 PROCEDURE — 82553 CREATINE MB FRACTION: CPT

## 2017-05-23 PROCEDURE — 83880 ASSAY OF NATRIURETIC PEPTIDE: CPT

## 2017-05-23 PROCEDURE — 93041 RHYTHM ECG TRACING: CPT

## 2017-05-23 PROCEDURE — 71010: CPT

## 2017-05-23 PROCEDURE — 94760 N-INVAS EAR/PLS OXIMETRY 1: CPT

## 2017-05-23 PROCEDURE — 99285 EMERGENCY DEPT VISIT HI MDM: CPT

## 2017-05-23 PROCEDURE — 84484 ASSAY OF TROPONIN QUANT: CPT

## 2017-05-23 NOTE — REP
Clinical:  Acute chest pain.

 

Technique:  Axial contrast enhanced images from the thoracic inlet to the upper

abdomen using 100 ml Isovue 370 intravenous contrast material with coronal and

sagittal re-formations.

 

Findings:  Satisfactory enhancement of the pulmonary vasculature is achieved and

no filling defects are identified to suggest pulmonary embolus. Atherosclerotic

changes to the thoracic aorta and coronary arteries.  Cardiomegaly and early

pulmonary vascular congestion is appreciated along with chronic linear scarring

in the left upper lobe.  No consolidation or effusion.  No pneumothorax.

Tracheobronchial tree is patent.  Mild reactive adenopathy suggested and stable.

Surrounding musculoskeletal structures demonstrate age-related changes.  Upper

abdomen demonstrates normal bilateral adrenal glands.

 

 

 

Impression:

No evidence for pulmonary embolus.

Cardiomegaly and early pulmonary vascular congestion.

Chronic linear scarring in the left upper lobe.

No consolidation or effusion.

 

 

Signed by

Cameron Chang MD 05/23/2017 05:42 P

## 2017-05-23 NOTE — REP
Clinical:  Chest pain.

 

Comparison:  12/20/2016.

 

Findings:

Examination is limited by portable technique and underpenetration.

Cardiomegaly is again appreciated and unchanged.  Pulmonary vasculature and

interstitial markings are accentuated due to technique and interstitial edema

along with scattered atelectasis and left lower lobe infiltrate/effusion cannot

be excluded.

 

Impression:

Chronic stable changes.

Cannot exclude interstitial edema, basilar atelectasis, left lower lobe

consolidation/effusion.

 

 

Signed by

Cameron Chang MD 05/23/2017 04:21 P

## 2017-05-24 NOTE — ECGEPIP
Stationary ECG Study

                           [facility] - ED

                                       

                                       Test Date:    2017

Pat Name:     NGHIA HUI              Department:   

Patient ID:   [id number]                 Room:         -

Gender:       F                        Technician:   felicitas

:          1950               Requested By: Sania Farrar 

Order Number: [id number]     Reading MD:   Sania Farrar

                                 Measurements

Intervals                              Axis          

Rate:         72                       P:            

NH:           0                        QRS:          96

QRSD:         100                      T:            81

QT:           389                                    

QTc:          426                                    

                           Interpretive Statements

ATRIAL FIBRILLATION

BORDERLINE RIGHT AXIS DEVIATION

LOW QRS VOLTAGE IN PRECORDIAL LEADS

INCOMPLETE RIGHT BUNDLE BRANCH BLOCK

MODERATE ST DEPRESSION

DECREASED RATE 16

Electronically Signed On 2017 14:23:12 EDT by Sania Farrar

## 2017-06-30 ENCOUNTER — HOSPITAL ENCOUNTER (OUTPATIENT)
Dept: HOSPITAL 53 - M LABDRAWP | Age: 67
End: 2017-06-30
Attending: INTERNAL MEDICINE
Payer: COMMERCIAL

## 2017-06-30 DIAGNOSIS — D50.9: ICD-10-CM

## 2017-06-30 DIAGNOSIS — N18.2: Primary | ICD-10-CM

## 2017-06-30 LAB
ALBUMIN SERPL BCG-MCNC: 3.6 GM/DL (ref 3.2–5.2)
ANION GAP SERPL CALC-SCNC: 7 MEQ/L (ref 8–16)
BASOPHILS # BLD AUTO: 0 K/MM3 (ref 0–0.2)
BASOPHILS NFR BLD AUTO: 0.5 % (ref 0–1)
BUN SERPL-MCNC: 25 MG/DL (ref 7–18)
CALCIUM SERPL-MCNC: 9.2 MG/DL (ref 8.8–10.2)
CHLORIDE SERPL-SCNC: 104 MEQ/L (ref 98–107)
CO2 SERPL-SCNC: 31 MEQ/L (ref 21–32)
CREAT SERPL-MCNC: 1.13 MG/DL (ref 0.55–1.02)
EOSINOPHIL # BLD AUTO: 0.1 K/MM3 (ref 0–0.5)
EOSINOPHIL NFR BLD AUTO: 1.7 % (ref 0–3)
ERYTHROCYTE [DISTWIDTH] IN BLOOD BY AUTOMATED COUNT: 15.8 % (ref 11.5–14.5)
GFR SERPL CREATININE-BSD FRML MDRD: 51.3 ML/MIN/{1.73_M2} (ref 45–?)
GLUCOSE SERPL-MCNC: 108 MG/DL (ref 80–110)
LARGE UNSTAINED CELL #: 0.1 K/MM3 (ref 0–0.4)
LARGE UNSTAINED CELL %: 1.8 % (ref 0–4)
LYMPHOCYTES # BLD AUTO: 1.3 K/MM3 (ref 1.5–4.5)
LYMPHOCYTES NFR BLD AUTO: 14.9 % (ref 24–44)
MAGNESIUM SERPL-MCNC: 2.1 MG/DL (ref 1.8–2.4)
MCH RBC QN AUTO: 30.8 PG (ref 27–33)
MCHC RBC AUTO-ENTMCNC: 32.2 G/DL (ref 32–36.5)
MCV RBC AUTO: 95.9 FL (ref 80–96)
MONOCYTES # BLD AUTO: 0.6 K/MM3 (ref 0–0.8)
MONOCYTES NFR BLD AUTO: 7 % (ref 0–5)
NEUTROPHILS # BLD AUTO: 5.8 K/MM3 (ref 1.8–7.7)
NEUTROPHILS NFR BLD AUTO: 74.2 % (ref 36–66)
PHOSPHATE SERPL-MCNC: 3.4 MG/DL (ref 2.5–4.9)
PLATELET # BLD AUTO: 228 K/MM3 (ref 150–450)
POTASSIUM SERPL-SCNC: 4 MEQ/L (ref 3.5–5.1)
SODIUM SERPL-SCNC: 142 MEQ/L (ref 136–145)
URATE SERPL-MCNC: 7.4 MG/DL (ref 2.6–6)
WBC # BLD AUTO: 7.8 K/MM3 (ref 4–10)

## 2017-06-30 PROCEDURE — 81001 URINALYSIS AUTO W/SCOPE: CPT

## 2017-06-30 PROCEDURE — 83735 ASSAY OF MAGNESIUM: CPT

## 2017-06-30 PROCEDURE — 36415 COLL VENOUS BLD VENIPUNCTURE: CPT

## 2017-06-30 PROCEDURE — 80069 RENAL FUNCTION PANEL: CPT

## 2017-06-30 PROCEDURE — 85025 COMPLETE CBC W/AUTO DIFF WBC: CPT

## 2017-06-30 PROCEDURE — 84550 ASSAY OF BLOOD/URIC ACID: CPT

## 2017-10-05 ENCOUNTER — HOSPITAL ENCOUNTER (INPATIENT)
Dept: HOSPITAL 53 - M ED | Age: 67
LOS: 11 days | Discharge: HOME | DRG: 291 | End: 2017-10-16
Attending: INTERNAL MEDICINE | Admitting: HOSPITALIST
Payer: COMMERCIAL

## 2017-10-05 VITALS — BODY MASS INDEX: 47.09 KG/M2 | HEIGHT: 66 IN | WEIGHT: 293 LBS

## 2017-10-05 VITALS — DIASTOLIC BLOOD PRESSURE: 76 MMHG | SYSTOLIC BLOOD PRESSURE: 114 MMHG

## 2017-10-05 VITALS — DIASTOLIC BLOOD PRESSURE: 56 MMHG | SYSTOLIC BLOOD PRESSURE: 121 MMHG

## 2017-10-05 DIAGNOSIS — E11.22: ICD-10-CM

## 2017-10-05 DIAGNOSIS — I48.0: ICD-10-CM

## 2017-10-05 DIAGNOSIS — J44.9: ICD-10-CM

## 2017-10-05 DIAGNOSIS — I13.0: Primary | ICD-10-CM

## 2017-10-05 DIAGNOSIS — D50.9: ICD-10-CM

## 2017-10-05 DIAGNOSIS — R13.10: ICD-10-CM

## 2017-10-05 DIAGNOSIS — Z88.8: ICD-10-CM

## 2017-10-05 DIAGNOSIS — Z90.49: ICD-10-CM

## 2017-10-05 DIAGNOSIS — Z95.5: ICD-10-CM

## 2017-10-05 DIAGNOSIS — I47.2: ICD-10-CM

## 2017-10-05 DIAGNOSIS — Z79.01: ICD-10-CM

## 2017-10-05 DIAGNOSIS — E78.5: ICD-10-CM

## 2017-10-05 DIAGNOSIS — D75.82: ICD-10-CM

## 2017-10-05 DIAGNOSIS — M25.532: ICD-10-CM

## 2017-10-05 DIAGNOSIS — E83.42: ICD-10-CM

## 2017-10-05 DIAGNOSIS — N17.9: ICD-10-CM

## 2017-10-05 DIAGNOSIS — Z87.891: ICD-10-CM

## 2017-10-05 DIAGNOSIS — N18.3: ICD-10-CM

## 2017-10-05 DIAGNOSIS — I50.33: ICD-10-CM

## 2017-10-05 DIAGNOSIS — Z99.81: ICD-10-CM

## 2017-10-05 DIAGNOSIS — E83.41: ICD-10-CM

## 2017-10-05 DIAGNOSIS — E66.2: ICD-10-CM

## 2017-10-05 DIAGNOSIS — Z79.51: ICD-10-CM

## 2017-10-05 DIAGNOSIS — Z79.82: ICD-10-CM

## 2017-10-05 DIAGNOSIS — I95.9: ICD-10-CM

## 2017-10-05 DIAGNOSIS — K59.00: ICD-10-CM

## 2017-10-05 DIAGNOSIS — Z79.899: ICD-10-CM

## 2017-10-05 DIAGNOSIS — Z88.2: ICD-10-CM

## 2017-10-05 DIAGNOSIS — Z79.84: ICD-10-CM

## 2017-10-05 DIAGNOSIS — I27.20: ICD-10-CM

## 2017-10-05 DIAGNOSIS — E87.6: ICD-10-CM

## 2017-10-05 DIAGNOSIS — Z79.891: ICD-10-CM

## 2017-10-05 DIAGNOSIS — I34.1: ICD-10-CM

## 2017-10-05 LAB
ALBUMIN SERPL BCG-MCNC: 3.4 GM/DL (ref 3.2–5.2)
ALBUMIN/GLOB SERPL: 0.76 {RATIO} (ref 1–1.93)
ALP SERPL-CCNC: 93 U/L (ref 45–117)
ALT SERPL W P-5'-P-CCNC: 17 U/L (ref 12–78)
ANION GAP SERPL CALC-SCNC: 6 MEQ/L (ref 8–16)
AST SERPL-CCNC: 15 U/L (ref 15–37)
BASOPHILS # BLD AUTO: 0 10^3/UL (ref 0–0.2)
BASOPHILS NFR BLD AUTO: 0.4 % (ref 0–1)
BILIRUB CONJ SERPL-MCNC: 0.4 MG/DL (ref 0–0.2)
BILIRUB SERPL-MCNC: 1.1 MG/DL (ref 0.2–1)
BUN SERPL-MCNC: 21 MG/DL (ref 7–18)
CALCIUM SERPL-MCNC: 9.1 MG/DL (ref 8.8–10.2)
CHLORIDE SERPL-SCNC: 107 MEQ/L (ref 98–107)
CO2 SERPL-SCNC: 26 MEQ/L (ref 21–32)
CREAT SERPL-MCNC: 1.13 MG/DL (ref 0.55–1.02)
EOSINOPHIL # BLD AUTO: 0.2 10^3/UL (ref 0–0.5)
EOSINOPHIL NFR BLD AUTO: 1.6 % (ref 0–3)
ERYTHROCYTE [DISTWIDTH] IN BLOOD BY AUTOMATED COUNT: 15.5 % (ref 11.5–14.5)
GFR SERPL CREATININE-BSD FRML MDRD: 51.1 ML/MIN/{1.73_M2} (ref 45–?)
GLUCOSE SERPL-MCNC: 113 MG/DL (ref 80–110)
IMM GRANULOCYTES NFR BLD: 0.5 % (ref 0–0)
LYMPHOCYTES # BLD AUTO: 0.9 10^3/UL (ref 1.5–4.5)
LYMPHOCYTES NFR BLD AUTO: 9.7 % (ref 24–44)
MCH RBC QN AUTO: 30.5 PG (ref 27–33)
MCHC RBC AUTO-ENTMCNC: 30.9 G/DL (ref 32–36.5)
MCV RBC AUTO: 98.5 FL (ref 80–96)
MONOCYTES # BLD AUTO: 0.8 10^3/UL (ref 0–0.8)
MONOCYTES NFR BLD AUTO: 8.8 % (ref 0–5)
NEUTROPHILS # BLD AUTO: 7.2 10^3/UL (ref 1.8–7.7)
NEUTROPHILS NFR BLD AUTO: 79 % (ref 36–66)
NRBC BLD AUTO-RTO: 0 % (ref 0–0)
PLATELET # BLD AUTO: 202 10^3/UL (ref 150–450)
POTASSIUM SERPL-SCNC: 4.5 MEQ/L (ref 3.5–5.1)
PROT SERPL-MCNC: 7.9 GM/DL (ref 6.4–8.2)
SODIUM SERPL-SCNC: 139 MEQ/L (ref 136–145)
T3RU NFR SERPL: 36 % (ref 30–39)
T4 SERPL-MCNC: 9.9 UG/DL (ref 4.5–12)
WBC # BLD AUTO: 9.1 10^3/UL (ref 4–10)

## 2017-10-05 RX ADMIN — INSULIN LISPRO SCH UNITS: 100 INJECTION, SOLUTION INTRAVENOUS; SUBCUTANEOUS at 21:00

## 2017-10-05 RX ADMIN — METOPROLOL TARTRATE SCH MG: 25 TABLET, FILM COATED ORAL at 21:00

## 2017-10-05 RX ADMIN — INSULIN LISPRO SCH UNITS: 100 INJECTION, SOLUTION INTRAVENOUS; SUBCUTANEOUS at 17:30

## 2017-10-05 RX ADMIN — NYSTATIN SCH DOSE: 100000 POWDER TOPICAL at 21:05

## 2017-10-05 RX ADMIN — ATORVASTATIN CALCIUM SCH MG: 20 TABLET, FILM COATED ORAL at 21:04

## 2017-10-05 RX ADMIN — DEXTROSE MONOHYDRATE SCH MG: 50 INJECTION, SOLUTION INTRAVENOUS at 17:00

## 2017-10-05 RX ADMIN — IPRATROPIUM BROMIDE AND ALBUTEROL SULFATE SCH ML: .5; 3 SOLUTION RESPIRATORY (INHALATION) at 19:51

## 2017-10-05 RX ADMIN — DABIGATRAN ETEXILATE MESYLATE SCH MG: 75 CAPSULE ORAL at 21:05

## 2017-10-05 RX ADMIN — SPIRONOLACTONE SCH MG: 25 TABLET, FILM COATED ORAL at 21:04

## 2017-10-05 NOTE — REPUSA
CLINICAL HISTORY: R/o DVT.

COMMENTS:

 

Real time sonography with duplex doppler of the extremities bilaterally was performed with attention 
to the major deep venous structures.

Evaluation reveals the common femoral, superficial femoral and popliteal veins bilaterally to be comp
letely compressible without intraluminal thrombus. There is normal spontaneous phasic flow and augmen
tation in all deep veins. The greater saphenous/common femoral vein junctions are patent bilaterally.
 

IMPRESSION: 

No evidence of DVT in the lower extremities bilaterally.

Thank you for your kind referral of this patient.

     Electronically signed by PORSHA SHINE MD on 10/05/2017 07:07:10 PM ET

## 2017-10-05 NOTE — HPE
DATE OF ADMISSION: 10/05/2017

 

PRIMARY CARE PROVIDER: [facility]

 

NEPHROLOGIST: Dr. Medeiros

 

PULMONOLOGIST: Dr. Pham

 

GASTROENTEROLOGIST: Dr. Peterson

 

CARDIOLOGIST: Dr. Fishman

 

CHIEF COMPLAINT: Shortness of breath for the last one week.

 

HISTORY OF PRESENT ILLNESS: The patient is a 67-year-old female who has been

following at nephrology clinic outpatient. Dr. Medeiros has been adjusting the

patient's diuretic regimen. She is on torsemide and spironolactone daily as well

as metolazone Monday, Wednesday and Friday. Her weight has been 330 pounds in the

office on 09/05/2017. Baseline creatinine is approximately 1. She presented to

the emergency room with progressively worsening shortness of breath, worse when

laying flat, dyspnea on exertion. She denied increase or changes in her cough or

sputum quality, and at rest, she feels completely normal. She has noted

increasing swelling in her legs. Otherwise, she denies sick contacts or recent

changes in medications.

 

PAST MEDICAL HISTORY:

1. Diastolic congestive heart failure.

2. Community-acquired pneumonia.

3. Constipation.

4. Obesity hypoventilation syndrome.

5. Morbid obesity.

6. Type 2 diabetes.

7. Hypertension.

8. Heparin-induced thrombocytopenia.

9. Chronic obstructive pulmonary disease (COPD), on chronic one liter of oxygen.

 

10. Coronary artery disease, status post stents.

11. Dyslipidemia.

12. Mitral valve stenosis with prolapse.

13. Pulmonary hypertension.

14. Paroxysmal atrial fibrillation, on Pradaxa.

15. Chronic kidney disease with a baseline creatinine of approximately 1.

 

ALLERGIES: HEPARIN and SULFA DRUGS.

 

SOCIAL HISTORY: The patient lives at home. She has a 35-pack year history but

quit 20 years ago. Denies alcohol or illicit drug use.

 

PAST SURGICAL HISTORY:

1. Status post colon resection in 2010.

2. Coronary stents in 2011.

3. Hernia repair times two.

 

FAMILY HISTORY: Noncontributory.

 

REVIEW OF SYSTEMS: Negative other than in the history of present illness (HPI).

 

HOME MEDICATIONS:

- metformin 500 mg by mouth twice a day

- torsemide 40 mg daily

- Breo Ellipta 200/25 inhaled daily

- potassium chloride 40 mEq daily

- Extra-Strength Tylenol four times a day as needed for pain

- hydrocodone with Tylenol 5/325 one tablet three times a day as needed for pain

 

- Ventolin HFA two puffs inhaled every four hours as needed for shortness of

breath

- aspirin 81 mg daily

- atorvastatin 40 mg at bedtime

- Pradaxa 150 mg by mouth twice a day

- Colace 100 mg daily as needed for constipation

- ferrous sulfate 325 mg daily

- loratadine 10 mg as needed for allergies

- magnesium chloride 64 mg three times a week on Monday, Wednesday, and Friday

- metolazone 2.5 mg three times a week on Monday, Wednesday, and Friday

- metoprolol tartrate 12.5 mg by mouth twice a day

- multivitamin one tablet daily

- Nystatin powder topically twice a day

- Senna 8.6 mg twice a day as needed for constipation

- spironolactone 25 mg by mouth twice a day

- Spiriva HandiHaler inhaled once daily

 

PHYSICAL EXAMINATION:

Temperature 97.6, pulse 76, respiratory rate 20, blood pressure 90/51, oxygen

saturation 95% on one liter.

GENERAL: She is a morbidly obese, elderly,  female laying on her left

side. She does not appear to be in any acute distress whatsoever. She is

comfortable and no accessory muscle use. She does not appear to be in respiratory

distress.

HEENT: She has an enlarged neck. Poor dentition. Moist mucous membranes.

Difficult to assess for any elevation in central venous pressure.

CARDIOVASCULAR EXAMINATION: S1, S2, regular but distant secondary to body

habitus.

RESPIRATORY EXAMINATION: Bibasilar rales but otherwise fairly clear with good air

movement. No wheeze appreciated. No significant prolonged expiratory phase

appreciated.

ABDOMINAL EXAMINATION: Grossly obese with edema to the pannus.

EXTREMITIES: Lower extremity edema as well. No clubbing or cyanosis.

 

LABORATORY STUDIES: WBC 9.1, hemoglobin 12.5, hematocrit 40.4, platelet count

202.

 

Chemistry panel: Sodium 139, potassium 4.5, chloride 107, bicarbonate 26, BUN 21,

creatinine 1.1, pro BNP is 2720. One set of cardiac enzymes is negative.

 

MICROBIOLOGY: An influenza swab is negative. Blood cultures are currently

pending.

 

IMAGING STUDIES: The patient did have a chest x-ray that revealed chronic

interstitial change and cardiomegaly.

 

ASSESSMENT AND PLAN: This is a 67-year-old female with decompensated diastolic

congestive heart failure.

 

PROBLEM LIST:

1. Decompensated diastolic congestive heart failure. I did discuss the case with

Dr. Medeiros who informed me that Dr. Marshall Alvares will be able to see the

patient tomorrow. In the meantime, we agreed that beginning more aggressive

diuresis is the best plan of care. She will be started on 80 mg of IV Lasix twice

a day. I will change her metolazone to daily with a goal of net negative two

liters per day to see if her symptoms do improve. We will continue her aldactone

with holding parameters. We will monitor her renal function and electrolytes

closely while undergoing diuresis and see if her respiratory status is able to

improve back to her baseline. She will be admitted to the progressive care unit.

We will monitor her cardiac enzymes as she is having shortness of breath. She

will be on a fluid restricted diet.

 

2. Dysphagia. She mentioned to me that the last two days she has had difficulty

swallowing pills, especially the potassium. It is not surprising given their

size. However, I will request a speech therapy evaluation to ensure that she is

able to take all of her medications as prescribed.

 

3. Hypomagnesemia. Continue with supplementation.

 

4. Hypertension. She will be on diuresis and we will continue her home

antihypertensives with beta blocker and spironolactone.

 

5. Constipation. Continue with her home bowel regimen.

 

6. Obesity hypoventilation syndrome and obesity complicating care.

 

7. Type 2 diabetes. Sliding scale insulin.

 

8. Chronic kidney disease, fairly stable. Continue to monitor while undergoing

diuresis. Continue with her diuretics and antihypertensives.

 

9. Chronic obstructive pulmonary disease (COPD). Continue with one liter of

oxygen and her home inhalers. I doubt this is a decompensation of her COPD. No

increasing cough or sputum and she is perfectly asymptomatic at rest. We will

provide her with nebulizer treatments.

 

10. Heparin-induced thrombocytopenia. Avoid heparin products.

 

11. Mitral stenosis prolapse and pulmonary hypertension. She likely has some

baseline respiratory compromise and the above is certainly not helping. We will

try and optimize her volume status.

 

12. Paroxysmal atrial fibrillation. She is rate controlled with a beta blocker

and anticoagulated with Pradaxa.

 

13. Iron deficiency anemia. Continue with supplementation.

 

14. Elevated thyroid-stimulating hormone (TSH). We will check a thyroid profile.

 

 

DISPOSITION: The patient is admitted to Dr. Otero's service who will continue

following the patient at 7:00 a.m.

## 2017-10-05 NOTE — REP
Chest one-view

 

HISTORY: Cough

 

Comparison: 05/23/2017

 

A minimal increase in interstitial markings is present in the lungs consistent

with chronic interstitial change. The cardiac silhouette is enlarged. The

pulmonary vasculature is normal in appearance.

 

Impression:

1.  Chronic interstitial change.

2.  Cardiomegaly.

 

 

Signed by

Brent Cordero MD 10/05/2017 01:24 P

## 2017-10-06 VITALS — DIASTOLIC BLOOD PRESSURE: 47 MMHG | SYSTOLIC BLOOD PRESSURE: 96 MMHG

## 2017-10-06 VITALS — SYSTOLIC BLOOD PRESSURE: 111 MMHG | DIASTOLIC BLOOD PRESSURE: 77 MMHG

## 2017-10-06 VITALS — DIASTOLIC BLOOD PRESSURE: 53 MMHG | SYSTOLIC BLOOD PRESSURE: 100 MMHG

## 2017-10-06 VITALS — DIASTOLIC BLOOD PRESSURE: 60 MMHG | SYSTOLIC BLOOD PRESSURE: 119 MMHG

## 2017-10-06 VITALS — DIASTOLIC BLOOD PRESSURE: 60 MMHG | SYSTOLIC BLOOD PRESSURE: 120 MMHG

## 2017-10-06 VITALS — SYSTOLIC BLOOD PRESSURE: 106 MMHG | DIASTOLIC BLOOD PRESSURE: 52 MMHG

## 2017-10-06 VITALS — DIASTOLIC BLOOD PRESSURE: 68 MMHG | SYSTOLIC BLOOD PRESSURE: 124 MMHG

## 2017-10-06 VITALS — SYSTOLIC BLOOD PRESSURE: 101 MMHG | DIASTOLIC BLOOD PRESSURE: 55 MMHG

## 2017-10-06 LAB
ANION GAP SERPL CALC-SCNC: 4 MEQ/L (ref 8–16)
BUN SERPL-MCNC: 21 MG/DL (ref 7–18)
CALCIUM SERPL-MCNC: 9 MG/DL (ref 8.8–10.2)
CHLORIDE SERPL-SCNC: 105 MEQ/L (ref 98–107)
CO2 SERPL-SCNC: 31 MEQ/L (ref 21–32)
CREAT SERPL-MCNC: 1.01 MG/DL (ref 0.55–1.02)
ERYTHROCYTE [DISTWIDTH] IN BLOOD BY AUTOMATED COUNT: 15.5 % (ref 11.5–14.5)
GFR SERPL CREATININE-BSD FRML MDRD: 58.2 ML/MIN/{1.73_M2} (ref 45–?)
GLUCOSE SERPL-MCNC: 98 MG/DL (ref 80–110)
MAGNESIUM SERPL-MCNC: 1.8 MG/DL (ref 1.8–2.4)
MCH RBC QN AUTO: 30.1 PG (ref 27–33)
MCHC RBC AUTO-ENTMCNC: 31 G/DL (ref 32–36.5)
MCV RBC AUTO: 97 FL (ref 80–96)
PLATELET # BLD AUTO: 175 10^3/UL (ref 150–450)
POTASSIUM SERPL-SCNC: 3.8 MEQ/L (ref 3.5–5.1)
SODIUM SERPL-SCNC: 140 MEQ/L (ref 136–145)
WBC # BLD AUTO: 7.7 10^3/UL (ref 4–10)

## 2017-10-06 RX ADMIN — IPRATROPIUM BROMIDE AND ALBUTEROL SULFATE SCH ML: .5; 3 SOLUTION RESPIRATORY (INHALATION) at 08:41

## 2017-10-06 RX ADMIN — TIOTROPIUM BROMIDE SCH INHALATION: 18 CAPSULE ORAL; RESPIRATORY (INHALATION) at 08:41

## 2017-10-06 RX ADMIN — FERROUS SULFATE TAB 325 MG (65 MG ELEMENTAL FE) SCH MG: 325 (65 FE) TAB at 10:12

## 2017-10-06 RX ADMIN — DEXTROSE MONOHYDRATE SCH MG: 50 INJECTION, SOLUTION INTRAVENOUS at 17:53

## 2017-10-06 RX ADMIN — INSULIN LISPRO SCH UNITS: 100 INJECTION, SOLUTION INTRAVENOUS; SUBCUTANEOUS at 12:40

## 2017-10-06 RX ADMIN — IPRATROPIUM BROMIDE AND ALBUTEROL SULFATE SCH ML: .5; 3 SOLUTION RESPIRATORY (INHALATION) at 13:20

## 2017-10-06 RX ADMIN — ATORVASTATIN CALCIUM SCH MG: 20 TABLET, FILM COATED ORAL at 21:16

## 2017-10-06 RX ADMIN — MULTIPLE VITAMINS W/ MINERALS TAB SCH TAB: TAB at 10:11

## 2017-10-06 RX ADMIN — HYDROCODONE BITARTRATE AND ACETAMINOPHEN PRN TAB: 5; 325 TABLET ORAL at 12:41

## 2017-10-06 RX ADMIN — ASPIRIN SCH MG: 81 TABLET ORAL at 10:11

## 2017-10-06 RX ADMIN — LORATADINE PRN MG: 10 TABLET ORAL at 10:11

## 2017-10-06 RX ADMIN — NYSTATIN SCH DOSE: 100000 POWDER TOPICAL at 21:00

## 2017-10-06 RX ADMIN — HYDROCODONE BITARTRATE AND ACETAMINOPHEN PRN TAB: 5; 325 TABLET ORAL at 21:22

## 2017-10-06 RX ADMIN — DEXTROSE MONOHYDRATE SCH MG: 50 INJECTION, SOLUTION INTRAVENOUS at 10:14

## 2017-10-06 RX ADMIN — IPRATROPIUM BROMIDE AND ALBUTEROL SULFATE SCH ML: .5; 3 SOLUTION RESPIRATORY (INHALATION) at 01:05

## 2017-10-06 RX ADMIN — METOPROLOL TARTRATE SCH MG: 25 TABLET, FILM COATED ORAL at 09:00

## 2017-10-06 RX ADMIN — INSULIN LISPRO SCH UNITS: 100 INJECTION, SOLUTION INTRAVENOUS; SUBCUTANEOUS at 21:00

## 2017-10-06 RX ADMIN — SPIRONOLACTONE SCH MG: 25 TABLET, FILM COATED ORAL at 21:00

## 2017-10-06 RX ADMIN — DABIGATRAN ETEXILATE MESYLATE SCH MG: 75 CAPSULE ORAL at 21:16

## 2017-10-06 RX ADMIN — DOCUSATE SODIUM PRN MG: 100 CAPSULE, LIQUID FILLED ORAL at 10:11

## 2017-10-06 RX ADMIN — DABIGATRAN ETEXILATE MESYLATE SCH MG: 75 CAPSULE ORAL at 10:11

## 2017-10-06 RX ADMIN — SPIRONOLACTONE SCH MG: 25 TABLET, FILM COATED ORAL at 10:12

## 2017-10-06 RX ADMIN — INSULIN LISPRO SCH UNITS: 100 INJECTION, SOLUTION INTRAVENOUS; SUBCUTANEOUS at 07:30

## 2017-10-06 RX ADMIN — INSULIN LISPRO SCH UNITS: 100 INJECTION, SOLUTION INTRAVENOUS; SUBCUTANEOUS at 17:30

## 2017-10-06 RX ADMIN — IPRATROPIUM BROMIDE AND ALBUTEROL SULFATE SCH ML: .5; 3 SOLUTION RESPIRATORY (INHALATION) at 20:37

## 2017-10-06 RX ADMIN — METOPROLOL TARTRATE SCH MG: 25 TABLET, FILM COATED ORAL at 21:00

## 2017-10-06 RX ADMIN — NYSTATIN SCH DOSE: 100000 POWDER TOPICAL at 09:00

## 2017-10-06 NOTE — REP
Portable chest, 06:20 a.m., 10/06/2017.

 

Comparisons are 10/05 2017 and 05/23/2017 and chest CT of 05/23/2017.

 

There is chronic marked cardiomegaly, unchanged.  There is mild interstitial

coarsening, unchanged, compatible with vascular engorgement.  There is focal

atelectasis in the left upper lobe, slightly increased.

 

There is no right pleural effusion.  The left costophrenic angle is obscured.

 

 

Signed by

Donn Henley MD 10/06/2017 07:45 A

## 2017-10-06 NOTE — IPN
DATE OF SERVICE:  10/06/2017

 

SUBJECTIVE:  Patient is seen and examined at the bedside.  Chart has been

reviewed.  She states that her breathing is not much improved.  No fever or

chills.  Increased lower extremity edema.

 

Temperature 97.7, pulse 68, respiratory rate 18, blood pressure 118/50 and 95% on

1 liter nasal cannula.

Generally, the patient is awake, alert and oriented times three, answering

questions appropriately.  No use of respiratory accessory muscles.  Morbidly

obese female sitting at the bedside.  Enlarged neck.  Poor dentition.  Moist

mucous membranes.  Difficult to assess jugular venous distention (JVD).

Lungs:  Bibasilar rales, otherwise clear with good air entry in upper lobes.

Heart:  S1, S2, sinus rhythm.

Abdomen is obese, soft, nontender, nondistended, with some edema to the pannus.

 

Extremities:  Chronic lower extremity edema.  No clubbing or cyanosis.

 

LABORATORY DATA/MICROBIOLOGY/IMAGING STUDIES:  Have been reviewed.  Chest x-ray

on 10/06/2016 repeated shows no right pleural effusion, cardiomegaly, mild

interstitial coarsening compatible with vascular engorgement with focal

atelectasis in the left upper lobe which is slightly increased.

 

ASSESSMENT AND PLAN:  This is a 67-year-old, morbidly obese female, body mass

index (BMI) is 57 complicating care, with diastolic heart failure, obesity

hypoventilation syndrome, type 2 diabetes, hypertension, heparin induced

thrombocytopenia, chronic obstructive pulmonary disease (COPD) on chronic 1 liter

of oxygen, coronary artery disease (CAD) status post stent, dyslipidemia, mitral

valve stenosis with prolapse, pulmonary hypertension, paroxysmal atrial

fibrillation on chronic Pradaxa, and chronic kidney disease with baseline

creatinine of 1, who presents to the emergency room with worsening shortness of

breath for the past one week.  The patient has been seen by nephrologist, Dr. Medeiros, as outpatient who has been adjusting her diuretic medication.  She has

been on torsemide, spironolactone, as well as metolazone Monday, Wednesday,

Friday, and her weight has been 330 pounds in the office.  She has noted

increasing swelling of her legs and worsening shortness of breath, admitted for

fluid overload.

 

CURRENT ISSUES (are as follows):

1.  Decompensated diastolic heart failure.  Dr. Marshall Alvares will see the

patient this morning.  She was given 80 mg IV Lasix twice a day.  Metolazone was

changed to daily.  Defer to Dr. Alvares for further diuresis.

2.  Dysphagia.  Speech therapy evaluation.

3.  Hypomagnesemia.  Continue supplementation.

4.  Hypertension.  On diuresis.  Continue home antihypertensive.

5.  Constipation.  Bowel regimen.

6.  Obesity hypoventilation syndrome and morbid obesity complicating care.

7.  Obstructive sleep apnea.  Resume home dose of CPAP.

8.  Type 2 diabetes.  Consistent carbohydrate and renal diet.  Sliding scale.

9.  Chronic obstructive pulmonary disease.  On chronic home oxygen at 1 liter.

10.  Mitral valve stenosis with prolapse and pulmonary hypertension.  Stable.

11.  Paroxysmal atrial fibrillation.  Rate controlled with beta blocker,

anticoagulated with Pradaxa.

12.  Iron deficiency anemia.  Continue with supplementation.

13.  Elevated TSH with normal T4, T3.  Low dose Synthroid as outpatient.

## 2017-10-07 VITALS — DIASTOLIC BLOOD PRESSURE: 48 MMHG | SYSTOLIC BLOOD PRESSURE: 90 MMHG

## 2017-10-07 VITALS — DIASTOLIC BLOOD PRESSURE: 59 MMHG | SYSTOLIC BLOOD PRESSURE: 107 MMHG

## 2017-10-07 VITALS — DIASTOLIC BLOOD PRESSURE: 57 MMHG | SYSTOLIC BLOOD PRESSURE: 117 MMHG

## 2017-10-07 VITALS — DIASTOLIC BLOOD PRESSURE: 54 MMHG | SYSTOLIC BLOOD PRESSURE: 114 MMHG

## 2017-10-07 VITALS — DIASTOLIC BLOOD PRESSURE: 54 MMHG | SYSTOLIC BLOOD PRESSURE: 99 MMHG

## 2017-10-07 VITALS — SYSTOLIC BLOOD PRESSURE: 105 MMHG | DIASTOLIC BLOOD PRESSURE: 56 MMHG

## 2017-10-07 VITALS — SYSTOLIC BLOOD PRESSURE: 108 MMHG | DIASTOLIC BLOOD PRESSURE: 58 MMHG

## 2017-10-07 VITALS — SYSTOLIC BLOOD PRESSURE: 124 MMHG | DIASTOLIC BLOOD PRESSURE: 62 MMHG

## 2017-10-07 VITALS — SYSTOLIC BLOOD PRESSURE: 126 MMHG | DIASTOLIC BLOOD PRESSURE: 58 MMHG

## 2017-10-07 VITALS — DIASTOLIC BLOOD PRESSURE: 54 MMHG | SYSTOLIC BLOOD PRESSURE: 110 MMHG

## 2017-10-07 VITALS — SYSTOLIC BLOOD PRESSURE: 101 MMHG | DIASTOLIC BLOOD PRESSURE: 50 MMHG

## 2017-10-07 VITALS — SYSTOLIC BLOOD PRESSURE: 128 MMHG | DIASTOLIC BLOOD PRESSURE: 58 MMHG

## 2017-10-07 VITALS — SYSTOLIC BLOOD PRESSURE: 135 MMHG | DIASTOLIC BLOOD PRESSURE: 62 MMHG

## 2017-10-07 LAB
ANION GAP SERPL CALC-SCNC: 5 MEQ/L (ref 8–16)
BUN SERPL-MCNC: 21 MG/DL (ref 7–18)
CALCIUM SERPL-MCNC: 9.2 MG/DL (ref 8.8–10.2)
CHLORIDE SERPL-SCNC: 96 MEQ/L (ref 98–107)
CO2 SERPL-SCNC: 37 MEQ/L (ref 21–32)
CREAT SERPL-MCNC: 1.12 MG/DL (ref 0.55–1.02)
ERYTHROCYTE [DISTWIDTH] IN BLOOD BY AUTOMATED COUNT: 15.4 % (ref 11.5–14.5)
EST. AVERAGE GLUCOSE BLD GHB EST-MCNC: 131 MG/DL (ref 60–110)
GFR SERPL CREATININE-BSD FRML MDRD: 51.7 ML/MIN/{1.73_M2} (ref 45–?)
GLUCOSE SERPL-MCNC: 118 MG/DL (ref 80–110)
MAGNESIUM SERPL-MCNC: 1.7 MG/DL (ref 1.8–2.4)
MCH RBC QN AUTO: 30 PG (ref 27–33)
MCHC RBC AUTO-ENTMCNC: 31 G/DL (ref 32–36.5)
MCV RBC AUTO: 96.5 FL (ref 80–96)
PLATELET # BLD AUTO: 199 10^3/UL (ref 150–450)
POTASSIUM SERPL-SCNC: 3.5 MEQ/L (ref 3.5–5.1)
SODIUM SERPL-SCNC: 138 MEQ/L (ref 136–145)
WBC # BLD AUTO: 8.6 10^3/UL (ref 4–10)

## 2017-10-07 RX ADMIN — METOPROLOL TARTRATE SCH MG: 25 TABLET, FILM COATED ORAL at 21:12

## 2017-10-07 RX ADMIN — METOPROLOL TARTRATE SCH MG: 25 TABLET, FILM COATED ORAL at 12:01

## 2017-10-07 RX ADMIN — INSULIN LISPRO SCH UNITS: 100 INJECTION, SOLUTION INTRAVENOUS; SUBCUTANEOUS at 21:00

## 2017-10-07 RX ADMIN — DABIGATRAN ETEXILATE MESYLATE SCH MG: 75 CAPSULE ORAL at 09:47

## 2017-10-07 RX ADMIN — INSULIN LISPRO SCH UNITS: 100 INJECTION, SOLUTION INTRAVENOUS; SUBCUTANEOUS at 12:57

## 2017-10-07 RX ADMIN — IPRATROPIUM BROMIDE AND ALBUTEROL SULFATE SCH ML: .5; 3 SOLUTION RESPIRATORY (INHALATION) at 20:05

## 2017-10-07 RX ADMIN — INSULIN LISPRO SCH UNITS: 100 INJECTION, SOLUTION INTRAVENOUS; SUBCUTANEOUS at 17:26

## 2017-10-07 RX ADMIN — FERROUS SULFATE TAB 325 MG (65 MG ELEMENTAL FE) SCH MG: 325 (65 FE) TAB at 09:47

## 2017-10-07 RX ADMIN — IPRATROPIUM BROMIDE AND ALBUTEROL SULFATE SCH ML: .5; 3 SOLUTION RESPIRATORY (INHALATION) at 00:53

## 2017-10-07 RX ADMIN — HYDROCODONE BITARTRATE AND ACETAMINOPHEN PRN TAB: 5; 325 TABLET ORAL at 17:27

## 2017-10-07 RX ADMIN — DABIGATRAN ETEXILATE MESYLATE SCH MG: 75 CAPSULE ORAL at 21:05

## 2017-10-07 RX ADMIN — IPRATROPIUM BROMIDE AND ALBUTEROL SULFATE SCH ML: .5; 3 SOLUTION RESPIRATORY (INHALATION) at 13:17

## 2017-10-07 RX ADMIN — NYSTATIN SCH DOSE: 100000 POWDER TOPICAL at 09:51

## 2017-10-07 RX ADMIN — FUROSEMIDE SCH MG: 10 INJECTION, SOLUTION INTRAMUSCULAR; INTRAVENOUS at 09:00

## 2017-10-07 RX ADMIN — TIOTROPIUM BROMIDE SCH INHALATION: 18 CAPSULE ORAL; RESPIRATORY (INHALATION) at 07:11

## 2017-10-07 RX ADMIN — LORATADINE PRN MG: 10 TABLET ORAL at 21:11

## 2017-10-07 RX ADMIN — IPRATROPIUM BROMIDE AND ALBUTEROL SULFATE SCH ML: .5; 3 SOLUTION RESPIRATORY (INHALATION) at 07:11

## 2017-10-07 RX ADMIN — MULTIPLE VITAMINS W/ MINERALS TAB SCH TAB: TAB at 09:47

## 2017-10-07 RX ADMIN — NYSTATIN SCH DOSE: 100000 POWDER TOPICAL at 21:05

## 2017-10-07 RX ADMIN — MAGNESIUM OXIDE SCH MG: 400 TABLET ORAL at 12:01

## 2017-10-07 RX ADMIN — SPIRONOLACTONE SCH MG: 25 TABLET, FILM COATED ORAL at 09:00

## 2017-10-07 RX ADMIN — HYDROCODONE BITARTRATE AND ACETAMINOPHEN PRN TAB: 5; 325 TABLET ORAL at 07:16

## 2017-10-07 RX ADMIN — FUROSEMIDE SCH MG: 10 INJECTION, SOLUTION INTRAMUSCULAR; INTRAVENOUS at 17:27

## 2017-10-07 RX ADMIN — ASPIRIN SCH MG: 81 TABLET ORAL at 09:47

## 2017-10-07 RX ADMIN — ATORVASTATIN CALCIUM SCH MG: 20 TABLET, FILM COATED ORAL at 21:11

## 2017-10-07 NOTE — IPN
DATE:  10/07/2017

 

Patient seen and examined at the bedside. Chart has been reviewed. Overnight

patient had episode of atrial flutter with rapid ventricular response (RVR)

ventricular rate of 150. She was also noted to have nonsustained ventricular

tachycardia on the monitor. Potassium and magnesium were optimized. Systolic

pressure was maintained at 101-110. Patient complained of some shortness of

breath, palpitations. No lightheadedness or near syncope, headaches.

 

VITAL SIGNS: Temperature 98.2, pulse 152, regular, respiratory rate 16, blood

pressure 105/56, 91% on 2 liters nasal cannula.

GENERAL: Patient is awake, alert, oriented, answering questions appropriately.

Anicteric sclerae. No jaundice. Unable to assess for jugular venous distention

(JVD) due to a thickened neck.

HEART: S1, S2, irregularly irregular.

LUNGS: Fine crackles at the bases. Air entry is equal.

ABDOMEN: Obese, protuberant, significant edema.

EXTREMITIES: Pitting edema 1+.

 

Laboratory data, Imaging studies, microbiology have been reviewed.

 

ASSESSMENT AND PLAN: This is a 67-year-old female, morbidly obese, body mass

index (BMI) 57, diastolic heart failure, type 2 diabetes, hypertension, metabolic

syndrome, obesity, hypoventilation syndrome, heparin-induced thrombocytopenia

(HIT), chronic obstructive pulmonary disease (COPD), chronic 1 liters of oxygen,

coronary artery disease (CAD), stent, dyslipidemia, mitral valve stenosis with

prolapse, pulmonary hypertension, paroxysmal atrial fibrillation, on chronic

Pradaxa, chronic kidney disease, baseline creatinine of 1, presents with

worsening shortness of breath for the past week. Her nephrologist has been

adjusting her medication and her diuretics for the past few days. She has been on

torsemide, spironolactone, metolazone with increase in weight in the office and

increasing lower extremity edema.

 

CURRENT ISSUES:

1.  Atrial flutter with rapid ventricular rate. Patient has received digoxin,

currently on diltiazem drip. Cardiology, Dr. Leroy, has been consulted. Patient's

blood pressure has been maintained above systolic of 100. She is continued on

Pradaxa for anticoagulation. May need to titrate due to low blood pressure.

Metoprolol, spironolactone could not be given last evening. Will defer to Dr. Leroy

for further changes in her rate control medications.

 

2.  Decompensated congestive heart failure exacerbation. Diastolic heart failure

with preserved ejection fraction, currently on Lasix 80 mg twice a day. Input and

output was 6.1 liters yesterday. Managed by Dr. Alvares. Continue on fluid

restriction, daily weights, strict intake and output. Monitor patient's

creatinine daily and clinical symptoms.

 

3.  Nonsustained ventricular tachycardia, on telemetry. Optimize potassium and

magnesium. She has been supplemented with 40 mEq of potassium and magnesium 1

gram this morning. Will recheck levels and supplement if needed. Will keep

potassium goal of greater than 4, magnesium greater than 2.

 

4.  Hypertension. Patient's antihypertensives are being held due to low blood

pressure from diuresis as well as atrial flutter/atrial fibrillation.

 

5.  Obesity hypoventilation syndrome with morbid obesity complicating care.

 

6.  Obstructive sleep apnea. Resume home settings of continuous positive airway

pressure (CPAP).

 

7.  Type 2 diabetes. Consistent-carbohydrate with renal diet. Sliding scale.

 

8.  COPD on chronic home oxygen.

 

9.  Mitral valve stenosis and prolapsed pulmonary hypertension, stable.

 

10.  Iron deficiency anemia, supplementation.

 

11.  Elevated thyroid-stimulating hormone (TSH) with normal T4, T3. In light of

worsening atrial fibrillation/atrial flutter will not start on supplementation

with Synthroid.

 

12.  Electrolytes abnormalities. Low magnesium, low potassium. Supplement

potassium and magnesium for optimal values of greater than 3.5 for potassium and

magnesium greater than 2.

## 2017-10-07 NOTE — CR
DATE OF CONSULTATION:  10/06/2017

 

REQUESTING PHYSICIAN:  Bharati Corona MD.

 

REASON FOR CONSULTATION:  Diuretic management for volume overload.

 

HISTORY OF PRESENT ILLNESS:  The patient is a 67-year-old  female with

past medical history of morbid obesity, diastolic congestive heart failure,

obesity hypoventilation syndrome, chronic obstructive pulmonary disease, 
coronary

artery disease status post stent, mitral valve stenosis with prolapse, 
paroxysmal

atrial fibrillation on Pradaxa, type 2 diabetes, hypertension.  She has a 
history

of recurrent volume overload.  She follows up with Dr. Medeiros in the 
nephrology

office for tailoring of her diuretic regimen.  She notes a weight gain of

reported 20 pounds within the past few months.  She is currently on torsemide 40

mg by mouth daily, spironolactone 25 mg by mouth twice a day, and metolazone 2.5

mg Monday, Wednesday, and Friday.  She has a baseline creatinine of 
approximately

1.  Most of her edema is in her abdomen.

 

She presented to the emergency room with progressive worsening of shortness of

breath and dyspnea on exertion and increasing abdominal girth.

 

PAST MEDICAL HISTORY:

1.  Diastolic congestive heart failure.

2.  Obesity hypoventilation syndrome.

3.  Morbid obesity.

4.  Hypertension.

5.  Type 2 diabetes.

6.  Heparin-induced thrombocytopenia.

7.  Chronic obstructive pulmonary disease (COPD).

8.  Coronary artery disease (CAD) status post stent.

9.  Pulmonary hypertension.

10.  Paroxysmal atrial fibrillation on Pradaxa.

 

ALLERGIES:  HEPARIN and SULFA DRUGS.

 

SOCIAL HISTORY:  The patient has a 35 pack-year history, quit about 20 years 
ago.

She denies alcohol or drugs.

 

PAST SURGICAL HISTORY:

1.  Colon resection in 2010.

2.  Coronary stent 2011.

3.  Hernia repair history.

 

FAMILY HISTORY:  Negative for end-stage renal disease.

 

REVIEW OF SYSTEMS:  As per history of present illness.

HOME MEDICATIONS:   Reviewed and include:

-  spironolactone 25 mg by mouth twice a day

-  metolazone 2.5 mg three times a week

-  torsemide 40 mg by mouth daily

-  metformin 500 mg twice a day

-  aspirin

-  statin

-  Pradaxa

-  magnesium and potassium supplements

-  metoprolol 12.5 mg by mouth twice a day

-  Spiriva

-  Breo Ellipta

 

PHYSICAL EXAMINATION:

Temperature 97.4, pulse 80, respiratory rate 20, blood pressure 119/60,

saturating 97% on 1 liter nasal cannula.

INTAKE AND OUTPUT:  Urine output yesterday 1.8 liters.  Weight in the bed scale

today 153.2 kg.

GENERAL:  Patient is seen in the emergency room in no acute distress in the

stretcher.  She is morbidly obese lying on her left lateral recumbent side.  She

is in no distress.

HEAD AND NECK:  Moist mucous membranes.  Extraocular muscles intact.  Unable to

assess for jugular venous distention due to her body habitus.

CARDIOVASCULAR:  Distant S1 and S2.  No edema in the upper extremities.  1+ 
edema

present in the lower extremities.

RESPIRATORY:  Symmetric bilateral air movement with mild crackle at the base.

ABDOMEN:  Severely protuberant and obese.  Her abdominal pannus has marked

induration consistent with edema.

EXTREMITIES:  1+ edema in the extremities below the knee.  No dependent edema at

the hip.

PSYCHIATRIC:  Appropriate mood and affect.

NEUROLOGIC:  No focal deficit.

 

LABORATORIES:  Sodium 140, potassium 3.8, bicarbonate 31, creatinine 1.0, BNP

2700, magnesium 1.8.

 

WBC 7.7, hemoglobin 12.1, platelets 175.

 

Microbiology:  Blood cultures with no growth for 24 hours.

 

IMAGING:  Chest x-ray done today shows vascular engorgement and obscured left

costophrenic angle.

 

Vascular duplex of the lower extremities done 10/05 shows no DVT.

 

INPATIENT MEDICATIONS:

-  DuoNeb every six

-  aspirin 81 mg by mouth daily

-  atorvastatin 40 mg by mouth nightly

-  Pradaxa 150 mg by mouth twice a day

-  ferrous 325 mg by mouth daily

-  Lasix 80 mg intravenously (IV) twice a day

-  insulin

-  Slow-Mag

-  metolazone 2.5 mg by mouth daily

-  metoprolol 12.5 mg by mouth twice a day

-  spironolactone 25 mg by mouth twice a day

-  tiotropium one inhalation daily

 

ASSESSMENT AND PLAN:  67-year-old female admitted with volume overload secondary

to decompensated diastolic heart failure.

 

1.  Hypervolemia secondary to decompensated heart failure.  The patient's last

echocardiogram from December 2016 is reviewed.  It was a very poor quality study

due to the patient's severe obesity; however, it was suggestive of grade 2

diastolic dysfunction and probable right ventricle hypokinesis.  As an

outpatient, the patient's current diuretic regimen is torsemide 40 mg by mouth

daily, Aldactone 25 mg twice a day, and metolazone 2.5 mg Monday, Wednesday,

Friday.  She has been retaining fluid and gaining weight on that regimen.  She 
is

currently started by Dr. Corona on Lasix 80 mg intravenously (IV) twice a day,

daily metolazone, and continuation of Aldactone.  Her renal function is at known

baseline.  She is diuresing well on current regimen.  If she develops 
hypokalemic

alkalosis, we can substitute amiloride in the place of Aldactone.  She does have

a history of nonsustained ventricular tachycardia (V-TACH); hence close 
attention to 

her magnesium and her potassium while we try to mobilize the fluids. 

It is interesting to see how much of her edema is in her abdominal body fat.

 

2.  Paroxysmal atrial fibrillation.  Patient is rate controlled with metoprolol

and anticoagulated with Pradaxa.

 

3.  Morbid obesity and obesity hypoventilation syndrome complicating her care.

 

4.  Hypertension.  Currently well controlled on regimen.

 

5.  History of chronic obstructive pulmonary disease (COPD).  Currently stable 
on

her home inhalers.

 

Thank you for involving us in the care of this patient.  I will follow the

patient with you.

GRISELDA

## 2017-10-07 NOTE — IPNPDOC
Subjective


Date Seen


The patient was seen on 10/7/17.





Subjective


Chief Complaint/HPI


The patient is a 67-year-old female admitted with a reason for visit of CHF.





Assessment /Plan


Assessment


Overnight provider called for rates in 150's, stat ekg demonstrated v-tach, pt 

was started on cardizem drip with close attention to blood pressure. Pt 

continued to have a. flutter with rates into the 150s intermittently after drip 

was initiated, with rates also dropping into the 70-80's. Pt was kept NPO in 

anticipation of possible cardioversion. Cardiologist on call was consulted Dr. Leroy, who recommended .25 mg Digoxin stat x1, will see pt today.





Plan/VTE


VTE Prophylaxis Ordered?:  Yes





VS, I&O, 24H, Fishbone


Vital Signs/I&O





Vital Signs








  Date Time  Temp Pulse Resp B/P (MAP) Pulse Ox O2 Delivery O2 Flow Rate FiO2


 


10/7/17 04:17    101/50 (67)    


 


10/7/17 04:00 98.2 152 20  91 Nasal Cannula 2.0 











Laboratory Data


24H LABS


Laboratory Tests 2


10/6/17 06:53: 


Anion Gap 4L, Glomerular Filtration Rate 58.2, Blood Urea Nitrogen 21H, 

Creatinine 1.01, Sodium Level 140, Potassium Level 3.8, Chloride Level 105, 

Carbon Dioxide Level 31, Calcium Level 9.0, Magnesium Level 1.8, Troponin I 0.04

, NT-Pro-B-Type Natriuretic Peptide 2726H


10/6/17 12:12: Bedside Glucose (Misc Panel) 118H


10/6/17 17:43: Bedside Glucose (Misc Panel) 120H


10/6/17 21:14: Bedside Glucose (Misc Panel) 115


10/7/17 02:57: 


Anion Gap 5L, Glomerular Filtration Rate 51.7, Blood Urea Nitrogen 21H, 

Creatinine 1.12H, Sodium Level 138, Potassium Level 3.5, Chloride Level 96L, 

Carbon Dioxide Level 37H, Calcium Level 9.2, Total Creatine Kinase 54, 

Magnesium Level 1.7L, Creatine Kinase MB 1.7, Creatine Kinase MB Relative Index 

3.14, Troponin I 0.06#


CBC/BMP


Laboratory Tests


10/6/17 06:53








Red Blood Count 4.02, Mean Corpuscular Volume 97.0 H, Mean Corpuscular 

Hemoglobin 30.1, Mean Corpuscular Hemoglobin Concent 31.0 L, Red Cell 

Distribution Width 15.5 H, Calcium Level 9.0





10/7/17 02:57








Red Blood Count 4.34, Mean Corpuscular Volume 96.5 H, Mean Corpuscular 

Hemoglobin 30.0, Mean Corpuscular Hemoglobin Concent 31.0 L, Red Cell 

Distribution Width 15.4 H, Calcium Level 9.2, Total Creatine Kinase 54


Microbiology





Microbiology


10/5/17 Blood Culture - Preliminary, Resulted


          No growth after 24 hours . All specim...


10/5/17 Blood Culture - Preliminary, Resulted


          No growth after 24 hours . All specim...


10/5/17 Influenza Virus Type A Antigen - Final, Complete


          


10/5/17 Influenza Virus Type B Antigen - Final, Complete





GME ATTESTATION


GME ATTESTATION


My preceptor for this patient encounter was physically present in the building 

during the encounter and was fully available. As needed, all aspects of the 

patient interview, examination, medical decision making process, and medical 

care plan development were reviewed and approved by the preceptor. Preceptor is 

aware and concurs with the plan as stated in the body of this note and will 

attest to such by his/her cosignature.











DOMENICO JEFFERY DO Oct 7, 2017 04:41

## 2017-10-07 NOTE — ECGEPIP
Stationary ECG Study

                           [facility] - ED

                                       

                                       Test Date:    2017-10-05

Pat Name:     NGHIA HUI              Department:   

Patient ID:   [id number]                 Room:         -

Gender:       F                        Technician:   

:          [id number]               Requested By: Sania Farrar 

Order Number: [id number]     Reading MD:   Mauricio Donovan

                                 Measurements

Intervals                              Axis          

Rate:         79                       P:            

AK:           0                        QRS:          100

QRSD:         127                      T:            26

QT:           338                                    

QTc:          388                                    

                           Interpretive Statements

ATRIAL FIBRILLATION

RIGHT BUNDLE BRANCH BLOCK

 

Electronically Signed On 10-7-2017 5:25:07 EDT by Mauricio Donovan

## 2017-10-07 NOTE — CR
DATE OF CONSULTATION:  10/07/2017

 

REFERRING PROVIDER:  Dr. Jaz Otero.

 

REASON FOR CONSULTATION:  Atrial fibrillation with a rapid ventricular rate.

 

PRIMARY CARDIOLOGIST:  Dr. Dalton Fishman.

 

NEPHROLOGIST:  Dr. Medeiros.

 

PULMONOLOGIST:  Dr. Tigre Pham.

 

HISTORY OF PRESENT ILLNESS:

67-year-old  women wandering by the office and she usually sees Dr. Fishman.  She has a history of coronary artery disease with percutaneous

transluminal coronary angioplasty (PTCA)/stent, normal LVEF.  She also has a

history of paroxysmal atrial fibrillation, valvular heart disease involving the

mitral valve, morbid obesity and probably sleep apnea, congestive heart failure
, left ventricular diastolic

dysfunction, hypertension, diabetes mellitus, hyperlipidemia, chronic kidney

disease that has been stable and heparin induced thrombocytopenia.  She was

admitted on 10/05/2017 with acute on chronic decompensated congestive heart

failure secondary to left ventricular diastolic dysfunction.  Her diuretics are

being managed by nephrology.  Last night, she went into atrial fibrillation with

a rapid ventricular rate of about 150 beats per minute.  She was started on IV

Cardizem and cardiology consultation was called because of low blood pressure.

Digoxin was added, IV and this morning, when I saw her, her heart rate is under

control at about  beats per minute.

 

When I saw Mrs. Mimi Raphael in her room, she was sitting in the chair in no

acute distress, very pleasant woman.  She denies any chest pain and there is no

shortness of breath or orthopnea.  She denies any palpitations even when her

heart rate was going fast.  There is no report of bleeding.  She states that she

has lost a significant amount of weight and her shortness of breath has improved

significantly.  She has no cough or hemoptysis. There is no report of diarrhea 
or

vomiting.  She stated she usually takes her medication regularly and it has been

difficult to control her fluid retention, she goes to nephrology every 1-2

months.

 

PAST SURGICAL HISTORY:

Positive for colon resection in 2010, hernia repair, otherwise unremarkable.

 

FAMILY HISTORY:  Noncontributory.

 

SOCIAL HISTORY:

The patient lives at home and she denies any smoking or EtOH abuse.  She quit

smoking about 20 years ago.

 

ALLERGIES:  HEPARIN AND SULFA DRUGS.

 

ADVANCED DIRECTIVES:  The patient is a FULL CODE.

 

MEDICATIONS AT HOME:

- metformin 400 mg by mouth twice a day

- atorvastatin 40 mg by mouth daily

- torsemide 40 mg by mouth daily

- Breo Ellipta 200/25 inhalation daily

- KCL 40 mEq by mouth daily

- hydrocodone 5/325 one tablet three times a day as needed for pain

- Ventolin HFA two puffs via inhalation every 4 hours as needed for shortness of

breath

- aspirin 81 mg by mouth daily

- Pradaxa 150 mg by mouth twice a day

- Colace 100 mg by mouth daily for constipation

- ferrous sulfate 325 mg by mouth daily

- loratadine 10 mg by mouth as needed for allergies

- Slow-Mag 64 mg by mouth three times a day on Monday, Wednesday and Friday.

- metolazone 2.5 mg by mouth three times a day on Monday, Wednesday and Friday.

- metoprolol 12.5 mg by mouth twice a day

- multivitamin one tablet by mouth daily

- Nystatin STADA apply to the affected skin

- senna as needed for constipation, twice daily in tablet of 8.6 mg

- spironolactone 25 mg by mouth twice a day

- Spiriva inhaler via inhalation one puff daily

 

CURRENT MEDICATIONS:

- potassium chloride 40 mg by mouth daily

- furosemide 40 mg IV every 12 hours

- Mag-Ox 500 mg by mouth daily

- Diltiazem drip running at 10/hour

- aspirin 81 mg by mouth daily

- ferrous sulfate 325 mg by mouth daily

- Slow-Mag 64 mg by mouth 64 mg tablet on Monday, Wednesday and Friday

- multivitamin one tablet by mouth daily

- Spiriva inhaler one inhalation daily

- atorvastatin 40 mg by mouth nightly

- Pradaxa/dabigatran 150 mg by mouth twice a day

- Nystatin applied to the affected skin as needed.

- spironolactone 25 mg by mouth twice a day

- DuoNeb 0.5 mg/2.5 mg every 6 hours inhalation and every 2 hours a needed for

shortness of breath or wheezing.

- Tylenol 500 mg by mouth four times a day as needed for pain or fever

- Colace 100 mg by mouth daily as needed for constipation

- loratadine 10 by mouth daily as needed for allergies

- senna one tablet by mouth twice a day

 

On physical examination, the patient is alert oriented, in no acute distress,

very pleasant.

Her last vital signs today revealed a blood pressure of 90/48 with a pulse of 77
,

respiration 18-20, and her maximum temperature is 98.1 degree Fahrenheit with a

oxygen saturation of 91% on 2 liters of nasal cannula.

Examination of the head is normocephalic, atraumatic.

Neck: Supple, I could not appreciate any carotid bruits or jugular venous

distention.

The lungs did not reveal any wheezing or crackles.

The heart examination revealed irregularly irregular heart sounds without

gallops.  The point of maximum impulse (PMI) is not displaced.  There is no rub.

Could not appreciate any murmurs.

Abdomen is protuberant, soft.

Extremities revealed trace to +1 bilaterally over the ankle and lower leg edema.

 

Neurological examination grossly is negative for focal deficit.

 

LABS:

CBC revealed a WBC of 8.6, hemoglobin 13.0, hematocrit 41.9 and platelet 199,
000.

BMP done today revealed a sodium of 138, potassium 3.5, chloride 96, CO2 37, BUN

21, creatinine 1.12, GFR 51.7 and fasting glucose 118 with a calcium of 9.2.

Serum magnesium is 1.7.  Serum troponin is 0.04, 0.04, and 0.06 respectively, #1
,

#2 and #3 starting last night at about 1-o'clock.  Serum for BNP during the 
night

was 2198.   Yesterday morning 10/06/2017 it was 2726.

 

Telemetry revealed atrial fibrillation with a controlled ventricular rate.

 

Chest x-ray on 10/06/2017 revealed cardiomegaly that is unchanged and mild

interstitial markings otherwise, no manifestation of heart failure or pleural

effusion.

 

Mrs. Mimi Raphael seems to be stable from a cardiac point of view with recent

hospitalization with acute on chronic decompensated diastolic heart failure

secondary to left ventricular diastolic dysfunction.  Cardiology consult was

called because of atrial fibrillation with rapid ventricular rate and

hypotension.  The patient's ventricular rate has improved after being started on

IV Cardizem and digoxin was added.  She is currently on anticoagulation therapy

for prevention of thrombolic event and should continues the same.  Her blood

pressure continues to be low and the IV Cardizem was discontinued.  For some

reason, the beta blocker she was taking at home was held for 24 hours and it

could be one of the reasons that she went into atrial fibrillation with the 
rapid

ventricular rate.  It will be started at 12.5 mg by mouth twice a day and in

order to prevent hypotension, I will decrease the spironolactone from 25 mg by

mouth twice a day to 25 mg by mouth daily.  She will continue with the 
furosemide

and I will discuss with the hospitalist about restarting her metolazone.  I have

noticed that she is on two magnesium supplements, we probably can get rid of 
one.

 

 

It was a pleasure to assist in the care of the Mrs. Mimi Raphael for her

underling cardiac condition.  I will continue to follow along with you over the

weekend and on Monday morning, she will be seen by Dr. Fishman.  She appears to 
be

currently well compensated and her heart rate seems to be under control.  She

probably will not tolerate tachycardic episode very well and it seems that in 
the

past, according to the chart, she was found to have mitral stenosis on

echocardiogram.  Also we have discussed about diet.  It seems that she is not

quite compliant in her diet and she was told to decrease salt intake, so she

would be taking less medications. She has manifested understanding.

GRISELDA

## 2017-10-07 NOTE — ECGEPIP
Stationary ECG Study

                              [facility]

                                       

                                       Test Date:    2017-10-07

Pat Name:     NGHIA HUI              Department:   

Patient ID:   [id number]                 Room:         [place] [address]

Gender:       F                        Technician:   LOTTIE

:          1950               Requested By: DOMENICO JEFFERY 

Order Number: [id number]     Reading MD:   Iván Leroy

                                 Measurements

Intervals                              Axis          

Rate:         151                      P:            163

[place]:           64                       QRS:          32

QRSD:         148                      T:            60

QT:           313                                    

QTc:          496                                    

                           Interpretive Statements

WIDE COMPLEXE TACHYCARDIA WITH SHORT [place] INTERVAL, POSSIBLE ATRIAL FLUTTER

RIGHT BUNDLE BRANCH BLOCK

LAST TRACING ON 10/05/2017 AT 12:58:14.  PATIENT WAS IN ATRIAL FIBRILLATION

WITH 

A NARROW COMPLEX QRS

 

Electronically Signed On 10-7-2017 23:20:14 EDT by Iván Leroy

## 2017-10-07 NOTE — ECGEPIP
Stationary ECG Study

                              [facility]

                                       

                                       Test Date:    2017-10-07

Pat Name:     NGHIA HUI              Department:   

Patient ID:   [id number]                 Room:         [place] [address]

Gender:       F                        Technician:   RADHA

:          1950               Requested By: GUERLINE ALAN

Order Number: [id number]     Reading MD:   Iván Leroy

                                 Measurements

Intervals                              Axis          

Rate:         70                       P:            

AL:           0                        QRS:          101

QRSD:         108                      T:            116

QT:           353                                    

QTc:          383                                    

                           Interpretive Statements

ATRIAL FIBRILLATION

MARKED RIGHT AXIS DEVIATION

LOW QRS VOLTAGE IN PRECORDIAL LEADS

NONSPECIFIC ST-T ABNORMALITY

COMPARED TO A PRIOR TRACING ON 10/05/2017 AT 12:58:14, NO SIGNIFICANT

CHANGES. 

THE MOST RECENT TRACING ON 10/07/2017 AT 2:47:32, PATIENT WAS IN A WIDE

COMPLEX 

TACHYCARDIA

 

 

Electronically Signed On 10-7-2017 23:23:38 EDT by Iván Leroy

## 2017-10-08 VITALS — SYSTOLIC BLOOD PRESSURE: 124 MMHG | DIASTOLIC BLOOD PRESSURE: 60 MMHG

## 2017-10-08 VITALS — DIASTOLIC BLOOD PRESSURE: 82 MMHG | SYSTOLIC BLOOD PRESSURE: 120 MMHG

## 2017-10-08 VITALS — SYSTOLIC BLOOD PRESSURE: 112 MMHG | DIASTOLIC BLOOD PRESSURE: 60 MMHG

## 2017-10-08 VITALS — SYSTOLIC BLOOD PRESSURE: 134 MMHG | DIASTOLIC BLOOD PRESSURE: 70 MMHG

## 2017-10-08 VITALS — SYSTOLIC BLOOD PRESSURE: 134 MMHG | DIASTOLIC BLOOD PRESSURE: 74 MMHG

## 2017-10-08 VITALS — SYSTOLIC BLOOD PRESSURE: 138 MMHG | DIASTOLIC BLOOD PRESSURE: 76 MMHG

## 2017-10-08 LAB
ANION GAP SERPL CALC-SCNC: 5 MEQ/L (ref 8–16)
BUN SERPL-MCNC: 27 MG/DL (ref 7–18)
CALCIUM SERPL-MCNC: 8.8 MG/DL (ref 8.8–10.2)
CHLORIDE SERPL-SCNC: 99 MEQ/L (ref 98–107)
CO2 SERPL-SCNC: 35 MEQ/L (ref 21–32)
CREAT SERPL-MCNC: 1.14 MG/DL (ref 0.55–1.02)
DIGOXIN SERPL-MCNC: 0.6 NG/ML (ref 0.5–2)
ERYTHROCYTE [DISTWIDTH] IN BLOOD BY AUTOMATED COUNT: 15.3 % (ref 11.5–14.5)
GFR SERPL CREATININE-BSD FRML MDRD: 50.6 ML/MIN/{1.73_M2} (ref 45–?)
GLUCOSE SERPL-MCNC: 108 MG/DL (ref 80–110)
MAGNESIUM SERPL-MCNC: 2.1 MG/DL (ref 1.8–2.4)
MCH RBC QN AUTO: 29.5 PG (ref 27–33)
MCHC RBC AUTO-ENTMCNC: 30.8 G/DL (ref 32–36.5)
MCV RBC AUTO: 95.7 FL (ref 80–96)
PLATELET # BLD AUTO: 188 10^3/UL (ref 150–450)
POTASSIUM SERPL-SCNC: 4 MEQ/L (ref 3.5–5.1)
SODIUM SERPL-SCNC: 139 MEQ/L (ref 136–145)
WBC # BLD AUTO: 7.9 10^3/UL (ref 4–10)

## 2017-10-08 RX ADMIN — DABIGATRAN ETEXILATE MESYLATE SCH MG: 75 CAPSULE ORAL at 20:44

## 2017-10-08 RX ADMIN — DABIGATRAN ETEXILATE MESYLATE SCH MG: 75 CAPSULE ORAL at 09:13

## 2017-10-08 RX ADMIN — SPIRONOLACTONE SCH MG: 25 TABLET, FILM COATED ORAL at 09:13

## 2017-10-08 RX ADMIN — IPRATROPIUM BROMIDE AND ALBUTEROL SULFATE SCH ML: .5; 3 SOLUTION RESPIRATORY (INHALATION) at 20:34

## 2017-10-08 RX ADMIN — NYSTATIN SCH DOSE: 100000 POWDER TOPICAL at 20:46

## 2017-10-08 RX ADMIN — DEXTROSE MONOHYDRATE SCH MLS/HR: 50 INJECTION, SOLUTION INTRAVENOUS at 10:27

## 2017-10-08 RX ADMIN — IPRATROPIUM BROMIDE AND ALBUTEROL SULFATE SCH ML: .5; 3 SOLUTION RESPIRATORY (INHALATION) at 13:19

## 2017-10-08 RX ADMIN — METOPROLOL TARTRATE SCH MG: 25 TABLET, FILM COATED ORAL at 20:43

## 2017-10-08 RX ADMIN — HYDROCODONE BITARTRATE AND ACETAMINOPHEN PRN TAB: 5; 325 TABLET ORAL at 09:14

## 2017-10-08 RX ADMIN — INSULIN LISPRO SCH UNITS: 100 INJECTION, SOLUTION INTRAVENOUS; SUBCUTANEOUS at 11:51

## 2017-10-08 RX ADMIN — IPRATROPIUM BROMIDE AND ALBUTEROL SULFATE SCH ML: .5; 3 SOLUTION RESPIRATORY (INHALATION) at 07:26

## 2017-10-08 RX ADMIN — ASPIRIN SCH MG: 81 TABLET ORAL at 09:13

## 2017-10-08 RX ADMIN — MULTIPLE VITAMINS W/ MINERALS TAB SCH TAB: TAB at 09:12

## 2017-10-08 RX ADMIN — INSULIN LISPRO SCH UNITS: 100 INJECTION, SOLUTION INTRAVENOUS; SUBCUTANEOUS at 21:00

## 2017-10-08 RX ADMIN — IPRATROPIUM BROMIDE AND ALBUTEROL SULFATE SCH ML: .5; 3 SOLUTION RESPIRATORY (INHALATION) at 01:18

## 2017-10-08 RX ADMIN — NYSTATIN SCH DOSE: 100000 POWDER TOPICAL at 09:16

## 2017-10-08 RX ADMIN — MAGNESIUM OXIDE SCH MG: 400 TABLET ORAL at 09:13

## 2017-10-08 RX ADMIN — HYDROCODONE BITARTRATE AND ACETAMINOPHEN PRN TAB: 5; 325 TABLET ORAL at 20:46

## 2017-10-08 RX ADMIN — ATORVASTATIN CALCIUM SCH MG: 20 TABLET, FILM COATED ORAL at 20:40

## 2017-10-08 RX ADMIN — TIOTROPIUM BROMIDE SCH INHALATION: 18 CAPSULE ORAL; RESPIRATORY (INHALATION) at 07:26

## 2017-10-08 RX ADMIN — FERROUS SULFATE TAB 325 MG (65 MG ELEMENTAL FE) SCH MG: 325 (65 FE) TAB at 09:13

## 2017-10-08 RX ADMIN — INSULIN LISPRO SCH UNITS: 100 INJECTION, SOLUTION INTRAVENOUS; SUBCUTANEOUS at 07:30

## 2017-10-08 RX ADMIN — INSULIN LISPRO SCH UNITS: 100 INJECTION, SOLUTION INTRAVENOUS; SUBCUTANEOUS at 17:30

## 2017-10-08 RX ADMIN — METOPROLOL TARTRATE SCH MG: 25 TABLET, FILM COATED ORAL at 09:15

## 2017-10-08 NOTE — IPN
DATE OF SERVICE:  10/07/2017

 

SUBJECTIVE:  The patient is seen this morning at the bedside.  Last night she

went into atrial fibrillation with rapid ventricular rate of about 150 beats per

minute.  She was started on intravenous (IV) Cardizem and cardiology consultation

was had with Dr. Leroy.  Her blood pressure was borderline soft.  Digoxin was

added.  Her metoprolol was resumed and her spironolactone dose was halved.  The

patient made 6.1 liters of urine in the past 24 hours and her renal function has

stayed fairly stable, although she has developed a metabolic alkalosis.  The

patient was completely asymptomatic while she was in atrial fibrillation with

rapid ventricular response (RVR).

 

REVIEW OF SYSTEMS:  Negative for chest pain, symptomatic palpitations, nausea,

vomiting, diarrhea, shortness of breath.  Remainder of review of systems is

negative.

 

VITAL SIGNS:  Temperature 97.6, pulse 73, respiratory rate 19, blood pressure

135/62, on high-flow nasal cannula 2 liters saturating 98%.

Intake and output:  Urine output 6.1 liters.  Oral intake 1.2 liters.  Net

negative 4.9 liters.  Weight in the bed scale today 149 kg, decreased from

prior.

 

PHYSICAL EXAMINATION:

General:  The patient is awake, alert, oriented times four in no acute distress.

Head and neck:  Extraocular muscles are intact.  Mucous membranes are moist.  Due

to significant adipose tissue at the neck, unable to assess her jugular veins.

Heart:  S1, S2, irregular.  2+ radial pulse.  1+ pitting edema in the

extremities.

Lungs:  Diminished at base with faint rales.  Seen on nasal cannula in no

respiratory distress.

Abdomen:  Obese, protuberant, significant edema in the abdominal pannus.

Extremities:  1+ pitting edema in the lower extremities.

Neurologic:  No focal deficits.

Psychiatric:  Appropriate mood and affect.

 

LABORATORIES:  White count 8.6, hemoglobin 13.0, platelets 199.

 

Sodium 138, potassium 3.5, bicarbonate 37, creatinine 1.1, magnesium 1.7.  BNP

2100.

 

INPATIENT MEDICATIONS:  The patient was on a diltiazem drip that is currently

discontinued.  Her magnesium was replaced.  Her potassium was replaced.  Her

furosemide was decreased to 40 mg intravenously (IV) twice a day by myself and

she will start on acetazolamide 250 mg by mouth daily.  Her metolazone is

currently on hold by myself.  Overnight, the patient received two doses of

digoxin.  She is resumed on metoprolol and her Aldactone is halved by the

cardiology team.  The remainder of medications are unchanged from prior.

 

ASSESSMENT AND PLAN:

1.  Decompensated congestive heart failure with significant hypervolemia

predominantly in the abdominal pannus.  The patient diuresed 6.1 liters in the

past 24 hours and has a significant metabolic alkalosis at this time with use of

concomitant loop diuretic and thiazide diuretic causing sequential nephron

blockade.  I will continue her Lasix and reduce to 40 mg intravenously (IV) twice

a day and add oral Diamox to correct the alkalemia.  I would resume her thiazide

diuretic once her alkalosis has been corrected.  Goal net negative 2-3 liters per

24 hours.

 

2.  Atrial fibrillation with rapid ventricular response.  The patient was seen by

Dr. Leroy.  She is resumed on metoprolol.  She received two doses of digoxin and

Cardizem drip overnight.  Her blood pressures have been a bit soft.  Her

spironolactone dose was halved.  She continues on Pradaxa for anticoagulation.

 

3.  Nonsustained ventricular tachycardia (V-TACH) on telemetry.  Expect renal

losses of potassium and magnesium with brisk diuresis and aggressive

supplementation of potassium and magnesium.  She continues on beta blockade with

metoprolol.  Consider amiodarone and defer to cardiology, Dr. Leroy.

 

4.  Hypertension.  The patient's blood pressure has actually been borderline

soft. I have reduced her Lasix to 40 mg IV twice a day.  Her Aldactone dose was

halved by Dr. Leroy and she is back on metoprolol.  Holding parameters with all

antihypertensives.  Of note, it would be difficult to diurese this patient if her

blood pressure is soft and may lead to acute kidney injury.

 

5.  Obesity hypoventilation syndrome and morbid obesity complicating care.

 

6.  History of sulfa allergy.  The patient has tolerated sulfa based diuretics

including loop diuretics and thiazide diuretics and so she will likely tolerate

Diamox.

## 2017-10-08 NOTE — IPN
DATE:  10/08/2017

 

The patient seen and examined at the bedside.  Chart has been reviewed.  This

morning patient denies any chest pain, pressure, dizziness or shortness of

breath, palpitations, nausea, vomiting or abdominal pain.  She continues to have

edema of the pannus, has been diuresing well.  No issues on telemetry overnight.

Ventricular tachycardia in atrial fibrillation, is rate controlled 52 to 83.

 

Temperature 97.1, pulse 60, respiratory 20, blood pressure 120/82, 91% on 1 liter

nasal cannula.

Generally, the patient is awake, alert, oriented times three, answering questions

appropriately.

Lungs are clear to auscultation.

Heart:  S1, S2, irregularly irregular.

Abdomen is obese, soft, mildly tender in the pannus with edema.

Extremities:  2+ pitting edema to the sacrum.

 

LABORATORY DATA:  CBC and metabolic panel studies and microbiology have been

reviewed.

 

ASSESSMENT/PLAN:

This is a 67-year-old female with morbid obesity, body mass index (BMI) 57,

metabolic syndrome, diastolic heart failure with preserved ejection fraction,

diabetes, hypertension, obesity, hyperventilation syndrome, heparin induced

thrombocytopenia, COPD on chronic 1 liter of oxygen, coronary artery disease,

dyslipidemia, mitral valve stenosis with prolapse, pulmonary hypertension,

paroxysmal atrial fibrillation on chronic Pradaxa, presents to the emergency room

with worsening shortness of breath for the past week while her nephrologist has

bee adjusting her medications and diuretics. The patient has been on torsemide,

spironolactone, metolazone with increasing weight in the office, increasing lower

extremity edema.

 

CURRENT ISSUES:

1.  Atrial fibrillation with rapid ventricular rate, resolved.  Currently on

metoprolol 25 twice a day, Pradaxa 150 twice a day for anticoagulation managed by

Dr. Leroy.

 

2.  Unsustained ventricular tachycardia might have been repleted.  No acute

episodes of unsustained ventricular tachycardia on telemetry over the past 24

hours.

 

3.  Decompensated congestive heart failure, diastolic dysfunction with acute

decompensation.  The patient is diuresing with Lasix twice a day intravenously.

Defer to Dr. Leroy, cardiology for further management.

 

4.  Hypertension currently on metoprolol.  The patient's blood pressure appears

to be well managed.

 

5.   Obesity hypoventilation syndrome with morbid obesity complicating care.

 

6.  Obstructive sleep apnea:  Resume home CPAP.

 

7.  Type 2 diabetes:  Sliding scale, consistent carbohydrate diet with renal

diet.

 

8.  Chronic obstructive pulmonary disease (COPD) on chronic home oxygen, oxygen

dependent.

 

9.  Mitral valve stenosis and prolapse with pulmonary hypertension, stable.

 

10.  Iron deficiency anemia: No acute indication for red blood cell transfusion.

 

11.  Elevated TSH:  Repeat thyroid function test as an outpatient.

 

12.  Electrolyte abnormalities:  Supplemented.

## 2017-10-08 NOTE — ECHO
DATE OF PROCEDURE:  10/06/2017

 

YOB: 1950

AGE:  67

REFERRING PROVIDER:  Dr. Bharati Corona

PATIENT CARDIOLOGIST:  Dr. Dalton Fishman

PATIENT LOCATION:  Room 3222

REASON FOR THE ECHOCARDIOGRAM:  Congestive heart failure.

 

2D MEASUREMENTS:

IVS:  1.4cm

LV:  5.5 cm

LVPW:  1.4 cm

LA:  4.4 cm

Aorta:  2.9 cm

Inferior vena cava:  2.0 cm

 

DOPPLER MEASUREMENTS:

Peak velocity across the aortic valve:  1.7 m/s

Peak velocity across the LVOT:  0.89 m/s

Mitral E:  1.5

Maximum tricuspid valve velocity:  3.4 m/s

 

2D COMMENTS:

1.  Mildly increased left ventricular wall thickness and borderline enlarged 
left

ventricle.  Left ventricular systolic function appeared to be normal at about

60%.  

2  Mildly enlarged left atrium.  The right atrium and the right ventricle

appeared to be dilated.  The right ventricular free wall seems to be

hypokinetic.

3.  The atrial septum appeared to be normal without evidence of defect or shunt.

4.  Normal aortic root.

5.  No pericardial effusion seen.

6.  Mildly calcified he aortic valve, leaflet excursion was not well visualized.

Moderately calcified mitral annulus, the anterior mitral valve leaflet motion 
was

not well visualized.  Normal tricuspid valve.  The pulmonic valve and proximal

pulmonary artery branches were not well visualized.

7.  The inferior vena cava was borderline enlarged, central venous pressure

mildly elevated.

 

DOPPLER:  

It detects probably moderate mitral regurgitation and moderate

tricuspid regurgitation.  The calculated pulmonary artery systolic pressure

varied between 50-60 mmHg.  Assessment of the left ventricular diastolic 
function

was limited in view of the underlying atrial fibrillation.

 

IMPRESSION:

1.  Technically limited study due to poor acoustic window secondary to body

habitus.

2.  Normal global left ventricular systolic function with mild concentric left

ventricular hypertrophy and a mildly enlarged left ventricle.  Assessment of the

left ventricular diastolic dysfunction was limited because of the underlying

atrial fibrillation.

3.  Aortic valve sclerosis with trivial aortic stenosis but no aortic

regurgitation.

4.  Mildly enlarged left atrium with probably moderate mitral regurgitation.  No

definitive mitral valve stenosis noted, or mitral valve prolapse.

5.  Moderate tricuspid regurgitation with moderately severe pulmonary

hypertension and dilated heart chambers.  Could not rule out the presence of 

right ventricular systolic dysfunction.

6.  There were features of elevated central venous pressure.

7.  The above was compared to echocardiogram done in November of 2016, pulmonary

pressure is now more elevated.  Left ventricular systolic function is normal .

MTDD

## 2017-10-08 NOTE — IPN
DATE:  10/08/2017

 

Ms. Mimi Raphael was seen early this morning, she was sitting in her chair in

no acute distress.  She said that she feels much better.  She denies any chest

pain or shortness of breath or palpitations.  She has no orthopnea or paroxysmal

nocturnal dyspnea.  She has lost a few more pounds, she has been having good

urinary output.  She was initially seen yesterday after she went into a wide

complex tachycardia that seemed to be atrial flutter.  She is spontaneously

cardioverted and she remains stable.  She was initially started on IV Cardizem

and it was discontinued.  Her beta blocker was restarted yesterday at twice a 
day

instead of once a day.  Her spironolactone was decreased from twice a day to 
once

a day because his blood pressure was running low.  She also had received a 
couple

doses of digoxin.

 

On physical examination, the patient is alert and oriented, in no acute distress

and her vital signs this morning reveal a blood pressure of 112/60 with a pulse

of 62, respiration 19 and her maximum temperature is 98.1 degrees Fahrenheit 
with

a oxygen saturation of 93% on 1 liter nasal cannula.  She has a negative fluid

balance of 1.8 liter for 10/07/2017.  Her weight a couple of days ago was 153.2

kg and today is 150.7 kg.

 

Examination of the head is atraumatic, normocephalic.

Neck is supple, no jugular venous distention.

The lungs reveal no wheezing or crackles.

The heart examination reveals irregularly irregular heart sounds without 
gallops.

The point of maximum impulse (PMI) is slightly displaced inferiorly and

laterally.  There is no rub.  Could not appreciate any murmurs.

Abdomen is obese and nontender.

Extremities revealed +1 bilateral lower extremity edema.

Neurological examination grossly is negative for focal deficit.

 

LABS:  CBC done today revealed a WBC 7.9, hemoglobin 12.4, hematocrit 40.3 and

platelet 188,000.  BMP revealed a sodium of 139, potassium 4.0, chloride 99, CO2

25, BUN 27, creatinine 1.14, GFR 50.6, fasting glucose 108, calcium 8.8.  Serum

magnesium is 2.1.  Serum for BNP today is 1608 and yesterday it was 2198.

 

IMPRESSION:

1.  History of coronary artery disease percutaneous transluminal coronary

angioplasty (PTCA)/stents.

 

2.  Atrial fibrillation, persistent.

 

3.  Congestive heart failure secondary to left ventricular diastolic 
dysfunction.

 

 

4. History of hyperlipidemia, diabetes mellitus.

 

5.  Morbid obesity and obstructive sleep apnea.

 

6.  Valvular heart disease involving the mitral valve.

 

7.  Wide complex tachycardia, probably atrial flutter versus atrial fibrillation

with aberrancy.

 

8. Nonsustained ventricular tachycardia noted early this morning on telemetry.

 

Mrs. Mimi Deleon seem to be stable on her current medications and she will

continue the same.  I am suspecting she probably had some underlying sick sinus

syndrome and for this reason, I will not continue with the digoxin but she will

continue with a small dose of the beta blocker/metoprolol tartrate.  Her blood

pressure is more stable for now.  She is currently on IV Lasix started by

nephrology and she is being monitored.  We should monitor her

closely, her BUN and creatinine as well as her electrolytes.  She is on Pradaxa

for prevention of thrombolic events.  No bleeding has been reported in the

hospital.

 

It has been a pleasure to participate in the care of Mrs. Mimi Raphael for 
her

underlying cardiac condition.  I will continue to monitor her along with you and

tomorrow, she will be seen by Dr. Fishman.  Please do not hesitate to call for 
any

questions.

GRISELDA

## 2017-10-09 VITALS — SYSTOLIC BLOOD PRESSURE: 105 MMHG | DIASTOLIC BLOOD PRESSURE: 55 MMHG

## 2017-10-09 VITALS — SYSTOLIC BLOOD PRESSURE: 110 MMHG | DIASTOLIC BLOOD PRESSURE: 57 MMHG

## 2017-10-09 VITALS — DIASTOLIC BLOOD PRESSURE: 55 MMHG | SYSTOLIC BLOOD PRESSURE: 113 MMHG

## 2017-10-09 VITALS — DIASTOLIC BLOOD PRESSURE: 62 MMHG | SYSTOLIC BLOOD PRESSURE: 112 MMHG

## 2017-10-09 VITALS — DIASTOLIC BLOOD PRESSURE: 74 MMHG | SYSTOLIC BLOOD PRESSURE: 125 MMHG

## 2017-10-09 VITALS — DIASTOLIC BLOOD PRESSURE: 97 MMHG | SYSTOLIC BLOOD PRESSURE: 128 MMHG

## 2017-10-09 LAB
ANION GAP SERPL CALC-SCNC: 5 MEQ/L (ref 8–16)
ANION GAP SERPL CALC-SCNC: 7 MEQ/L (ref 8–16)
BUN SERPL-MCNC: 29 MG/DL (ref 7–18)
BUN SERPL-MCNC: 35 MG/DL (ref 7–18)
CALCIUM SERPL-MCNC: 9.1 MG/DL (ref 8.8–10.2)
CALCIUM SERPL-MCNC: 9.2 MG/DL (ref 8.8–10.2)
CHLORIDE SERPL-SCNC: 96 MEQ/L (ref 98–107)
CHLORIDE SERPL-SCNC: 97 MEQ/L (ref 98–107)
CO2 SERPL-SCNC: 34 MEQ/L (ref 21–32)
CO2 SERPL-SCNC: 34 MEQ/L (ref 21–32)
CREAT SERPL-MCNC: 1.29 MG/DL (ref 0.55–1.02)
CREAT SERPL-MCNC: 1.38 MG/DL (ref 0.55–1.02)
ERYTHROCYTE [DISTWIDTH] IN BLOOD BY AUTOMATED COUNT: 15.5 % (ref 11.5–14.5)
GFR SERPL CREATININE-BSD FRML MDRD: 40.6 ML/MIN/{1.73_M2} (ref 45–?)
GFR SERPL CREATININE-BSD FRML MDRD: 43.9 ML/MIN/{1.73_M2} (ref 45–?)
GLUCOSE SERPL-MCNC: 138 MG/DL (ref 80–110)
GLUCOSE SERPL-MCNC: 171 MG/DL (ref 80–110)
MAGNESIUM SERPL-MCNC: 2.3 MG/DL (ref 1.8–2.4)
MCH RBC QN AUTO: 30.1 PG (ref 27–33)
MCHC RBC AUTO-ENTMCNC: 31.6 G/DL (ref 32–36.5)
MCV RBC AUTO: 95.4 FL (ref 80–96)
PLATELET # BLD AUTO: 201 10^3/UL (ref 150–450)
POTASSIUM SERPL-SCNC: 3.3 MEQ/L (ref 3.5–5.1)
POTASSIUM SERPL-SCNC: 3.7 MEQ/L (ref 3.5–5.1)
SODIUM SERPL-SCNC: 136 MEQ/L (ref 136–145)
SODIUM SERPL-SCNC: 137 MEQ/L (ref 136–145)
WBC # BLD AUTO: 8.5 10^3/UL (ref 4–10)

## 2017-10-09 RX ADMIN — TIOTROPIUM BROMIDE SCH INHALATION: 18 CAPSULE ORAL; RESPIRATORY (INHALATION) at 07:16

## 2017-10-09 RX ADMIN — HYDROCODONE BITARTRATE AND ACETAMINOPHEN PRN TAB: 5; 325 TABLET ORAL at 10:44

## 2017-10-09 RX ADMIN — NYSTATIN SCH DOSE: 100000 POWDER TOPICAL at 08:41

## 2017-10-09 RX ADMIN — MAGNESIUM OXIDE SCH MG: 400 TABLET ORAL at 08:37

## 2017-10-09 RX ADMIN — ATORVASTATIN CALCIUM SCH MG: 20 TABLET, FILM COATED ORAL at 21:36

## 2017-10-09 RX ADMIN — ASPIRIN SCH MG: 81 TABLET ORAL at 08:38

## 2017-10-09 RX ADMIN — IPRATROPIUM BROMIDE AND ALBUTEROL SULFATE SCH ML: .5; 3 SOLUTION RESPIRATORY (INHALATION) at 01:14

## 2017-10-09 RX ADMIN — SPIRONOLACTONE SCH MG: 25 TABLET, FILM COATED ORAL at 08:37

## 2017-10-09 RX ADMIN — METOPROLOL TARTRATE SCH MG: 25 TABLET, FILM COATED ORAL at 21:35

## 2017-10-09 RX ADMIN — HYDROCODONE BITARTRATE AND ACETAMINOPHEN PRN TAB: 5; 325 TABLET ORAL at 22:49

## 2017-10-09 RX ADMIN — IPRATROPIUM BROMIDE AND ALBUTEROL SULFATE SCH ML: .5; 3 SOLUTION RESPIRATORY (INHALATION) at 20:23

## 2017-10-09 RX ADMIN — IPRATROPIUM BROMIDE AND ALBUTEROL SULFATE SCH ML: .5; 3 SOLUTION RESPIRATORY (INHALATION) at 07:16

## 2017-10-09 RX ADMIN — INSULIN LISPRO SCH UNITS: 100 INJECTION, SOLUTION INTRAVENOUS; SUBCUTANEOUS at 08:39

## 2017-10-09 RX ADMIN — AMIODARONE HYDROCHLORIDE SCH MG: 200 TABLET ORAL at 21:36

## 2017-10-09 RX ADMIN — METOPROLOL TARTRATE SCH MG: 25 TABLET, FILM COATED ORAL at 08:37

## 2017-10-09 RX ADMIN — DABIGATRAN ETEXILATE MESYLATE SCH MG: 75 CAPSULE ORAL at 08:38

## 2017-10-09 RX ADMIN — INSULIN LISPRO SCH UNITS: 100 INJECTION, SOLUTION INTRAVENOUS; SUBCUTANEOUS at 18:30

## 2017-10-09 RX ADMIN — INSULIN LISPRO SCH UNITS: 100 INJECTION, SOLUTION INTRAVENOUS; SUBCUTANEOUS at 12:40

## 2017-10-09 RX ADMIN — FERROUS SULFATE TAB 325 MG (65 MG ELEMENTAL FE) SCH MG: 325 (65 FE) TAB at 08:36

## 2017-10-09 RX ADMIN — IPRATROPIUM BROMIDE AND ALBUTEROL SULFATE SCH ML: .5; 3 SOLUTION RESPIRATORY (INHALATION) at 13:02

## 2017-10-09 RX ADMIN — LORATADINE PRN MG: 10 TABLET ORAL at 08:44

## 2017-10-09 RX ADMIN — NYSTATIN SCH DOSE: 100000 POWDER TOPICAL at 21:36

## 2017-10-09 RX ADMIN — AMIODARONE HYDROCHLORIDE SCH MG: 200 TABLET ORAL at 08:38

## 2017-10-09 RX ADMIN — INSULIN LISPRO SCH UNITS: 100 INJECTION, SOLUTION INTRAVENOUS; SUBCUTANEOUS at 21:00

## 2017-10-09 RX ADMIN — MULTIPLE VITAMINS W/ MINERALS TAB SCH TAB: TAB at 08:36

## 2017-10-09 RX ADMIN — DEXTROSE MONOHYDRATE SCH MLS/HR: 50 INJECTION, SOLUTION INTRAVENOUS at 10:47

## 2017-10-09 RX ADMIN — DABIGATRAN ETEXILATE MESYLATE SCH MG: 75 CAPSULE ORAL at 21:36

## 2017-10-09 NOTE — IPN
DATE OF SERVICE:  10/08/2017

 

SUBJECTIVE:  The patient is seen this morning at the bedside.  She has no

complaints.  She feels well.  She denies shortness of breath or palpitations.

She slept well last night.  Her Lasix pushes have been switched to Lasix drip

today, and she received a dose of Diamox.  I discussed with her regarding her

history of SULFA allergy.  She notes a rash with a SULFA ANTIBIOTIC at age 8.

 

REVIEW OF SYSTEMS:  Negative for chest pain, shortness of breath, palpitations,

orthopnea.  Positive for abdominal edema.  Remainder of review of systems is

negative.

 

VITAL SIGNS:  Temperature 98.2, pulse 66, respiratory rate 20, blood pressure

138/76, saturating 94% on 1 liter nasal cannula

 

INTAKE AND OUTPUT:  Urine output yesterday 2950.  Net negative 1840 mL.

 

PHYSICAL EXAMINATION:

The patient is seen out of bed to the chair.  Awake, alert, oriented times four,

in no acute distress.

Head and neck:  Extraocular muscles intact.  Moist mucous membranes.  No jugular

venous distention.

Chest:  S1, S2, irregular.  1+ pitting edema in the lower extremities and diffuse

edema in the abdominal pannus.

Lungs:  With symmetric air entry without crackles.

Abdomen:  Obese, large pannus, with significant indurated edema.

Lower extremities:  With 1+ pitting edema.

Neurologic:  Appropriately conversational and interactive.  No focal deficits.

Psychiatric:  Appropriate mood and affect.

 

LABORATORIES:

White count 7.9, hemoglobin 12.4, platelets 188.

 

Sodium 139, potassium 4.0, bicarbonate 35, BUN 27, creatinine 1.1, calcium 8.8,

magnesium 2.1, BNP 1600 decreased from prior.

 

INPATIENT MEDICATIONS:  Today, I put the patient on a Lasix drip at 5 mg an hour,

and she started on Diamox 250 mg by mouth daily.  Her metoprolol was made twice a

day, and her spironolactone was reduced to once daily by Dr. Leroy.  She also

received her flu and Prevnar vaccine.  The remainder of medications are unchanged

from prior.

 

ASSESSMENT AND PLAN:

 

1.  Decompensated congestive heart failure with significant edema, primarily in

the abdominal pannus.   The patient's blood pressures have had some variability,

and so I have discontinued the Lasix pushes and put her on a Lasix drip at 5 mg

per hour.  As she had a significant metabolic alkalosis, I will hold the thiazide

diuretic at this time, and she is starting on Diamox 250 mg by mouth daily.  She

has symptomatically improved thus far on the admission, and we will continue with

diuresis for a goal net negative 2-3 liters per 24 hours.  She is compliant with

fluid restriction.

 

2.  Atrial fibrillation with rapid ventricular response (RVR).  The patient has

been seen by Dr. Leroy of cardiology, and her beta blocker, metoprolol tartrate,

has been increased to twice a day, and her spironolactone dose has been reduced.

She remains on Pradaxa.

 

3.  Nonsustained ventricular tachycardia (V-TACH).  Keep magnesium greater than 2

and potassium greater than 4.  The patient is on metoprolol.  Consider amiodarone

and defer to cardiology regarding the same.

 

4.  Morbid obesity, complicating care.

## 2017-10-09 NOTE — IPN
DATE:  10/09/2017

 

The patient is seen and examined at the bedside. The chart has been reviewed.

The patient states that her breathing is improved.  She is diuresing well.  
Heart

rate has been rate controlled with atrial fibrillation, ventricular rate of 62 
to

84.  Non complaints of dizziness, lightheadedness, chest pain, pressure or

tightness.

Temperature 97, pulse 73, respiratory rate 18, blood pressure 125/74, 99% on 1

liter nasal cannula.  Telemetry had episodes of 4 beats nonsustained V-Tac

yesterday, asymptomatic.  Blood pressure is well maintained.

Generally, awake, alert , oriented times three answering questions

appropriately.

Neck is thick.

Lungs are clear to auscultation.  No wheezing, rales or rhonchi.

Heart:  S1, S2 irregularly irregular.

Abdomen is obese, soft, nontender, nondistended with fair induration and some

retention in the pannus. Positive pitting edema improved from yesterday.

Input and output:  Input of 1200, output of 3200, negative 2000.  Current weight

is 147.4 kg with peak weight of 160.8 kg on admission.

 

Laboratory data, imaging studies, microbiology have been reviewed.

 

ASSESSMENT/PLAN:  This is a 67-year-old female with history of morbid obesity,

body mass index (BMI) of 57, metabolic syndrome, diastolic heart failure with

preserved systolic function, diabetes, hypertension, obesity, hypoventilation

syndrome, heparin induced thrombocytopenia, COPD on chronic 1 liters of oxygen

with chronic hypoxic respiratory failure, coronary artery disease, dyslipidemia,

mitral valve stenosis with prolapse, pulmonary hypertension, atrial fibrillation

on chronic Pradaxa who presents to the emergency room with worsening shortness 
of

breath and increasing lower extremity edema despite adjustments by her

nephrologist as an outpatient.  The patient had been on torsemide,

spironolactone, metolazone with increasing weight as outpatient and presents to

the emergency room.

 

IMPRESSION:

1.  Atrial fibrillation with rapid ventricular response currently on metoprolol

12.5 mg twice a day, appears to be rate controlled for now.  On amiodarone 400 
mg

twice a day titrated by cardiology.

 

2.  Congestive heart failure exacerbation acute on chronic diastolic dysfunction

with preserved systolic function.  The patient is currently on Lasix, 
intravenous

drip, spironolactone 25 daily and Diamox 250 mg daily managed by nephrologist,

Dr. Alvares.  The patient appears to be diuresing well with clinical improvement

in her shortness of breath and with her exercise ability , she continues to have

significant edema in her pannus and lower extremity, which is improved slightly

over the past few days.  Continue with strict Ins and outs, daily weights and

Lasix diuresis and fluid restriction.

 

3.  Nonsustained V-tac, currently on amiodarone and metoprolol 12.5 mg twice a

day.

 

4.  Hypertension:  Controlled.  Well managed on metoprolol.

 

5.  Obesity hypoventilation syndrome with morbid obesity complicating care.

 

6.  Obstructive sleep apnea:  Resume home settings with CPAP.

 

7.  Type II diabetes on consistent carbohydrate diet and renal diet.  A1c 6.2.

Glucose is well controlled with postprandial glucose of 183.

 

8.  Chronic obstructive pulmonary disease (COPD) on home oxygen.  Oxygen

dependent.

 

9.  Mitral valve stenosis and pulmonary hypertension stable.

 

10.  Iron deficiency anemia:  No acute indication for red blood cell (RBC)

transfusion.

 

11.  Elevated thyroid stimulating hormone (TSH):  Repeat thyroid function tests

as an outpatient.

 

12.  Electrolyte abnormalities: Low potassium, low magnesium supplement as 
needed

to keep potassium above 4 and magnesium above 2.

 

2.

MTDD

## 2017-10-09 NOTE — IPN
DATE:  10/09/2017

 

Mrs. Raphael tells me that she is feeling better.  During the night, she had an

episode of wide-complex tachycardia with ventricular rate approximately 150 beats

per minute that lasted over 6 minutes.  She was asymptomatic.  Even though it was

felt to be ventricular tachycardia, reviewing the imaging it is most likely

atrial flutter with 2:1 conduction and rate induced right bundle branch block.

She otherwise reports fairly substantial improvement since admission.  She

believes that she recently gained at least 20 pounds and gained approximately 50

pounds since last discharged from this facility a year ago.  Denies any chest

pain.

 

Vital Signs: Blood pressure 128/97.  Heart rate has been mostly in 70s and 80s,

sinus rhythm, and episode of atrial flutter as above.  She is afebrile.

Saturation 92% on 1 liter of oxygen by nasal cannula.  Fluid balance yesterday

was approximately negative 2 liters.  Weight is 147.4 kg, which is roughly 13 kg

down since admission.

She is alert and oriented appropriate.

Her jugular venous pulse (JVP) is difficult to estimate due to body habitus.   It

does not appear to be grossly elevated while she is sitting and I do not see it

above the clavicle.  Lungs are reasonably clear to auscultation.

Heart exam reveals very muffled heart sounds due to the body habitus.  Again, I

do not appreciate any gallop or rub.  There is barely audible murmur of MR.

Abdomen is morbidly obese.  There is still some induration with fluid retention.

 

There is still some peripheral edema, but improved since admission per her

report.  Neurologically, she is intact.

 

Laboratory-wise, basic metabolic panel with potassium 3.3, BUN 29, creatinine 1.3

for GFR 44, glucose is 138.  CBC:  Hemoglobin 13.2, hematocrit 41.8 and platelet

count 201,000.

 

ASSESSMENT/PLAN: Mrs. Raphael is a 67-year-old morbidly obese female who has

history of coronary artery disease with percutaneous coronary intervention (PCI)

for remote myocardial infarction (MI).  She presented with acutely exacerbated

congestive heart failure and in atrial fibrillation with rapid ventricular rate

(RVR).  She was relatively hypotensive.  Digoxin was used to control her rate and

she eventually converted to sinus rhythm, but last night she had runs of

wide-complex tachycardia with a ventricular rate of 150.  In my opinion, most

likely representing atrial flutter with 2:1 conduction and aberrant conduction.

 

As far as the management is concerned, she is already anticoagulated with

Pradaxa.  I will start her on amiodarone as clearly the episodes of atrial

fibrillation are not well tolerated.  As far as the congestive heart failure is

concerned, she seems to be improving and she is receiving furosemide drip.  This

has been regulated principally by nephrology and I think that we should continue

diuresing her some more as she certainly is not euvolemic as yet.  As far as

coronary artery disease is concerned, she is on aspirin, statin and low-dose

metoprolol.  She has no anginal symptoms and there was no evidence for ischemia

or myocardial necrosis during this admission.  I will continue following this

patient with you.

## 2017-10-10 VITALS — OXYGEN SATURATION: 93 %

## 2017-10-10 VITALS — SYSTOLIC BLOOD PRESSURE: 93 MMHG | DIASTOLIC BLOOD PRESSURE: 68 MMHG

## 2017-10-10 VITALS — SYSTOLIC BLOOD PRESSURE: 109 MMHG | DIASTOLIC BLOOD PRESSURE: 56 MMHG

## 2017-10-10 VITALS — DIASTOLIC BLOOD PRESSURE: 48 MMHG | SYSTOLIC BLOOD PRESSURE: 91 MMHG

## 2017-10-10 VITALS — OXYGEN SATURATION: 94 %

## 2017-10-10 VITALS — DIASTOLIC BLOOD PRESSURE: 57 MMHG | SYSTOLIC BLOOD PRESSURE: 120 MMHG

## 2017-10-10 VITALS — DIASTOLIC BLOOD PRESSURE: 60 MMHG | SYSTOLIC BLOOD PRESSURE: 105 MMHG

## 2017-10-10 VITALS — SYSTOLIC BLOOD PRESSURE: 116 MMHG | OXYGEN SATURATION: 93 % | DIASTOLIC BLOOD PRESSURE: 57 MMHG

## 2017-10-10 VITALS — DIASTOLIC BLOOD PRESSURE: 56 MMHG | SYSTOLIC BLOOD PRESSURE: 109 MMHG

## 2017-10-10 VITALS — DIASTOLIC BLOOD PRESSURE: 62 MMHG | SYSTOLIC BLOOD PRESSURE: 113 MMHG

## 2017-10-10 VITALS — OXYGEN SATURATION: 92 %

## 2017-10-10 LAB
ANION GAP SERPL CALC-SCNC: 6 MEQ/L (ref 8–16)
BUN SERPL-MCNC: 36 MG/DL (ref 7–18)
CALCIUM SERPL-MCNC: 9.2 MG/DL (ref 8.8–10.2)
CHLORIDE SERPL-SCNC: 99 MEQ/L (ref 98–107)
CO2 SERPL-SCNC: 32 MEQ/L (ref 21–32)
CREAT SERPL-MCNC: 1.32 MG/DL (ref 0.55–1.02)
ERYTHROCYTE [DISTWIDTH] IN BLOOD BY AUTOMATED COUNT: 15.8 % (ref 11.5–14.5)
GFR SERPL CREATININE-BSD FRML MDRD: 42.7 ML/MIN/{1.73_M2} (ref 45–?)
GLUCOSE SERPL-MCNC: 130 MG/DL (ref 80–110)
MAGNESIUM SERPL-MCNC: 2.6 MG/DL (ref 1.8–2.4)
MCH RBC QN AUTO: 30.7 PG (ref 27–33)
MCHC RBC AUTO-ENTMCNC: 32 G/DL (ref 32–36.5)
MCV RBC AUTO: 95.8 FL (ref 80–96)
PLATELET # BLD AUTO: 215 10^3/UL (ref 150–450)
POTASSIUM SERPL-SCNC: 3.9 MEQ/L (ref 3.5–5.1)
SODIUM SERPL-SCNC: 137 MEQ/L (ref 136–145)
WBC # BLD AUTO: 9 10^3/UL (ref 4–10)

## 2017-10-10 RX ADMIN — AMIODARONE HYDROCHLORIDE SCH MG: 200 TABLET ORAL at 20:57

## 2017-10-10 RX ADMIN — IPRATROPIUM BROMIDE AND ALBUTEROL SULFATE SCH ML: .5; 3 SOLUTION RESPIRATORY (INHALATION) at 01:09

## 2017-10-10 RX ADMIN — NYSTATIN SCH DOSE: 100000 POWDER TOPICAL at 08:49

## 2017-10-10 RX ADMIN — INSULIN LISPRO SCH UNITS: 100 INJECTION, SOLUTION INTRAVENOUS; SUBCUTANEOUS at 12:39

## 2017-10-10 RX ADMIN — INSULIN LISPRO SCH UNITS: 100 INJECTION, SOLUTION INTRAVENOUS; SUBCUTANEOUS at 17:09

## 2017-10-10 RX ADMIN — TIOTROPIUM BROMIDE SCH INHALATION: 18 CAPSULE ORAL; RESPIRATORY (INHALATION) at 07:31

## 2017-10-10 RX ADMIN — AMIODARONE HYDROCHLORIDE SCH MG: 200 TABLET ORAL at 08:46

## 2017-10-10 RX ADMIN — FUROSEMIDE SCH MG: 10 INJECTION, SOLUTION INTRAMUSCULAR; INTRAVENOUS at 08:45

## 2017-10-10 RX ADMIN — HYDROCODONE BITARTRATE AND ACETAMINOPHEN PRN TAB: 5; 325 TABLET ORAL at 08:54

## 2017-10-10 RX ADMIN — DABIGATRAN ETEXILATE MESYLATE SCH MG: 75 CAPSULE ORAL at 20:58

## 2017-10-10 RX ADMIN — NYSTATIN SCH DOSE: 100000 POWDER TOPICAL at 20:58

## 2017-10-10 RX ADMIN — MAGNESIUM OXIDE SCH MG: 400 TABLET ORAL at 08:47

## 2017-10-10 RX ADMIN — INSULIN LISPRO SCH UNITS: 100 INJECTION, SOLUTION INTRAVENOUS; SUBCUTANEOUS at 21:00

## 2017-10-10 RX ADMIN — IPRATROPIUM BROMIDE AND ALBUTEROL SULFATE SCH ML: .5; 3 SOLUTION RESPIRATORY (INHALATION) at 20:41

## 2017-10-10 RX ADMIN — INSULIN LISPRO SCH UNITS: 100 INJECTION, SOLUTION INTRAVENOUS; SUBCUTANEOUS at 08:49

## 2017-10-10 RX ADMIN — DABIGATRAN ETEXILATE MESYLATE SCH MG: 75 CAPSULE ORAL at 08:46

## 2017-10-10 RX ADMIN — ATORVASTATIN CALCIUM SCH MG: 20 TABLET, FILM COATED ORAL at 20:58

## 2017-10-10 RX ADMIN — IPRATROPIUM BROMIDE AND ALBUTEROL SULFATE SCH ML: .5; 3 SOLUTION RESPIRATORY (INHALATION) at 13:41

## 2017-10-10 RX ADMIN — HYDROCODONE BITARTRATE AND ACETAMINOPHEN PRN TAB: 5; 325 TABLET ORAL at 22:17

## 2017-10-10 RX ADMIN — ASPIRIN SCH MG: 81 TABLET ORAL at 08:46

## 2017-10-10 RX ADMIN — FERROUS SULFATE TAB 325 MG (65 MG ELEMENTAL FE) SCH MG: 325 (65 FE) TAB at 08:45

## 2017-10-10 RX ADMIN — MULTIPLE VITAMINS W/ MINERALS TAB SCH TAB: TAB at 08:47

## 2017-10-10 RX ADMIN — FUROSEMIDE SCH MG: 10 INJECTION, SOLUTION INTRAMUSCULAR; INTRAVENOUS at 17:08

## 2017-10-10 RX ADMIN — METOPROLOL TARTRATE SCH MG: 25 TABLET, FILM COATED ORAL at 20:58

## 2017-10-10 RX ADMIN — METOPROLOL TARTRATE SCH MG: 25 TABLET, FILM COATED ORAL at 08:47

## 2017-10-10 RX ADMIN — IPRATROPIUM BROMIDE AND ALBUTEROL SULFATE SCH ML: .5; 3 SOLUTION RESPIRATORY (INHALATION) at 07:31

## 2017-10-10 RX ADMIN — SPIRONOLACTONE SCH MG: 25 TABLET, FILM COATED ORAL at 08:46

## 2017-10-10 NOTE — IPN
DATE:  10/09/2017

 

SUBJECTIVE:  The patient is seen this morning at the bedside.  She has no

complaints.  Her breathing is easy.  She is diuresing well on the Lasix drip.

Her heart rate has been controlled and she is adequately fluid restricting.  Her

weights have been down trending.

 

REVIEW OF SYSTEMS:  Negative for headache, dizziness, lightheadedness, chest

pain, palpitations, shortness of breath at rest, nausea, vomiting, diarrhea.

Remainder of review of systems is negative.

 

Temperature 96.7, pulse 72, respiratory rate 18, blood pressure 110/57,

saturating 97% on 1 liter nasal cannula.

Intake and output:  Urine output in the past 24 hours 3200 mL, net negative 2

liters.  Weight on the bed scale today 147.4 kg.

 

EXAMINATION:  The patient is seen ambulating in no acute distress.  Extraocular

muscles are intact.  Mucous membranes are moist.  The neck is thick.

Lungs are clear to auscultation bilaterally.

Heart:  S1, S2, irregular.  2+ radial pulse, 1+ pitting edema in the lower

extremities.

Abdomen is obese, soft.  There is significant edema and fluid retention in the

pannus.

Neurologic - no focal deficit

 

LABS:  White count 8.5, hemoglobin 13.2 and platelets 201.  Sodium 136, 
potassium

3.7, bicarbonate 34, BUN 35, creatinine 1.38, calcium 9.2, magnesium 2.3.

 

INPATIENT MEDICATIONS:  The patient remains on a Lasix drip at 5 mg an hour with

Diamox 250 mg by mouth daily.  She is started on amiodarone 400 mg by mouth 
twice

a day.  Remainder of medications are unchanged from prior.

 

ASSESSMENT AND PLAN:



1.  Creatinine creep.  The patient's GFR is within her baseline range.  However,

it has slowly down trended over the past several days likely due to aggressive

diuresis and concomitant atrial fibrillation with rapid ventricular rate (RVR)

and relative hypotension.  I will discontinue her Lasix drip at this time and 
put

her on Lasix 40 mg IV every 12.



2.  Atrial fibrillation with RVR complicating her diuresis with borderline blood

pressures.  She is now started on amiodarone per cardiology and continued on

metoprolol.



3. Decompensated CHF - the pt is diuresing well and fluid restricting. Volume 
status has 

improved from prior. Continue with IV lasix and oral diamox at this time.

[facility] [place]

## 2017-10-10 NOTE — ECGEPIP
Stationary ECG Study

                              [facility]

                                       

                                       Test Date:    2017-10-10

Pat Name:     NGHIA HUI              Department:   

Patient ID:   [id number]                 Room:         [place] [address]

Gender:       F                        Technician:   MUNDO

:          1950               Requested By: Dalton Fishman 

Order Number: [id number]     Reading MD:   Joceline Wilkinson

                                 Measurements

Intervals                              Axis          

Rate:         65                       P:            

NY:           0                        QRS:          98

QRSD:         103                      T:            90

QT:           407                                    

QTc:          424                                    

                           Interpretive Statements

ATRIAL FIBRILLATION

BORDERLINE RIGHT AXIS DEVIATION

LOW QRS VOLTAGE IN PRECORDIAL LEADS

INCOMPLETE RIGHT BUNDLE BRANCH BLOCK

 ST & T-WAVE ABNORMALITY LATERALLY MORE MARKED C/W 10/7/17 740

ABNORMAL RHYTHM ECG

 

Electronically Signed On 10- 8:03:46 EDT by Joceline Wilkinson

## 2017-10-10 NOTE — IPN
DATE:  10/10/2017

 

Mrs. Raphael had a good night.  She was able to sleep on and off between her bed

and her chair as she usually does.  She denies much dyspnea this morning and she

also denies any chest discomfort.

 

Vital Signs:  Blood pressure 109/56.  Heart rate has been in the 70s and 80s.

She is afebrile.  Saturation 90% on 1 liter of oxygen.  Fluid balance yesterday

was negative 2800.  Weight has not been documented yet this morning.  She is

alert and oriented and appropriate.  Lungs are fairly clear to auscultation.  I

do not appreciate any crackles today.  Heart exam reveals a regular rhythm.  No

gallop or murmur is noted, but she has very muffled heart sounds due to her

morbid obesity.  There is still edema of her abdominal pannus and to a lesser

degree also of lower extremities.

 

Laboratory-wise, normal CBC.  Basic metabolic panel reveals potassium 3.9, BUN 36

and creatinine 1.3 for GFR of 43.

 

ASSESSMENT/PLAN:

Mrs. Raphael is a 67-year-old morbidly obese female who has established coronary

artery disease who presented with exacerbated congestive heart failure in the

setting of atrial fibrillation.  She had no awareness of the arrhythmia.  At this

point, she is already markedly better with very dramatic diuresis.  I suggest to

continue diuresing her even more and try to reach as "dry" status as possible.

Hopefully, this will be accomplished within another day or two, at which point, I

think she can be discharged on the current regimen.  From a heart failure

perspective, she still needs more diuresis as above, but otherwise she is on

appropriate medications.  As far as atrial fibrillation is concerned, I started

the patient on amiodarone as she does not tolerate the arrhythmia well.  We just

started oral loading and I will continue following her up on an outpatient basis.

She is chronically anticoagulated with Pradaxa.  As far as coronary artery

disease is concerned, she has no anginal symptoms and did not have any troponin

elevation.  Continue chronic medications.

## 2017-10-10 NOTE — IPN
DATE:  10/10/2017

 

SUBJECTIVE: Patient is seen and examined in the room today. Patient stated her

breathing is improving; however, patient continues to have oxygen saturations

during ambulation. No recurrence of ventricular tachycardia recorded on

telemetry.

 

OBJECTIVE:

VITAL SIGNS: Temperature 97.6, pulse 60, respirations 18, blood pressure 113/62,

pulse oximetry 93% with 1 liter nasal cannula.

GENERAL: No sign of acute distress, alert and oriented times three.

HEENT: Normocephalic, atraumatic. Extraocular motor grossly intact.

LUNGS: Clear to auscultation bilaterally.

CARDIOVASCULAR: Positive S1, S2, irregularly irregular .

ABDOMEN: Soft, nontender, nondistended. Bowel sounds present.

EXTREMITIES: Positive pitting edema bilaterally.

 

LABORATORY DATA:

WBC 9, hematocrit 13, hematocrit 40, platelet count is 215.

 

Sodium is 137, potassium 3.9, chloride is 99, carbon dioxide 32, BUN 36,

creatinine 1.32, GFR is 42.7, fasting glucose 130, calcium is 9.2, magnesium 
2.6.

 

ASSESSMENT AND PLAN:

1.  Acute on chronic diastolic congestive heart failure exacerbation. Patient 
has

been receiving Lasix diuresis. Patient is also taking spironolactone and Diamox.

Patient responds well with diuretic. We appreciate nephrology's, Dr. Alvares's

assistance. Patient is cleared by physical therapy today; however, per

specialist's recommendation, patient still has a certain amount of fluid

overload, and patient would benefit from further diuretic treatments.

 

2.  Atrial fibrillation with rapid ventricular response (RVR). Patient's is on

amiodarone and metoprolol tartrate. Patient is on Pradaxa. Will refer the

medication adjustment to cardiology. We appreciate her assistance.

 

3.  Non-sustained ventricular tachycardia, on amiodarone and metoprolol. No

recurrent of ventricular tachycardia noted in the last 24 hours.

 

4.  Hypertension. Blood pressure in the satisfactory range. Patient is on

metoprolol tartrate.

 

5.  Obesity hypoventilation syndrome with morbid obesity.

 

6.  Obstructive sleep apnea (SINTIA), on SINTIA protocol. Patient may use her own

continuous positive airway pressure (CPAP).

 

7.  Type 2 diabetes. A1c of 6.2. Patient is on sliding scale.

 

8.  History of chronic obstructive pulmonary disease (COPD), on home oxygen. No

exacerbation at this moment.

 

9.  History of pulmonary hypertension.

 

10.  History of iron deficiency anemia. Current hemoglobin and hematocrit within

normal range. Continue to monitor.

 

11.  Elevated thyroid-stimulating hormone (TSH) with normal free T4.

 

12.  Acute renal injury. Currently patient is being diuresed with Lasix.

Nephrology has been assisting in patient's care We appreciate Dr. Alvares's

assistance.

 

13.  Deep vein thrombosis (DVT) prophylaxis, on Pradaxa.

MTDD

## 2017-10-11 VITALS — DIASTOLIC BLOOD PRESSURE: 54 MMHG | SYSTOLIC BLOOD PRESSURE: 118 MMHG

## 2017-10-11 VITALS — OXYGEN SATURATION: 94 %

## 2017-10-11 VITALS — DIASTOLIC BLOOD PRESSURE: 56 MMHG | SYSTOLIC BLOOD PRESSURE: 115 MMHG

## 2017-10-11 VITALS — DIASTOLIC BLOOD PRESSURE: 66 MMHG | SYSTOLIC BLOOD PRESSURE: 128 MMHG

## 2017-10-11 VITALS — DIASTOLIC BLOOD PRESSURE: 57 MMHG | SYSTOLIC BLOOD PRESSURE: 103 MMHG | OXYGEN SATURATION: 95 %

## 2017-10-11 VITALS — DIASTOLIC BLOOD PRESSURE: 57 MMHG | SYSTOLIC BLOOD PRESSURE: 110 MMHG | OXYGEN SATURATION: 94 %

## 2017-10-11 VITALS — OXYGEN SATURATION: 92 %

## 2017-10-11 VITALS — OXYGEN SATURATION: 93 %

## 2017-10-11 VITALS — DIASTOLIC BLOOD PRESSURE: 64 MMHG | SYSTOLIC BLOOD PRESSURE: 118 MMHG

## 2017-10-11 VITALS — OXYGEN SATURATION: 98 %

## 2017-10-11 LAB
ANION GAP SERPL CALC-SCNC: 6 MEQ/L (ref 8–16)
BUN SERPL-MCNC: 40 MG/DL (ref 7–18)
CALCIUM SERPL-MCNC: 9.2 MG/DL (ref 8.8–10.2)
CHLORIDE SERPL-SCNC: 101 MEQ/L (ref 98–107)
CO2 SERPL-SCNC: 31 MEQ/L (ref 21–32)
CREAT SERPL-MCNC: 1.27 MG/DL (ref 0.55–1.02)
ERYTHROCYTE [DISTWIDTH] IN BLOOD BY AUTOMATED COUNT: 15.7 % (ref 11.5–14.5)
GFR SERPL CREATININE-BSD FRML MDRD: 44.7 ML/MIN/{1.73_M2} (ref 45–?)
GLUCOSE SERPL-MCNC: 126 MG/DL (ref 80–110)
MAGNESIUM SERPL-MCNC: 2.7 MG/DL (ref 1.8–2.4)
MCH RBC QN AUTO: 30.3 PG (ref 27–33)
MCHC RBC AUTO-ENTMCNC: 31.5 G/DL (ref 32–36.5)
MCV RBC AUTO: 96.3 FL (ref 80–96)
PLATELET # BLD AUTO: 210 10^3/UL (ref 150–450)
POTASSIUM SERPL-SCNC: 3.6 MEQ/L (ref 3.5–5.1)
SODIUM SERPL-SCNC: 138 MEQ/L (ref 136–145)
WBC # BLD AUTO: 8.9 10^3/UL (ref 4–10)

## 2017-10-11 RX ADMIN — FUROSEMIDE SCH MG: 10 INJECTION, SOLUTION INTRAMUSCULAR; INTRAVENOUS at 08:35

## 2017-10-11 RX ADMIN — AMIODARONE HYDROCHLORIDE SCH MG: 200 TABLET ORAL at 08:36

## 2017-10-11 RX ADMIN — NYSTATIN SCH DOSE: 100000 POWDER TOPICAL at 21:48

## 2017-10-11 RX ADMIN — TORSEMIDE SCH MG: 10 TABLET ORAL at 11:39

## 2017-10-11 RX ADMIN — IPRATROPIUM BROMIDE AND ALBUTEROL SULFATE SCH ML: .5; 3 SOLUTION RESPIRATORY (INHALATION) at 07:21

## 2017-10-11 RX ADMIN — INSULIN LISPRO SCH UNITS: 100 INJECTION, SOLUTION INTRAVENOUS; SUBCUTANEOUS at 12:21

## 2017-10-11 RX ADMIN — INSULIN LISPRO SCH UNITS: 100 INJECTION, SOLUTION INTRAVENOUS; SUBCUTANEOUS at 21:08

## 2017-10-11 RX ADMIN — FERROUS SULFATE TAB 325 MG (65 MG ELEMENTAL FE) SCH MG: 325 (65 FE) TAB at 08:36

## 2017-10-11 RX ADMIN — TIOTROPIUM BROMIDE SCH INHALATION: 18 CAPSULE ORAL; RESPIRATORY (INHALATION) at 07:21

## 2017-10-11 RX ADMIN — INSULIN LISPRO SCH UNITS: 100 INJECTION, SOLUTION INTRAVENOUS; SUBCUTANEOUS at 17:13

## 2017-10-11 RX ADMIN — DABIGATRAN ETEXILATE MESYLATE SCH MG: 75 CAPSULE ORAL at 21:47

## 2017-10-11 RX ADMIN — METOPROLOL TARTRATE SCH MG: 25 TABLET, FILM COATED ORAL at 08:36

## 2017-10-11 RX ADMIN — TORSEMIDE SCH MG: 10 TABLET ORAL at 17:13

## 2017-10-11 RX ADMIN — IPRATROPIUM BROMIDE AND ALBUTEROL SULFATE SCH ML: .5; 3 SOLUTION RESPIRATORY (INHALATION) at 14:55

## 2017-10-11 RX ADMIN — ASPIRIN SCH MG: 81 TABLET ORAL at 08:36

## 2017-10-11 RX ADMIN — INSULIN LISPRO SCH UNITS: 100 INJECTION, SOLUTION INTRAVENOUS; SUBCUTANEOUS at 08:35

## 2017-10-11 RX ADMIN — NYSTATIN SCH DOSE: 100000 POWDER TOPICAL at 08:36

## 2017-10-11 RX ADMIN — HYDROCODONE BITARTRATE AND ACETAMINOPHEN PRN TAB: 5; 325 TABLET ORAL at 11:40

## 2017-10-11 RX ADMIN — IPRATROPIUM BROMIDE AND ALBUTEROL SULFATE SCH ML: .5; 3 SOLUTION RESPIRATORY (INHALATION) at 20:27

## 2017-10-11 RX ADMIN — MULTIPLE VITAMINS W/ MINERALS TAB SCH TAB: TAB at 08:36

## 2017-10-11 RX ADMIN — HYDROCODONE BITARTRATE AND ACETAMINOPHEN PRN TAB: 5; 325 TABLET ORAL at 21:46

## 2017-10-11 RX ADMIN — ATORVASTATIN CALCIUM SCH MG: 20 TABLET, FILM COATED ORAL at 21:47

## 2017-10-11 RX ADMIN — IPRATROPIUM BROMIDE AND ALBUTEROL SULFATE SCH ML: .5; 3 SOLUTION RESPIRATORY (INHALATION) at 01:13

## 2017-10-11 RX ADMIN — METOPROLOL TARTRATE SCH MG: 25 TABLET, FILM COATED ORAL at 21:47

## 2017-10-11 RX ADMIN — SPIRONOLACTONE SCH MG: 25 TABLET, FILM COATED ORAL at 08:36

## 2017-10-11 RX ADMIN — DABIGATRAN ETEXILATE MESYLATE SCH MG: 75 CAPSULE ORAL at 08:36

## 2017-10-11 RX ADMIN — AMIODARONE HYDROCHLORIDE SCH MG: 200 TABLET ORAL at 21:47

## 2017-10-11 NOTE — IPN
DATE OF SERVICE:  10/10/2017

 

SUBJECTIVE:  The patient is seen this morning at the bedside.  She has no

complaints.  Feels well.  Her breathing has continued to improve.  She has not

had significant arrhythmias on the telemonitor.  She continues on 1 liter nasal

cannula.

 

REVIEW OF SYSTEMS:  Negative for dizziness, headaches, chest pain, palpitations,

shortness of breath, nausea, vomiting, diarrhea, dysuria, abdominal pain.

Remainder of review of systems is negative.

 

Vital signs:  Temperature 97.0, pulse 68, respiratory rate 18, blood pressure

120/57, saturation 94% on 1 liter nasal cannula.  Intake and output:  Urine

output 4.3 liters yesterday.  Net negative 2.8 liters.  Weight on the bed scale

today is not recorded.

 

General:  The patient is seen out of bed to the chair in no acute distress.  She

is awake, alert, oriented times four.

Head and neck:  Extraocular muscles are intact.  Mucous membranes are moist.

Neck is supple, difficult to assess jugular veins.

Cardiac:  S1, S2, regular rhythm.  2+ radial pulse.  1+ pitting edema in the

lower extremities.

Lungs:  Clear to auscultation.

Abdomen:  With significant induration and edema in the abdominal pannus.

 

LABS:  Sodium 137, potassium 3.9, bicarbonate 32, BUN 36, creatinine 1.3,

magnesium 2.6.  White count 9.0, hemoglobin 13, platelet 215.

 

INPATIENT MEDICATIONS:  The patient is on Lasix 40 mg IV twice daily.  Her Lasix

drip was discontinued yesterday by myself.  She received potassium

supplementation and her magnesium was discontinued.  There are no other

significant changes in the past 24 hours.

 

ASSESSMENT AND PLAN:

1.  Congestive heart failure (CHF) exacerbation.  The patient has diuresed very

well over the past 5 days of this admission.  She has lost a documented 13 kg.

She has improved from a respiratory point of view.  She no longer requires

continuous Lasix drip and is now on Lasix 40 mg IV twice daily and she is net

negative about 2 liters in every 24 hour period.  She is fluid restricting well.

Her previous home diuretic regimen was daily torsemide and spironolactone with

additional metolazone three times a week.  I would expect that in the next 24-48

hours, the patient can likely be discharged on her prior home diuretic regimen

albeit with increased doses.

 

2.  Atrial fibrillation with rapid ventricular rate (RVR).  The patient remained

on metoprolol.  She was started on amiodarone.  She continues on Pradaxa.

 

3.  Hypertension.  Blood pressure is adequate without hypotensive episodes.

 

4.  Chronic kidney disease (CKD) stage III.  The patient's renal function and

electrolytes are fairly stable especially given that she has diuresed about 13 kg

in 5 days with episodic atrial fibrillation with rapid ventricular rate.

 

5.  Hypermagnesemia.  Magnesium 2.6.  Her oral magnesium supplement is being

held.

## 2017-10-11 NOTE — IPN
DATE:  10/11/2017

 

Mrs. Raphael had a good night.  She was able to sleep in bed, which was probably

the first time during this hospitalization. Her fluid balance again yesterday was

negative about 2 liters.  She does ambulate during the day.  She was able to walk

about 60 feet and about 80 feet the day before.  She has no specific complaints

this morning.

 

Blood pressure 110/57.  Heart rate has been in 60s and 70s in sinus rhythm.

Saturation is in low to mid 90s on 1 liter of oxygen by nasal cannula.  Weight is

documented as 147 kg, which is about 14 kg down since admission.

She is alert, oriented and appropriate.  I do not appreciate jugular vein (JVP)

elevation.  Lungs are clear.

Heart exam reveals regular rhythm without gallop or rub.

Abdomen is difficult to assess due to her large pannus, but I do not see any

obvious induration.

Peripheral edema is almost completely resolved.

 

Basic metabolic panel is pending, but CBC is unchanged and essentially normal.

 

ASSESSMENT/PLAN:

Mrs. Raphael is a 67-year-old female who has coronary artery disease with remote

coronary intervention and also has chronic diastolic congestive heart failure.

She presented with exacerbation in setting of atrial fibrillation with rapid

ventricular response (RVR).  She converted to sinus rhythm and I actually started

her on amiodarone because she had no awareness of the arrhythmia and it was not

well tolerated.  At this point, I think that she is very close to being

euvolemic.  I assume that she will be able to go home within a day or two once we

see her creatinine climbing up.  I would not continue aggressive IV diuresis and

switch her to her baseline oral diuretic.  I plan to see her after her discharge

within a week or two.

## 2017-10-11 NOTE — IPN
DATE: 10/11/2017

 

SUBJECTIVE: The patient is seen and examined in the room today. The patient

stated her breathing is improving. However, the patient still feels there is a

significant fluid remaining in the abdomen. The patient has noticed significant

urine output from the Lasix diuresis.

 

OBJECTIVE:

VITAL SIGNS: Temperature is 97.5, pulse is 63, respirations 18, blood pressure 
is

128/66, pulse oximetry is 92% with one liter nasal cannula.

GENERAL: No sign of acute distress, alert and oriented times three.

HEENT: Normocephalic, atraumatic. Extraocular motor grossly intact. Positive

jugular venous distention (JVD).

LUNGS: Clear to auscultation bilaterally.

CARDIOVASCULAR: Positive S1, S2, irregularly irregular.

ABDOMEN: Soft, nontender, nondistended. Bowel sounds present.

EXTREMITIES: Positive pitting edema bilaterally. Compression stockings in place.

 

 

LABORATORY DATA: WBC 8.9, hemoglobin 13, hematocrit 41.3, platelet count is 210.

 

Sodium is 138, potassium 3.8, chloride 101, carbon dioxide is 31, BUN 40,

creatinine 1.27, GFR is 44.7, fasting glucose is 126, calcium is 9.2, magnesium

2.7.

 

ASSESSMENT AND PLAN:

1. Acute on chronic diastolic congestive heart failure. The patient continues to

receive IV diuresis. The patient is also taking spironolactone and Diamox. The

patient responds to diuretic very well. The patient still has some sign of fluid

overload, mainly in the abdominal area. We will continue diuresis. Nephrology 
and

cardiology have been assisting with the patient's care. We appreciate their

assistance.

 

2. Atrial fibrillation with rapid ventricular response (RVR). The patient is on

amiodarone and metoprolol tartrate. The patient is on Pradaxa.

 

3. Non-sustained ventricular tachycardia, on amiodarone and metoprolol. No

recurrence for more than 48 hours now.

 

4. Hypertension. Blood pressure in the satisfactory range. Currently, the 
patient

is receiving diuresis. Continue to monitor.

 

5. Obstructive sleep apnea (SINTIA), on SINTIA protocol. Per patient, patient did not

follow with repeated sleep study. At this moment, the patient does not have a

continuous positive airway pressure (CPAP) at home.

 

6. Type 2 diabetes. A1c of 6.3, on sliding scale and consistent-carbohydrate

diet.

 

7. History of chronic obstructive pulmonary disease (COPD), on home oxygen. No

exacerbation at this moment.

 

8. History of pulmonary hypertension.

 

9. History of iron deficiency anemia. Hemoglobin and hematocrit are within 
normal

range. Continue to monitor.

 

10. Elevated thyroid-stimulating hormone (TSH) with normal free T4.

 

11. Acute renal injury. The patient is receiving the diuretic treatments and

renal function is improving. We appreciate Dr. Alvares's assistance.

 

12. Deep vein thrombosis (DVT) prophylaxis, on Pradaxa.

MTDD

## 2017-10-12 VITALS — DIASTOLIC BLOOD PRESSURE: 59 MMHG | SYSTOLIC BLOOD PRESSURE: 114 MMHG

## 2017-10-12 VITALS — DIASTOLIC BLOOD PRESSURE: 46 MMHG | SYSTOLIC BLOOD PRESSURE: 89 MMHG

## 2017-10-12 VITALS — SYSTOLIC BLOOD PRESSURE: 82 MMHG | DIASTOLIC BLOOD PRESSURE: 50 MMHG

## 2017-10-12 VITALS — SYSTOLIC BLOOD PRESSURE: 112 MMHG | DIASTOLIC BLOOD PRESSURE: 57 MMHG

## 2017-10-12 VITALS — OXYGEN SATURATION: 93 %

## 2017-10-12 LAB
ANION GAP SERPL CALC-SCNC: 8 MEQ/L (ref 8–16)
BUN SERPL-MCNC: 47 MG/DL (ref 7–18)
CALCIUM SERPL-MCNC: 8.8 MG/DL (ref 8.8–10.2)
CHLORIDE SERPL-SCNC: 101 MEQ/L (ref 98–107)
CO2 SERPL-SCNC: 30 MEQ/L (ref 21–32)
CREAT SERPL-MCNC: 1.57 MG/DL (ref 0.55–1.02)
ERYTHROCYTE [DISTWIDTH] IN BLOOD BY AUTOMATED COUNT: 15.9 % (ref 11.5–14.5)
GFR SERPL CREATININE-BSD FRML MDRD: 35 ML/MIN/{1.73_M2} (ref 45–?)
GLUCOSE SERPL-MCNC: 116 MG/DL (ref 80–110)
MAGNESIUM SERPL-MCNC: 2.5 MG/DL (ref 1.8–2.4)
MCH RBC QN AUTO: 30.3 PG (ref 27–33)
MCHC RBC AUTO-ENTMCNC: 31.5 G/DL (ref 32–36.5)
MCV RBC AUTO: 96.2 FL (ref 80–96)
NRBC BLD AUTO-RTO: 0 % (ref 0–0)
PLATELET # BLD AUTO: 214 10^3/UL (ref 150–450)
POTASSIUM SERPL-SCNC: 3.6 MEQ/L (ref 3.5–5.1)
SODIUM SERPL-SCNC: 139 MEQ/L (ref 136–145)
WBC # BLD AUTO: 9 10^3/UL (ref 4–10)

## 2017-10-12 RX ADMIN — FERROUS SULFATE TAB 325 MG (65 MG ELEMENTAL FE) SCH MG: 325 (65 FE) TAB at 08:15

## 2017-10-12 RX ADMIN — ASPIRIN SCH MG: 81 TABLET ORAL at 08:15

## 2017-10-12 RX ADMIN — SPIRONOLACTONE SCH MG: 25 TABLET, FILM COATED ORAL at 21:56

## 2017-10-12 RX ADMIN — INSULIN LISPRO SCH UNITS: 100 INJECTION, SOLUTION INTRAVENOUS; SUBCUTANEOUS at 17:29

## 2017-10-12 RX ADMIN — ATORVASTATIN CALCIUM SCH MG: 20 TABLET, FILM COATED ORAL at 21:55

## 2017-10-12 RX ADMIN — DABIGATRAN ETEXILATE MESYLATE SCH MG: 75 CAPSULE ORAL at 08:13

## 2017-10-12 RX ADMIN — IPRATROPIUM BROMIDE AND ALBUTEROL SULFATE SCH ML: .5; 3 SOLUTION RESPIRATORY (INHALATION) at 14:22

## 2017-10-12 RX ADMIN — SPIRONOLACTONE SCH MG: 25 TABLET, FILM COATED ORAL at 10:13

## 2017-10-12 RX ADMIN — NYSTATIN SCH DOSE: 100000 POWDER TOPICAL at 21:57

## 2017-10-12 RX ADMIN — AMIODARONE HYDROCHLORIDE SCH MG: 200 TABLET ORAL at 08:15

## 2017-10-12 RX ADMIN — INSULIN LISPRO SCH UNITS: 100 INJECTION, SOLUTION INTRAVENOUS; SUBCUTANEOUS at 08:16

## 2017-10-12 RX ADMIN — INSULIN LISPRO SCH UNITS: 100 INJECTION, SOLUTION INTRAVENOUS; SUBCUTANEOUS at 21:00

## 2017-10-12 RX ADMIN — IPRATROPIUM BROMIDE AND ALBUTEROL SULFATE SCH ML: .5; 3 SOLUTION RESPIRATORY (INHALATION) at 02:00

## 2017-10-12 RX ADMIN — NYSTATIN SCH DOSE: 100000 POWDER TOPICAL at 10:13

## 2017-10-12 RX ADMIN — INSULIN LISPRO SCH UNITS: 100 INJECTION, SOLUTION INTRAVENOUS; SUBCUTANEOUS at 12:01

## 2017-10-12 RX ADMIN — DOCUSATE SODIUM PRN MG: 100 CAPSULE, LIQUID FILLED ORAL at 08:15

## 2017-10-12 RX ADMIN — DABIGATRAN ETEXILATE MESYLATE SCH MG: 75 CAPSULE ORAL at 21:55

## 2017-10-12 RX ADMIN — TORSEMIDE SCH MG: 10 TABLET ORAL at 17:29

## 2017-10-12 RX ADMIN — METOPROLOL TARTRATE SCH MG: 25 TABLET, FILM COATED ORAL at 21:00

## 2017-10-12 RX ADMIN — AMIODARONE HYDROCHLORIDE SCH MG: 200 TABLET ORAL at 21:56

## 2017-10-12 RX ADMIN — TORSEMIDE SCH MG: 10 TABLET ORAL at 10:14

## 2017-10-12 RX ADMIN — HYDROCODONE BITARTRATE AND ACETAMINOPHEN PRN TAB: 5; 325 TABLET ORAL at 21:57

## 2017-10-12 RX ADMIN — HYDROCODONE BITARTRATE AND ACETAMINOPHEN PRN TAB: 5; 325 TABLET ORAL at 10:13

## 2017-10-12 RX ADMIN — TIOTROPIUM BROMIDE SCH INHALATION: 18 CAPSULE ORAL; RESPIRATORY (INHALATION) at 07:26

## 2017-10-12 RX ADMIN — IPRATROPIUM BROMIDE AND ALBUTEROL SULFATE SCH ML: .5; 3 SOLUTION RESPIRATORY (INHALATION) at 19:55

## 2017-10-12 RX ADMIN — IPRATROPIUM BROMIDE AND ALBUTEROL SULFATE SCH ML: .5; 3 SOLUTION RESPIRATORY (INHALATION) at 07:26

## 2017-10-12 RX ADMIN — METOPROLOL TARTRATE SCH MG: 25 TABLET, FILM COATED ORAL at 08:15

## 2017-10-12 RX ADMIN — MULTIPLE VITAMINS W/ MINERALS TAB SCH TAB: TAB at 08:15

## 2017-10-12 NOTE — IPN
DATE: 10/11/2017

 

SUBJECTIVE:

The patient is seen this morning at the bedside. She states she feels much

improved over the course of her admission, is diuresing well on Lasix. Has

noticed that her exercise tolerance has increased. Has been walking farther

distances every day, is looking forward to receiving more physical therapy today.

Inquires when I think she will be discharged.

 

Review of systems

Negative for headache, chest pain, palpitations, nausea, vomiting, diarrhea.

Positive for improvement in shortness of breath and abdominal and peripheral

edema.

 

OBJECTIVE:

VITAL SIGNS: Temperature 98.7, pulse 88, respiratory rate 20, blood pressure

118/54, saturating 92% on one liter nasal cannula.

INTAKE AND OUTPUT:  Urine output yesterday 3.2 liters. Net negative 1940 mL.

Weight in the bed scale today 147.9 kg.

 

PHYSICAL EXAMINATION:

GENERAL: The patient is seen out of bed to chair. She is in no respiratory

distress, alert and oriented times three.

HEAD/NECK: Extraocular muscles are intact. Nasal cannula in place. Moist mucous

membranes. Neck veins with no significant jugular venous distention.

HEART: S1, S2. Two plus radial pulse trace edema in the lower extremity.

LUNGS: Clear to auscultation bilaterally.

ABDOMEN: Soft, nontender. Abdominal pannus with induration at the very inferior

aspect.

EXTREMITIES: 1+ edema in the lower extremities.

NEUROLOGIC: No focal deficits.

PSYCHIATRIC: Appropriate mood and affect.

 

LABORATORY DATA:

White count 8.9, hemoglobin 13, platelets 210. Sodium 138, potassium 3.6,

bicarbonate 31, BUN 40, creatinine 1.2, calcium 9.2, magnesium 2.7, glucose 126.

 

INPATIENT MEDICATIONS:

Reviewed by myself. The patient continues on Diamox 250 mg by mouth daily,

furosemide 40 mg intravenous (IV) twice a day. Her magnesium was discontinued by

myself.

 

ASSESSMENT AND PLAN:

1. Congestive heart failure exacerbation. The patient has diuresed very well over

the past six days of this admission. She has lost around 13 kg on this admission.

The edema in her abdominal pannus has significantly improved from prior. She is

back on one liter nasal cannula which is her home oxygen regimen. At this point,

I will switch her to an oral diuretic regimen to continue Diamox 250 mg by mouth

daily with torsemide 30 mg by mouth twice a day and spironolactone 25 mg daily.

Despite the spironolactone, she will be likely require potassium supplementation

as an outpatient chronically.

2. Atrial fibrillation with rapid ventricular rate. The patient remains on

metoprolol, amiodarone, and continues on Pradaxa.

3. Hypertension.  Blood pressure is adequate without hypotensive issues.

4. Chronic kidney disease stage III.  The patient's renal function and

electrolytes  are fairly stable, especially given that she has diuresed about 13

kg in less than a week with episodic atrial fibrillation with rapid ventricular

rate (RVR).  I will see her as an outpatient within about one week post discharge

to monitor her renal function, electrolytes and volume status.

5. Hypermagnesemia. The patient's oral magnesium supplement is on hold at this

time.

## 2017-10-12 NOTE — IPN
DATE:  10/12/2017

 

SUBJECTIVE: The patient seen and examined in the room today. Currently, patient

requiring 1 liter nasal cannula for oxygen support which is about her baseline

oxygen requirement. The patient still noted to have significant fluid collection

in the abdomen. The patient stated she is feeling better than before in the home

prior to admission. The patient has been very careful with regard to her fluid

restrictions

 

OBJECTIVE: VITAL SIGNS: Temperature 97.1, pulse 65, respirations 18, blood

pressure is 112/57, pulse oximetry 93% on 1 liter nasal cannula.

GENERAL: No sign of acute distress. Alert and oriented times three.

HEENT: Normocephalic, atraumatic. Extraocular movements grossly intact.

CARDIOVASCULAR: Positive S1, S2, irregularly irregular.

LUNGS: Clear to auscultation bilaterally.

ABDOMEN: Morbidly obese. Soft, nondistended, nontender. Bowel sounds present.

EXTREMITIES: Minimal pedal edema bilaterally. Compression stockings in place.

 

LABORATORY DATA: WBC 9, hemoglobin 12.7, hematocrit 40.3, platelet count 214.

 

Sodium is 139, potassium 3.6, chloride 101, carbon dioxide 30, BUN 47, creatinine

1.57. GFR is 35, fasting glucose 116, calcium 8.8. Magnesium 2.5.

 

ASSESSMENT AND PLAN:

1. Acute on chronic diastolic congestive heart failure. The patient continues to

receive diuresis. Medication regimen is being adjusted with the help of

nephrology. The patient continues to have net negative fluid balance. The patient

has lost a significant amount of fluids since admission. Up to date the patient

has been having negative 18.8 liters. The most current diuretic regimen is Diamox

250 mg by mouth daily, spironolactone 25 mg by mouth twice a day, torsemide 20 mg

by mouth twice a day.

 

2. Atrial fibrillation with rapid ventricular response (RVR). Amiodarone,

metoprolol, Pradaxa.

 

3. History of nonsustained ventricular tachycardia (Vtac). Amiodarone and

metoprolol. No recurrence noted on the telemetry.

 

4. Hypertension. Blood pressure is in the satisfactory range.

 

5. Obstructive sleep apnea (SINTIA). SINTIA protocol. Per patient, the patient did not

followup with repeated sleep study. The patient does not have a CPAP at home.

 

6. Type 2 diabetes. A1c 6.3. Sliding scale. On consistent carbohydrate diet.

 

7. History of COPD. On 1 liter of oxygen at the baseline. No exacerbation at this

moment

 

8. History of pulmonary hypertension.

 

9. History iron deficiency anemia. Hemoglobin and hematocrit within normal range.

Continue to monitor.

 

10. Elevated TSH with free normal free T4.

 

11. Acute kidney injury. The patient is currently under active diuretic

adjustment. Nephrology has been assisting on the patient's care. Appreciate Dr. Alvares's recommendation.

 

12. DVT prophylaxis. On Pradaxa.

## 2017-10-13 VITALS — SYSTOLIC BLOOD PRESSURE: 98 MMHG | DIASTOLIC BLOOD PRESSURE: 58 MMHG

## 2017-10-13 VITALS — SYSTOLIC BLOOD PRESSURE: 104 MMHG | DIASTOLIC BLOOD PRESSURE: 56 MMHG

## 2017-10-13 VITALS — SYSTOLIC BLOOD PRESSURE: 128 MMHG | DIASTOLIC BLOOD PRESSURE: 62 MMHG

## 2017-10-13 VITALS — SYSTOLIC BLOOD PRESSURE: 98 MMHG | DIASTOLIC BLOOD PRESSURE: 52 MMHG

## 2017-10-13 LAB
ANION GAP SERPL CALC-SCNC: 6 MEQ/L (ref 8–16)
BUN SERPL-MCNC: 54 MG/DL (ref 7–18)
CALCIUM SERPL-MCNC: 8.8 MG/DL (ref 8.8–10.2)
CHLORIDE SERPL-SCNC: 103 MEQ/L (ref 98–107)
CO2 SERPL-SCNC: 30 MEQ/L (ref 21–32)
CREAT SERPL-MCNC: 1.43 MG/DL (ref 0.55–1.02)
ERYTHROCYTE [DISTWIDTH] IN BLOOD BY AUTOMATED COUNT: 15.8 % (ref 11.5–14.5)
GFR SERPL CREATININE-BSD FRML MDRD: 39 ML/MIN/{1.73_M2} (ref 45–?)
GLUCOSE SERPL-MCNC: 114 MG/DL (ref 80–110)
MAGNESIUM SERPL-MCNC: 2.7 MG/DL (ref 1.8–2.4)
MCH RBC QN AUTO: 30.3 PG (ref 27–33)
MCHC RBC AUTO-ENTMCNC: 31.7 G/DL (ref 32–36.5)
MCV RBC AUTO: 95.7 FL (ref 80–96)
NRBC BLD AUTO-RTO: 0 % (ref 0–0)
PLATELET # BLD AUTO: 202 10^3/UL (ref 150–450)
POTASSIUM SERPL-SCNC: 3.4 MEQ/L (ref 3.5–5.1)
SODIUM SERPL-SCNC: 139 MEQ/L (ref 136–145)
WBC # BLD AUTO: 7.8 10^3/UL (ref 4–10)

## 2017-10-13 RX ADMIN — HYDROCODONE BITARTRATE AND ACETAMINOPHEN PRN TAB: 5; 325 TABLET ORAL at 21:42

## 2017-10-13 RX ADMIN — IPRATROPIUM BROMIDE AND ALBUTEROL SULFATE SCH ML: .5; 3 SOLUTION RESPIRATORY (INHALATION) at 07:45

## 2017-10-13 RX ADMIN — NYSTATIN SCH DOSE: 100000 POWDER TOPICAL at 21:43

## 2017-10-13 RX ADMIN — INSULIN LISPRO SCH UNITS: 100 INJECTION, SOLUTION INTRAVENOUS; SUBCUTANEOUS at 08:14

## 2017-10-13 RX ADMIN — ATORVASTATIN CALCIUM SCH MG: 20 TABLET, FILM COATED ORAL at 21:42

## 2017-10-13 RX ADMIN — IPRATROPIUM BROMIDE AND ALBUTEROL SULFATE SCH ML: .5; 3 SOLUTION RESPIRATORY (INHALATION) at 15:25

## 2017-10-13 RX ADMIN — AMIODARONE HYDROCHLORIDE SCH MG: 200 TABLET ORAL at 21:42

## 2017-10-13 RX ADMIN — IPRATROPIUM BROMIDE AND ALBUTEROL SULFATE SCH ML: .5; 3 SOLUTION RESPIRATORY (INHALATION) at 20:48

## 2017-10-13 RX ADMIN — INSULIN LISPRO SCH UNITS: 100 INJECTION, SOLUTION INTRAVENOUS; SUBCUTANEOUS at 12:10

## 2017-10-13 RX ADMIN — TIOTROPIUM BROMIDE SCH INHALATION: 18 CAPSULE ORAL; RESPIRATORY (INHALATION) at 07:45

## 2017-10-13 RX ADMIN — IPRATROPIUM BROMIDE AND ALBUTEROL SULFATE SCH ML: .5; 3 SOLUTION RESPIRATORY (INHALATION) at 02:00

## 2017-10-13 RX ADMIN — INSULIN LISPRO SCH UNITS: 100 INJECTION, SOLUTION INTRAVENOUS; SUBCUTANEOUS at 20:28

## 2017-10-13 RX ADMIN — HYDROCODONE BITARTRATE AND ACETAMINOPHEN PRN TAB: 5; 325 TABLET ORAL at 10:47

## 2017-10-13 RX ADMIN — DABIGATRAN ETEXILATE MESYLATE SCH MG: 75 CAPSULE ORAL at 08:13

## 2017-10-13 RX ADMIN — MULTIPLE VITAMINS W/ MINERALS TAB SCH TAB: TAB at 08:13

## 2017-10-13 RX ADMIN — FERROUS SULFATE TAB 325 MG (65 MG ELEMENTAL FE) SCH MG: 325 (65 FE) TAB at 08:13

## 2017-10-13 RX ADMIN — SPIRONOLACTONE SCH MG: 25 TABLET, FILM COATED ORAL at 10:46

## 2017-10-13 RX ADMIN — DABIGATRAN ETEXILATE MESYLATE SCH MG: 75 CAPSULE ORAL at 21:42

## 2017-10-13 RX ADMIN — METOPROLOL TARTRATE SCH MG: 25 TABLET, FILM COATED ORAL at 20:27

## 2017-10-13 RX ADMIN — NYSTATIN SCH DOSE: 100000 POWDER TOPICAL at 08:16

## 2017-10-13 RX ADMIN — AMIODARONE HYDROCHLORIDE SCH MG: 200 TABLET ORAL at 08:13

## 2017-10-13 RX ADMIN — SPIRONOLACTONE SCH MG: 25 TABLET, FILM COATED ORAL at 21:42

## 2017-10-13 RX ADMIN — INSULIN LISPRO SCH UNITS: 100 INJECTION, SOLUTION INTRAVENOUS; SUBCUTANEOUS at 17:12

## 2017-10-13 RX ADMIN — ASPIRIN SCH MG: 81 TABLET ORAL at 08:13

## 2017-10-13 RX ADMIN — METOPROLOL TARTRATE SCH MG: 25 TABLET, FILM COATED ORAL at 09:00

## 2017-10-13 NOTE — IPN
DATE:  10/13/2017

 

Miss Raphael is seen this morning on her bedside.  She is sitting in the chair.

She denies any cough, fever, chills, nausea or vomiting.  She has chronic

hypoxemia and remains on oxygen via nasal cannula.  Her peripheral edema has

improved since admission.  Apparently, she has low blood pressure since last

evening and her diuretics have been held this morning.

 

On physical examination, temperature 98.3 degrees Fahrenheit, heart rate 65 per

minute and respiratory rate 18 per minute.  Blood pressure 98/52 mmHg and oxygen

saturation 94%.

Intake and output records from yesterday showed total intake 1320 and output 3740

mL.  She had a negative fluid balance of about 2400 mL yesterday.  Since

admission, she has diuresed almost 20 liters.

Her head is atraumatic.  Neck is supple and there is no jugular venous distention

(JVD) sitting upright.

Heart sounds are distant and lungs have diminished breath sounds bilaterally.

Abdomen is obese, soft and nontender.

Extremities:  Have no cyanosis or clubbing.

Neurologically, she is awake, alert and oriented times three.

 

Today's labs show WBC count 7.8, hemoglobin 12.1 and hematocrit 38.2.  Platelets

202.  Sodium is 139 and potassium 3.4.  BUN 54 and creatinine 1.43.

 

PROBLEMS:

1.  Acute kidney injury superimposed on chronic kidney disease.  Slight

improvement in the creatinine is noted since yesterday.  I feel that overall her

kidney function has been stable with mild fluctuations.  This is probably her

baseline kidney function.

2.  Hypokalemia.  This is related to diuretic use.  The patient will be given

potassium chloride 40 mEq one dose today.  Her torsemide is being held today due

to low blood pressure.  Electrolytes will be checked again tomorrow morning.

3.  Acute on chronic congestive heart failure.  The patient seems to be very well

diuresed and may have slightly over diuresed.  Her blood pressure is low as the

result of aggressive diuresis as she has lost about 20 liters of fluid since

admission.  Her torsemide is being held today.  She will receive acetazolamide

and spironolactone.

4.   Hypotension.  Blood pressure is chronically low and it is lower than her

baseline today.  She is on minimal dose of metoprolol, which is held this

morning.  I am also going to hold her torsemide for one day.  We will reassess

her vital signs tomorrow and then consider to resume diuretic.

 

DISPOSITION:  At this point, the patient does not seem to be stable for discharge

due to low blood pressure and electrolyte abnormality.  We will monitor her for

next 24 hours.

## 2017-10-13 NOTE — IPN
DATE:  10/13/2017

 

SUBJECTIVE: Patient seen and examined in the room today. Patient denies any acute

complaint or acute changes. Still using 1 liter nasal cannula for the oxygen

support. Patient started having hypotension starting yesterday evening time;

however, patient stated she is asymptomatic.

 

OBJECTIVE:

VITAL SIGNS: Temperature is 98.3, pulse 65, respirations 18, blood pressure is

98/52, pulse oximetry is 95% with 1 liter nasal cannula.

GENERAL: No sign of acute distress. Alert and oriented times three.

HEENT: Normocephalic, atraumatic. Extraocular motor grossly intact.

CARDIOVASCULAR: Positive S1, S2, irregularly irregular.

LUNGS: Clear to auscultation bilaterally.

ABDOMEN: Morbidly obese, soft, nontender, nondistended. Bowel sounds present.

EXTREMITIES: Minimal lower extremity edema bilaterally. Compression stockings in

place.

 

LABORATORY DATA:

WBC 7.8, hemoglobin 12.1, hematocrit 38.2, platelet count is 202.

 

Sodium is 139, potassium 3.4, chloride 103, carbon dioxide 30, BUN 54, creatinine

1.43, GFR is 39, fasting glucose 114, calcium 8.8, magnesium 2.7.

 

ASSESSMENT AND PLAN:

1.  Acute on chronic diastolic congestive heart failure. Patient has been

receiving diuresis with  multiple diuretics. Currently patient has soft blood

pressure, so the blood pressure medication will be on hold, and patient's

diuretic regimen is also being actively adjusted with the help of nephrology.

Continue to monitor intake and output.

 

2.  Hypotension, asymptomatic. Most likely due to the diuretic. Blood pressure

medication and diuretic medication have been adjusted. Will continue to monitor.

 

3.  Atrial fibrillation with rapid ventricular response (RVR), on amiodarone and

Pradaxa. Metoprolol will be on hold due to concurrent hypotension.

 

4.  History of nonsustained ventricular tachycardia, on amiodarone. Metoprolol

will be on hold due to the hypotension. Will continue to monitor.

 

5.  Hypertension. Patient is currently hypotensive. Blood pressure medication on

hold.

 

6.  Obstructive sleep apnea (SINTIA), on SINTIA protocol. Per patient, patient did not

followup with repeat sleep study. Patient does not have a CPAP at home.

 

7.  Type 2 diabetes. A1c of 6.3, on sliding scale and consistent-carbohydrate

diet.

 

8.  History of chronic obstructive pulmonary disease (COPD), on 1 liter oxygen at

baseline. No exacerbation at this moment.

 

9.  History of pulmonary hypertension.

 

10.  History of iron deficiency anemia. Hemoglobin and hematocrit stable.

Continue to monitor.

 

11.  Elevated thyroid-stimulating hormone (TSH) with normal free T4 level.

 

12.  Acute kidney injury. Renal function improving. We appreciate Dr. Alvares's

assistance.

 

13.  Deep vein thrombosis (DVT) prophylaxis, on Pradaxa.

## 2017-10-14 VITALS — DIASTOLIC BLOOD PRESSURE: 54 MMHG | SYSTOLIC BLOOD PRESSURE: 106 MMHG

## 2017-10-14 VITALS — DIASTOLIC BLOOD PRESSURE: 52 MMHG | SYSTOLIC BLOOD PRESSURE: 110 MMHG

## 2017-10-14 VITALS — DIASTOLIC BLOOD PRESSURE: 57 MMHG | SYSTOLIC BLOOD PRESSURE: 112 MMHG

## 2017-10-14 VITALS — DIASTOLIC BLOOD PRESSURE: 54 MMHG | SYSTOLIC BLOOD PRESSURE: 107 MMHG

## 2017-10-14 LAB
ANION GAP SERPL CALC-SCNC: 7 MEQ/L (ref 8–16)
BUN SERPL-MCNC: 45 MG/DL (ref 7–18)
CALCIUM SERPL-MCNC: 8.6 MG/DL (ref 8.8–10.2)
CHLORIDE SERPL-SCNC: 106 MEQ/L (ref 98–107)
CO2 SERPL-SCNC: 27 MEQ/L (ref 21–32)
CREAT SERPL-MCNC: 1.21 MG/DL (ref 0.55–1.02)
ERYTHROCYTE [DISTWIDTH] IN BLOOD BY AUTOMATED COUNT: 15.7 % (ref 11.5–14.5)
GFR SERPL CREATININE-BSD FRML MDRD: 47.2 ML/MIN/{1.73_M2} (ref 45–?)
GLUCOSE SERPL-MCNC: 118 MG/DL (ref 80–110)
MAGNESIUM SERPL-MCNC: 2.6 MG/DL (ref 1.8–2.4)
MCH RBC QN AUTO: 30.4 PG (ref 27–33)
MCHC RBC AUTO-ENTMCNC: 31.3 G/DL (ref 32–36.5)
MCV RBC AUTO: 97 FL (ref 80–96)
NRBC BLD AUTO-RTO: 0 % (ref 0–0)
PLATELET # BLD AUTO: 201 10^3/UL (ref 150–450)
POTASSIUM SERPL-SCNC: 3.9 MEQ/L (ref 3.5–5.1)
SODIUM SERPL-SCNC: 140 MEQ/L (ref 136–145)
WBC # BLD AUTO: 8.4 10^3/UL (ref 4–10)

## 2017-10-14 RX ADMIN — METOPROLOL TARTRATE SCH MG: 25 TABLET, FILM COATED ORAL at 09:00

## 2017-10-14 RX ADMIN — AMIODARONE HYDROCHLORIDE SCH MG: 200 TABLET ORAL at 20:35

## 2017-10-14 RX ADMIN — INSULIN LISPRO SCH UNITS: 100 INJECTION, SOLUTION INTRAVENOUS; SUBCUTANEOUS at 20:33

## 2017-10-14 RX ADMIN — METOPROLOL TARTRATE SCH MG: 25 TABLET, FILM COATED ORAL at 20:34

## 2017-10-14 RX ADMIN — IPRATROPIUM BROMIDE AND ALBUTEROL SULFATE SCH ML: .5; 3 SOLUTION RESPIRATORY (INHALATION) at 07:20

## 2017-10-14 RX ADMIN — FERROUS SULFATE TAB 325 MG (65 MG ELEMENTAL FE) SCH MG: 325 (65 FE) TAB at 09:05

## 2017-10-14 RX ADMIN — HYDROCODONE BITARTRATE AND ACETAMINOPHEN PRN TAB: 5; 325 TABLET ORAL at 10:48

## 2017-10-14 RX ADMIN — INSULIN LISPRO SCH UNITS: 100 INJECTION, SOLUTION INTRAVENOUS; SUBCUTANEOUS at 12:51

## 2017-10-14 RX ADMIN — INSULIN LISPRO SCH UNITS: 100 INJECTION, SOLUTION INTRAVENOUS; SUBCUTANEOUS at 17:42

## 2017-10-14 RX ADMIN — SPIRONOLACTONE SCH MG: 25 TABLET, FILM COATED ORAL at 20:34

## 2017-10-14 RX ADMIN — TORSEMIDE SCH MG: 20 TABLET ORAL at 09:06

## 2017-10-14 RX ADMIN — NYSTATIN SCH DOSE: 100000 POWDER TOPICAL at 20:36

## 2017-10-14 RX ADMIN — IPRATROPIUM BROMIDE AND ALBUTEROL SULFATE SCH ML: .5; 3 SOLUTION RESPIRATORY (INHALATION) at 19:49

## 2017-10-14 RX ADMIN — ASPIRIN SCH MG: 81 TABLET ORAL at 09:06

## 2017-10-14 RX ADMIN — IPRATROPIUM BROMIDE AND ALBUTEROL SULFATE SCH ML: .5; 3 SOLUTION RESPIRATORY (INHALATION) at 14:46

## 2017-10-14 RX ADMIN — NYSTATIN SCH DOSE: 100000 POWDER TOPICAL at 09:07

## 2017-10-14 RX ADMIN — SPIRONOLACTONE SCH MG: 25 TABLET, FILM COATED ORAL at 09:05

## 2017-10-14 RX ADMIN — IPRATROPIUM BROMIDE AND ALBUTEROL SULFATE SCH ML: .5; 3 SOLUTION RESPIRATORY (INHALATION) at 01:42

## 2017-10-14 RX ADMIN — ATORVASTATIN CALCIUM SCH MG: 20 TABLET, FILM COATED ORAL at 20:35

## 2017-10-14 RX ADMIN — TIOTROPIUM BROMIDE SCH INHALATION: 18 CAPSULE ORAL; RESPIRATORY (INHALATION) at 07:20

## 2017-10-14 RX ADMIN — INSULIN LISPRO SCH UNITS: 100 INJECTION, SOLUTION INTRAVENOUS; SUBCUTANEOUS at 09:04

## 2017-10-14 RX ADMIN — DABIGATRAN ETEXILATE MESYLATE SCH MG: 75 CAPSULE ORAL at 09:04

## 2017-10-14 RX ADMIN — DOCUSATE SODIUM PRN MG: 100 CAPSULE, LIQUID FILLED ORAL at 18:55

## 2017-10-14 RX ADMIN — AMIODARONE HYDROCHLORIDE SCH MG: 200 TABLET ORAL at 09:05

## 2017-10-14 RX ADMIN — MULTIPLE VITAMINS W/ MINERALS TAB SCH TAB: TAB at 09:05

## 2017-10-14 RX ADMIN — DABIGATRAN ETEXILATE MESYLATE SCH MG: 75 CAPSULE ORAL at 20:35

## 2017-10-14 NOTE — IPN
DATE:  10/14/2017

 

SUBJECTIVE:  Patient seen and examined in the room today.  Patient has become

very concerned about her weight gain.  Per patient, patient has been weighed on

the bed scale.  The patient was noted to have continued increasing weight in the

past 48 hours.  It was explained to the patient that her input and output has

been accurately measured.  The patient has had a negative fluid balance and the

bed scale usually may not be the most accurate way to determine the patient's

fluid status.  We offered the patient be remeasured on the regular scale.  The

patient stated she does not feel she is ready for discharge.

 

OBJECTIVE:

VITAL SIGNS: Temperature is 98.7, pulse 79, respirations 18, blood pressure

112/57, pulse oximetry is 99% with 1 liter nasal cannula.

GENERAL:  Anxious.  No sign of acute distress.  Alert and oriented times three.

HEENT:  Normocephalic, atraumatic.  Extraocular motor grossly intact.

CARDIOVASCULAR:  Irregularly irregular.  Positive S1, S2.

LUNGS:  Clear to auscultation bilaterally.  Decreased breath sounds due to body

habitus.

ABDOMEN:  Morbidly obese, soft, nontender, nondistended.  Bowel sounds present.

EXTREMITIES:  Compression stockings in place.  Minimal swelling at lower

extremities.

 

LABORATORY DATA:

WBC 8.4, hemoglobin 12.1hematocrit 38.3, platelet count is 201.

 

Sodium is 140, potassium 3.9, chloride 106, carbon dioxide 27, BUN 45, creatinine

1.21, GFR is 47.2, fasting glucose 118, calcium 8.6, magnesium 2.6.

 

ASSESSMENT AND PLAN:

1.  Acute on chronic diastolic congestive heart failure.  Patient has been

receiving diuresis with multiple diuretics.  The patient had a soft blood

pressure previously and the medication has to be on hold.  Today, the patient's

blood pressure is in the satisfactory range.  The patient's diuretic is also

being adjusted.  We appreciated Dr. Alvares's assistance.  The patient's torsemide

has decreased from twice a day dose to daily dose.  The patient is continued on

spironolactone and Diamox.

2.  Hypotension, most likely due to diuretic.  Since adjustment of medications,

the patient's hypotension has resolved.  The patient remains asymptomatic.

3.  Atrial fibrillation with rapid ventricular response (RVR), on amiodarone and

Pradaxa and beta blocker.

4.  History of nonsustained ventricular tachycardia, on amiodarone and

metoprolol.  Metoprolol has holding parameters.  Patient will continue to be

monitored on telemetry.

5.  Obstructive sleep apnea (SINTIA), on SINTIA protocol.  The patient did not follow

with repeat sleep study.  The patient does not have a CPAP at home at this

moment.  The patient may need outpatient followup to schedule her sleep study.

6.  Type 2 diabetes.  A1c of 6.3.  On sliding scale and consistent-carbohydrate

diet.

7.  History of chronic obstructive pulmonary disease (COPD).  At baseline,

patient uses 1 liter nasal cannula.  No exacerbation at this moment.

8.  History of pulmonary hypertension.

9.  History of iron deficiency anemia.  Continue to monitor hemoglobin and

hematocrit, both are currently in the normal range.

10.  Elevated thyroid-stimulating hormone (TSH) with normal free T4.

11.  Acute kidney injury, improving.  Appreciate Dr. Alvares's assistance.

12.  Deep vein thrombosis (DVT) prophylaxis, on Pradaxa.

## 2017-10-14 NOTE — IPN
DATE: 10/14/2017

 

SUBJECTIVE:

Ms. Raphael is seen this morning on her bedside.  She is sitting in the chair and

reports not feeling well.  She is somewhat upset because of her increasing

weight.  She denies any unusual dyspnea, chest pain or leg edema.  She feels that

her abdominal pannus is increasing in size again. Yesterday her torsemide was

held due to low blood pressure.  The patient denies any fever, chills, nausea or

vomiting.

 

PHYSICAL EXAMINATION:

VITAL SIGNS: Temperature 98.7 degrees Fahrenheit, heart rate 78 per minute and

respiratory rate 18 per minute.  Blood pressure of 112/58 mmHg and oxygen

saturation 98% on one liter oxygen.

Intake and output records from yesterday are recorded as intake 1320 and output

2550 mL.  Her recorded weight today is 150 kg.

HEAD/NECK: Head is atraumatic.  Neck is supple and without jugular venous

distention (JVD) or thyroid enlargement.  She is sitting upright in the chair at

the time of my visit.

LUNGS: Have diminished breath sounds but no audible rales or wheezing.

CARDIAC: Heart sounds are regular.

ABDOMEN:  Obese, soft and nontender.  I do not feel any significant pitting edema

on her abdominal pannus.

EXTREMITIES:  Have no cyanosis or clubbing.  She has trace of lower extremity

edema.

NEUROLOGIC: She is awake, alert and oriented times three.

 

LABORATORY DATA:

Today's labs show WBC count 8.4, hemoglobin 12.0, hematocrit 38.3.  Platelets

201.  Sodium 140 and potassium 3.9.  BUN 45 and creatinine 1.21.

 

PROBLEMS:

1. Acute on chronic congestive heart failure.  She remains reasonably well

compensated.  I feel that her weight is not accurate.  She does not have any

significant peripheral edema or pulmonary rales.  We will resume her torsemide at

a decreased dose of 20 mg once a day.  She was a negative fluid balance even

yesterday when she did not receive any torsemide.  We will continue with

spironolactone and acetazolamide as previously.

2. Acute kidney injury superimposed on chronic kidney disease.  She did have

slight improvement in her kidney function since yesterday.  Her electrolytes are

within normal range.  At this point we will continue to monitor her kidney

function closely.

3. Hypokalemia.  Potassium level has improved to normal range.  Her torsemide

dose has been decreased and spironolactone dose has been increased.  Electrolytes

will be rechecked tomorrow morning.

## 2017-10-15 VITALS — SYSTOLIC BLOOD PRESSURE: 105 MMHG | DIASTOLIC BLOOD PRESSURE: 51 MMHG

## 2017-10-15 VITALS — SYSTOLIC BLOOD PRESSURE: 98 MMHG | DIASTOLIC BLOOD PRESSURE: 79 MMHG

## 2017-10-15 VITALS — DIASTOLIC BLOOD PRESSURE: 50 MMHG | SYSTOLIC BLOOD PRESSURE: 106 MMHG

## 2017-10-15 LAB
ANION GAP SERPL CALC-SCNC: 8 MEQ/L (ref 8–16)
BUN SERPL-MCNC: 38 MG/DL (ref 7–18)
CALCIUM SERPL-MCNC: 8.1 MG/DL (ref 8.8–10.2)
CHLORIDE SERPL-SCNC: 106 MEQ/L (ref 98–107)
CO2 SERPL-SCNC: 27 MEQ/L (ref 21–32)
CREAT SERPL-MCNC: 1.15 MG/DL (ref 0.55–1.02)
ERYTHROCYTE [DISTWIDTH] IN BLOOD BY AUTOMATED COUNT: 15.9 % (ref 11.5–14.5)
GFR SERPL CREATININE-BSD FRML MDRD: 50.1 ML/MIN/{1.73_M2} (ref 45–?)
GLUCOSE SERPL-MCNC: 106 MG/DL (ref 80–110)
MCH RBC QN AUTO: 30.2 PG (ref 27–33)
MCHC RBC AUTO-ENTMCNC: 31 G/DL (ref 32–36.5)
MCV RBC AUTO: 97.5 FL (ref 80–96)
NRBC BLD AUTO-RTO: 0 % (ref 0–0)
PLATELET # BLD AUTO: 212 10^3/UL (ref 150–450)
POTASSIUM SERPL-SCNC: 3.9 MEQ/L (ref 3.5–5.1)
SODIUM SERPL-SCNC: 141 MEQ/L (ref 136–145)
WBC # BLD AUTO: 8.3 10^3/UL (ref 4–10)

## 2017-10-15 RX ADMIN — NYSTATIN SCH DOSE: 100000 POWDER TOPICAL at 21:57

## 2017-10-15 RX ADMIN — ATORVASTATIN CALCIUM SCH MG: 20 TABLET, FILM COATED ORAL at 21:56

## 2017-10-15 RX ADMIN — AMIODARONE HYDROCHLORIDE SCH MG: 200 TABLET ORAL at 09:17

## 2017-10-15 RX ADMIN — METOPROLOL TARTRATE SCH MG: 25 TABLET, FILM COATED ORAL at 09:00

## 2017-10-15 RX ADMIN — HYDROCODONE BITARTRATE AND ACETAMINOPHEN PRN TAB: 5; 325 TABLET ORAL at 09:19

## 2017-10-15 RX ADMIN — INSULIN LISPRO SCH UNITS: 100 INJECTION, SOLUTION INTRAVENOUS; SUBCUTANEOUS at 20:39

## 2017-10-15 RX ADMIN — SPIRONOLACTONE SCH MG: 25 TABLET, FILM COATED ORAL at 09:17

## 2017-10-15 RX ADMIN — AMIODARONE HYDROCHLORIDE SCH MG: 200 TABLET ORAL at 21:55

## 2017-10-15 RX ADMIN — INSULIN LISPRO SCH UNITS: 100 INJECTION, SOLUTION INTRAVENOUS; SUBCUTANEOUS at 18:15

## 2017-10-15 RX ADMIN — IPRATROPIUM BROMIDE AND ALBUTEROL SULFATE SCH ML: .5; 3 SOLUTION RESPIRATORY (INHALATION) at 15:17

## 2017-10-15 RX ADMIN — TORSEMIDE SCH MG: 20 TABLET ORAL at 09:17

## 2017-10-15 RX ADMIN — HYDROCODONE BITARTRATE AND ACETAMINOPHEN PRN TAB: 5; 325 TABLET ORAL at 22:06

## 2017-10-15 RX ADMIN — IPRATROPIUM BROMIDE AND ALBUTEROL SULFATE SCH ML: .5; 3 SOLUTION RESPIRATORY (INHALATION) at 20:00

## 2017-10-15 RX ADMIN — INSULIN LISPRO SCH UNITS: 100 INJECTION, SOLUTION INTRAVENOUS; SUBCUTANEOUS at 13:03

## 2017-10-15 RX ADMIN — MULTIPLE VITAMINS W/ MINERALS TAB SCH TAB: TAB at 09:16

## 2017-10-15 RX ADMIN — DOCUSATE SODIUM PRN MG: 100 CAPSULE, LIQUID FILLED ORAL at 09:16

## 2017-10-15 RX ADMIN — INSULIN LISPRO SCH UNITS: 100 INJECTION, SOLUTION INTRAVENOUS; SUBCUTANEOUS at 09:18

## 2017-10-15 RX ADMIN — ASPIRIN SCH MG: 81 TABLET ORAL at 09:17

## 2017-10-15 RX ADMIN — IPRATROPIUM BROMIDE AND ALBUTEROL SULFATE SCH ML: .5; 3 SOLUTION RESPIRATORY (INHALATION) at 08:56

## 2017-10-15 RX ADMIN — DABIGATRAN ETEXILATE MESYLATE SCH MG: 75 CAPSULE ORAL at 21:55

## 2017-10-15 RX ADMIN — TIOTROPIUM BROMIDE SCH INHALATION: 18 CAPSULE ORAL; RESPIRATORY (INHALATION) at 08:55

## 2017-10-15 RX ADMIN — DABIGATRAN ETEXILATE MESYLATE SCH MG: 75 CAPSULE ORAL at 09:16

## 2017-10-15 RX ADMIN — NYSTATIN SCH DOSE: 100000 POWDER TOPICAL at 09:20

## 2017-10-15 RX ADMIN — IPRATROPIUM BROMIDE AND ALBUTEROL SULFATE SCH ML: .5; 3 SOLUTION RESPIRATORY (INHALATION) at 01:24

## 2017-10-15 RX ADMIN — FERROUS SULFATE TAB 325 MG (65 MG ELEMENTAL FE) SCH MG: 325 (65 FE) TAB at 09:16

## 2017-10-15 RX ADMIN — SPIRONOLACTONE SCH MG: 25 TABLET, FILM COATED ORAL at 21:56

## 2017-10-15 RX ADMIN — METOPROLOL TARTRATE SCH MG: 25 TABLET, FILM COATED ORAL at 20:38

## 2017-10-15 NOTE — IPN
DATE: 10/15/2017

 

SUBJECTIVE:

Ms. Raphael is seen this afternoon on her bedside.  She is lying in the bed today.

She reports feeling better today and denies any worsening dyspnea or peripheral

edema.  She has no fever, chills, nausea or vomiting.

 

PHYSICAL EXAMINATION:

VITAL SIGNS: Temperature 97.1 degrees Fahrenheit, heart rate 74 per minute and

respiratory rate 18 per minute.  Blood pressure this morning 108/52 mmHg and now

98/79 mmHg.  Oxygen saturation between 90% and 95% on one liter oxygen.

Intake and output records from yesterday show a negative fluid balance of 360 mL.

She has been consistently in negative balance for all week.

HEAD/NECK: Her head is atraumatic.  Neck is supple and I do not see any abnormal

jugular venous distention (JVD).

LUNGS:  Have moderate bilateral air entry.

HEART:  Sounds are regular.

ABDOMEN:  Obese and nontender.  Bowel sounds are normal.

EXTREMITIES: Have no cyanosis or clubbing.

NEUROLOGIC: She is awake, alert and oriented times three.

 

LABORATORY DATA:

Today's labs show WBC count 8.3, hemoglobin 11.9 and hematocrit 38.4.  Platelets

212.  A urinalysis is done today which showed 23 WBCs and five RBCs, but no

bacteria.  Her chemistry showed sodium 141 and potassium 3.9.  BUN 38 and

creatinine 1.15.

 

PROBLEMS:

1. Congestive heart failure.  I feel the patient is reasonably well compensated

and I would continue on current diuretic regimen.  Her torsemide dose was cut

down to 20 mg daily yesterday.  With twice a day torsemide, she had become

hypotensive.

2. Acute on chronic kidney disease.  She did have worsening kidney function a few

days ago which has now improved back to baseline.  Her electrolytes are now

within normal range.

 

DISPOSITION:

The patient seems doing well and she is now willing to go home tomorrow.  I feel

that medically she is stable for discharge.  I would recommend to continue with

oral fluid restriction of 1500 mL per day at home along with current diuretics.

She will followup with Dr. Medeiros in next couple of weeks as an outpatient.

## 2017-10-15 NOTE — IPN
DATE:  10/15/2017

 

SUBJECTIVE:

The patient is seen and examined in the room today. The patient denies any

complaint about worsening shortness of breath, denies any chest pain. The patient

continues to have negative fluid balance.

 

OBJECTIVE:

VITAL SIGNS: Temperature is 97.1, pulse 59, respirations 19, blood pressure

116/50. Pulse oximetry 95% with one liter nasal cannula.

GENERAL: No sign of acute distress. Alert and oriented times three. Morbidly

obese.

HEENT: Normocephalic, atraumatic. Extraocular motor grossly intact.

CARDIOVASCULAR: Irregularly irregular. Positive S1, S2.

LUNGS: Clear to auscultation bilaterally. Decreased breath sounds due to body

habitus.

ABDOMEN: Soft, nontender, nondistended. Bowel sounds present.

EXTREMITIES: Compression stocking in place. Very minimal swelling of the lower

extremities.

 

LABORATORY DATA:

WBC 8.3, hemoglobin 11.9, hematocrit 38.4, platelet count 212.

Sodium is 141. Potassium 3.9, chloride 106, carbon dioxide 27, BUN 38, creatinine

1.15, GFR 50.1, fasting glucose 106, calcium 8.1.

 

ASSESSMENT AND PLAN:

1. Acute on chronic diastolic congestive heart failure:  The patient continues to

receive diuresis from multiple diuretics. Nephrology Dr. Alvares has been

assisting on the case. We appreciate his opinions. Currently the patient is on

Diamox, torsemide and spironolactone.

2. Hypotension, most likely due to diuretic. Continue to monitor. The patient is

asymptomatic.

3. Atrial fibrillation with rapid ventricular rate (RVR) on amiodarone,

metoprolol tartrate and Pradaxa.

4. History of nonsustained ventricular tachycardia (V-tac). On amiodarone and

metoprolol. The patient continues to be monitored on telemetry.

5. Obstructive sleep apnea (SINTIA) on SINTIA protocol. The patient did not have a

repeat sleep study. The patient does not have continuous positive airway pressure

(CPAP) at home. The patient may need to followup in outpatient setting to

schedule her sleep study.

6. Type 2 diabetes. A1c is 6.3. On sliding scale. On consistent carbohydrate

diet. 7. History of chronic obstructive pulmonary disease (COPD). At the

baseline, requires one liter nasal cannula. No exacerbation at this moment.

8. History of pulmonary hypertension.

9. History of iron-deficiency anemia. We will continue to monitor.

10. Elevated thyroid function with normal free T4.

11. Acute kidney injury improving.

12. Deep venous thrombosis (DVT) prophylaxis on Pradaxa.

## 2017-10-16 VITALS — DIASTOLIC BLOOD PRESSURE: 61 MMHG | SYSTOLIC BLOOD PRESSURE: 121 MMHG

## 2017-10-16 VITALS — DIASTOLIC BLOOD PRESSURE: 67 MMHG | SYSTOLIC BLOOD PRESSURE: 129 MMHG

## 2017-10-16 VITALS — DIASTOLIC BLOOD PRESSURE: 55 MMHG | SYSTOLIC BLOOD PRESSURE: 102 MMHG

## 2017-10-16 LAB
ANION GAP SERPL CALC-SCNC: 3 MEQ/L (ref 8–16)
BUN SERPL-MCNC: 37 MG/DL (ref 7–18)
CALCIUM SERPL-MCNC: 8.6 MG/DL (ref 8.8–10.2)
CHLORIDE SERPL-SCNC: 107 MEQ/L (ref 98–107)
CO2 SERPL-SCNC: 30 MEQ/L (ref 21–32)
CREAT SERPL-MCNC: 1.16 MG/DL (ref 0.55–1.02)
ERYTHROCYTE [DISTWIDTH] IN BLOOD BY AUTOMATED COUNT: 15.9 % (ref 11.5–14.5)
GFR SERPL CREATININE-BSD FRML MDRD: 49.6 ML/MIN/{1.73_M2} (ref 45–?)
GLUCOSE SERPL-MCNC: 117 MG/DL (ref 80–110)
MCH RBC QN AUTO: 30.5 PG (ref 27–33)
MCHC RBC AUTO-ENTMCNC: 31.5 G/DL (ref 32–36.5)
MCV RBC AUTO: 96.8 FL (ref 80–96)
NRBC BLD AUTO-RTO: 0 % (ref 0–0)
PLATELET # BLD AUTO: 187 10^3/UL (ref 150–450)
POTASSIUM SERPL-SCNC: 3.8 MEQ/L (ref 3.5–5.1)
SODIUM SERPL-SCNC: 140 MEQ/L (ref 136–145)
WBC # BLD AUTO: 7.6 10^3/UL (ref 4–10)

## 2017-10-16 RX ADMIN — MULTIPLE VITAMINS W/ MINERALS TAB SCH TAB: TAB at 10:24

## 2017-10-16 RX ADMIN — AMIODARONE HYDROCHLORIDE SCH MG: 200 TABLET ORAL at 10:20

## 2017-10-16 RX ADMIN — IPRATROPIUM BROMIDE AND ALBUTEROL SULFATE SCH ML: .5; 3 SOLUTION RESPIRATORY (INHALATION) at 07:08

## 2017-10-16 RX ADMIN — INSULIN LISPRO SCH UNITS: 100 INJECTION, SOLUTION INTRAVENOUS; SUBCUTANEOUS at 07:53

## 2017-10-16 RX ADMIN — INSULIN LISPRO SCH UNITS: 100 INJECTION, SOLUTION INTRAVENOUS; SUBCUTANEOUS at 12:23

## 2017-10-16 RX ADMIN — IPRATROPIUM BROMIDE AND ALBUTEROL SULFATE SCH ML: .5; 3 SOLUTION RESPIRATORY (INHALATION) at 02:00

## 2017-10-16 RX ADMIN — DOCUSATE SODIUM PRN MG: 100 CAPSULE, LIQUID FILLED ORAL at 10:26

## 2017-10-16 RX ADMIN — DABIGATRAN ETEXILATE MESYLATE SCH MG: 75 CAPSULE ORAL at 10:20

## 2017-10-16 RX ADMIN — ASPIRIN SCH MG: 81 TABLET ORAL at 10:21

## 2017-10-16 RX ADMIN — SPIRONOLACTONE SCH MG: 25 TABLET, FILM COATED ORAL at 10:26

## 2017-10-16 RX ADMIN — HYDROCODONE BITARTRATE AND ACETAMINOPHEN PRN TAB: 5; 325 TABLET ORAL at 07:53

## 2017-10-16 RX ADMIN — TIOTROPIUM BROMIDE SCH INHALATION: 18 CAPSULE ORAL; RESPIRATORY (INHALATION) at 07:08

## 2017-10-16 RX ADMIN — NYSTATIN SCH DOSE: 100000 POWDER TOPICAL at 10:27

## 2017-10-16 RX ADMIN — METOPROLOL TARTRATE SCH MG: 25 TABLET, FILM COATED ORAL at 10:26

## 2017-10-16 RX ADMIN — FERROUS SULFATE TAB 325 MG (65 MG ELEMENTAL FE) SCH MG: 325 (65 FE) TAB at 10:21

## 2017-10-16 RX ADMIN — TORSEMIDE SCH MG: 20 TABLET ORAL at 10:26

## 2017-10-16 NOTE — IPN
DATE OF VISIT:  10/16/2017

 

Mrs. Raphael is seen this morning on her bedside.  She has complained of severe

pain in her left wrist area.  She denies any trauma or fall.  She denies any

nausea or vomiting.  Her chronic dyspnea is unchanged and she remains on oxygen.

She has no fever or chills.

 

On physical exam, temperature 97.2 degrees Fahrenheit, heart rate 60 per minute

and respiratory rate 20 per minute.  Blood pressure 129/67 mmHg and oxygen

saturation 94% on 1 liter of oxygen.  Head is atraumatic.  Neck is supple and

without any obvious jugular venous distention (JVD) or thyroid enlargement.

Ears, nose and throat are unremarkable.  Heart sounds are regular and lungs with

moderate bilateral air entry.  Abdomen obese with a large abdominal pannus.

Bowel sounds are present.  Extremities have no cyanosis or clubbing.  On her 
left

wrist area she has tenderness but no redness or increased temperature.  She has

no known h/o gout.

 

Today's labs show WBC count 7.6, hemoglobin 11.3 and hematocrit 35.9.  Platelets

187.  Sodium 140 and potassium 3.8, BUN 37 and creatinine 1.16.  Calcium level 
is

8.6.

 

PROBLEMS:

1.  Congestive heart failure.  Her volume status seems to be reasonably

well-compensated.  Weight in the hospital is not reliable.  Intake and output

records revealed negative fluid balance.  We will continue with current diuretic

regimen.

 

2.  Acute on chronic kidney disease.  She has no change in her kidney function.

This is most likely her baseline.

 

3.  Left wrist pain.  Probably patient has tendonitis or arthritis.  She has no

known history of gout.  I do not see any clinical signs of acute gout.  We will

give her a trial of diclofenac topical application and see how she responds.

 

DISPOSITION:  From a renal standpoint, patient can be discharged to home any

time.

MTDD

## 2017-10-17 NOTE — DSES
DATE OF ADMISSION:  10/05/2017

DATE OF DISCHARGE:  10/16/2017

 

PRIMARY CARE PROVIDER: [facility].

 

CONSULTANTS: Nephrology, Dr. Alvares

 

CARDIOLOGISTS: Dr. Fishman and Dr. Rad BERGER DIAGNOSES:

1. Acute on chronic diastolic congestive heart failure.

2. Atrial fibrillation with rapid ventricular response (RVR).

3. History of nonsustained ventricular tachycardia.

4. Obstructive sleep apnea (SINTIA).

5. Type 2 diabetes.

6. Pulmonary hypertension.

7. History of chronic obstructive pulmonary disease (COPD).

8. History of iron deficiency anemia.

9. Elevated thyroid-stimulating hormone (TSH) with a normal free T4.

10. Acute kidney injury.

 

HOSPITALIZATION COURSE: Patient is an 67-year-old female who presented to

[facility] on 10/05/2017 for 1-week of shortness of breath. The

patient was admitted for decompensated diastolic congestive heart failure and Dr. Alvares was consulted, and the patient was started on intravenous (IV) Lasix

diuresis and other diuretic regimen was also adjusted by nephrology. Later, the

cardiologist was consulted for atrial fibrillation with RVR and hypotension. The

patient was started on digoxin and diltiazem drip and those cardiac medications

adjusted with cardiology assistance. The patient was also started on

anticoagulation. The patient continued to show significant diuresis with the

diuretic, and the patient continued to show symptomatic improvement. On

10/16/2017, the patient was approaching her baseline and patient determined to be

medically stable for discharge with the recommendation to followup with her

primary care provider and nephrology and cardiology at the scheduled time.

 

OBJECTIVE:

VITAL SIGNS: Temperature is 98.3, pulse is 64, respirations 20, blood pressure

121/61, pulse oximetry is 91% with one liter nasal cannula.

 

INTAKE AND OUTPUT THROUGHOUT THE WHOLE HOSPITALIZATION:  Patient admitted from

10/05/2017 to 10/16/2017; total fluid balance was -28.69 mL.

 

LABORATORY DATA ON THE DAY OF DISCHARGE: WBC 7.6, hemoglobin 11.3, hematocrit

35.9, platelet count is 187.

 

Sodium is 140, potassium is 3.8, chloride 107, carbon dioxide 30, BUN 37,

creatinine 1.16, GFR is 49.6, fasting glucose 117, calcium is 8.6.

 

Microbiology: Blood culture on 10/05/2017: Negative after 5 days times two sets.

 

 

IMAGING STUDIES: Chest x-ray on 10/05/2017 showed chronic interstitial changes

and cardiomegaly.

 

Bilateral lower extremity Doppler showed no evidence of deep vein thrombosis

(DVT).

 

DISCHARGE MEDICATIONS:

- acetazolamide 250 mg by mouth daily

- amiodarone 400 mg by mouth twice a day

- torsemide 20 mg by mouth daily

- Tylenol 500 mg by mouth four times a day as needed

- Percocet 5/325 one tablet by mouth three times a day as needed

- Ventolin two puff inhalation every 4 hours as needed

- aspirin 81 mg by mouth daily

- atorvastatin 40 mg by mouth nightly

- Pradaxa 150 mg by mouth twice a day

- Colace 100 mg by mouth daily as needed

- ferrous sulfate 325 mg by mouth daily

- Breo one puff inhalation daily

- loratadine 10 mg by mouth daily as needed

- magnesium chloride 64 mg by mouth three times a week

- metformin 500 mg by mouth twice a day

- metolazone 2.5 mg by mouth three times a week

- metoprolol tartrate 12.5 mg by mouth twice a day

- multivitamin one tablet by mouth daily

- potassium chloride 40 mEq by mouth daily

- Senna one tablet by mouth twice a day as needed

- spironolactone 25 mg by mouth twice a day

- Spiriva inhalation daily

 

DISCHARGE INSTRUCTIONS:

Discontinue lines.

Discharge home.

Activity as tolerated.

Consistent carbohydrate diet with fluid restriction as tolerated.

Patient should followup with her primary care provider in 1-2 weeks.

Patient should followup with nephrologist in 1-2 weeks.

Patient should followup with cardiology in the outpatient setting.

 

DISCHARGE CONDITION: Stable.

 

DISCHARGE TIME: Greater than 30 minutes.

## 2017-12-02 ENCOUNTER — HOSPITAL ENCOUNTER (INPATIENT)
Dept: HOSPITAL 53 - M ED | Age: 67
LOS: 16 days | Discharge: HOME | DRG: 291 | End: 2017-12-18
Attending: INTERNAL MEDICINE | Admitting: INTERNAL MEDICINE
Payer: COMMERCIAL

## 2017-12-02 VITALS — SYSTOLIC BLOOD PRESSURE: 119 MMHG | DIASTOLIC BLOOD PRESSURE: 73 MMHG

## 2017-12-02 VITALS — WEIGHT: 293 LBS | BODY MASS INDEX: 47.09 KG/M2 | HEIGHT: 66 IN

## 2017-12-02 DIAGNOSIS — I50.33: ICD-10-CM

## 2017-12-02 DIAGNOSIS — N18.3: ICD-10-CM

## 2017-12-02 DIAGNOSIS — Z79.899: ICD-10-CM

## 2017-12-02 DIAGNOSIS — L03.115: ICD-10-CM

## 2017-12-02 DIAGNOSIS — I25.10: ICD-10-CM

## 2017-12-02 DIAGNOSIS — E87.6: ICD-10-CM

## 2017-12-02 DIAGNOSIS — J44.9: ICD-10-CM

## 2017-12-02 DIAGNOSIS — E11.9: ICD-10-CM

## 2017-12-02 DIAGNOSIS — L97.819: ICD-10-CM

## 2017-12-02 DIAGNOSIS — E87.3: ICD-10-CM

## 2017-12-02 DIAGNOSIS — I48.0: ICD-10-CM

## 2017-12-02 DIAGNOSIS — Z95.9: ICD-10-CM

## 2017-12-02 DIAGNOSIS — K21.9: ICD-10-CM

## 2017-12-02 DIAGNOSIS — I83.018: ICD-10-CM

## 2017-12-02 DIAGNOSIS — Z88.8: ICD-10-CM

## 2017-12-02 DIAGNOSIS — I13.0: Primary | ICD-10-CM

## 2017-12-02 DIAGNOSIS — Z79.82: ICD-10-CM

## 2017-12-02 DIAGNOSIS — Z79.01: ICD-10-CM

## 2017-12-02 DIAGNOSIS — E66.01: ICD-10-CM

## 2017-12-02 DIAGNOSIS — E78.5: ICD-10-CM

## 2017-12-02 DIAGNOSIS — J96.11: ICD-10-CM

## 2017-12-02 DIAGNOSIS — Z91.14: ICD-10-CM

## 2017-12-02 DIAGNOSIS — Z79.84: ICD-10-CM

## 2017-12-02 DIAGNOSIS — Z99.81: ICD-10-CM

## 2017-12-02 DIAGNOSIS — Z88.2: ICD-10-CM

## 2017-12-02 LAB
ALBUMIN SERPL BCG-MCNC: 3.3 GM/DL (ref 3.2–5.2)
ALBUMIN/GLOB SERPL: 0.8 {RATIO} (ref 1–1.93)
ALP SERPL-CCNC: 88 U/L (ref 45–117)
ALT SERPL W P-5'-P-CCNC: 11 U/L (ref 12–78)
ANION GAP SERPL CALC-SCNC: 9 MEQ/L (ref 8–16)
AST SERPL-CCNC: 12 U/L (ref 7–37)
BASE EXCESS BLDV CALC-SCNC: 2.8 MMOL/L (ref -2–2)
BASOPHILS # BLD AUTO: 0 10^3/UL (ref 0–0.2)
BASOPHILS NFR BLD AUTO: 0.2 % (ref 0–1)
BILIRUB CONJ SERPL-MCNC: 1.2 MG/DL (ref 0–0.2)
BILIRUB SERPL-MCNC: 2.3 MG/DL (ref 0.2–1)
BUN SERPL-MCNC: 21 MG/DL (ref 7–18)
CALCIUM SERPL-MCNC: 8.5 MG/DL (ref 8.8–10.2)
CHLORIDE SERPL-SCNC: 104 MEQ/L (ref 98–107)
CO2 BLDV CALC-SCNC: 30.2 MEQ/L (ref 24–28)
CO2 SERPL-SCNC: 27 MEQ/L (ref 21–32)
CREAT SERPL-MCNC: 1.26 MG/DL (ref 0.55–1.02)
EOSINOPHIL # BLD AUTO: 0 10^3/UL (ref 0–0.5)
EOSINOPHIL NFR BLD AUTO: 0.1 % (ref 0–3)
ERYTHROCYTE [DISTWIDTH] IN BLOOD BY AUTOMATED COUNT: 16.1 % (ref 11.5–14.5)
GFR SERPL CREATININE-BSD FRML MDRD: 45.1 ML/MIN/{1.73_M2} (ref 45–?)
GLUCOSE SERPL-MCNC: 131 MG/DL (ref 80–110)
HCO3 STD BLDV-SCNC: 26.6 MEQ/L
IMM GRANULOCYTES NFR BLD: 0.4 % (ref 0–0)
INR PPP: 1.58
LYMPHOCYTES # BLD AUTO: 0.4 10^3/UL (ref 1.5–4.5)
LYMPHOCYTES NFR BLD AUTO: 2.7 % (ref 24–44)
MAGNESIUM SERPL-MCNC: 2 MG/DL (ref 1.8–2.4)
MCH RBC QN AUTO: 30.3 PG (ref 27–33)
MCHC RBC AUTO-ENTMCNC: 30.5 G/DL (ref 32–36.5)
MCV RBC AUTO: 99.3 FL (ref 80–96)
MONOCYTES # BLD AUTO: 1.2 10^3/UL (ref 0–0.8)
MONOCYTES NFR BLD AUTO: 8.3 % (ref 0–5)
NEUTROPHILS # BLD AUTO: 12.2 10^3/UL (ref 1.8–7.7)
NEUTROPHILS NFR BLD AUTO: 88.3 % (ref 36–66)
NRBC BLD AUTO-RTO: 0 % (ref 0–0)
PCO2 BLDV: 49 MMHG (ref 38–50)
PHOSPHATE SERPL-MCNC: 2.9 MG/DL (ref 2.5–4.9)
PLATELET # BLD AUTO: 210 10^3/UL (ref 150–450)
PO2 BLDV: 48.6 MMHG (ref 30–50)
POTASSIUM SERPL-SCNC: 3.6 MEQ/L (ref 3.5–5.1)
PROT SERPL-MCNC: 7.4 GM/DL (ref 6.4–8.2)
SAO2 % BLDV: 83.9 % (ref 60–80)
SODIUM SERPL-SCNC: 140 MEQ/L (ref 136–145)
WBC # BLD AUTO: 13.8 10^3/UL (ref 4–10)

## 2017-12-02 RX ADMIN — INSULIN LISPRO SCH UNITS: 100 INJECTION, SOLUTION INTRAVENOUS; SUBCUTANEOUS at 21:00

## 2017-12-02 RX ADMIN — DABIGATRAN ETEXILATE MESYLATE SCH MG: 75 CAPSULE ORAL at 22:27

## 2017-12-02 RX ADMIN — NYSTATIN SCH DOSE: 100000 POWDER TOPICAL at 22:27

## 2017-12-02 RX ADMIN — Medication SCH MLS/HR: at 22:26

## 2017-12-02 RX ADMIN — IPRATROPIUM BROMIDE AND ALBUTEROL SULFATE PRN ML: .5; 3 SOLUTION RESPIRATORY (INHALATION) at 15:44

## 2017-12-02 RX ADMIN — ATORVASTATIN CALCIUM SCH MG: 20 TABLET, FILM COATED ORAL at 22:26

## 2017-12-02 RX ADMIN — IPRATROPIUM BROMIDE AND ALBUTEROL SULFATE PRN ML: .5; 3 SOLUTION RESPIRATORY (INHALATION) at 15:22

## 2017-12-02 RX ADMIN — SODIUM CHLORIDE, PRESERVATIVE FREE SCH ML: 5 INJECTION INTRAVENOUS at 22:28

## 2017-12-02 RX ADMIN — METOPROLOL TARTRATE SCH MG: 25 TABLET, FILM COATED ORAL at 22:26

## 2017-12-02 NOTE — HPEPDOC
General


Date of Admission


Dec 2, 2017 at 18:02


Primary Care Physician:  Rashid Sears MD


Other Providers


PCP: [facility]


Cardiologist: Dr. Fishman


Nephrologist Dr. Medeiros


Pulmonologist: Dr. Pham


Attending Physician:  GUERLINE BLANCO MD


Chief Complaint


The patient is a 67-year-old female admitted with a reason for visit of CHF. 68 yo F presents to ED via EMS after she fell trying to sit down in chair and per 

pt, just had miscalculated where the chair was. PMH includes diastolic CHF, 

paroxysmal A. fib on Pradaxa, HLD, HTN, NIDDM 2, COPD 1L NC, CAD s/p stents x2. 

Denies presyncopal symptoms, lightheadedness, dizziness, LOC, or hitting her 

head. States she has been having difficulty ambulating and moving lately due to 

bilateral leg swelling, associated with shortness of breath, 20+ lb weight gain 

in past few weeks, orthopnea, paroxysmal nocturnal dyspnea. Pt also admits to 

not being compliant with meds lately, especially Torsemide and states "I don't 

take it like I should." Recently admitted to hospital early October 2017 for 

similar symptoms, acute CHF decompensation. Denies recent travel, changes in 

meds, sick contacts. Denies chest pain or pressure, lightheadedness, dizziness, 

n/v, abd pain. 


Noted in ED to have 3+ pitting edema, weeping fluid in lower legs, crackles in 

lungs. Given ASA, Lasix, and Duoneb in ED.





Home Medications


Scheduled


Amiodarone HCl (Amiodarone HCl) 200 Mg Tab, 200 MG PO DAILY, (Reported)


Aspirin (Aspirin 81) 81 Mg Tab, 81 MG PO DAILY, (Reported)


Atorvastatin Calcium (Atorvastatin Calcium) 40 Mg Tab, 40 MG PO QHS, (Reported)


Dabigatran Etexilate (Pradaxa) 150 Mg Cap, 150 MG PO BID, (Reported)


Ferrous Sulfate (Ferrous Sulfate) 325 Mg Tab, 325 MG PO DAILY, (Reported)


Fluticasone/Vilanterol (Breo Ellipta 200-25 Mcg/INH) 1 Inh Inh, 1 PUFF INH DAILY

, (Reported)


Fluticasone/Vilanterol (Breo Ellipta 200-25 Mcg/INH) 1 Inh Inh, 1 PUFF INH DAILY

, (Reported)


Metformin Hydrochloride (Metformin HCl) 500 Mg Tab, 500 MG PO BID, (Reported)


Metolazone (Metolazone) 2.5 Mg Tab, 2.5 MG PO 3XW, (Reported)


   MON, WED, FRI 


Metoprolol Tartrate (Metoprolol Tartrate) 25 Mg Tab, 12.5 MG PO BID, (Reported)


Multivitamins *[facility] STOCKED* (Thera M Plus *[facility] STOCKED*) 1 Tab Tab, 1 TAB PO 

DAILY, (Reported)


Nystatin (Nystatin Powder) 100,000 Unit/Gm Pow, 1 DOSE TOP BID, (Reported)


   APPLY TO ABDOMINAL FOLDS 


Potassium Chloride (Klor-Con M20) 20 Meq Tabcr, 20 MEQ PO BID, (Reported)


Spironolactone (Spironolactone) 25 Mg Tab, 25 MG PO BID, (Reported)


Tiotropium Bromide Monohydrate (Spiriva Handihaler) 18 Mcg Cap, 1 INHALATION 

INH DAILY, (Reported)


Torsemide (Torsemide) 20 Mg Tab, 20 MG PO BID, (Reported)





Scheduled PRN


Acetaminophen (Acetaminophen Extra Stren) 500 Mg Tab, 500 MG PO QID PRN for 

PAIN OR FEVER, (Reported)


Acetaminophen/Hydrocodone (Hydrocodone/Acetaminophen 5-325 mg) 1 Tab Tab, 1 TAB 

PO TID PRN for PAIN, (Reported)


Albuterol Sulfate (Ventolin Hfa) 200 Puff/8 Gm Aers, 2 PUFF INH Q4H PRN for 

SHORTNESS OF BREATH, (Reported)


Albuterol Sulfate (Ventolin Hfa) 108 Mcg/Act Aer, 108 MCG PO Q4H PRN for 

SHORTNESS OF BREATH, (Reported)


Docusate Sodium (Colace) 100 Mg Cap, 100 MG PO DAILY PRN for CONSTIPATION, (

Reported)


Loratadine (Loratadine) 10 Mg Tab, 10 MG PO DAILY PRN for ALLERGIES, (Reported)


Senna (Senna Lax) 8.6 Mg Tab, 1 TAB PO BID PRN for CONSTIPATION, (Reported)





Allergies


Coded Allergies:  


     Sulfa Drugs (Verified  Allergy, Unknown, QUESTIONABLE- PARENTS TOLD HER 

SHE WAS ALLERGIC???, 12/16/16)


     Sulfa Drugs Cross Reactors (Verified  Allergy, Unknown, 4/5/13)


     Heparin (Verified  Adverse Reaction, Intermediate, HIT, 6/9/16)





Past Medical History


Medical History


Diastolic CHF


Paroxysmal A. fib, on Pradaxa


HLD


HTN


NIDDM 2


COPD, 1L NC


GERD


CAD, s/p stents x2


Pulmonary HTN


CKD, baseline Cr ~1


Surgical History


Cardiac stents x2, 2011


Colon Resection, 2010


Hernia repair x2


Right lumpectomy-benign


Left foot tendon repair





Review of Symptoms


Constitutional:  Reports: Other (decreased appetite), 


   Denies: Chills, Fever, Weakness, Fatigue


Eyes:  Denies: Pain, Vision change


ENT:  Reports: Head Aches, 


   Denies: Ear Pain, Dysphagia


Skin:  Reports: Lesions (rt lower leg warm and red)


Pulmonary:  Reports: Dyspnea, Cough (dry)


Cardiovascular:  Reports: Orthopnea, Paroxysmal Noc. Dyspnea, Edema (from feet 

to belly), 


   Denies: Chest Pain, Palpitations, Lt Headedness


Gastrointestinal:  Reports: Constipation (hard stools), 


   Denies: Nausea, Vomiting, Abdominal Pain, Diarrhea, Melena, Hematochezia


Genitourinary:  Denies: Dysuria, Hematuria


Hematologic:  Reports: Bleeding Excessively (easy bleeding & bruising on Pradaxa

)


Endocrine:  Denies: Heat Intolerance, Cold Intolerance


Musculoskeletal:  Reports: Back Pain (chronic), Shoulder Pain (right shoulder 

from fall)


Neurological:  Denies: Weakness, Numbness, Incoordination, Confusion


Psych:  Reports: Mood Normal





Physical Examination


General Exam:  Positive: Alert, Cooperative, No Acute Distress


Eye Exam:  Positive: PERRLA, Conjunctiva & lids normal, EOMI


ENT Exam:  Positive: Atraumatic, Pharynx Normal, Tongue Midline, Other ENT (

poor dentition), 


   Negative: Pharyngeal Edema


Neck Exam:  Positive: Supple, 


   Negative: Lymphadenopathy


Chest Exam:  Positive: Rales (b/l lower lobes), Wheezing (expiratory)


Heart Exam:  Positive: Rate Normal, Irregular Rhythm, Other (distant heart 

sounds due to body habitus)


Telemetry:  Positive: Atrial fibrillation


Abdomen Exam:  Positive: Normal bowel sounds, 


   Negative: Soft (tense from fluid, with dry skin no lower abdomen), Tenderness


Extremity Exam:  Positive: Edema (3+), Tenderness (mild tenderness to touch on 

right LE)


Skin Exam:  Positive: Lesion (right LE warm & erythematous, weeping clear fluid)


Neuro Exam:  Positive: Normal Speech, Strength at 5/5 X4 ext, Normal Tone, 

Sensation Intact


Psych Exam:  Positive: Mood NL, Oriented x 3





Vital Signs





Vital Signs








  Date Time  Temp Pulse Resp B/P (MAP) Pulse Ox O2 Delivery O2 Flow Rate FiO2


 


12/2/17 19:30  89  111/58 (75) 91   


 


12/2/17 19:01 98.6  17   Nasal Cannula 2.0 











Laboratory Data


Labs 24H


Laboratory Tests 2


12/2/17 15:00: 


Immature Granulocyte % (Auto) 0.4H, White Blood Count 13.8H, Red Blood Count 

4.32, Hemoglobin 13.1, Hematocrit 42.9, Mean Corpuscular Volume 99.3H, Mean 

Corpuscular Hemoglobin 30.3, Mean Corpuscular Hemoglobin Concent 30.5L, Red 

Cell Distribution Width 16.1H, Platelet Count 210, Neutrophils (%) (Auto) 88.3H

, Lymphocytes (%) (Auto) 2.7L, Monocytes (%) (Auto) 8.3H, Eosinophils (%) (Auto

) 0.1, Basophils (%) (Auto) 0.2, Neutrophils # (Auto) 12.2H, Lymphocytes # (Auto

) 0.4L, Monocytes # (Auto) 1.2H, Eosinophils # (Auto) 0.0, Basophils # (Auto) 

0.0, Immature Granulocyte # (Auto) 0.1H, Nucleated Red Blood Cells % (auto) 0.0

, Blood Gas Bicarbonate Standard 26.6, Venous Blood pH 7.386, Venous Blood 

Partial Pressure CO2 49.0, Venous Blood Partial Pressure O2 48.6, Venous Blood 

Total Carbon Dioxide 30.2H, Venous Blood HCO3 28.7H, Venous Blood Oxygen 

Saturation 83.9H, Venous Blood Base Excess 2.8H, Lactic Acid Level 1.9


12/2/17 16:19: 


Anion Gap 9, Glomerular Filtration Rate 45.1, Calcium Level 8.5L, Phosphorus 

Level 2.9, Magnesium Level 2.0, Aspartate Amino Transf (AST/SGOT) 12, Alanine 

Aminotransferase (ALT/SGPT) 11L, Alkaline Phosphatase 88, Total Bilirubin 2.3H, 

Direct Bilirubin 1.2H, Total Creatine Kinase 34, Creatine Kinase MB 1.6, 

Creatine Kinase MB Relative Index 4.70H, Troponin I 0.02, NT-Pro-B-Type 

Natriuretic Peptide 3845H, Total Protein 7.4, Albumin 3.3, Albumin/Globulin 

Ratio 0.80L


12/2/17 18:17: 


Urine Appearance CLEAR, Urine Color YELLOW, Urine pH 5.0, Urine Specific 

Gravity 1.009, Urine Protein NEGATIVE, Urine Glucose (UA) NEGATIVE, Urine 

Ketones NEGATIVE, Urine Urobilinogen 0.2, Urine Bilirubin NEGATIVE, Urine 

Leukocyte Esterase NEGATIVE, Urine Blood 2+H, Urine Nitrite NEGATIVE, Urine WBC 

(Auto) 1, Urine RBC (Auto) 3, Urine Hyaline Casts (Auto) 5, Urine Bacteria (Auto

) NEGATIVE, Urine Squamous Epithelial Cells 1, Urine Mucus (Auto) SMALL, Urine 

Sperm (Auto) 


12/2/17 19:26: 


Prothrombin Time 19.3H, Prothromb Time International Ratio 1.58


CBC/BMP


Laboratory Tests


12/2/17 15:00








Red Blood Count 4.32, Mean Corpuscular Volume 99.3 H, Mean Corpuscular 

Hemoglobin 30.3, Mean Corpuscular Hemoglobin Concent 30.5 L, Red Cell 

Distribution Width 16.1 H, Neutrophils (%) (Auto) 88.3 H, Lymphocytes (%) (Auto

) 2.7 L, Monocytes (%) (Auto) 8.3 H, Eosinophils (%) (Auto) 0.1, Basophils (%) (

Auto) 0.2, Neutrophils # (Auto) 12.2 H, Lymphocytes # (Auto) 0.4 L, Monocytes # 

(Auto) 1.2 H, Eosinophils # (Auto) 0.0, Basophils # (Auto) 0.0





12/2/17 16:19








Microbiology





Microbiology


12/2/17 Blood Culture, Received


          Pending


12/2/17 Blood Culture, Received


          Pending


12/2/17 Urine Culture, Received


          Pending





 Assessment/Plan


Acute decompensated heart failure


hx of diastolic HF. Presented with 20+ lb wt gain in past few weeks, dyspneic, 

orthopnea, PND. Cardiac markers neg. BNP elevated, cardiomegaly on CXR, 

crackles and 3+ pitting edema on PE


hold home Torsemide


Metolazone 2.5 daily, Lasix 80 bid IV with net neg 2L, Aldactone daily


strict I/Os, daily weights, low salt diet





Right LE Cellulitis


warm, erythematous, tender to touch, weeping clear fluid


will start Teflaro





Leukocytosis


likely combination of: reactionary to acute CHF decompensation and RLE 

cellulitis


start Teflaro


urine, blood cultures pending





Hyperbilirubinemia


may be 2/2 volume overload status


monitor and trend. May require ultrasound if continues to be elevated





Paroxysmal A.Fib


on Pradaxa


rate controlled on home Metoprolol





NIDDM2


hold home Metformin due to elevated Cr


ISS inpatient





COPD


no acute exacerbation


on 1L NC all the time at home. Will continue


continue home Ventolin





HLD


continue home med





HTN


continue home med





GERD


continue home med





DISPOSITION: will admit to hospital and transfer care to Dr. Blanco's service 

in am.





Plan / VTE


VTE Prophylaxis Ordered?:  Yes





GME ATTESTATION


GME ATTESTATION


My faculty preceptor for this patient encounter was physically present during 

the encounter and was fully available. All aspects of the patient interview, 

examination, medical decision making process, and medical care plan development 

were reviewed and approved by the faculty preceptor. The faculty preceptor is 

aware and concurs with the plan as stated in the body of this note and will 

attest to such by his/her cosignature.











JEFF CORDOBA DO Dec 2, 2017 20:19

## 2017-12-02 NOTE — REP
Chest two views

 

HISTORY: Dyspnea

 

Comparison: 10/05/2017

 

The radiographs are underpenetrated.  An increase in interstitial markings is

present in the lungs consistent with chronic interstitial change.  The cardiac

silhouette is enlarged. The pulmonary vasculature is normal in appearance.  The

bony structure is intact.

 

IMPRESSION:

1.  Limited examination demonstrating chronic interstitial change.  Superimposed

edema cannot be excluded.

2.  Cardiomegaly.

 

 

Signed by

Brent Cordero MD 12/02/2017 03:28 P

## 2017-12-02 NOTE — ECGEPIP
Stationary ECG Study

                           [facility] - ED

                                       

                                       Test Date:    2017

Pat Name:     NGHIA HUI              Department:   

Patient ID:   [id number]                 Room:         -

Gender:       F                        Technician:   radha

:          1950               Requested By: SARA STONE

Order Number: [id number]     Reading MD:   Sania Farrar

                                 Measurements

Intervals                              Axis          

Rate:         89                       P:            

ND:           0                        QRS:          106

QRSD:         100                      T:            147

QT:           318                                    

QTc:          388                                    

                           Interpretive Statements

ATRIAL FIBRILLATION

INCOMPLETE RIGHT BUNDLE BRANCH BLOCK

POSSIBLE RIGHT VENTRICULAR HYPERTROPHY

NONSPECIFIC ST & T-WAVE ABNORMALITY

INCREASED RATE 10/10/17

Electronically Signed On 2017 17:53:05 EST by Sania Farrar

## 2017-12-03 VITALS — DIASTOLIC BLOOD PRESSURE: 63 MMHG | SYSTOLIC BLOOD PRESSURE: 112 MMHG

## 2017-12-03 VITALS — SYSTOLIC BLOOD PRESSURE: 107 MMHG | DIASTOLIC BLOOD PRESSURE: 57 MMHG

## 2017-12-03 VITALS — SYSTOLIC BLOOD PRESSURE: 111 MMHG | DIASTOLIC BLOOD PRESSURE: 59 MMHG

## 2017-12-03 VITALS — DIASTOLIC BLOOD PRESSURE: 54 MMHG | SYSTOLIC BLOOD PRESSURE: 100 MMHG

## 2017-12-03 VITALS — DIASTOLIC BLOOD PRESSURE: 59 MMHG | SYSTOLIC BLOOD PRESSURE: 111 MMHG

## 2017-12-03 VITALS — DIASTOLIC BLOOD PRESSURE: 60 MMHG | SYSTOLIC BLOOD PRESSURE: 109 MMHG

## 2017-12-03 LAB
ANION GAP SERPL CALC-SCNC: 6 MEQ/L (ref 8–16)
BUN SERPL-MCNC: 21 MG/DL (ref 7–18)
CALCIUM SERPL-MCNC: 8.3 MG/DL (ref 8.8–10.2)
CHLORIDE SERPL-SCNC: 104 MEQ/L (ref 98–107)
CO2 SERPL-SCNC: 30 MEQ/L (ref 21–32)
CREAT SERPL-MCNC: 1.23 MG/DL (ref 0.55–1.02)
ERYTHROCYTE [DISTWIDTH] IN BLOOD BY AUTOMATED COUNT: 16.1 % (ref 11.5–14.5)
GFR SERPL CREATININE-BSD FRML MDRD: 46.4 ML/MIN/{1.73_M2} (ref 45–?)
GLUCOSE SERPL-MCNC: 133 MG/DL (ref 80–110)
MAGNESIUM SERPL-MCNC: 1.9 MG/DL (ref 1.8–2.4)
MCH RBC QN AUTO: 30.7 PG (ref 27–33)
MCHC RBC AUTO-ENTMCNC: 30.9 G/DL (ref 32–36.5)
MCV RBC AUTO: 99.5 FL (ref 80–96)
NRBC BLD AUTO-RTO: 0 % (ref 0–0)
PLATELET # BLD AUTO: 174 10^3/UL (ref 150–450)
POTASSIUM SERPL-SCNC: 3.3 MEQ/L (ref 3.5–5.1)
SODIUM SERPL-SCNC: 140 MEQ/L (ref 136–145)
WBC # BLD AUTO: 10.8 10^3/UL (ref 4–10)

## 2017-12-03 RX ADMIN — SODIUM CHLORIDE, PRESERVATIVE FREE SCH ML: 5 INJECTION INTRAVENOUS at 20:22

## 2017-12-03 RX ADMIN — METOPROLOL TARTRATE SCH MG: 25 TABLET, FILM COATED ORAL at 08:22

## 2017-12-03 RX ADMIN — DABIGATRAN ETEXILATE MESYLATE SCH MG: 75 CAPSULE ORAL at 08:22

## 2017-12-03 RX ADMIN — DEXTROSE MONOHYDRATE SCH MG: 50 INJECTION, SOLUTION INTRAVENOUS at 17:00

## 2017-12-03 RX ADMIN — NYSTATIN SCH DOSE: 100000 POWDER TOPICAL at 20:16

## 2017-12-03 RX ADMIN — Medication SCH MLS/HR: at 22:01

## 2017-12-03 RX ADMIN — TIOTROPIUM BROMIDE SCH INHALATION: 18 CAPSULE ORAL; RESPIRATORY (INHALATION) at 08:08

## 2017-12-03 RX ADMIN — ATORVASTATIN CALCIUM SCH MG: 20 TABLET, FILM COATED ORAL at 20:21

## 2017-12-03 RX ADMIN — INSULIN LISPRO SCH UNITS: 100 INJECTION, SOLUTION INTRAVENOUS; SUBCUTANEOUS at 11:05

## 2017-12-03 RX ADMIN — HYDROCODONE BITARTRATE AND ACETAMINOPHEN PRN TAB: 5; 325 TABLET ORAL at 18:42

## 2017-12-03 RX ADMIN — INSULIN LISPRO SCH UNITS: 100 INJECTION, SOLUTION INTRAVENOUS; SUBCUTANEOUS at 08:21

## 2017-12-03 RX ADMIN — Medication SCH MLS/HR: at 11:05

## 2017-12-03 RX ADMIN — AMIODARONE HYDROCHLORIDE SCH MG: 200 TABLET ORAL at 08:22

## 2017-12-03 RX ADMIN — SODIUM CHLORIDE, PRESERVATIVE FREE SCH ML: 5 INJECTION INTRAVENOUS at 06:44

## 2017-12-03 RX ADMIN — DABIGATRAN ETEXILATE MESYLATE SCH MG: 75 CAPSULE ORAL at 20:16

## 2017-12-03 RX ADMIN — FERROUS SULFATE TAB 325 MG (65 MG ELEMENTAL FE) SCH MG: 325 (65 FE) TAB at 08:21

## 2017-12-03 RX ADMIN — DEXTROSE MONOHYDRATE SCH MG: 50 INJECTION, SOLUTION INTRAVENOUS at 08:23

## 2017-12-03 RX ADMIN — SPIRONOLACTONE SCH MG: 25 TABLET, FILM COATED ORAL at 08:21

## 2017-12-03 RX ADMIN — INSULIN LISPRO SCH UNITS: 100 INJECTION, SOLUTION INTRAVENOUS; SUBCUTANEOUS at 17:08

## 2017-12-03 RX ADMIN — NYSTATIN SCH DOSE: 100000 POWDER TOPICAL at 08:23

## 2017-12-03 RX ADMIN — ASPIRIN SCH MG: 81 TABLET ORAL at 08:21

## 2017-12-03 RX ADMIN — DEXTROSE MONOHYDRATE SCH MLS/HR: 50 INJECTION, SOLUTION INTRAVENOUS at 18:41

## 2017-12-03 RX ADMIN — METOPROLOL TARTRATE SCH MG: 25 TABLET, FILM COATED ORAL at 20:21

## 2017-12-03 RX ADMIN — INSULIN LISPRO SCH UNITS: 100 INJECTION, SOLUTION INTRAVENOUS; SUBCUTANEOUS at 21:00

## 2017-12-03 RX ADMIN — SODIUM CHLORIDE, PRESERVATIVE FREE SCH ML: 5 INJECTION INTRAVENOUS at 11:05

## 2017-12-03 NOTE — REP
RIGHT SHOULDER, ONE VIEW:

 

HISTORY: Fall.

 

There is no definite fracture or dislocation. The joint spaces are normal in

appearance.

 

IMPRESSION:

 

Limited examination demonstrating no definite fracture or dislocation.

 

 

Signed by

Brent Cordero MD 12/03/2017 08:52 A

## 2017-12-03 NOTE — IPNPDOC
Date Seen


The patient was seen on 12/3/17.





Progress Note


SUBJECTIVE:Patient is seen and examined at the bedside. chart has been reviewed.


She still complains of shortness of breath and slept upright due to severe 

orthopnea.


She complains of right wrist pain from her fall but has full range of motion 

and a visible 


bruise, denies paresthesias, weakness, or decreased hand .  She also c/o 

right LE


venous ulcers that are weeping as well as generalized pain from the fall 

requesting


norco.   





Vitals reviewed


General Exam:  Positive: Alert, Cooperative, No Acute Distress


Eye Exam:  Positive: PERRLA, Conjunctiva & lids normal, EOMI


ENT Exam:  Positive: Atraumatic, Pharynx Normal, Tongue Midline, Other ENT (

poor dentition), 


   Negative: Pharyngeal Edema


Neck Exam:  Positive: Supple, 


   Negative: Lymphadenopathy


Chest Exam:  Positive: Rales (b/l lower lobes), Wheezing (expiratory)


Heart Exam:  Positive: Rate Normal, Irregular Rhythm, Other (distant heart 

sounds due to body habitus)


Telemetry:  Positive: Atrial fibrillation


Abdomen Exam:  Positive: Normal bowel sounds, 


   Negative: Soft (tense from fluid, with dry skin no lower abdomen), Tenderness


Extremity Exam:  Positive: Edema (3+), Tenderness (mild tenderness to touch on 

right LE)


Skin Exam:  Positive: Lesion (right LE warm & erythematous, weeping clear fluid)


Neuro Exam:  Positive: Normal Speech, Strength at 5/5 X4 ext, Normal Tone, 

Sensation Intact


Psych Exam:  Positive: Mood NL, Oriented x 3





Laboratory data/imaging studies reviewed.





67-year-old female admitted with a reason for visit of CHF. 66 yo F presents to 

ED via EMS after she fell trying to sit down in chair and per pt, just had 

miscalculated where the chair was. PMH includes diastolic CHF, paroxysmal A. 

fib on Pradaxa, HLD, HTN, NIDDM 2, COPD 1L NC, CAD s/p stents x2. Denies 

presyncopal symptoms, lightheadedness, dizziness, LOC, or hitting her head. 

States she has been having difficulty ambulating and moving lately due to 

bilateral leg swelling, associated with shortness of breath, 20+ lb weight gain 

in past few weeks, orthopnea, paroxysmal nocturnal dyspnea. Pt also admits to 

not being compliant with meds lately, especially Torsemide and states "I don't 

take it like I should." Recently admitted to hospital early October 2017 for 

similar symptoms, acute CHF decompensation. Denies recent travel, changes in 

meds, sick contacts. Denies chest pain or pressure, lightheadedness, dizziness, 

n/v, abd pain. 


Noted in ED to have 3+ pitting edema, weeping fluid in lower legs, crackles in 

lungs. Given ASA, Lasix, and Duoneb in ED.





 Assessment/Plan


Acute decompensated heart failure


hx of diastolic HF. Presented with 20+ lb wt gain in past few weeks, dyspneic, 

orthopnea, PND. Cardiac markers neg. BNP elevated, cardiomegaly on CXR, 

crackles and 3+ pitting edema on PE


hold home Torsemide


Metolazone 2.5 daily, Lasix 80 bid IV with net neg 2L, Aldactone daily


strict I/Os, daily weights, low salt diet





Right LE Cellulitis


warm, erythematous, tender to touch, weeping clear fluid


will start Teflaro





Leukocytosis


likely combination of: reactionary to acute CHF decompensation and RLE 

cellulitis


start Teflaro


urine, blood cultures pending





Hyperbilirubinemia


may be 2/2 volume overload status


monitor and trend. May require ultrasound if continues to be elevated





Paroxysmal A.Fib


on Pradaxa


rate controlled on home Metoprolol





NIDDM2


hold home Metformin due to elevated Cr


ISS inpatient





COPD


no acute exacerbation


on 1L NC all the time at home. Will continue


continue home Ventolin





HLD


continue home med





HTN


continue home med





GERD


continue home med





Right wrist pain s/p fall at home. Range of motion intact, and no motor/sensory


changes. therefore, prn pain meds and physical therapy





Right LE venous stasis ulcer:   obtain wound care consult.





VS, I&O, 24H, Fishbone


Vital Signs/I&O





Vital Signs








  Date Time  Temp Pulse Resp B/P (MAP) Pulse Ox O2 Delivery O2 Flow Rate FiO2


 


12/3/17 04:00 97.7 64 20 100/54 (69) 2 Nasal Cannula  


 


12/3/17 04:00       2.0 











Laboratory Data


24H LABS


Laboratory Tests 2


12/2/17 15:00: 


Immature Granulocyte % (Auto) 0.4H, White Blood Count 13.8H, Red Blood Count 

4.32, Hemoglobin 13.1, Hematocrit 42.9, Mean Corpuscular Volume 99.3H, Mean 

Corpuscular Hemoglobin 30.3, Mean Corpuscular Hemoglobin Concent 30.5L, Red 

Cell Distribution Width 16.1H, Platelet Count 210, Neutrophils (%) (Auto) 88.3H

, Lymphocytes (%) (Auto) 2.7L, Monocytes (%) (Auto) 8.3H, Eosinophils (%) (Auto

) 0.1, Basophils (%) (Auto) 0.2, Neutrophils # (Auto) 12.2H, Lymphocytes # (Auto

) 0.4L, Monocytes # (Auto) 1.2H, Eosinophils # (Auto) 0.0, Basophils # (Auto) 

0.0, Immature Granulocyte # (Auto) 0.1H, Nucleated Red Blood Cells % (auto) 0.0

, Blood Gas Bicarbonate Standard 26.6, Venous Blood pH 7.386, Venous Blood 

Partial Pressure CO2 49.0, Venous Blood Partial Pressure O2 48.6, Venous Blood 

Total Carbon Dioxide 30.2H, Venous Blood HCO3 28.7H, Venous Blood Oxygen 

Saturation 83.9H, Venous Blood Base Excess 2.8H, Lactic Acid Level 1.9


12/2/17 16:19: 


Anion Gap 9, Glomerular Filtration Rate 45.1, Calcium Level 8.5L, Phosphorus 

Level 2.9, Magnesium Level 2.0, Aspartate Amino Transf (AST/SGOT) 12, Alanine 

Aminotransferase (ALT/SGPT) 11L, Alkaline Phosphatase 88, Total Bilirubin 2.3H, 

Direct Bilirubin 1.2H, Total Creatine Kinase 34, Creatine Kinase MB 1.6, 

Creatine Kinase MB Relative Index 4.70H, Troponin I 0.02, NT-Pro-B-Type 

Natriuretic Peptide 3845H, Total Protein 7.4, Albumin 3.3, Albumin/Globulin 

Ratio 0.80L


12/2/17 18:17: 


Urine Appearance CLEAR, Urine Color YELLOW, Urine pH 5.0, Urine Specific 

Gravity 1.009, Urine Protein NEGATIVE, Urine Glucose (UA) NEGATIVE, Urine 

Ketones NEGATIVE, Urine Urobilinogen 0.2, Urine Bilirubin NEGATIVE, Urine 

Leukocyte Esterase NEGATIVE, Urine Blood 2+H, Urine Nitrite NEGATIVE, Urine WBC 

(Auto) 1, Urine RBC (Auto) 3, Urine Hyaline Casts (Auto) 5, Urine Bacteria (Auto

) NEGATIVE, Urine Squamous Epithelial Cells 1, Urine Mucus (Auto) SMALL, Urine 

Sperm (Auto) 


12/2/17 19:26: 


Prothrombin Time 19.3H, Prothromb Time International Ratio 1.58


12/2/17 22:12: Bedside Glucose (Misc Panel) 112


12/3/17 04:34: 


Nucleated Red Blood Cells % (auto) 0.0, Anion Gap 6L, Glomerular Filtration 

Rate 46.4, Blood Urea Nitrogen 21H, Creatinine 1.23H, Sodium Level 140, 

Potassium Level 3.3L, Chloride Level 104, Carbon Dioxide Level 30, Calcium 

Level 8.3L, Magnesium Level 1.9


CBC/BMP


Laboratory Tests


12/2/17 15:00








Red Blood Count 4.32, Mean Corpuscular Volume 99.3 H, Mean Corpuscular 

Hemoglobin 30.3, Mean Corpuscular Hemoglobin Concent 30.5 L, Red Cell 

Distribution Width 16.1 H, Neutrophils (%) (Auto) 88.3 H, Lymphocytes (%) (Auto

) 2.7 L, Monocytes (%) (Auto) 8.3 H, Eosinophils (%) (Auto) 0.1, Basophils (%) (

Auto) 0.2, Neutrophils # (Auto) 12.2 H, Lymphocytes # (Auto) 0.4 L, Monocytes # 

(Auto) 1.2 H, Eosinophils # (Auto) 0.0, Basophils # (Auto) 0.0





12/2/17 16:19








12/3/17 04:34








Red Blood Count 3.81 L, Mean Corpuscular Volume 99.5 H, Mean Corpuscular 

Hemoglobin 30.7, Mean Corpuscular Hemoglobin Concent 30.9 L, Red Cell 

Distribution Width 16.1 H, Calcium Level 8.3 L


Microbiology





Microbiology


12/2/17 Blood Culture, Received


          Pending


12/2/17 Blood Culture, Received


          Pending


12/2/17 Urine Culture, Received


          Pending











GUERLINE BLANCO MD Dec 3, 2017 05:56

## 2017-12-04 VITALS — SYSTOLIC BLOOD PRESSURE: 109 MMHG | DIASTOLIC BLOOD PRESSURE: 59 MMHG

## 2017-12-04 VITALS — SYSTOLIC BLOOD PRESSURE: 106 MMHG | DIASTOLIC BLOOD PRESSURE: 59 MMHG

## 2017-12-04 VITALS — DIASTOLIC BLOOD PRESSURE: 59 MMHG | SYSTOLIC BLOOD PRESSURE: 105 MMHG

## 2017-12-04 VITALS — DIASTOLIC BLOOD PRESSURE: 56 MMHG | SYSTOLIC BLOOD PRESSURE: 112 MMHG

## 2017-12-04 VITALS — DIASTOLIC BLOOD PRESSURE: 56 MMHG | SYSTOLIC BLOOD PRESSURE: 99 MMHG

## 2017-12-04 VITALS — OXYGEN SATURATION: 90 %

## 2017-12-04 VITALS — DIASTOLIC BLOOD PRESSURE: 54 MMHG | SYSTOLIC BLOOD PRESSURE: 109 MMHG

## 2017-12-04 VITALS — SYSTOLIC BLOOD PRESSURE: 99 MMHG | DIASTOLIC BLOOD PRESSURE: 55 MMHG

## 2017-12-04 LAB
ANION GAP SERPL CALC-SCNC: 6 MEQ/L (ref 8–16)
BUN SERPL-MCNC: 23 MG/DL (ref 7–18)
CALCIUM SERPL-MCNC: 8.4 MG/DL (ref 8.8–10.2)
CHLORIDE SERPL-SCNC: 98 MEQ/L (ref 98–107)
CO2 SERPL-SCNC: 34 MEQ/L (ref 21–32)
CREAT SERPL-MCNC: 1.47 MG/DL (ref 0.55–1.02)
ERYTHROCYTE [DISTWIDTH] IN BLOOD BY AUTOMATED COUNT: 15.9 % (ref 11.5–14.5)
GFR SERPL CREATININE-BSD FRML MDRD: 37.7 ML/MIN/{1.73_M2} (ref 45–?)
GLUCOSE SERPL-MCNC: 127 MG/DL (ref 80–110)
MAGNESIUM SERPL-MCNC: 1.9 MG/DL (ref 1.8–2.4)
MCH RBC QN AUTO: 30.5 PG (ref 27–33)
MCHC RBC AUTO-ENTMCNC: 30.7 G/DL (ref 32–36.5)
MCV RBC AUTO: 99.3 FL (ref 80–96)
NRBC BLD AUTO-RTO: 0 % (ref 0–0)
PLATELET # BLD AUTO: 223 10^3/UL (ref 150–450)
POTASSIUM SERPL-SCNC: 3.4 MEQ/L (ref 3.5–5.1)
SODIUM SERPL-SCNC: 138 MEQ/L (ref 136–145)
WBC # BLD AUTO: 13.4 10^3/UL (ref 4–10)

## 2017-12-04 RX ADMIN — SODIUM CHLORIDE, PRESERVATIVE FREE SCH ML: 5 INJECTION INTRAVENOUS at 05:12

## 2017-12-04 RX ADMIN — HYDROCODONE BITARTRATE AND ACETAMINOPHEN PRN TAB: 5; 325 TABLET ORAL at 05:12

## 2017-12-04 RX ADMIN — SODIUM CHLORIDE, PRESERVATIVE FREE SCH ML: 5 INJECTION INTRAVENOUS at 22:19

## 2017-12-04 RX ADMIN — Medication SCH MLS/HR: at 22:19

## 2017-12-04 RX ADMIN — HYDROCODONE BITARTRATE AND ACETAMINOPHEN PRN TAB: 5; 325 TABLET ORAL at 20:42

## 2017-12-04 RX ADMIN — METOPROLOL TARTRATE SCH MG: 25 TABLET, FILM COATED ORAL at 08:25

## 2017-12-04 RX ADMIN — TIOTROPIUM BROMIDE SCH INHALATION: 18 CAPSULE ORAL; RESPIRATORY (INHALATION) at 08:28

## 2017-12-04 RX ADMIN — INSULIN LISPRO SCH UNITS: 100 INJECTION, SOLUTION INTRAVENOUS; SUBCUTANEOUS at 20:38

## 2017-12-04 RX ADMIN — Medication SCH MLS/HR: at 11:47

## 2017-12-04 RX ADMIN — FERROUS SULFATE TAB 325 MG (65 MG ELEMENTAL FE) SCH MG: 325 (65 FE) TAB at 08:22

## 2017-12-04 RX ADMIN — DEXTROSE MONOHYDRATE SCH MLS/HR: 50 INJECTION, SOLUTION INTRAVENOUS at 17:11

## 2017-12-04 RX ADMIN — LORATADINE PRN MG: 10 TABLET ORAL at 08:55

## 2017-12-04 RX ADMIN — NYSTATIN SCH DOSE: 100000 POWDER TOPICAL at 21:45

## 2017-12-04 RX ADMIN — ATORVASTATIN CALCIUM SCH MG: 20 TABLET, FILM COATED ORAL at 20:39

## 2017-12-04 RX ADMIN — AMIODARONE HYDROCHLORIDE SCH MG: 200 TABLET ORAL at 08:23

## 2017-12-04 RX ADMIN — SODIUM CHLORIDE, PRESERVATIVE FREE SCH ML: 5 INJECTION INTRAVENOUS at 13:54

## 2017-12-04 RX ADMIN — INSULIN LISPRO SCH UNITS: 100 INJECTION, SOLUTION INTRAVENOUS; SUBCUTANEOUS at 11:47

## 2017-12-04 RX ADMIN — INSULIN LISPRO SCH UNITS: 100 INJECTION, SOLUTION INTRAVENOUS; SUBCUTANEOUS at 08:19

## 2017-12-04 RX ADMIN — NYSTATIN SCH DOSE: 100000 POWDER TOPICAL at 08:18

## 2017-12-04 RX ADMIN — INSULIN LISPRO SCH UNITS: 100 INJECTION, SOLUTION INTRAVENOUS; SUBCUTANEOUS at 17:11

## 2017-12-04 RX ADMIN — METOPROLOL TARTRATE SCH MG: 25 TABLET, FILM COATED ORAL at 20:39

## 2017-12-04 RX ADMIN — SPIRONOLACTONE SCH MG: 25 TABLET, FILM COATED ORAL at 08:26

## 2017-12-04 RX ADMIN — ASPIRIN SCH MG: 81 TABLET ORAL at 08:22

## 2017-12-04 RX ADMIN — DABIGATRAN ETEXILATE MESYLATE SCH MG: 75 CAPSULE ORAL at 08:22

## 2017-12-04 RX ADMIN — DOCUSATE SODIUM PRN MG: 100 CAPSULE, LIQUID FILLED ORAL at 20:42

## 2017-12-04 RX ADMIN — DABIGATRAN ETEXILATE MESYLATE SCH MG: 75 CAPSULE ORAL at 20:39

## 2017-12-04 NOTE — IPNPDOC
Date Seen


The patient was seen on 12/4/17.





Progress Note


SUBJECTIVE:Patient is seen and examined at the bedside. chart has been reviewed.


She still complains of shortness of breath and slept upright due to severe 

orthopnea. 


Despite almost 6 liters diuresis since admission, she has not seen any 

significant


clinical improvement.   LE continues to have 3+ pitting edema despite lasix iv 

gtt.


She complains of right wrist pain from her fall but has full range of motion 

and a visible 


bruise, denies paresthesias, weakness, or decreased hand .  She also c/o 

right LE


venous ulcers that are weeping as well as generalized pain from the fall 

requesting


norco.   





Vitals reviewed


General Exam:  Positive: Alert, Cooperative, No Acute Distress


Eye Exam:  Positive: PERRLA, Conjunctiva & lids normal, EOMI


ENT Exam:  Positive: Atraumatic, Pharynx Normal, Tongue Midline, Other ENT (

poor dentition), 


   Negative: Pharyngeal Edema


Neck Exam:  Positive: Supple, 


   Negative: Lymphadenopathy


Chest Exam:  Positive: Rales (b/l lower lobes, diminished


Heart Exam:  Positive: Rate Normal, Irregular Rhythm, Other (distant heart 

sounds due to body habitus)


Telemetry:  Positive: Atrial fibrillation


Abdomen Exam:  Positive: Normal bowel sounds, 


   Negative: Soft (tense from fluid, with dry skin no lower abdomen), Tenderness


Extremity Exam:  Positive: Edema (3+), Tenderness (mild tenderness to touch on 

right LE)


Skin Exam:  Positive: Lesion (right LE warm & erythematous, weeping clear fluid)


Neuro Exam:  Positive: Normal Speech, Strength at 5/5 X4 ext, Normal Tone, 

Sensation Intact


Psych Exam:  Positive: Mood NL, Oriented x 3





Laboratory data/imaging studies reviewed.





67-year-old female admitted with a reason for visit of CHF. 68 yo F presents to 

ED via EMS after she fell trying to sit down in chair and per pt, just had 

miscalculated where the chair was. PMH includes diastolic CHF, paroxysmal A. 

fib on Pradaxa, HLD, HTN, NIDDM 2, COPD 1L NC, CAD s/p stents x2. Denies 

presyncopal symptoms, lightheadedness, dizziness, LOC, or hitting her head. 

States she has been having difficulty ambulating and moving lately due to 

bilateral leg swelling, associated with shortness of breath, 20+ lb weight gain 

in past few weeks, orthopnea, paroxysmal nocturnal dyspnea. Pt also admits to 

not being compliant with meds lately, especially Torsemide and states "I don't 

take it like I should." Recently admitted to hospital early October 2017 for 

similar symptoms, acute CHF decompensation. Denies recent travel, changes in 

meds, sick contacts. Denies chest pain or pressure, lightheadedness, dizziness, 

n/v, abd pain. 


Noted in ED to have 3+ pitting edema, weeping fluid in lower legs, crackles in 

lungs. Given ASA, Lasix, and Duoneb in ED.





 Assessment/Plan


Acute decompensated heart failure


hx of diastolic HF. Presented with 20+ lb wt gain in past few weeks, dyspneic, 

orthopnea, PND. Cardiac markers neg. BNP elevated, cardiomegaly on CXR, 

crackles and 3+ pitting edema. defer to nephrologist Dr. Alvares for diuresis, 

currently on lasix iv gtt. supplement k and mg as needed. Telemetry unremarkable


strict I/Os, daily weights, low salt diet





Right LE Cellulitis


warm, erythematous, tender to touch, weeping clear fluid


will start Teflaro





Leukocytosis


likely combination of: reactionary to acute CHF decompensation and RLE 

cellulitis


start Teflaro


urine, blood cultures pending





Hyperbilirubinemia


may be 2/2 volume overload status


monitor and trend. May require ultrasound if continues to be elevated





Paroxysmal A.Fib


on Pradaxa


rate controlled on home Metoprolol





NIDDM2


hold home Metformin due to elevated Cr


ISS inpatient. consistent carbs diet





COPD


no acute exacerbation


on 1L NC all the time at home. Will continue


continue home Ventolin





HLD


continue home med





HTN


continue home med





GERD


continue home med





Right wrist pain s/p fall at home. Range of motion intact, and no motor/sensory


changes. therefore, prn pain meds and physical therapy





Right LE venous stasis ulcer:   obtain wound care consult.





Morbid obesity bmi 60.5 complicating acute issues





Metabolic syndrome, chronic





Deconditioning. will most likely require prolong hospital stay and potential 

rehab.


PT-activity as tolerated





VS, I&O, 24H, Fishbone


Vital Signs/I&O





Vital Signs








  Date Time  Temp Pulse Resp B/P (MAP) Pulse Ox O2 Delivery O2 Flow Rate FiO2


 


12/4/17 06:00   22     


 


12/4/17 05:12      Nasal Cannula  


 


12/4/17 04:00       2.0 


 


12/4/17 04:00 99.2 75  109/54 (72) 90   














I&O- Last 24 Hours up to 6 AM 


 


 12/5/17





 05:59


 


Intake Total 100 ml


 


Output Total 425 ml


 


Balance -325 ml











Laboratory Data


24H LABS


Laboratory Tests 2


12/3/17 11:02: Bedside Glucose (Misc Panel) 112


12/3/17 16:26: Bedside Glucose (Misc Panel) 147H


12/3/17 20:26: Bedside Glucose (Misc Panel) 124H


12/4/17 04:41: 


Nucleated Red Blood Cells % (auto) 0.0, Anion Gap 6L, Glomerular Filtration 

Rate 37.7L, Blood Urea Nitrogen 23H, Creatinine 1.47H, Sodium Level 138, 

Potassium Level 3.4L, Chloride Level 98, Carbon Dioxide Level 34H, Calcium 

Level 8.4L, Magnesium Level 1.9


CBC/BMP


Laboratory Tests


12/4/17 04:41








Red Blood Count 4.13, Mean Corpuscular Volume 99.3 H, Mean Corpuscular 

Hemoglobin 30.5, Mean Corpuscular Hemoglobin Concent 30.7 L, Red Cell 

Distribution Width 15.9 H, Calcium Level 8.4 L


Microbiology





Microbiology


12/2/17 Blood Culture - Preliminary, Resulted


          No growth after 24 hours . All specim...


12/2/17 Blood Culture - Preliminary, Resulted


          No growth after 24 hours . All specim...


12/2/17 Urine Culture, Received


          Pending











GUERLINE BLANCO MD Dec 4, 2017 06:53

## 2017-12-05 VITALS — DIASTOLIC BLOOD PRESSURE: 55 MMHG | SYSTOLIC BLOOD PRESSURE: 105 MMHG

## 2017-12-05 VITALS — DIASTOLIC BLOOD PRESSURE: 58 MMHG | SYSTOLIC BLOOD PRESSURE: 105 MMHG

## 2017-12-05 VITALS — SYSTOLIC BLOOD PRESSURE: 97 MMHG | DIASTOLIC BLOOD PRESSURE: 52 MMHG

## 2017-12-05 VITALS — DIASTOLIC BLOOD PRESSURE: 64 MMHG | SYSTOLIC BLOOD PRESSURE: 107 MMHG

## 2017-12-05 LAB
ANION GAP SERPL CALC-SCNC: 1 MEQ/L (ref 8–16)
ANION GAP SERPL CALC-SCNC: 3 MEQ/L (ref 8–16)
BASE EXCESS BLDA CALC-SCNC: 11.8 MMOL/L (ref -2–2)
BUN SERPL-MCNC: 25 MG/DL (ref 7–18)
BUN SERPL-MCNC: 26 MG/DL (ref 7–18)
CALCIUM SERPL-MCNC: 8.9 MG/DL (ref 8.8–10.2)
CALCIUM SERPL-MCNC: 9.1 MG/DL (ref 8.8–10.2)
CHLORIDE SERPL-SCNC: 94 MEQ/L (ref 98–107)
CHLORIDE SERPL-SCNC: 95 MEQ/L (ref 98–107)
CO2 BLDA CALC-SCNC: 40.2 MEQ/L (ref 23–31)
CO2 SERPL-SCNC: 40 MEQ/L (ref 21–32)
CO2 SERPL-SCNC: 43 MEQ/L (ref 21–32)
CREAT SERPL-MCNC: 1.21 MG/DL (ref 0.55–1.02)
CREAT SERPL-MCNC: 1.36 MG/DL (ref 0.55–1.02)
ERYTHROCYTE [DISTWIDTH] IN BLOOD BY AUTOMATED COUNT: 15.3 % (ref 11.5–14.5)
GFR SERPL CREATININE-BSD FRML MDRD: 41.3 ML/MIN/{1.73_M2} (ref 45–?)
GFR SERPL CREATININE-BSD FRML MDRD: 47.2 ML/MIN/{1.73_M2} (ref 45–?)
GLUCOSE SERPL-MCNC: 113 MG/DL (ref 80–110)
GLUCOSE SERPL-MCNC: 122 MG/DL (ref 80–110)
HCO3 BLDA-SCNC: 38.4 MEQ/L (ref 22–26)
HCO3 STD BLDA-SCNC: 35.5 MEQ/L (ref 22–26)
MAGNESIUM SERPL-MCNC: 1.8 MG/DL (ref 1.8–2.4)
MCH RBC QN AUTO: 30 PG (ref 27–33)
MCHC RBC AUTO-ENTMCNC: 30.7 G/DL (ref 32–36.5)
MCV RBC AUTO: 97.8 FL (ref 80–96)
NRBC BLD AUTO-RTO: 0 % (ref 0–0)
PCO2 BLDA: 58.3 MMHG (ref 35–45)
PH BLDA: 7.44 UNITS (ref 7.35–7.45)
PLATELET # BLD AUTO: 191 10^3/UL (ref 150–450)
PO2 BLDA: 63.5 MMHG (ref 75–100)
POTASSIUM SERPL-SCNC: 3.1 MEQ/L (ref 3.5–5.1)
POTASSIUM SERPL-SCNC: 3.5 MEQ/L (ref 3.5–5.1)
SODIUM SERPL-SCNC: 138 MEQ/L (ref 136–145)
SODIUM SERPL-SCNC: 138 MEQ/L (ref 136–145)
WBC # BLD AUTO: 9.4 10^3/UL (ref 4–10)

## 2017-12-05 RX ADMIN — SODIUM CHLORIDE, PRESERVATIVE FREE SCH ML: 5 INJECTION INTRAVENOUS at 05:35

## 2017-12-05 RX ADMIN — INSULIN LISPRO SCH UNITS: 100 INJECTION, SOLUTION INTRAVENOUS; SUBCUTANEOUS at 20:38

## 2017-12-05 RX ADMIN — SODIUM CHLORIDE, PRESERVATIVE FREE SCH ML: 5 INJECTION INTRAVENOUS at 20:39

## 2017-12-05 RX ADMIN — AMIODARONE HYDROCHLORIDE SCH MG: 200 TABLET ORAL at 08:09

## 2017-12-05 RX ADMIN — LORATADINE PRN MG: 10 TABLET ORAL at 15:08

## 2017-12-05 RX ADMIN — INSULIN LISPRO SCH UNITS: 100 INJECTION, SOLUTION INTRAVENOUS; SUBCUTANEOUS at 12:12

## 2017-12-05 RX ADMIN — ACETAZOLAMIDE SODIUM SCH MG: 500 INJECTION, POWDER, LYOPHILIZED, FOR SOLUTION INTRAVENOUS at 23:37

## 2017-12-05 RX ADMIN — SODIUM CHLORIDE, PRESERVATIVE FREE SCH ML: 5 INJECTION INTRAVENOUS at 13:34

## 2017-12-05 RX ADMIN — NYSTATIN SCH DOSE: 100000 POWDER TOPICAL at 08:10

## 2017-12-05 RX ADMIN — TIOTROPIUM BROMIDE SCH INHALATION: 18 CAPSULE ORAL; RESPIRATORY (INHALATION) at 08:40

## 2017-12-05 RX ADMIN — POTASSIUM CHLORIDE SCH MEQ: 750 TABLET, EXTENDED RELEASE ORAL at 10:53

## 2017-12-05 RX ADMIN — SPIRONOLACTONE SCH MG: 25 TABLET, FILM COATED ORAL at 08:09

## 2017-12-05 RX ADMIN — DABIGATRAN ETEXILATE MESYLATE SCH MG: 75 CAPSULE ORAL at 20:37

## 2017-12-05 RX ADMIN — ASPIRIN SCH MG: 81 TABLET ORAL at 08:09

## 2017-12-05 RX ADMIN — HYDROCODONE BITARTRATE AND ACETAMINOPHEN PRN TAB: 5; 325 TABLET ORAL at 08:21

## 2017-12-05 RX ADMIN — DOCUSATE SODIUM PRN MG: 100 CAPSULE, LIQUID FILLED ORAL at 08:14

## 2017-12-05 RX ADMIN — Medication SCH MLS/HR: at 10:53

## 2017-12-05 RX ADMIN — DEXTROSE MONOHYDRATE SCH MLS/HR: 50 INJECTION, SOLUTION INTRAVENOUS at 17:47

## 2017-12-05 RX ADMIN — FERROUS SULFATE TAB 325 MG (65 MG ELEMENTAL FE) SCH MG: 325 (65 FE) TAB at 08:09

## 2017-12-05 RX ADMIN — METOPROLOL TARTRATE SCH MG: 25 TABLET, FILM COATED ORAL at 08:12

## 2017-12-05 RX ADMIN — HYDROCODONE BITARTRATE AND ACETAMINOPHEN PRN TAB: 5; 325 TABLET ORAL at 20:37

## 2017-12-05 RX ADMIN — Medication SCH MLS/HR: at 23:37

## 2017-12-05 RX ADMIN — ATORVASTATIN CALCIUM SCH MG: 20 TABLET, FILM COATED ORAL at 20:38

## 2017-12-05 RX ADMIN — NYSTATIN SCH DOSE: 100000 POWDER TOPICAL at 20:39

## 2017-12-05 RX ADMIN — INSULIN LISPRO SCH UNITS: 100 INJECTION, SOLUTION INTRAVENOUS; SUBCUTANEOUS at 08:17

## 2017-12-05 RX ADMIN — METOPROLOL TARTRATE SCH MG: 25 TABLET, FILM COATED ORAL at 20:38

## 2017-12-05 RX ADMIN — INSULIN LISPRO SCH UNITS: 100 INJECTION, SOLUTION INTRAVENOUS; SUBCUTANEOUS at 17:30

## 2017-12-05 RX ADMIN — DABIGATRAN ETEXILATE MESYLATE SCH MG: 75 CAPSULE ORAL at 08:09

## 2017-12-05 NOTE — IPN
DATE:  12/05/2017

 

SUBJECTIVE: The patient is seen this morning out of bed to the chair. Reports

that she is not comfortable in the bed at all and is able to respire more

comfortably sitting upright in the chair. Has not been able to ambulate.

Continues to complain of pain and tenderness and weeping in the lower extremity.

 

 

VITAL SIGNS: Temperature 97.6, pulse 79, respiratory rate 14-17, blood pressure

107/64, saturating 89-91% on 2 liters nasal cannula. Intake and output: Urine

output yesterday 5375, oral intake 1040, net negative 4.3 liters. She is not

weighed today.

GENERAL: The patient is seen sitting out of bed to the chair. She is not in any

acute distress. She is eating breakfast and is conversational. Extraocular

muscles are intact. The dentition is poor. The tongue is moist. There is a nasal

cannula in place.

CARDIAC: S1, S2, distant. Irregularly irregular.

CHEST: Diminished breath sounds at the bilateral bases. Distant but symmetric 
air

entry. No tachypnea. No use of accessory muscles.

ABDOMEN: Soft, obese. Significant pannus with edema and induration. The lower

extremities are propped up on the recliner chair and have tender pitting edema

and weeping, clear fluid from the right lower extremity.

GENITOURINARY:  There is a Murray catheter with brisk urine output.

NEUROLOGIC: There is no focal deficit.

PSYCHIATRIC: Appropriate mood and affect.

 

LABORATORY DATA:

White count is 9.4, hemoglobin 12.3, platelets 191.

 

Sodium 138, potassium 3.1, bicarbonate 40, BUN 25, creatinine 1.2, glucose 113,

calcium 8.9, magnesium 1.8.

 

Microbiology: Blood cultures December 2 with no growth for 72 hours. Urine

culture December 2 with no growth.

 

INPATIENT MEDICATIONS: Patient continues on Lasix drip at 5 mg an hour, and I

have started her on Diamox 250 mg intravenous (IV) daily and amiloride 5 mg by

mouth daily. I have discontinued her spironolactone. She has received 80 mEq of

potassium supplementation. Remainder of medications are unchanged from prior.

 

PROBLEMS:

1.  Acute on chronic decompensated heart failure secondary to noncompliance with

her chronic home diuretic regimen. The patient is responding well to diuretics

with brisk urine output. She has developed a significant metabolic alkalosis due

to concurrent use of both loop and thiazide diuretic. In view of this, I will

continue her on Lasix drip but will switch her daily Diamox for bicarbonaturesis
,

 and we will also discontinue spironolactone in favor of amiloride due

to its effect of causing metabolic acidosis. She should hopefully continue to

diurese well with sequential nephron paralysis with use of Lasix, Diamox, and

amiloride. I hope that her alkalemia also improves with his regimen.

 

2.  Alkalemia. Change diuretics to Diamox and amiloride as mentioned above.

Continue with Lasix.

 

3.  Hypokalemia secondary to aggressive diuretics and concomitant alkalosis. The

patient received 80 mEq of potassium supplementation. I will repeat a chemistry

at 6 p.m. tonight to monitor both her bicarbonate and her potassium.

 

4.  Paroxysmal atrial fibrillation. The patient continues on metoprolol,

amiodarone, and Pradaxa.

 

5.  Right lower extremity cellulitis. The patient continues on Teflaro per the

primary team.

 

6.  Morbid obesity, complicating her care along with obesity hypoventilation

syndrome.

 

Plan of care is discussed with Dr. Laury Sandra.

GRISELDA

## 2017-12-05 NOTE — CR
DATE OF CONSULTATION: 12/03/2017

 

REQUESTING PHYSICIAN: Dr. Jaz Otero



REASON FOR CONSULTATION: Diuretic management for decompensated diastolic

congestive heart failure.



HISTORY OF PRESENT ILLNESS: The patient is a 67-year-old  female with a

past medical history of morbid obesity, diastolic congestive heart failure,

obesity, hypoventilation syndrome, chronic pulmonary obstructive disease,

coronary artery disease status post stent, mitral valve stenosis with prolapse,

paroxysmal atrial fibrillation on Pradaxa, type 2 diabetes,  hypertension,

chronic kidney disease (CKD) stage III with baseline creatinine of about 1.1 to

1.2 and heparin induced thrombocytopenia. The patient has had recurrent

hospitalizations for acute on chronic decompensated congestive heart failure and

the patient is currently admitted for the same at present. She states that for

the last several weeks she has become noncompliant with her oral diuretic 
regimen

most especially her Torsemide. She states that she stopped taking it because it

was "uncomfortable having to urinate all the time."  She states that she has 
been

having difficulty ambulating for the past several days due to significant lower

extremity edema and shortness of breath.  She also notes weeping from the right

lower extremity, tenderness and erythema. She states she has gained about 40

pounds since the last time she was discharged from the hospital in October. She

is currently at her baseline renal function and nephrology is  called for

management of diuretics.

 

PAST MEDICAL HISTORY:

1. Diastolic congestive heart failure with recurrent decompensation.

2. Paroxysmal atrial fibrillation on Pradaxa.

3. CKD stage III with baseline creatinine of about 1.1 to 1.2.

4. Hypertension.

5. Diabetes.

6. Chronic pulmonary obstructive disease (COPD).

7.Coronary artery disease status post stent.

8. Obesity.

9. Hypoventilation syndrome.

10. Morbid obesity.

11. Heparin induced thrombocytopenia.

12. Pulmonary hypertension.

 

ALLERGIES: HEPARIN and SULFA drugs.

 

SOCIAL HISTORY: The patient has a 35-pack year history, quit smoking about 20

years ago. She denies alcohol or drugs.  She lives alone.

 

PAST SURGICAL HISTORY:

1. Cardiac stents.

2. Hernia repair times two.

3. Colon resection in 2010.

 

FAMILY HISTORY: Negative for end-stage renal disease.

 

REVIEW OF SYSTEMS: In general negative for fevers and chills.

Head/Neck: Negative for visual changes, blurriness, or dysphagia.

Cardiac: Negative for chest pain. Positive for peripheral edema and occasional

palpitations.

Respiratory: Positive for shortness of breath and date of examination. Negative

for hemoptysis.

Gastrointestinal (GI): Negative for nausea or vomiting.

Genitourinary: Negative for hematuria or dysuria.

Musculoskeletal: Positive for difficulty with ambulation and negative for

arthralgias, myalgias.

Skin: Positive for cellulitic changes of the right lower extremity and weeping

from the right lower extremity.

Endocrine: Positive for diabetes.

Heme/Onc: Positive for longterm anticoagulant use, negative for easy  bruising 
or

bleeding.

Neurologic: Negative for syncope or headache.

Psychiatric:  Negative for depression or suicidal ideation.

The remainder of the review of systems is negative, as per history of present

illness (HPI) of negative.



HOME MEDICATIONS:

- albuterol two puffs inhaled as needed

- hydrocodone acetaminophen one tablet by mouth three times a day taken as

needed

- amiodarone 200 mg by mouth daily

- aspirin 81 mg by mouth daily

- atorvastatin 40 mg by mouth nightly

- Pradaxa 150 mg by mouth twice a day

- ferrous sulfate 325 mg by mouth daily

- Breo Ellipta one puff inhaled daily

- loratadine 10 mg by mouth daily as needed

- metformin 500 mg by mouth twice a day

- metolazone 2.5 mg Monday, Wednesday and Friday

- metoprolol 12.5 mg by mouth twice a day

- potassium 20 mEq by mouth twice a day

- aldactone 25 mg by mouth twice a day

- Spiriva one inhalation daily

- Torsemide 20 mg by mouth twice a day, the patient was noncompliant with

Torsemide



PHYSICAL EXAMINATION:

Vital signs: Temperature 97.2, pulse 81, respiratory rate 18, blood pressure

111/59, saturating 96% on 2 liters nasal cannula. Intake and output: Oral intake

reported at 300 mL, urine output yesterday reported as 1825, negative 1525,

weight is not recorded in the bed scale today.

General: The patient was seen sitting upright in bed, morbidly obese,

comfortable, in no acute respiratory distress.

HEENT: Extraocular muscles intact, oral mucosa is moist.

Neck: Obese and it is difficult to assess the neck veins.

Cardiac : S1, S2, irregularly, irregular. Distant heart sounds.  2+ radial 
pulse.

There is 3+ pitting edema present in the bilateral lower extremities that 
extends

up to the thigh and dependent areas.

Respiratory: There are bibasilar rales. The patient is comfortable on 2 liters

nasal cannula, speaking in full sentences, no accessory muscle use.

Abdomen: Soft, obese. There is significant pannus and there is induration and

pitting edema present in the abdominal pannus.

Extremities: The lower extremities have 3+ edema bilaterally. There is weeping

from the right lower extremity and the right lower extremity is tender to touch

and erythematous.

Skin: Right lower extremity with mild erythema and weeping clear fluid.

Neurologic: She is at her baseline mentation, no focal deficits.

Psychiatric: Appropriate mood and affect.

LABORATORY DATA: White count 10.8, hemoglobin 11.7, platelets 174. Sodium 140,

potassium 3.3, bicarbonate 30, BUN 21, creatinine 1.2, calcium 8.3, magnesium

1.9.

 

Microbiology: Blood cultures on 12/02: No growth for 24 hours.

 

IMAGING: Chest x-ray on 12/02 reveals chronic interstitial changes, enlarged

cardiac silhouette.

 

INPATIENT MEDICATIONS:

- ceftaroline 400 mg intravenous  (IV) every 12

- acetaminophen 500 mg by mouth as needed

- Norco one tablet by mouth every 6 hours as needed

- Proventil two puffs inhaled every 4 hours as needed

- amiodarone 200 mg by mouth daily

- aspirin 81 mg by mouth daily

- atorvastatin 40 mg by mouth nightly

- Pradaxa 150 mg by mouth twice a day

- Colace 100 mg by mouth daily as needed

- ferrous sulfate 325 mg by mouth daily

- insulin

- Claritin 10 mg by mouth daily as needed

- metolazone 2.5 mg by mouth daily

- metoprolol 12.5 mg by mouth twice a day

- nystatin powder to abdominal area topical twice a day

- aldactone 25 mg by mouth daily

- Spiriva one inhalation inhaled daily

 

PROBLEM LIST: This is a 65-year-old female with a past medical history of

diastolic congestive heart failure, paroxysmal atrial fibrillation on Pradaxa,

hypertension, diabetes, morbid obesity, hypoventilation syndrome, chronic

pulmonary obstructive disease (COPD), coronary artery disease (CAD), chronic

kidney disease (CKD) stage III with recurrent hospitalizations for decompensated

diastolic heart failure who is now admitted with volume overload and

decompensated diastolic congestive heart failure secondary to noncompliance with

her outpatient chronic oral diuretic regimen.



1. Acute on chronic decompensated diastolic heart failure secondary to

noncompliance with her chronic outpatient diuretic regimen. The patient is

currently on Lasix 80 mg intravenous  (IV) twice a day and she is due for a dose

at 5 p.m. After she receives a bolus of Lasix 80 mg IV I will start her on a

Lasix drip at 5 mg per hour and discontinue the Lasix bolus. We will otherwise

continue her on metolazone 2.5 mg by mouth daily and aldactone 25 mg by mouth

daily for sequential nephron blockade to target daily negative balance of about 
2

liters. The patient as well will continue with fluid restriction 1500 mL daily

and she states that she was compliant with the same as an outpatient.

 

2. CKD stage III.  Creatinine is currently at her known baseline. We will watch

her renal function as we aggressively diurese the patient and replete

electrolytes aggressively while she is being diuresed.



3. Paroxysmal atrial fibrillation. The patient continues on Pradaxa for

anticoagulation and is rate controlled on metoprolol. She is as well on

amiodarone. Will aim to keep magnesium greater than 2 and potassium greater than



4. Right lower extremity cellulitic changes. The patient is on antimicrobials as

per the primary team.



5. Anemia. Hemoglobin 11.7. The patient continues on ferrous sulfate. No other

intervention at this time.



6. Diabetes. The patient's home metformin is held at present and she continues 
on

insulin while an inpatient.



7. Morbid obesity complicating her care. Body mass index (BMI) greater than 60.



8. History of obesity, hypoventilation syndrome and chronic pulmonary 
obstructive

disease (COPD). The patient continues on her home inhalers and at present is on 
2

liters nasal cannula.



9. Hypertension. Blood pressure is at goal at this time and no change in her

medications is being made.

 

Thank you for involving me in the care of this patient. I will be happy to 
follow

the patient along with you.

 

 

cc:    MD GRISELDA Bernal

## 2017-12-05 NOTE — IPNPDOC
Text Note


Date of Service


The patient was seen on 12/5/17.





NOTE


SUBJECTIVE: Patient is seen and examined at the bedside. chart has been 

reviewed. Says her SOB is slowly improving. 








PHYSICAL EXAM: 


Vitals : As Below


General Exam:  Positive: Alert, Cooperative, No Acute Distress


Eye Exam:  Positive: PERRLA, Conjunctiva & lids normal, EOMI


ENT Exam:  Positive: Atraumatic, Pharynx Normal, Tongue Midline, Other ENT (

poor dentition), 


   Negative: Pharyngeal Edema


Neck Exam:  Positive: Supple, 


   Negative: Lymphadenopathy


Chest Exam:  Positive: Rales (b/l lower lobes, diminished


Heart Exam:  Positive: Rate Normal, Irregular Rhythm, Other (distant heart 

sounds due to body habitus)


Telemetry:  Positive: Atrial fibrillation


Abdomen Exam:  Positive: Normal bowel sounds, 


   Negative: Soft (tense from fluid, with dry skin no lower abdomen), Tenderness


Extremity Exam:  Positive: Edema (3+), Tenderness (mild tenderness to touch on 

right LE)


Skin Exam:  Positive: Lesion (right LE warm & erythematous, weeping clear fluid)


Neuro Exam:  Positive: Normal Speech, Strength at 5/5 X4 ext, Normal Tone, 

Sensation Intact


Psych Exam:  Positive: Mood NL, Oriented x 3





Laboratory data/imaging studies reviewed.





ASSESSMENT:  67-year-old female admitted with a reason for visit of CHF. 68 yo 

F presents to ED via EMS after she fell trying to sit down in chair and per pt, 

just had miscalculated where the chair was. PMH includes diastolic CHF, 

paroxysmal A. fib on Pradaxa, HLD, HTN, NIDDM 2, COPD 1L NC, CAD s/p stents x2. 

Denies presyncopal symptoms, lightheadedness, dizziness, LOC, or hitting her 

head. States she has been having difficulty ambulating and moving lately due to 

bilateral leg swelling, associated with shortness of breath, 20+ lb weight gain 

in past few weeks, orthopnea, paroxysmal nocturnal dyspnea. Pt also admits to 

not being compliant with meds lately, especially Torsemide and states "I don't 

take it like I should." Recently admitted to hospital early October 2017 for 

similar symptoms, acute CHF decompensation. Denies recent travel, changes in 

meds, sick contacts. Denies chest pain or pressure, lightheadedness, dizziness, 

n/v, abd pain. 


Noted in ED to have 3+ pitting edema, weeping fluid in lower legs, crackles in 

lungs. Given ASA, Lasix, and Duoneb in ED. 





PLAN:


Acute decompensated diastolic heart failure


currently on lasix iv gtt, diamox and amiloride.  supplement k and mg as 

needed. Telemetry NSVT asymptomatic . will transfer to med-surg. 


strict I/Os, daily weights, low salt diet, fluid restriction. 





RADHA on CKD stage 3:  due to cardiorenal syndrome improving with diuresis. 

Bicarb increasing . Will get ABG. Diuretics have been changed. 





Right LE Cellulitis: continue teflaro. 





Leukocytosis: likely combination of reactionary to acute CHF decompensation and 

RLE cellulitis





Hyperbilirubinemia


may be 2/2 volume overload status and hepatic congestion





Paroxysmal A.Fib


on Pradaxa, amiodarone and Metoprolol





NIDDM2


hold home Metformin due to elevated Cr


ISS inpatient. consistent carbs diet





COPD: no acute exacerbation


on 1L NC all the time at home. Will continue


continue home Ventolin





Chronic respiratory failure with hypoxia: will continue oxygen by nasal canula





Pulmonary Hypertension: continue diuresis and oxygen 





CAD with h/o stent: stable at this point. 





HLD


continue home med





HTN


continue home med





GERD


continue home med





Right wrist pain s/p fall at home. Range of motion intact, and no motor/sensory


changes. therefore, prn pain meds and physical therapy





Right LE venous stasis ulcer:   obtain wound care consult.





Morbid obesity bmi 60.5 complicating acute issues





Obesity hypoventilation/ SINTIA: Had abnormal nocturnal pulse oximetry before. 

Never had a sleep study.  continue on oxygen





History of HIT: No heparin or lovenox. 





DVT prophylaxis with TEDS and SCD.





Deconditioning. will most likely require prolong hospital stay and potential 

rehab.


PT-activity as tolerated





VS,Fishbone, I+O


VS, Fishbone, I+O


Laboratory Tests


12/5/17 05:21








Red Blood Count 4.10, Mean Corpuscular Volume 97.8 H, Mean Corpuscular 

Hemoglobin 30.0, Mean Corpuscular Hemoglobin Concent 30.7 L, Red Cell 

Distribution Width 15.3 H, Calcium Level 8.9








Vital Signs








  Date Time  Temp Pulse Resp B/P (MAP) Pulse Ox O2 Delivery O2 Flow Rate FiO2


 


12/5/17 08:51   18   Nasal Cannula 3.0 


 


12/5/17 08:12  83  107/64    


 


12/5/17 08:00 97.6    89   














I&O- Last 24 Hours up to 6 AM 


 


 12/6/17





 06:00


 


Intake Total 180 ml


 


Balance 180 ml

















JOSE REINOSO MD Dec 5, 2017 14:26

## 2017-12-05 NOTE — IPN
DATE:  12/04/2017

 

SUBJECTIVE:

The patient is seen this morning at the bedside. She states she felt

uncomfortable over the course of the night and continued to complain of some

generalized pain and requesting pain medications. She states she is not

comfortable in bed but rather much more comfortable when she is upright in a

chair. She has not ambulated much thus far.

 

OBJECTIVE:

VITAL SIGNS: Temperature 98.8, pulse 72, respiratory rate 18-22, blood pressure

106/59, saturating 90% on two liters nasal cannula.

INTAKE AND OUTPUT: Intake 1810, urine output 4315, net-negative 2540 the past 24

hours. Weight in the bed scale today 170 kg, decreased from prior.

 

PHYSICAL EXAMINATION:

GENERAL: The patient is seen in bed in no acute distress, morbidly obese.

HEAD/NECK: Extraocular muscles are intact. There is a moist tongue. The neck is

obese, difficult to assess veins.

CARDIAC: S1, S2, irregularly irregular. 2+ radial pulse, 3+ pitting edema in the

bilateral lower extremities up to the thigh and dependent area.

LUNGS: Bibasilar rales. Superior lung fields are clear. Air entry is distant.

ABDOMEN: Soft, obese, large abdominal pannus with significant induration and

edema.

EXTREMITIES: 3+ pitting edema bilaterally that extends the thighs, groin and

dependent areas.

SKIN: There is weeping clear fluid from her right lower extremity and

erythematous changes on the right calf.

Genitourinary : power with urine

NEUROLOGIC: She is at her baseline mentation, oriented times four.

PSYCHIATRIC: Appropriate mood and affect.

 

LABORATORY DATA:

White count 13.4, hemoglobin 12.6, platelets 223. Sodium 138, potassium 3.4,

bicarbonate 34, BUN 23, creatinine 1.4, magnesium 1.9, glucose 133.

 

MICROBIOLOGY:

Blood cultures 12/02 two sets:  No growth for 48 hours. Urine culture 12/02: No

growth.

 

INPATIENT MEDICATIONS:

The patient continues on Lasix about 5 mg an hour. She received a dose of

intravenous (IV) magnesium this morning, along with 80 mEq of oral potassium. 
She

is started on potassium 40 mEq by mouth daily. I have discontinue her

metolazone.

 

PROBLEMS:

1. Acute on chronic decompensated diastolic heart failure secondary to

noncompliance with her chronic home diuretic regimen. The patient is

significantly hypervolemic with pitting edema in the bilateral lower extremities

up to the thigh and buttock, along with induration and edema in her abdominal

pannus. On this point, I will continue her on Lasix drip with aldactone. She has

responded well to diuretics with excellent urine output. She has had a mild

decremental decrease in glomerular filtration rate (GFR) and concomitant

metabolic alkalosis secondary to aggressive diuresis. I have discontinued her

metolazone. I may intermittently give her Diamox both for sequential nephron

blockade and also for correction of her alkalemia. We will continue with 
tailored

diuretics for goal net-negative about two liters daily.

2. Acute kidney injury (RADHA) on chronic kidney disease (CKD) III:  Baseline

creatinine is about 1.1 to 1.2, currently with a decremental decrease in GFR in

the setting of aggressive diuresis. Continue diuretics with goal net-negative 
two

liters daily. Avoid hypotension or borderline hypotension while we are

aggressively diuresing the patient. I would like to keep her mean arterial

pressure (MAP) at 70 at least.

3. Alkalosis secondary to aggressive diuresis:  We will continue with Lasix at

this time. Discontinue metolazone and will like give Diamox intermittently for

sequential nephron blockade and correction of alkalemia.

4. Hypokalemia secondary to diuretics:  The patient received 80 mEq of potassium

today and started on a daily oral potassium supplementation.

5. Paroxysmal atrial fibrillation:  The patient continues on metoprolol,

amiodarone and Pradaxa.

6. Right lower extremity cellulitis:  The patient continues on Teflaro per the

primary team.

7. Morbid obesity complicating her care.

GRISELDA

## 2017-12-06 VITALS — DIASTOLIC BLOOD PRESSURE: 60 MMHG | SYSTOLIC BLOOD PRESSURE: 102 MMHG

## 2017-12-06 VITALS — DIASTOLIC BLOOD PRESSURE: 55 MMHG | SYSTOLIC BLOOD PRESSURE: 94 MMHG

## 2017-12-06 VITALS — SYSTOLIC BLOOD PRESSURE: 100 MMHG | DIASTOLIC BLOOD PRESSURE: 53 MMHG

## 2017-12-06 VITALS — DIASTOLIC BLOOD PRESSURE: 51 MMHG | SYSTOLIC BLOOD PRESSURE: 109 MMHG

## 2017-12-06 LAB
ANION GAP SERPL CALC-SCNC: 1 MEQ/L (ref 8–16)
BUN SERPL-MCNC: 28 MG/DL (ref 7–18)
CALCIUM SERPL-MCNC: 8.9 MG/DL (ref 8.8–10.2)
CHLORIDE SERPL-SCNC: 95 MEQ/L (ref 98–107)
CO2 SERPL-SCNC: 42 MEQ/L (ref 21–32)
CREAT SERPL-MCNC: 1.25 MG/DL (ref 0.55–1.02)
ERYTHROCYTE [DISTWIDTH] IN BLOOD BY AUTOMATED COUNT: 15.2 % (ref 11.5–14.5)
GFR SERPL CREATININE-BSD FRML MDRD: 45.5 ML/MIN/{1.73_M2} (ref 45–?)
GLUCOSE SERPL-MCNC: 112 MG/DL (ref 80–110)
MAGNESIUM SERPL-MCNC: 2 MG/DL (ref 1.8–2.4)
MCH RBC QN AUTO: 30.5 PG (ref 27–33)
MCHC RBC AUTO-ENTMCNC: 30.7 G/DL (ref 32–36.5)
MCV RBC AUTO: 99.2 FL (ref 80–96)
NRBC BLD AUTO-RTO: 0 % (ref 0–0)
PLATELET # BLD AUTO: 215 10^3/UL (ref 150–450)
POTASSIUM SERPL-SCNC: 3.5 MEQ/L (ref 3.5–5.1)
SODIUM SERPL-SCNC: 138 MEQ/L (ref 136–145)
WBC # BLD AUTO: 7.9 10^3/UL (ref 4–10)

## 2017-12-06 RX ADMIN — ACETAZOLAMIDE SODIUM SCH MG: 500 INJECTION, POWDER, LYOPHILIZED, FOR SOLUTION INTRAVENOUS at 18:19

## 2017-12-06 RX ADMIN — DOCUSATE SODIUM PRN MG: 100 CAPSULE, LIQUID FILLED ORAL at 10:11

## 2017-12-06 RX ADMIN — ACETAZOLAMIDE SODIUM SCH MG: 500 INJECTION, POWDER, LYOPHILIZED, FOR SOLUTION INTRAVENOUS at 11:39

## 2017-12-06 RX ADMIN — INSULIN LISPRO SCH UNITS: 100 INJECTION, SOLUTION INTRAVENOUS; SUBCUTANEOUS at 07:30

## 2017-12-06 RX ADMIN — DABIGATRAN ETEXILATE MESYLATE SCH MG: 75 CAPSULE ORAL at 09:44

## 2017-12-06 RX ADMIN — AMIODARONE HYDROCHLORIDE SCH MG: 200 TABLET ORAL at 09:44

## 2017-12-06 RX ADMIN — ACETAZOLAMIDE SODIUM SCH MG: 500 INJECTION, POWDER, LYOPHILIZED, FOR SOLUTION INTRAVENOUS at 05:49

## 2017-12-06 RX ADMIN — NYSTATIN SCH DOSE: 100000 POWDER TOPICAL at 09:44

## 2017-12-06 RX ADMIN — METOPROLOL TARTRATE SCH MG: 25 TABLET, FILM COATED ORAL at 09:43

## 2017-12-06 RX ADMIN — INSULIN LISPRO SCH UNITS: 100 INJECTION, SOLUTION INTRAVENOUS; SUBCUTANEOUS at 18:18

## 2017-12-06 RX ADMIN — Medication SCH MLS/HR: at 11:38

## 2017-12-06 RX ADMIN — FERROUS SULFATE TAB 325 MG (65 MG ELEMENTAL FE) SCH MG: 325 (65 FE) TAB at 09:44

## 2017-12-06 RX ADMIN — HYDROCODONE BITARTRATE AND ACETAMINOPHEN PRN TAB: 5; 325 TABLET ORAL at 10:11

## 2017-12-06 RX ADMIN — TIOTROPIUM BROMIDE SCH INHALATION: 18 CAPSULE ORAL; RESPIRATORY (INHALATION) at 07:35

## 2017-12-06 RX ADMIN — ACETAZOLAMIDE SODIUM SCH MG: 500 INJECTION, POWDER, LYOPHILIZED, FOR SOLUTION INTRAVENOUS at 23:11

## 2017-12-06 RX ADMIN — INSULIN LISPRO SCH UNITS: 100 INJECTION, SOLUTION INTRAVENOUS; SUBCUTANEOUS at 12:35

## 2017-12-06 RX ADMIN — HYDROCODONE BITARTRATE AND ACETAMINOPHEN PRN TAB: 5; 325 TABLET ORAL at 20:21

## 2017-12-06 RX ADMIN — METOPROLOL TARTRATE SCH MG: 25 TABLET, FILM COATED ORAL at 20:16

## 2017-12-06 RX ADMIN — SODIUM CHLORIDE, PRESERVATIVE FREE SCH ML: 5 INJECTION INTRAVENOUS at 05:50

## 2017-12-06 RX ADMIN — SODIUM CHLORIDE, PRESERVATIVE FREE SCH ML: 5 INJECTION INTRAVENOUS at 22:27

## 2017-12-06 RX ADMIN — METOPROLOL TARTRATE SCH MG: 25 TABLET, FILM COATED ORAL at 22:41

## 2017-12-06 RX ADMIN — SODIUM CHLORIDE, PRESERVATIVE FREE SCH ML: 5 INJECTION INTRAVENOUS at 18:16

## 2017-12-06 RX ADMIN — DABIGATRAN ETEXILATE MESYLATE SCH MG: 75 CAPSULE ORAL at 20:21

## 2017-12-06 RX ADMIN — ATORVASTATIN CALCIUM SCH MG: 20 TABLET, FILM COATED ORAL at 20:21

## 2017-12-06 RX ADMIN — ASPIRIN SCH MG: 81 TABLET ORAL at 09:44

## 2017-12-06 RX ADMIN — POTASSIUM CHLORIDE SCH MEQ: 750 TABLET, EXTENDED RELEASE ORAL at 09:42

## 2017-12-06 RX ADMIN — INSULIN LISPRO SCH UNITS: 100 INJECTION, SOLUTION INTRAVENOUS; SUBCUTANEOUS at 20:20

## 2017-12-06 RX ADMIN — NYSTATIN SCH DOSE: 100000 POWDER TOPICAL at 20:22

## 2017-12-06 RX ADMIN — LORATADINE PRN MG: 10 TABLET ORAL at 10:11

## 2017-12-06 RX ADMIN — Medication SCH MLS/HR: at 22:27

## 2017-12-06 NOTE — IPNPDOC
Text Note


Date of Service


The patient was seen on 12/6/17.





NOTE


SUBJECTIVE: Patient is seen and examined at the bedside. chart has been 

reviewed. Says her SOB is slowly improving. Still sleeping in recliner. No 

fever or chills, no chest pain , cough improved, no nausea or vomiting or 

diarrhea. 








PHYSICAL EXAM: 


Vitals : As Below


General Exam:  Positive: Alert, Cooperative, No Acute Distress


Eye Exam:  Positive: PERRLA, Conjunctiva & lids normal, EOMI


ENT Exam:  Positive: Atraumatic, Pharynx Normal, Tongue Midline, Other ENT (

poor dentition), 


   Negative: Pharyngeal Edema


Neck Exam:  Positive: Supple, 


   Negative: Lymphadenopathy


Chest Exam:  Positive: Rales (b/l lower lobes, diminished


Heart Exam:  Positive: Rate Normal, Irregular Rhythm, Other (distant heart 

sounds due to body habitus)


Telemetry:  Positive: Atrial fibrillation


Abdomen Exam:  Positive: Normal bowel sounds, 


   Negative: Soft (tense from fluid, with dry skin no lower abdomen), Tenderness


Extremity Exam:  Positive: Edema (3+), Tenderness (mild tenderness to touch on 

right LE)


Skin Exam:  Positive: Lesion (right LE warm & erythematous, weeping clear fluid)


Neuro Exam:  Positive: Normal Speech, Strength at 5/5 X4 ext, Normal Tone, 

Sensation Intact


Psych Exam:  Positive: Mood NL, Oriented x 3





Laboratory data/imaging studies reviewed.





ASSESSMENT:  67-year-old female admitted with a reason for visit of CHF. 66 yo 

F presents to ED via EMS after she fell trying to sit down in chair and per pt, 

just had miscalculated where the chair was. PMH includes diastolic CHF, 

paroxysmal A. fib on Pradaxa, HLD, HTN, NIDDM 2, COPD 1L NC, CAD s/p stents x2. 

Denies presyncopal symptoms, lightheadedness, dizziness, LOC, or hitting her 

head. States she has been having difficulty ambulating and moving lately due to 

bilateral leg swelling, associated with shortness of breath, 20+ lb weight gain 

in past few weeks, orthopnea, paroxysmal nocturnal dyspnea. Pt also admits to 

not being compliant with meds lately, especially Torsemide and states "I don't 

take it like I should." Recently admitted to hospital early October 2017 for 

similar symptoms, acute CHF decompensation. Denies recent travel, changes in 

meds, sick contacts. Denies chest pain or pressure, lightheadedness, dizziness, 

n/v, abd pain. 


Noted in ED to have 3+ pitting edema, weeping fluid in lower legs, crackles in 

lungs. Given ASA, Lasix, and Duoneb in ED. 





PLAN:


Acute decompensated diastolic heart failure


currently on  diamox and amiloride.  supplement k and mg as needed. 


strict I/Os, daily weights, low salt diet, fluid restriction. 





RADHA on CKD stage 3:  due to cardiorenal syndrome improving with diuresis. 

Bicarb increasing . Will get ABG. Diuretics have been changed. 





Right LE Cellulitis: continue teflaro. 





Leukocytosis: likely combination of reactionary to acute CHF decompensation and 

RLE cellulitis





Hyperbilirubinemia


may be 2/2 volume overload status and hepatic congestion





Paroxysmal A.Fib


on Pradaxa, amiodarone and Metoprolol





NIDDM2


hold home Metformin due to elevated Cr


ISS inpatient. consistent carbs diet





COPD: no acute exacerbation


on 1L NC all the time at home. Will continue


continue home Ventolin





Chronic respiratory failure with hypoxia: will continue oxygen by nasal canula





Pulmonary Hypertension: continue diuresis and oxygen 





CAD with h/o stent: stable at this point. 





HLD


continue home med





HTN


continue home med





GERD


continue home med





Right wrist pain s/p fall at home. Range of motion intact, and no motor/sensory


changes. therefore, prn pain meds and physical therapy





Right LE venous stasis ulcer:   obtain wound care consult.





Morbid obesity bmi 60.5 complicating acute issues





Obesity hypoventilation/ SINTIA: Had abnormal nocturnal pulse oximetry before. 

Never had a sleep study.  continue on oxygen





History of HIT: No heparin or lovenox. 





DVT prophylaxis with TEDS and SCD.





Deconditioning. will most likely require prolong hospital stay and potential 

rehab.


PT-activity as tolerated





VS,Fishbone, I+O


VS, Fishbone, I+O


Laboratory Tests


12/5/17 18:10








Calcium Level 9.1





12/6/17 05:36








Calcium Level 8.9, Red Blood Count 3.94 L, Mean Corpuscular Volume 99.2 H, Mean 

Corpuscular Hemoglobin 30.5, Mean Corpuscular Hemoglobin Concent 30.7 L, Red 

Cell Distribution Width 15.2 H








Vital Signs








  Date Time  Temp Pulse Resp B/P (MAP) Pulse Ox O2 Delivery O2 Flow Rate FiO2


 


12/6/17 11:10   16     


 


12/6/17 09:43  69  94/55    


 


12/6/17 08:00 97.8    90 Nasal Cannula 2.0 














I&O- Last 24 Hours up to 6 AM 


 


 12/7/17





 06:00


 


Intake Total 0 ml


 


Output Total 0 ml


 


Balance 0 ml

















JOSE REINOSO MD Dec 6, 2017 12:01

## 2017-12-06 NOTE — IPN
DATE:  12/06/2017

 

SUBJECTIVE: The patient is seen this morning at the bedside. She reports she

continues to sleep in the chair, as she feels uncomfortable in the bed. She

denies any other acute complaint. She remains with Murray catheter.

 

VITAL SIGNS: Temperature 97.8, pulse 69, respiratory rate 18, blood pressure

systolic , diastolic 55-58, saturating 90-98% on 2 liters nasal cannula.

Intake and output: Oral intake yesterday recorded as 420, urine output 2900, net

negative 2440. Weight in the bed scale is not recorded today.

GENERAL: The patient is seen in the recliner in no acute distress, morbidly

obese.

Extraocular muscles are intact. Oral mucosa is moist. Nasal cannula is in place.

NECK: Supple. The neck veins are difficult to assess secondary to increased neck

circumference.

CARDIAC: S1, S2, distant heart sounds. Radial pulse 2+. There is 3+ pitting 
edema

present in the bilateral extremities up to the thigh and dependent area.

LUNGS: There are diminished breath sounds at the base.  There is no tachypnea. 
No

use of accessory muscles. Her pulse is irregular. The abdomen is soft, obese 
with

significant pannus with induration and edema and dry skin.

EXTREMITIES: pitting edema bilaterally in lower ext

GENITOURINARY:  There is a Murray catheter with urine.

NEUROLOGIC: She is at her baseline mentation.

PSYCHIATRIC: Appropriate mood and affect.

 

LABORATORY DATA:

White count 7.9, hemoglobin 12. Sodium 138, potassium 3.5, bicarbonate 42, BUN

28, creatinine 1.2, magnesium 2.0. The pH 7.43 with pCO2 of 58.

 

INPATIENT MEDICATIONS: The patient's Lasix drip is discontinued. She is on 
Diamox

250 IV every 6 and amiloride 5 mg by mouth daily. Her remainder of medications

are unchanged from prior.

 

PROBLEMS:

1.  Acute on chronic decompensated diastolic heart failure secondary to

noncompliance with home diuretic regimen. The patient has developed a 
significant

metabolic alkalosis due to concurrent use of both loop and thiazide diuretic. 
Her

serum bicarbonate is in excess of 40. Of note, she does have a chronic metabolic

alkalosis and chronic respiratory acidosis. Her baseline bicarbonate is usually

in the low 30s. Will hold Lasix drip at present for 24 hours and continue her on

Diamox around the clock with amiloride. Once her alkalemia has improved, we will

resume Lasix.

 

2.  Hypokalemia secondary to aggressive diuretics and concomitant alkalosis. The

patient continues on aggressive potassium supplementation.

 

3.  Alkalemia. Patient has a chronic respiratory acidosis and a chronic 
metabolic

alkalosis. At present serum bicarbonate in excess of 40. Lasix is on hold for 24

hours. Continue with Diamox and amiloride as mentioned above.

 

4.  Chronic kidney disease (CKD), stage II. Renal function is at baseline.

 

5.  Paroxysmal atrial fibrillation. The patient continues on metoprolol,

amiodarone, and Pradaxa.

 

6.  Morbid obesity, complicating her care along with obesity hypoventilation,

obstructive sleep apnea (SINTIA), and history of chronic obstructive pulmonary

disease (COPD).

GRISELDA

## 2017-12-07 VITALS — DIASTOLIC BLOOD PRESSURE: 54 MMHG | SYSTOLIC BLOOD PRESSURE: 92 MMHG | OXYGEN SATURATION: 92 %

## 2017-12-07 VITALS — DIASTOLIC BLOOD PRESSURE: 58 MMHG | SYSTOLIC BLOOD PRESSURE: 110 MMHG

## 2017-12-07 VITALS — SYSTOLIC BLOOD PRESSURE: 96 MMHG | DIASTOLIC BLOOD PRESSURE: 54 MMHG

## 2017-12-07 VITALS — SYSTOLIC BLOOD PRESSURE: 139 MMHG | DIASTOLIC BLOOD PRESSURE: 59 MMHG

## 2017-12-07 LAB
ANION GAP SERPL CALC-SCNC: 5 MEQ/L (ref 8–16)
BUN SERPL-MCNC: 26 MG/DL (ref 7–18)
CALCIUM SERPL-MCNC: 8.8 MG/DL (ref 8.8–10.2)
CHLORIDE SERPL-SCNC: 98 MEQ/L (ref 98–107)
CO2 SERPL-SCNC: 35 MEQ/L (ref 21–32)
CREAT SERPL-MCNC: 1.38 MG/DL (ref 0.55–1.02)
ERYTHROCYTE [DISTWIDTH] IN BLOOD BY AUTOMATED COUNT: 15.2 % (ref 11.5–14.5)
GFR SERPL CREATININE-BSD FRML MDRD: 40.6 ML/MIN/{1.73_M2} (ref 45–?)
GLUCOSE SERPL-MCNC: 137 MG/DL (ref 80–110)
MAGNESIUM SERPL-MCNC: 2.2 MG/DL (ref 1.8–2.4)
MCH RBC QN AUTO: 30.3 PG (ref 27–33)
MCHC RBC AUTO-ENTMCNC: 30.1 G/DL (ref 32–36.5)
MCV RBC AUTO: 100.8 FL (ref 80–96)
NRBC BLD AUTO-RTO: 0 % (ref 0–0)
PLATELET # BLD AUTO: 215 10^3/UL (ref 150–450)
POTASSIUM SERPL-SCNC: 3.8 MEQ/L (ref 3.5–5.1)
SODIUM SERPL-SCNC: 138 MEQ/L (ref 136–145)
WBC # BLD AUTO: 7.9 10^3/UL (ref 4–10)

## 2017-12-07 RX ADMIN — ACETAZOLAMIDE SODIUM SCH MG: 500 INJECTION, POWDER, LYOPHILIZED, FOR SOLUTION INTRAVENOUS at 05:14

## 2017-12-07 RX ADMIN — INSULIN LISPRO SCH UNITS: 100 INJECTION, SOLUTION INTRAVENOUS; SUBCUTANEOUS at 13:06

## 2017-12-07 RX ADMIN — ACETAZOLAMIDE SODIUM SCH MG: 500 INJECTION, POWDER, LYOPHILIZED, FOR SOLUTION INTRAVENOUS at 13:05

## 2017-12-07 RX ADMIN — FERROUS SULFATE TAB 325 MG (65 MG ELEMENTAL FE) SCH MG: 325 (65 FE) TAB at 09:09

## 2017-12-07 RX ADMIN — HYDROCODONE BITARTRATE AND ACETAMINOPHEN PRN TAB: 5; 325 TABLET ORAL at 09:09

## 2017-12-07 RX ADMIN — DEXTROSE MONOHYDRATE SCH MG: 50 INJECTION, SOLUTION INTRAVENOUS at 17:56

## 2017-12-07 RX ADMIN — NYSTATIN SCH DOSE: 100000 POWDER TOPICAL at 21:00

## 2017-12-07 RX ADMIN — DABIGATRAN ETEXILATE MESYLATE SCH MG: 75 CAPSULE ORAL at 09:09

## 2017-12-07 RX ADMIN — SODIUM CHLORIDE, PRESERVATIVE FREE SCH ML: 5 INJECTION INTRAVENOUS at 13:08

## 2017-12-07 RX ADMIN — DOCUSATE SODIUM PRN MG: 100 CAPSULE, LIQUID FILLED ORAL at 09:10

## 2017-12-07 RX ADMIN — SODIUM CHLORIDE, PRESERVATIVE FREE SCH ML: 5 INJECTION INTRAVENOUS at 05:15

## 2017-12-07 RX ADMIN — INSULIN LISPRO SCH UNITS: 100 INJECTION, SOLUTION INTRAVENOUS; SUBCUTANEOUS at 17:30

## 2017-12-07 RX ADMIN — METOPROLOL TARTRATE SCH MG: 25 TABLET, FILM COATED ORAL at 21:24

## 2017-12-07 RX ADMIN — Medication SCH MLS/HR: at 11:35

## 2017-12-07 RX ADMIN — Medication SCH MLS/HR: at 22:37

## 2017-12-07 RX ADMIN — ACETAZOLAMIDE SODIUM SCH MG: 500 INJECTION, POWDER, LYOPHILIZED, FOR SOLUTION INTRAVENOUS at 17:56

## 2017-12-07 RX ADMIN — INSULIN LISPRO SCH UNITS: 100 INJECTION, SOLUTION INTRAVENOUS; SUBCUTANEOUS at 09:12

## 2017-12-07 RX ADMIN — LORATADINE PRN MG: 10 TABLET ORAL at 09:10

## 2017-12-07 RX ADMIN — TIOTROPIUM BROMIDE SCH INHALATION: 18 CAPSULE ORAL; RESPIRATORY (INHALATION) at 07:20

## 2017-12-07 RX ADMIN — DABIGATRAN ETEXILATE MESYLATE SCH MG: 75 CAPSULE ORAL at 21:23

## 2017-12-07 RX ADMIN — METOPROLOL TARTRATE SCH MG: 25 TABLET, FILM COATED ORAL at 09:11

## 2017-12-07 RX ADMIN — NYSTATIN SCH DOSE: 100000 POWDER TOPICAL at 09:00

## 2017-12-07 RX ADMIN — INSULIN LISPRO SCH UNITS: 100 INJECTION, SOLUTION INTRAVENOUS; SUBCUTANEOUS at 21:00

## 2017-12-07 RX ADMIN — POTASSIUM CHLORIDE SCH MEQ: 750 TABLET, EXTENDED RELEASE ORAL at 09:10

## 2017-12-07 RX ADMIN — DEXTROSE MONOHYDRATE SCH MG: 50 INJECTION, SOLUTION INTRAVENOUS at 09:11

## 2017-12-07 RX ADMIN — AMIODARONE HYDROCHLORIDE SCH MG: 200 TABLET ORAL at 09:10

## 2017-12-07 RX ADMIN — ATORVASTATIN CALCIUM SCH MG: 20 TABLET, FILM COATED ORAL at 21:24

## 2017-12-07 RX ADMIN — ASPIRIN SCH MG: 81 TABLET ORAL at 09:10

## 2017-12-07 RX ADMIN — SODIUM CHLORIDE, PRESERVATIVE FREE SCH ML: 5 INJECTION INTRAVENOUS at 21:26

## 2017-12-07 NOTE — IPNPDOC
Text Note


Date of Service


The patient was seen on 12/7/17.





NOTE


SUBJECTIVE: Patient is seen and examined at the bedside. chart has been 

reviewed. Says her SOB is slowly improving. Able to now sleep in bed. right leg 

still red with seepage. Hematuria noted in the power seems to be traumatic. No 

fever or chills, no chest pain , cough improved, no nausea or vomiting or 

diarrhea. 








PHYSICAL EXAM: 


Vitals : As Below


General Exam:  Positive: Alert, Cooperative, No Acute Distress


Eye Exam:  Positive: PERRLA, Conjunctiva & lids normal, EOMI


ENT Exam:  Positive: Atraumatic, Pharynx Normal, Tongue Midline, Other ENT (

poor dentition), 


   Negative: Pharyngeal Edema


Neck Exam:  Positive: Supple, 


   Negative: Lymphadenopathy


Chest Exam:  Positive: Rales (b/l lower lobes, diminished


Heart Exam:  Positive: Rate Normal, Irregular Rhythm, Other (distant heart 

sounds due to body habitus)


Telemetry:  Positive: Atrial fibrillation


Abdomen Exam:  Positive: Normal bowel sounds, 


   Negative: Soft (tense from fluid, with dry skin no lower abdomen), Tenderness


Extremity Exam:  Positive: Edema (3+), Tenderness (mild tenderness to touch on 

right LE)


Skin Exam:  Positive: Lesion (right LE warm & erythematous, weeping clear fluid)


Neuro Exam:  Positive: Normal Speech, Strength at 5/5 X4 ext, Normal Tone, 

Sensation Intact


Psych Exam:  Positive: Mood NL, Oriented x 3





Laboratory data/imaging studies reviewed.





ASSESSMENT:  67-year-old female admitted with a reason for visit of CHF. 66 yo 

F presents to ED via EMS after she fell trying to sit down in chair and per pt, 

just had miscalculated where the chair was. PMH includes diastolic CHF, 

paroxysmal A. fib on Pradaxa, HLD, HTN, NIDDM 2, COPD 1L NC, CAD s/p stents x2. 

Denies presyncopal symptoms, lightheadedness, dizziness, LOC, or hitting her 

head. States she has been having difficulty ambulating and moving lately due to 

bilateral leg swelling, associated with shortness of breath, 20+ lb weight gain 

in past few weeks, orthopnea, paroxysmal nocturnal dyspnea. Pt also admits to 

not being compliant with meds lately, especially Torsemide and states "I don't 

take it like I should." Recently admitted to hospital early October 2017 for 

similar symptoms, acute CHF decompensation. Denies recent travel, changes in 

meds, sick contacts. Denies chest pain or pressure, lightheadedness, dizziness, 

n/v, abd pain. 


Noted in ED to have 3+ pitting edema, weeping fluid in lower legs, crackles in 

lungs. Given ASA, Lasix, and Duoneb in ED. 





PLAN:


Acute decompensated diastolic heart failure


currently on  diamox and amiloride and lasix.  supplement k and mg as needed. 


strict I/Os, daily weights, low salt diet, fluid restriction. 





RADHA on CKD stage 3:  due to cardiorenal syndrome improving with diuresis. 

Bicarb increasing . Will get ABG. Diuretics have been changed. 





Right LE Cellulitis: continue teflaro. 





Leukocytosis: likely combination of reactionary to acute CHF decompensation and 

RLE cellulitis





Hyperbilirubinemia


may be 2/2 volume overload status and hepatic congestion





Paroxysmal A.Fib


on Pradaxa, amiodarone and Metoprolol





NIDDM2


hold home Metformin due to elevated Cr


ISS inpatient. consistent carbs diet





COPD: no acute exacerbation


on 1L NC all the time at home. Will continue


continue home Ventolin





Chronic respiratory failure with hypoxia: will continue oxygen by nasal canula





Pulmonary Hypertension: continue diuresis and oxygen 





CAD with h/o stent: stable at this point. 





HLD


continue home med





HTN


continue home med





GERD


continue home med





Right wrist pain s/p fall at home. Range of motion intact, and no motor/sensory


changes. therefore, prn pain meds and physical therapy





Right LE venous stasis ulcer:   obtain wound care consult.





Morbid obesity bmi 60.5 complicating acute issues





Obesity hypoventilation/ SINTIA: Had abnormal nocturnal pulse oximetry before. 

Never had a sleep study.  continue on oxygen





History of HIT: No heparin or lovenox. 





DVT prophylaxis with TEDS and SCD.





Deconditioning. will most likely require prolong hospital stay and potential 

rehab.


PT-activity as tolerated





VS,Fishbone, I+O


VS, Fishbone, I+O


Laboratory Tests


12/7/17 05:34








Red Blood Count 3.93 L, Mean Corpuscular Volume 100.8 H, Mean Corpuscular 

Hemoglobin 30.3, Mean Corpuscular Hemoglobin Concent 30.1 L, Red Cell 

Distribution Width 15.2 H, Calcium Level 8.8








Vital Signs








  Date Time  Temp Pulse Resp B/P (MAP) Pulse Ox O2 Delivery O2 Flow Rate FiO2


 


12/7/17 09:39   18     


 


12/7/17 09:11  96  110/58    


 


12/7/17 08:15     92 Nasal Cannula 1.0 


 


12/7/17 04:00 97.6       

















JOSE REINOSO MD Dec 7, 2017 12:49

## 2017-12-07 NOTE — IPN
DATE:  12/07/2017

 

SUBJECTIVE: The patient is seen this morning at the bedside. She reports that 
she

moved from recliner to bed, as her breathing has improved and also she was

getting significantly tender buttocks from sitting in the recliner.  She denies

any other acute complaints.  She remains with Murray catheter, which has blood

tinged urine at present.

 

VITAL SIGNS: Temperature 97.6, pulse 67 to 96, respiratory rate 17 to 24, blood

pressure 110/58, saturating 91 to 92% on nasal cannula 1 liter.  Intake and

output:  Oral intake yesterday 720 mL, urine output 1400 mL, net negative 640.

Weight in the bed scale today is 163.9 kg.

 

GENERAL: The patient is seen lying in bed, lateral but recumbent.  In no acute

distress.  Morbidly obese.

Extraocular muscles are intact.  Nasal cannula is in place.  Tongue is moist.

Oral dentition is poor.  Difficult to assess her neck veins.

CARDIAC: S1, S2.  Heart sounds are irregular.  Radial pulse is palpable.  There

is significant pitting edema present in the bilateral upper extremities to the

thigh.

LUNGS: Breath sounds are distant and symmetric.  There is no tachypnea.  No use

of accessory muscles.

ABDOMEN:  Soft, obese with significant pannus with induration and edema and dry

skin.

GENITOURINARY:  There is a Murray catheter in place with blood tinged urine.

NEUROLOGIC: She is at her baseline mentation.

PSYCHIATRIC: Appropriate mood and affect.

 

LABORATORY DATA:

White count 7.9, hemoglobin 11.9, platelets 216, sodium 138, potassium 3.8,

bicarbonate improved to 35, BUN 26, creatinine 1.3, glucose 137.

 

INPATIENT MEDICATIONS:  The patient just started on Lasix 60 mg IV twice a day.

She continues on Diamox 250 mg intravenously every 6 hours and amiloride 5 mg by

mouth daily.  There is no significant change in her medications.

 

PROBLEMS:

1.  Acute on chronic decompensated diastolic heart failure secondary to

noncompliance with home diuretic regimen. The patient's alkalemia has improved

with holding Lasix for 24 hours.  I have resumed Lasix 60 mg IV twice a day and

we will continue her on Diamox around the clock with oral amiloride.  She did 
not

have a significant diuresis the past 24 hours while her Lasix was held, as is

expected.

 

2.  Chronic kidney disease stage III.  The patient's renal function has had some

minor fluctuations with diuretic use, but is fairly stable at present.  We will

continue to diurese with goal net negative of about 2 liters per day.  Her

overall weight seems to be down about 10 kg with cumulative negative fluid

balance of about 11 liters.  She still has significantly more edema and will

require further IV diuresis.

 

3.  Blood tinged urine noted in Murray catheter, likely from trauma.  The 
patient 

is currently on Pradaxa.  Hemoglobin has been stable at present.  Would monitor 
for now.

 

4.  Paroxysmal atrial fibrillation. The patient continues on metoprolol,

amiodarone, and Pradaxa.

 

5.  Right lower extremity cellulitis.  The patient continues on Teflaro per the

primary team.

 

6.  Alkalemia, improved from prior.  The patient does have a chronic underlying

respiratory acidosis with metabolic compensation.  Lasix is resumed at present 
in

conjunction with Diamox and amiloride, as mentioned above, for further diuresis.

 

 

7.  Morbid obesity, complicating her care along with obesity hypoventilation

syndrome and history of chronic obstructive pulmonary disease (COPD).

 

Plan of care is discussed with Dr. Laury Sandra.

GRISELDA

## 2017-12-08 VITALS — SYSTOLIC BLOOD PRESSURE: 157 MMHG | DIASTOLIC BLOOD PRESSURE: 76 MMHG

## 2017-12-08 VITALS — DIASTOLIC BLOOD PRESSURE: 64 MMHG | SYSTOLIC BLOOD PRESSURE: 107 MMHG

## 2017-12-08 VITALS — DIASTOLIC BLOOD PRESSURE: 60 MMHG | SYSTOLIC BLOOD PRESSURE: 123 MMHG

## 2017-12-08 LAB
ANION GAP SERPL CALC-SCNC: 7 MEQ/L (ref 8–16)
BUN SERPL-MCNC: 26 MG/DL (ref 7–18)
CALCIUM SERPL-MCNC: 8.8 MG/DL (ref 8.8–10.2)
CHLORIDE SERPL-SCNC: 99 MEQ/L (ref 98–107)
CO2 SERPL-SCNC: 32 MEQ/L (ref 21–32)
CREAT SERPL-MCNC: 1.31 MG/DL (ref 0.55–1.02)
ERYTHROCYTE [DISTWIDTH] IN BLOOD BY AUTOMATED COUNT: 15.2 % (ref 11.5–14.5)
GFR SERPL CREATININE-BSD FRML MDRD: 43.1 ML/MIN/{1.73_M2} (ref 45–?)
GLUCOSE SERPL-MCNC: 104 MG/DL (ref 80–110)
MAGNESIUM SERPL-MCNC: 2.4 MG/DL (ref 1.8–2.4)
MCH RBC QN AUTO: 30.4 PG (ref 27–33)
MCHC RBC AUTO-ENTMCNC: 30.8 G/DL (ref 32–36.5)
MCV RBC AUTO: 98.7 FL (ref 80–96)
NRBC BLD AUTO-RTO: 0 % (ref 0–0)
PLATELET # BLD AUTO: 210 10^3/UL (ref 150–450)
POTASSIUM SERPL-SCNC: 3.9 MEQ/L (ref 3.5–5.1)
SODIUM SERPL-SCNC: 138 MEQ/L (ref 136–145)
WBC # BLD AUTO: 7 10^3/UL (ref 4–10)

## 2017-12-08 RX ADMIN — INSULIN LISPRO SCH UNITS: 100 INJECTION, SOLUTION INTRAVENOUS; SUBCUTANEOUS at 09:22

## 2017-12-08 RX ADMIN — DABIGATRAN ETEXILATE MESYLATE SCH MG: 75 CAPSULE ORAL at 09:26

## 2017-12-08 RX ADMIN — ASPIRIN SCH MG: 81 TABLET ORAL at 09:26

## 2017-12-08 RX ADMIN — METOPROLOL TARTRATE SCH MG: 25 TABLET, FILM COATED ORAL at 21:37

## 2017-12-08 RX ADMIN — SODIUM CHLORIDE, PRESERVATIVE FREE SCH ML: 5 INJECTION INTRAVENOUS at 06:00

## 2017-12-08 RX ADMIN — POTASSIUM CHLORIDE SCH MEQ: 750 TABLET, EXTENDED RELEASE ORAL at 09:27

## 2017-12-08 RX ADMIN — SODIUM CHLORIDE, PRESERVATIVE FREE SCH ML: 5 INJECTION INTRAVENOUS at 21:39

## 2017-12-08 RX ADMIN — NYSTATIN SCH DOSE: 100000 POWDER TOPICAL at 09:29

## 2017-12-08 RX ADMIN — NYSTATIN SCH DOSE: 100000 POWDER TOPICAL at 21:38

## 2017-12-08 RX ADMIN — INSULIN LISPRO SCH UNITS: 100 INJECTION, SOLUTION INTRAVENOUS; SUBCUTANEOUS at 17:52

## 2017-12-08 RX ADMIN — FERROUS SULFATE TAB 325 MG (65 MG ELEMENTAL FE) SCH MG: 325 (65 FE) TAB at 09:27

## 2017-12-08 RX ADMIN — HYDROCODONE BITARTRATE AND ACETAMINOPHEN PRN TAB: 5; 325 TABLET ORAL at 21:36

## 2017-12-08 RX ADMIN — INSULIN LISPRO SCH UNITS: 100 INJECTION, SOLUTION INTRAVENOUS; SUBCUTANEOUS at 12:00

## 2017-12-08 RX ADMIN — HYDROCODONE BITARTRATE AND ACETAMINOPHEN PRN TAB: 5; 325 TABLET ORAL at 10:46

## 2017-12-08 RX ADMIN — AMIODARONE HYDROCHLORIDE SCH MG: 200 TABLET ORAL at 09:27

## 2017-12-08 RX ADMIN — INSULIN LISPRO SCH UNITS: 100 INJECTION, SOLUTION INTRAVENOUS; SUBCUTANEOUS at 21:00

## 2017-12-08 RX ADMIN — DEXTROSE MONOHYDRATE SCH MG: 50 INJECTION, SOLUTION INTRAVENOUS at 09:23

## 2017-12-08 RX ADMIN — ATORVASTATIN CALCIUM SCH MG: 20 TABLET, FILM COATED ORAL at 21:37

## 2017-12-08 RX ADMIN — DOCUSATE SODIUM PRN MG: 100 CAPSULE, LIQUID FILLED ORAL at 21:35

## 2017-12-08 RX ADMIN — METOPROLOL TARTRATE SCH MG: 25 TABLET, FILM COATED ORAL at 09:00

## 2017-12-08 RX ADMIN — TIOTROPIUM BROMIDE SCH INHALATION: 18 CAPSULE ORAL; RESPIRATORY (INHALATION) at 08:30

## 2017-12-08 RX ADMIN — SODIUM CHLORIDE, PRESERVATIVE FREE SCH ML: 5 INJECTION INTRAVENOUS at 13:23

## 2017-12-08 RX ADMIN — DEXTROSE MONOHYDRATE SCH MG: 50 INJECTION, SOLUTION INTRAVENOUS at 17:53

## 2017-12-08 RX ADMIN — DABIGATRAN ETEXILATE MESYLATE SCH MG: 75 CAPSULE ORAL at 21:37

## 2017-12-08 NOTE — IPNPDOC
Text Note


Date of Service


The patient was seen on 12/8/17.





NOTE


SUBJECTIVE: Patient is seen and examined at the bedside. chart has been 

reviewed. Says her SOB is slowly improving. Able to now sleep in bed. right leg 

still red with seepage. Hematuria noted in the power seems to be traumatic. No 

fever or chills, no chest pain , cough improved, no nausea or vomiting or 

diarrhea. 








PHYSICAL EXAM: 


Vitals : As Below


General Exam:  Positive: Alert, Cooperative, No Acute Distress


Eye Exam:  Positive: PERRLA, Conjunctiva & lids normal, EOMI


ENT Exam:  Positive: Atraumatic, Pharynx Normal, Tongue Midline, Other ENT (

poor dentition), 


   Negative: Pharyngeal Edema


Neck Exam:  Positive: Supple, 


   Negative: Lymphadenopathy


Chest Exam:  Positive: Rales (b/l lower lobes, diminished


Heart Exam:  Positive: Rate Normal, Irregular Rhythm, Other (distant heart 

sounds due to body habitus)


Telemetry:  Positive: Atrial fibrillation


Abdomen Exam:  Positive: Normal bowel sounds, 


   Negative: Soft (tense from fluid, with dry skin no lower abdomen), Tenderness


Extremity Exam:  Positive: Edema (3+), Tenderness (mild tenderness to touch on 

right LE)


Skin Exam:  Positive: Lesion (right LE warm & erythematous, weeping clear fluid)


Neuro Exam:  Positive: Normal Speech, Strength at 5/5 X4 ext, Normal Tone, 

Sensation Intact


Psych Exam:  Positive: Mood NL, Oriented x 3





Laboratory data/imaging studies reviewed.





ASSESSMENT:  67-year-old female admitted with a reason for visit of CHF. 68 yo 

F presents to ED via EMS after she fell trying to sit down in chair and per pt, 

just had miscalculated where the chair was. PMH includes diastolic CHF, 

paroxysmal A. fib on Pradaxa, HLD, HTN, NIDDM 2, COPD 1L NC, CAD s/p stents x2.

  


Noted in ED to have 3+ pitting edema, weeping fluid in lower legs, crackles in 

lungs. Given ASA, Lasix, and Duoneb in ED and admitted for CHF exacerbation. 





PLAN:


Acute decompensated diastolic heart failure


currently on  diamox and amiloride and lasix.  supplement k and mg as needed. 


strict I/Os, daily weights, low salt diet, fluid restriction. 





RADHA on CKD stage 3:  due to cardiorenal syndrome improving with diuresis. 

Bicarb increasing . Will get ABG. Diuretics have been changed. 





Right LE Cellulitis: finished teflaro course. 





Leukocytosis: likely combination of reactionary to acute CHF decompensation and 

RLE cellulitis





Hyperbilirubinemia


may be 2/2 volume overload status and hepatic congestion





Paroxysmal A.Fib


on Pradaxa, amiodarone and Metoprolol





Hematuria: due to trauma to power . HH stable will continue with Pradaxa. 





NIDDM2


hold home Metformin due to elevated Cr


ISS inpatient. consistent carbs diet





COPD: no acute exacerbation


on 1L NC all the time at home. Will continue


continue home Ventolin





Chronic respiratory failure with hypoxia: will continue oxygen by nasal canula





Pulmonary Hypertension: continue diuresis and oxygen 





CAD with h/o stent: stable at this point. 





HLD


continue home med





HTN


continue home med





GERD


continue home med





Right wrist pain s/p fall at home. Range of motion intact, and no motor/sensory


changes. therefore, prn pain meds and physical therapy





Right LE venous stasis ulcer:   continue wound care. 





Morbid obesity bmi 60.5 complicating acute issues





Obesity hypoventilation/ SINTIA: Had abnormal nocturnal pulse oximetry before. 

Never had a sleep study.  continue on oxygen





History of HIT: No heparin or lovenox. 





DVT prophylaxis with TEDS and SCD.





Deconditioning. will most likely require prolong hospital stay and potential 

rehab.


PT-activity as tolerated





VS,Fishbone, I+O


VS, Fishbone, I+O


Laboratory Tests


12/8/17 06:31








Red Blood Count 3.91 L, Mean Corpuscular Volume 98.7 H, Mean Corpuscular 

Hemoglobin 30.4, Mean Corpuscular Hemoglobin Concent 30.8 L, Red Cell 

Distribution Width 15.2 H, Calcium Level 8.8








Vital Signs








  Date Time  Temp Pulse Resp B/P (MAP) Pulse Ox O2 Delivery O2 Flow Rate FiO2


 


12/8/17 10:46   18     


 


12/8/17 09:00  75  99/55    


 


12/8/17 06:00 97.2    96 Nasal Cannula 1.0 














I&O- Last 24 Hours up to 6 AM 


 


 12/9/17





 06:00


 


Output Total 1350 ml


 


Balance -1350 ml

















JOSE REINOSO MD Dec 8, 2017 11:34

## 2017-12-08 NOTE — IPN
DATE:  12/08/2017

 

SUBJECTIVE: The patient is seen out of  bed to chair.  Reports that she was able

to ambulate a few steps with physical therapy, which she was not able to do

previously. She denies any acute complaints.  No shortness of breath at rest.

Remains with Murray catheter.  States that her bottom is sore from sitting in the

chair.

 

VITAL SIGNS: Temperature 97.2, pulse 57 to 75.  Respiratory rate 19, blood

pressure 123/67, saturating 96% on 1 liter nasal cannula.  Oral intake yesterday

is not recorded. Urine output is 3250.  Weight in the bed scale today is 163 kg.

GENERAL: The patient is seen out of bed to the chair comfortable in no acute

distress.  

HEENT: Extraocular muscles are intact.  The oral mucosa is moist with poor

dentition. Nasal cannula is in place.  The neck is obese.  Difficult to assess

the neck veins.

CARDIAC: S1, S2, irregularly irregular.  S2+ radial pulse.  3+ pitting edema in

the lower extremities. The chest has diminished breath sounds at the bases.

There is no tachypnea.  She is comfortable on 1 liter nasal cannula. 

ABDOMEN: soft, obese, significant pannus with induration and edema and 
chronically dry

skin.

EXTREMITIES: Lower extremities have bilateral pitting edema 2 to 3+.

NEUROLOGIC: She is at baseline mentation.

PSYCHIATRIC: Appropriate mood and affect.

 

LABORATORY DATA: White count 7.  Hemoglobin 11.9, platelet 210.  Sodium 138,

potassium 3.9, bicarbonate 32, BUN 26, creatinine 1.3, magnesium 2.4.

 

INPATIENT MEDICATIONS: The patient's Teflaro is discontinued per the primary

team. She continues on amiloride 5 mg by mouth daily, Lasix 60 IV twice a day,

and Diamox 250 by mouth four times a day. The remainder of medications are

unchanged from prior.

 

PROBLEMS:

1.  Acute on chronic decompensated diastolic heart failure secondary to

noncompliance with home diuretic regimen. The patient's alkalemia has improved. 
I

am not making any changes to her diuretic regimen today.  She continues on a

combination of Lasix, amiloride and Diamox for goal net negative 2 liter balance

daily.

 

2.  Chronic kidney disease stage III.  The patient's renal function is fairly

stable with diuresis and is close to baseline.  Overall weight seems to be down

about 10 kg.  She still has significant remaining edema and hypervolemia on exam

and will require further IV diuresis.

 

3.  Paroxysmal atrial fibrillation.  The patient continues on metoprolol,

amiodarone and Pradaxa.

 

4. Mild hypermagnesemia with magnesium 2.4. She is not on any magnesium

supplements.

 

5.  Morbid obesity, complicating her care along with chronic obstructive

pulmonary disease and obesity hypoventilation syndrome

 

6. Deconditioning. The patient continues with physical therapy.

GRISELDA

## 2017-12-09 VITALS — SYSTOLIC BLOOD PRESSURE: 100 MMHG | DIASTOLIC BLOOD PRESSURE: 51 MMHG

## 2017-12-09 VITALS — DIASTOLIC BLOOD PRESSURE: 55 MMHG | SYSTOLIC BLOOD PRESSURE: 90 MMHG

## 2017-12-09 VITALS — SYSTOLIC BLOOD PRESSURE: 101 MMHG | DIASTOLIC BLOOD PRESSURE: 58 MMHG

## 2017-12-09 LAB
ANION GAP SERPL CALC-SCNC: 5 MEQ/L (ref 8–16)
BUN SERPL-MCNC: 30 MG/DL (ref 7–18)
CALCIUM SERPL-MCNC: 9.1 MG/DL (ref 8.8–10.2)
CHLORIDE SERPL-SCNC: 99 MEQ/L (ref 98–107)
CO2 SERPL-SCNC: 36 MEQ/L (ref 21–32)
CREAT SERPL-MCNC: 1.36 MG/DL (ref 0.55–1.02)
ERYTHROCYTE [DISTWIDTH] IN BLOOD BY AUTOMATED COUNT: 15.2 % (ref 11.5–14.5)
GFR SERPL CREATININE-BSD FRML MDRD: 41.3 ML/MIN/{1.73_M2} (ref 45–?)
GLUCOSE SERPL-MCNC: 109 MG/DL (ref 80–110)
MAGNESIUM SERPL-MCNC: 2.4 MG/DL (ref 1.8–2.4)
MCH RBC QN AUTO: 30.6 PG (ref 27–33)
MCHC RBC AUTO-ENTMCNC: 30.7 G/DL (ref 32–36.5)
MCV RBC AUTO: 99.5 FL (ref 80–96)
NRBC BLD AUTO-RTO: 0 % (ref 0–0)
PLATELET # BLD AUTO: 213 10^3/UL (ref 150–450)
POTASSIUM SERPL-SCNC: 3.9 MEQ/L (ref 3.5–5.1)
SODIUM SERPL-SCNC: 140 MEQ/L (ref 136–145)
WBC # BLD AUTO: 6.8 10^3/UL (ref 4–10)

## 2017-12-09 RX ADMIN — DOCUSATE SODIUM,SENNOSIDES SCH TAB: 50; 8.6 TABLET, FILM COATED ORAL at 21:02

## 2017-12-09 RX ADMIN — ATORVASTATIN CALCIUM SCH MG: 20 TABLET, FILM COATED ORAL at 21:02

## 2017-12-09 RX ADMIN — DEXTROSE MONOHYDRATE SCH MG: 50 INJECTION, SOLUTION INTRAVENOUS at 08:31

## 2017-12-09 RX ADMIN — SODIUM CHLORIDE, PRESERVATIVE FREE SCH ML: 5 INJECTION INTRAVENOUS at 06:11

## 2017-12-09 RX ADMIN — SODIUM CHLORIDE, PRESERVATIVE FREE SCH ML: 5 INJECTION INTRAVENOUS at 13:08

## 2017-12-09 RX ADMIN — DABIGATRAN ETEXILATE MESYLATE SCH MG: 75 CAPSULE ORAL at 08:28

## 2017-12-09 RX ADMIN — DEXTROSE MONOHYDRATE SCH MG: 50 INJECTION, SOLUTION INTRAVENOUS at 17:51

## 2017-12-09 RX ADMIN — METOPROLOL TARTRATE SCH MG: 25 TABLET, FILM COATED ORAL at 08:30

## 2017-12-09 RX ADMIN — DABIGATRAN ETEXILATE MESYLATE SCH MG: 75 CAPSULE ORAL at 21:02

## 2017-12-09 RX ADMIN — SODIUM CHLORIDE, PRESERVATIVE FREE SCH ML: 5 INJECTION INTRAVENOUS at 21:10

## 2017-12-09 RX ADMIN — INSULIN LISPRO SCH UNITS: 100 INJECTION, SOLUTION INTRAVENOUS; SUBCUTANEOUS at 13:08

## 2017-12-09 RX ADMIN — FERROUS SULFATE TAB 325 MG (65 MG ELEMENTAL FE) SCH MG: 325 (65 FE) TAB at 08:29

## 2017-12-09 RX ADMIN — INSULIN LISPRO SCH UNITS: 100 INJECTION, SOLUTION INTRAVENOUS; SUBCUTANEOUS at 17:51

## 2017-12-09 RX ADMIN — INSULIN LISPRO SCH UNITS: 100 INJECTION, SOLUTION INTRAVENOUS; SUBCUTANEOUS at 21:00

## 2017-12-09 RX ADMIN — AMIODARONE HYDROCHLORIDE SCH MG: 200 TABLET ORAL at 08:29

## 2017-12-09 RX ADMIN — METOPROLOL TARTRATE SCH MG: 25 TABLET, FILM COATED ORAL at 21:03

## 2017-12-09 RX ADMIN — TIOTROPIUM BROMIDE SCH INHALATION: 18 CAPSULE ORAL; RESPIRATORY (INHALATION) at 08:11

## 2017-12-09 RX ADMIN — ASPIRIN SCH MG: 81 TABLET ORAL at 08:29

## 2017-12-09 RX ADMIN — DOCUSATE SODIUM,SENNOSIDES SCH TAB: 50; 8.6 TABLET, FILM COATED ORAL at 08:29

## 2017-12-09 RX ADMIN — POTASSIUM CHLORIDE SCH MEQ: 750 TABLET, EXTENDED RELEASE ORAL at 08:28

## 2017-12-09 RX ADMIN — NYSTATIN SCH DOSE: 100000 POWDER TOPICAL at 21:05

## 2017-12-09 RX ADMIN — HYDROCODONE BITARTRATE AND ACETAMINOPHEN PRN TAB: 5; 325 TABLET ORAL at 10:18

## 2017-12-09 RX ADMIN — NYSTATIN SCH DOSE: 100000 POWDER TOPICAL at 08:31

## 2017-12-09 RX ADMIN — INSULIN LISPRO SCH UNITS: 100 INJECTION, SOLUTION INTRAVENOUS; SUBCUTANEOUS at 08:30

## 2017-12-09 RX ADMIN — POLYETHYLENE GLYCOL 3350 SCH PKT: 17 POWDER, FOR SOLUTION ORAL at 08:27

## 2017-12-09 RX ADMIN — BISACODYL SCH MG: 10 SUPPOSITORY RECTAL at 09:00

## 2017-12-09 RX ADMIN — HYDROCODONE BITARTRATE AND ACETAMINOPHEN PRN TAB: 5; 325 TABLET ORAL at 21:04

## 2017-12-09 NOTE — IPNPDOC
Text Note


Date of Service


The patient was seen on 12/9/17.





NOTE


SUBJECTIVE: Patient is seen and examined at the bedside. chart has been 

reviewed. Says her SOB is slowly improving. Able to now sleep in bed. right leg 

still  with seepage. Hematuria has cleared. No fever or chills, no chest pain , 

cough improved, no nausea or vomiting or diarrhea. Complains that has not had a 

bowel movement for the past 8 days. 








PHYSICAL EXAM: 


Vitals : As Below


General Exam:  Positive: Alert, Cooperative, No Acute Distress


Eye Exam:  Positive: PERRLA, Conjunctiva & lids normal, EOMI


ENT Exam:  Positive: Atraumatic, Pharynx Normal, Tongue Midline, Other ENT (

poor dentition), 


   Negative: Pharyngeal Edema


Neck Exam:  Positive: Supple, 


   Negative: Lymphadenopathy


Chest Exam:  Positive: Rales (b/l lower lobes, diminished


Heart Exam:  Positive: Rate Normal, Irregular Rhythm, Other (distant heart 

sounds due to body habitus)


Telemetry:  Positive: Atrial fibrillation


Abdomen Exam:  Positive: Normal bowel sounds, 


   Negative: Soft (tense from fluid, with dry skin no lower abdomen), Tenderness


Extremity Exam:  Positive: Edema (3+), Tenderness (mild tenderness to touch on 

right LE)


Skin Exam:  Positive: Lesion (right LE warm & erythematous, weeping clear fluid)


Neuro Exam:  Positive: Normal Speech, Strength at 5/5 X4 ext, Normal Tone, 

Sensation Intact


Psych Exam:  Positive: Mood NL, Oriented x 3





Laboratory data/imaging studies reviewed.





ASSESSMENT:  67-year-old female admitted with a reason for visit of CHF. 68 yo 

F presents to ED via EMS after she fell trying to sit down in chair and per pt, 

just had miscalculated where the chair was. PMH includes diastolic CHF, 

paroxysmal A. fib on Pradaxa, HLD, HTN, NIDDM 2, COPD 1L NC, CAD s/p stents x2.

  


Noted in ED to have 3+ pitting edema, weeping fluid in lower legs, crackles in 

lungs. Given ASA, Lasix, and Duoneb in ED and admitted for CHF exacerbation. 





PLAN:


Acute decompensated diastolic heart failure


currently on  diamox and amiloride and lasix.  supplement k and mg as needed. 


strict I/Os, daily weights, low salt diet, fluid restriction. 





RADHA on CKD stage 3:  due to cardiorenal syndrome improving with diuresis. 

Alkalosis resolved. 





Right LE Cellulitis: finished teflaro course. 





Leukocytosis: likely combination of reactionary to acute CHF decompensation and 

RLE cellulitis





Hyperbilirubinemia resolved. 


may be 2/2 volume overload status and hepatic congestion





Paroxysmal A.Fib


on Pradaxa, amiodarone and Metoprolol





Hematuria: due to trauma to powre . HH stable will continue with Pradaxa. 





NIDDM2


hold home Metformin due to elevated Cr


ISS inpatient. consistent carbs diet





COPD: no acute exacerbation


on 1L NC all the time at home. Will continue


continue home Ventolin





Chronic respiratory failure with hypoxia: will continue oxygen by nasal canula





Pulmonary Hypertension: continue diuresis and oxygen 





CAD with h/o stent: stable at this point. 





HLD


continue home med





HTN


continue home med





GERD


continue home med





Right wrist pain s/p fall at home. Range of motion intact, and no motor/sensory


changes. therefore, prn pain meds and physical therapy





Right LE venous stasis ulcer:   continue wound care. 





Morbid obesity bmi 60.5 complicating acute issues





Obesity hypoventilation/ SINTIA: Had abnormal nocturnal pulse oximetry before. 

Never had a sleep study.  continue on oxygen





History of HIT: No heparin or lovenox. 





DVT prophylaxis with TEDS and SCD.





Deconditioning. will most likely require prolong hospital stay and potential 

rehab.


PT-activity as tolerated





VS,Fishbone, I+O


VS, Fishbone, I+O


Laboratory Tests


12/9/17 06:08








Red Blood Count 3.76 L, Mean Corpuscular Volume 99.5 H, Mean Corpuscular 

Hemoglobin 30.6, Mean Corpuscular Hemoglobin Concent 30.7 L, Red Cell 

Distribution Width 15.2 H, Calcium Level 9.1








Vital Signs








  Date Time  Temp Pulse Resp B/P (MAP) Pulse Ox O2 Delivery O2 Flow Rate FiO2


 


12/9/17 08:30  67  113/57    


 


12/9/17 06:00 97.0  18  94 Nasal Cannula 1.0 

















JOSE REINOSO MD Dec 9, 2017 09:07

## 2017-12-10 VITALS — DIASTOLIC BLOOD PRESSURE: 46 MMHG | SYSTOLIC BLOOD PRESSURE: 102 MMHG

## 2017-12-10 VITALS — SYSTOLIC BLOOD PRESSURE: 108 MMHG | DIASTOLIC BLOOD PRESSURE: 57 MMHG

## 2017-12-10 VITALS — DIASTOLIC BLOOD PRESSURE: 52 MMHG | SYSTOLIC BLOOD PRESSURE: 96 MMHG

## 2017-12-10 LAB
ALBUMIN SERPL BCG-MCNC: 3 GM/DL (ref 3.2–5.2)
ANION GAP SERPL CALC-SCNC: 8 MEQ/L (ref 8–16)
BUN SERPL-MCNC: 34 MG/DL (ref 7–18)
CALCIUM SERPL-MCNC: 8.9 MG/DL (ref 8.8–10.2)
CHLORIDE SERPL-SCNC: 98 MEQ/L (ref 98–107)
CO2 SERPL-SCNC: 33 MEQ/L (ref 21–32)
CREAT SERPL-MCNC: 1.39 MG/DL (ref 0.55–1.02)
GFR SERPL CREATININE-BSD FRML MDRD: 40.3 ML/MIN/{1.73_M2} (ref 45–?)
GLUCOSE SERPL-MCNC: 104 MG/DL (ref 80–110)
PHOSPHATE SERPL-MCNC: 3.5 MG/DL (ref 2.5–4.9)
POTASSIUM SERPL-SCNC: 3.6 MEQ/L (ref 3.5–5.1)
SODIUM SERPL-SCNC: 139 MEQ/L (ref 136–145)

## 2017-12-10 RX ADMIN — SODIUM CHLORIDE, PRESERVATIVE FREE SCH ML: 5 INJECTION INTRAVENOUS at 21:13

## 2017-12-10 RX ADMIN — DEXTROSE MONOHYDRATE SCH MG: 50 INJECTION, SOLUTION INTRAVENOUS at 08:56

## 2017-12-10 RX ADMIN — AMIODARONE HYDROCHLORIDE SCH MG: 200 TABLET ORAL at 08:54

## 2017-12-10 RX ADMIN — METOPROLOL TARTRATE SCH MG: 25 TABLET, FILM COATED ORAL at 08:56

## 2017-12-10 RX ADMIN — INSULIN LISPRO SCH UNITS: 100 INJECTION, SOLUTION INTRAVENOUS; SUBCUTANEOUS at 08:58

## 2017-12-10 RX ADMIN — DABIGATRAN ETEXILATE MESYLATE SCH MG: 75 CAPSULE ORAL at 21:09

## 2017-12-10 RX ADMIN — SODIUM CHLORIDE, PRESERVATIVE FREE SCH ML: 5 INJECTION INTRAVENOUS at 06:00

## 2017-12-10 RX ADMIN — INSULIN LISPRO SCH UNITS: 100 INJECTION, SOLUTION INTRAVENOUS; SUBCUTANEOUS at 17:11

## 2017-12-10 RX ADMIN — FERROUS SULFATE TAB 325 MG (65 MG ELEMENTAL FE) SCH MG: 325 (65 FE) TAB at 08:54

## 2017-12-10 RX ADMIN — ATORVASTATIN CALCIUM SCH MG: 20 TABLET, FILM COATED ORAL at 21:09

## 2017-12-10 RX ADMIN — DEXTROSE MONOHYDRATE SCH MG: 50 INJECTION, SOLUTION INTRAVENOUS at 17:10

## 2017-12-10 RX ADMIN — NYSTATIN SCH DOSE: 100000 POWDER TOPICAL at 08:56

## 2017-12-10 RX ADMIN — NYSTATIN SCH DOSE: 100000 POWDER TOPICAL at 21:11

## 2017-12-10 RX ADMIN — ASPIRIN SCH MG: 81 TABLET ORAL at 08:54

## 2017-12-10 RX ADMIN — INSULIN LISPRO SCH UNITS: 100 INJECTION, SOLUTION INTRAVENOUS; SUBCUTANEOUS at 21:00

## 2017-12-10 RX ADMIN — HYDROCODONE BITARTRATE AND ACETAMINOPHEN PRN TAB: 5; 325 TABLET ORAL at 12:18

## 2017-12-10 RX ADMIN — POLYETHYLENE GLYCOL 3350 SCH PKT: 17 POWDER, FOR SOLUTION ORAL at 08:55

## 2017-12-10 RX ADMIN — HYDROCODONE BITARTRATE AND ACETAMINOPHEN PRN TAB: 5; 325 TABLET ORAL at 21:12

## 2017-12-10 RX ADMIN — SODIUM CHLORIDE, PRESERVATIVE FREE SCH ML: 5 INJECTION INTRAVENOUS at 17:11

## 2017-12-10 RX ADMIN — INSULIN LISPRO SCH UNITS: 100 INJECTION, SOLUTION INTRAVENOUS; SUBCUTANEOUS at 12:00

## 2017-12-10 RX ADMIN — POTASSIUM CHLORIDE SCH MEQ: 750 TABLET, EXTENDED RELEASE ORAL at 08:55

## 2017-12-10 RX ADMIN — DOCUSATE SODIUM,SENNOSIDES SCH TAB: 50; 8.6 TABLET, FILM COATED ORAL at 21:09

## 2017-12-10 RX ADMIN — DABIGATRAN ETEXILATE MESYLATE SCH MG: 75 CAPSULE ORAL at 08:54

## 2017-12-10 RX ADMIN — BISACODYL SCH MG: 10 SUPPOSITORY RECTAL at 08:56

## 2017-12-10 RX ADMIN — DOCUSATE SODIUM,SENNOSIDES SCH TAB: 50; 8.6 TABLET, FILM COATED ORAL at 08:54

## 2017-12-10 RX ADMIN — TIOTROPIUM BROMIDE SCH INHALATION: 18 CAPSULE ORAL; RESPIRATORY (INHALATION) at 08:14

## 2017-12-10 RX ADMIN — METOPROLOL TARTRATE SCH MG: 25 TABLET, FILM COATED ORAL at 21:00

## 2017-12-10 NOTE — IPNPDOC
Text Note


Date of Service


The patient was seen on 12/10/17.





NOTE


SUBJECTIVE: Patient is seen and examined at the bedside. chart has been 

reviewed. SOB has improved.  right leg still  with seepage. Hematuria has 

cleared. No fever or chills, no chest pain , cough improved, no nausea or 

vomiting or diarrhea. Complains that has not had a bowel movement yet. 








PHYSICAL EXAM: 


Vitals : As Below


General Exam:  Positive: Alert, Cooperative, No Acute Distress


Eye Exam:  Positive: PERRLA, Conjunctiva & lids normal, EOMI


ENT Exam:  Positive: Atraumatic, Pharynx Normal, Tongue Midline, Other ENT (

poor dentition), 


   Negative: Pharyngeal Edema


Neck Exam:  Positive: Supple, 


   Negative: Lymphadenopathy


Chest Exam:  Positive: Rales (b/l lower lobes, diminished


Heart Exam:  Positive: Rate Normal, Irregular Rhythm, Other (distant heart 

sounds due to body habitus)


Telemetry:  Positive: Atrial fibrillation


Abdomen Exam:  Positive: Normal bowel sounds, 


   Negative: Soft (tense from fluid, with dry skin no lower abdomen), Tenderness


Extremity Exam:  Positive: Edema (3+), Tenderness (mild tenderness to touch on 

right LE)


Skin Exam:  Positive: Lesion (right LE warm & erythematous, weeping clear fluid)


Neuro Exam:  Positive: Normal Speech, Strength at 5/5 X4 ext, Normal Tone, 

Sensation Intact


Psych Exam:  Positive: Mood NL, Oriented x 3





Laboratory data/imaging studies reviewed.





ASSESSMENT:  67-year-old female admitted with a reason for visit of CHF. 66 yo 

F presents to ED via EMS after she fell trying to sit down in chair and per pt, 

just had miscalculated where the chair was. PMH includes diastolic CHF, 

paroxysmal A. fib on Pradaxa, HLD, HTN, NIDDM 2, COPD 1L NC, CAD s/p stents x2.

  


Noted in ED to have 3+ pitting edema, weeping fluid in lower legs, crackles in 

lungs. Given ASA, Lasix, and Duoneb in ED and admitted for CHF exacerbation. 





PLAN:


Acute decompensated diastolic heart failure


currently on  diamox and amiloride and lasix.  supplement k and mg as needed. 


strict I/Os, daily weights, low salt diet, fluid restriction. 





RADHA on CKD stage 3:  due to cardiorenal syndrome improving with diuresis. 

Alkalosis resolved. 





Right LE Cellulitis: finished teflaro course. 





Leukocytosis: likely combination of reactionary to acute CHF decompensation and 

RLE cellulitis





Hyperbilirubinemia resolved. 


may be 2/2 volume overload status and hepatic congestion





Paroxysmal A.Fib


on Pradaxa, amiodarone and Metoprolol





Hematuria: due to trauma to power . HH stable will continue with Pradaxa. 





NIDDM2


hold home Metformin due to elevated Cr


ISS inpatient. consistent carbs diet





COPD: no acute exacerbation


on 1L NC all the time at home. Will continue


continue home Ventolin





Chronic respiratory failure with hypoxia: will continue oxygen by nasal canula





Pulmonary Hypertension: continue diuresis and oxygen 





CAD with h/o stent: stable at this point. 





HLD


continue home med





HTN


continue home med





GERD


continue home med





Right wrist pain s/p fall at home. Range of motion intact, and no motor/sensory


changes. therefore, prn pain meds and physical therapy





Right LE venous stasis ulcer:   continue wound care. 





Morbid obesity bmi 60.5 complicating acute issues





Obesity hypoventilation/ SINTIA: Had abnormal nocturnal pulse oximetry before. 

Never had a sleep study.  continue on oxygen





History of HIT: No heparin or lovenox. 





DVT prophylaxis with TEDS and SCD.





Deconditioning. will most likely require prolong hospital stay and potential 

rehab.


PT-activity as tolerated





VS,Fishbone, I+O


VS, Fishbone, I+O





Vital Signs








  Date Time  Temp Pulse Resp B/P (MAP) Pulse Ox O2 Delivery O2 Flow Rate FiO2


 


12/10/17 08:56  70  97/52    


 


12/10/17 06:00 97.0  18  95 Nasal Cannula 2.0 

















JOSE REINOSO MD Dec 10, 2017 10:23

## 2017-12-10 NOTE — IPN
DATE OF SERVICE:  12/09/2017

 

SUBJECTIVE:  The patient was seen and examined at the bedside today morning.  She

was laying in the bed.  She did not have any active complaints.  She has an

indwelling Murray catheter.  She continues to make good amount of urine.  She is

hemodynamically stable.  Renal function is also close to baseline.

 

REVIEW OF SYSTEMS:  The patient denies any fever, chills, rigors.  She denies any

chest pain, shortness of breath.  She denies any pain abdomen, constipation.  She

does report persistent abdominal wall edema, and she reports that lower extremity

edema is improved now.  The rest of review of systems is negative.

 

OBJECTIVE:

Temperature is  97 degrees Fahrenheit, blood pressure is 101/58, pulse is 70,

respiratory rate of 18, saturating 94% on nasal cannula at 2 liters.

INTAKE AND OUTPUT:  Urine output recorded yesterday 3.6 liters.  Urine output so

far today since overnight is 1.7 liters.  Weight in the bed scale is 156 kg,

which is not reliable because it is 7 kg below yesterday's weight.

 

PHYSICAL EXAMINATION:

GENERAL:  The patient is awake, alert, oriented times three, laying in bed, in no

apparent distress.

HEAD AND NECK EXAMINATION:  Extraocular muscles intact.  Pupils equally round and

reactive to light.  Mucous membranes are moist.  Neck is supple.  I could not

appreciate jugular venous distention (JVD) because the patient is very obese.

CARDIOVASCULAR:  S1, S2, irregularly irregular heart rate.  She has 2+ edema of

the bilateral lower extremities.  She has abdominal wall edema, as well.

RESPIRATORY:  Chest is clear to auscultation bilaterally.  Bilateral equal air

entry.  No rales or rhonchi.

ABDOMEN:  Soft.  Obese.  Positive massive abdominal wall edema.  I could not

appreciate any organomegaly.

MUSCULOSKELETAL:  No clubbing or cyanosis, but she has 2+ pitting edema on the

bilateral lower extremities

CENTRAL NERVOUS SYSTEM (CNS):  No focal neurological deficit.  Power is 5/5 in

bilateral upper extremities.

PSYCHIATRIC:  Normal mood and affect.

 

LABORATORY REVIEW:

CBC showed a WBC of 6.8, hemoglobin 11.5, platelets are 213.

 

BMP showed sodium 140, potassium 3.9, chloride 99, bicarbonate 36, BUN at 30,

creatinine is 1.36 which is stable, calcium 9.1, magnesium 2.4.

 

CURRENT INPATIENT MEDICATIONS:  The patient's medications were all reviewed by

me.  She continues to be on acetazolamide 250 mg by mouth four times a day.  Her

amiloride dose has been increased to 10 mg by mouth daily.  Potassium chloride

dose has been decreased to 20 mEq by mouth daily.  She continues to be on Lasix

60 mg intravenous (IV) twice a day.  There is no other change in the medications

today as compared with yesterday.

 

ASSESSMENT:  A 67-year-old female with past medical history of chronic kidney

disease stage III, diastolic congestive heart failure, paroxysmal atrial

fibrillation (AFib), and morbid obesity, admitted this time because of

decompensated diastolic congestive heart failure secondary to noncompliance with

her diuretics.

 

PLAN:

 

1.  Decompensated diastolic congestive heart failure.  The patient continues to

be on IV and oral diuretics.  She is responding very well to diuretics.  She is

almost 2 liters negative every day.  I will continue the current dose of Lasix.

Amiloride dose has been increased because the patient is still requiring

potassium supplementation.  Continue the Murray catheter at this time because it

is very inconvenient for the patient to go multiple times to the restroom, and

she is morbidly obese.

 

2.  Chronic kidney disease stage III.  The patient's renal function continues to

be stable with the creatinine of 1.3.  Okay to continue current dose of

diuretics.

 

3.  Paroxysmal atrial fibrillation.  The patient's heart rate is controlled at

this time.  Continue current dose of metoprolol 12.5 mg by mouth bid, amiodarone

200 mg by mouth daily, and Pradaxa 150 mg by mouth twice a day.

 

4.  Metabolic alkalosis, which is secondary to aggressive diuresis.  Bicarbonate

level is 36.  She continues to be on acetazolamide and amiloride, both of which

prevent acidosis.  However, I am not going to cut down the dose of diuretic

because the patient still has a lot of abdominal wall and lower extremity edema.

 

 

5.  Morbid obesity and deconditioning.  The patient lives alone.  She is morbidly

obese; and because of the lower extremity edema, she has been deconditioned.  She

is unable to walk.  She needs physical therapy before discharge from the

hospital.

## 2017-12-11 VITALS — DIASTOLIC BLOOD PRESSURE: 50 MMHG | SYSTOLIC BLOOD PRESSURE: 105 MMHG

## 2017-12-11 VITALS — SYSTOLIC BLOOD PRESSURE: 113 MMHG | DIASTOLIC BLOOD PRESSURE: 62 MMHG

## 2017-12-11 VITALS — SYSTOLIC BLOOD PRESSURE: 124 MMHG | DIASTOLIC BLOOD PRESSURE: 58 MMHG

## 2017-12-11 LAB
ALBUMIN SERPL BCG-MCNC: 3.3 GM/DL (ref 3.2–5.2)
ANION GAP SERPL CALC-SCNC: 6 MEQ/L (ref 8–16)
BUN SERPL-MCNC: 30 MG/DL (ref 7–18)
CALCIUM SERPL-MCNC: 9.1 MG/DL (ref 8.8–10.2)
CHLORIDE SERPL-SCNC: 98 MEQ/L (ref 98–107)
CO2 SERPL-SCNC: 35 MEQ/L (ref 21–32)
CREAT SERPL-MCNC: 1.36 MG/DL (ref 0.55–1.02)
GFR SERPL CREATININE-BSD FRML MDRD: 41.3 ML/MIN/{1.73_M2} (ref 45–?)
GLUCOSE SERPL-MCNC: 129 MG/DL (ref 80–110)
PHOSPHATE SERPL-MCNC: 2.8 MG/DL (ref 2.5–4.9)
POTASSIUM SERPL-SCNC: 3.7 MEQ/L (ref 3.5–5.1)
SODIUM SERPL-SCNC: 139 MEQ/L (ref 136–145)

## 2017-12-11 RX ADMIN — HYDROCODONE BITARTRATE AND ACETAMINOPHEN PRN TAB: 5; 325 TABLET ORAL at 22:03

## 2017-12-11 RX ADMIN — SODIUM CHLORIDE, PRESERVATIVE FREE SCH ML: 5 INJECTION INTRAVENOUS at 22:01

## 2017-12-11 RX ADMIN — LORATADINE PRN MG: 10 TABLET ORAL at 11:43

## 2017-12-11 RX ADMIN — METOPROLOL TARTRATE SCH MG: 25 TABLET, FILM COATED ORAL at 22:00

## 2017-12-11 RX ADMIN — INSULIN LISPRO SCH UNITS: 100 INJECTION, SOLUTION INTRAVENOUS; SUBCUTANEOUS at 07:07

## 2017-12-11 RX ADMIN — DEXTROSE MONOHYDRATE SCH MG: 50 INJECTION, SOLUTION INTRAVENOUS at 08:24

## 2017-12-11 RX ADMIN — DOCUSATE SODIUM,SENNOSIDES SCH TAB: 50; 8.6 TABLET, FILM COATED ORAL at 08:23

## 2017-12-11 RX ADMIN — DABIGATRAN ETEXILATE MESYLATE SCH MG: 75 CAPSULE ORAL at 22:00

## 2017-12-11 RX ADMIN — AMIODARONE HYDROCHLORIDE SCH MG: 200 TABLET ORAL at 08:23

## 2017-12-11 RX ADMIN — NYSTATIN SCH DOSE: 100000 POWDER TOPICAL at 08:25

## 2017-12-11 RX ADMIN — DABIGATRAN ETEXILATE MESYLATE SCH MG: 75 CAPSULE ORAL at 08:23

## 2017-12-11 RX ADMIN — INSULIN LISPRO SCH UNITS: 100 INJECTION, SOLUTION INTRAVENOUS; SUBCUTANEOUS at 12:12

## 2017-12-11 RX ADMIN — INSULIN LISPRO SCH UNITS: 100 INJECTION, SOLUTION INTRAVENOUS; SUBCUTANEOUS at 20:52

## 2017-12-11 RX ADMIN — BISACODYL SCH MG: 10 SUPPOSITORY RECTAL at 08:26

## 2017-12-11 RX ADMIN — ASPIRIN SCH MG: 81 TABLET ORAL at 08:24

## 2017-12-11 RX ADMIN — INSULIN LISPRO SCH UNITS: 100 INJECTION, SOLUTION INTRAVENOUS; SUBCUTANEOUS at 17:11

## 2017-12-11 RX ADMIN — FERROUS SULFATE TAB 325 MG (65 MG ELEMENTAL FE) SCH MG: 325 (65 FE) TAB at 08:23

## 2017-12-11 RX ADMIN — SODIUM CHLORIDE, PRESERVATIVE FREE SCH ML: 5 INJECTION INTRAVENOUS at 13:20

## 2017-12-11 RX ADMIN — ATORVASTATIN CALCIUM SCH MG: 20 TABLET, FILM COATED ORAL at 22:00

## 2017-12-11 RX ADMIN — POLYETHYLENE GLYCOL 3350 SCH PKT: 17 POWDER, FOR SOLUTION ORAL at 08:24

## 2017-12-11 RX ADMIN — TIOTROPIUM BROMIDE SCH INHALATION: 18 CAPSULE ORAL; RESPIRATORY (INHALATION) at 07:38

## 2017-12-11 RX ADMIN — DEXTROSE MONOHYDRATE SCH MG: 50 INJECTION, SOLUTION INTRAVENOUS at 17:20

## 2017-12-11 RX ADMIN — HYDROCODONE BITARTRATE AND ACETAMINOPHEN PRN TAB: 5; 325 TABLET ORAL at 11:44

## 2017-12-11 RX ADMIN — DOCUSATE SODIUM,SENNOSIDES SCH TAB: 50; 8.6 TABLET, FILM COATED ORAL at 22:00

## 2017-12-11 RX ADMIN — SODIUM CHLORIDE, PRESERVATIVE FREE SCH ML: 5 INJECTION INTRAVENOUS at 05:28

## 2017-12-11 RX ADMIN — POTASSIUM CHLORIDE SCH MEQ: 750 TABLET, EXTENDED RELEASE ORAL at 08:24

## 2017-12-11 RX ADMIN — METOPROLOL TARTRATE SCH MG: 25 TABLET, FILM COATED ORAL at 08:05

## 2017-12-11 RX ADMIN — NYSTATIN SCH DOSE: 100000 POWDER TOPICAL at 22:01

## 2017-12-11 NOTE — IPNPDOC
Text Note


Date of Service


The patient was seen on 12/11/17.





NOTE


SUBJECTIVE: Patient is seen and examined at the bedside. chart has been 

reviewed. SOB has improved.  right leg still  with seepage. Hematuria has 

cleared. No fever or chills, no chest pain , cough improved, no nausea or 

vomiting or diarrhea.Had a small bowel movement . Sitting up in chair. Requests 

to keep the power in for 1 more day as still not moving very well..








PHYSICAL EXAM: 


Vitals : As Below


General Exam:  Positive: Alert, Cooperative, No Acute Distress


Eye Exam:  Positive: PERRLA, Conjunctiva & lids normal, EOMI


ENT Exam:  Positive: Atraumatic, Pharynx Normal, Tongue Midline, Other ENT (

poor dentition), 


   Negative: Pharyngeal Edema


Neck Exam:  Positive: Supple, 


   Negative: Lymphadenopathy


Chest Exam:  Positive: Rales (b/l lower lobes, diminished


Heart Exam:  Positive: Rate Normal, Irregular Rhythm, Other (distant heart 

sounds due to body habitus)


Telemetry:  Positive: Atrial fibrillation


Abdomen Exam:  Positive: Normal bowel sounds, 


   Negative: Soft (tense from fluid, with dry skin no lower abdomen), Tenderness


Extremity Exam:  Positive: Edema (3+), Tenderness (mild tenderness to touch on 

right LE)


Skin Exam:  Positive: Lesion (right LE warm & erythematous, weeping clear fluid)


Neuro Exam:  Positive: Normal Speech, Strength at 5/5 X4 ext, Normal Tone, 

Sensation Intact


Psych Exam:  Positive: Mood NL, Oriented x 3





Laboratory data/imaging studies reviewed.





ASSESSMENT:  67-year-old female admitted with a reason for visit of CHF. 66 yo 

F presents to ED via EMS after she fell trying to sit down in chair and per pt, 

just had miscalculated where the chair was. PMH includes diastolic CHF, 

paroxysmal A. fib on Pradaxa, HLD, HTN, NIDDM 2, COPD 1L NC, CAD s/p stents x2.

  


Noted in ED to have 3+ pitting edema, weeping fluid in lower legs, crackles in 

lungs. Given ASA, Lasix, and Duoneb in ED and admitted for CHF exacerbation. 





PLAN:


Acute decompensated diastolic heart failure


currently on  diamox and amiloride and lasix.  supplement k and mg as needed. 


strict I/Os, daily weights, low salt diet, fluid restriction. 





RADHA on CKD stage 3:  due to cardiorenal syndrome improving with diuresis. 

Alkalosis resolved. 





Right LE Cellulitis: finished teflaro course. 





Leukocytosis: resolved. likely combination of reactionary to acute CHF 

decompensation and RLE cellulitis





Hyperbilirubinemia resolved. 


may be 2/2 volume overload status and hepatic congestion





Paroxysmal A.Fib


on Pradaxa, amiodarone and Metoprolol





Hematuria: resolved. due to trauma to power . HH stable will continue with 

Pradaxa. 





NIDDM2


hold home Metformin due to elevated Cr


ISS inpatient. consistent carbs diet





COPD: no acute exacerbation


on 1L NC all the time at home. Will continue


continue home Ventolin





Chronic respiratory failure with hypoxia: will continue oxygen by nasal canula





Pulmonary Hypertension: continue diuresis and oxygen 





CAD with h/o stent: stable at this point. 





HLD


continue home med





HTN


continue home med





GERD


continue home med





Right LE venous stasis ulcer:   continue wound care. 





Morbid obesity bmi 60.5 complicating acute issues





Obesity hypoventilation/ SINTIA: Had abnormal nocturnal pulse oximetry before. 

Never had a sleep study.  continue on oxygen





History of HIT: No heparin or lovenox. 





DVT prophylaxis with TEDS and SCD.





Deconditioning. will most likely require prolong hospital stay and potential 

rehab.


PT-activity as tolerated





VS,Fishbone, I+O


VS, Fishbone, I+O


Laboratory Tests


12/11/17 10:04








Anion Gap 6 L








Vital Signs








  Date Time  Temp Pulse Resp B/P (MAP) Pulse Ox O2 Delivery O2 Flow Rate FiO2


 


12/11/17 12:14   20   Nasal Cannula  


 


12/11/17 08:45       2.0 


 


12/11/17 08:05  80  109/60    


 


12/11/17 06:00 97.8    89   














I&O- Last 24 Hours up to 6 AM 


 


 12/12/17





 06:00


 


Intake Total 360 ml


 


Output Total 350 ml


 


Balance 10 ml

















JOSE REINOSO MD Dec 11, 2017 12:40

## 2017-12-11 NOTE — IPN
DATE OF SERVICE:  12/10/2017

 

SUBJECTIVE:  The patient was seen and examined at the bedside today morning.  She

was laying in the bed.  She continues to diurese well.  Patient is

hemodynamically stable.  Renal function also continues to be stable at this time

and patient is gradually losing all the fluid weight.

 

REVIEW OF SYSTEMS:  The patient denies any fever, chills, rigors.  She denies any

chest pain, shortness of breath.  She does report abdominal wall edema, which is

improving, and she reports lower extremity edema and blisters are also improving.

The rest of review of systems is negative.

 

OBJECTIVE:

Temperature is 97 degrees Fahrenheit, blood pressure is 108/57, pulse is 64,

respiratory rate of 18, saturating 95% on nasal cannula at 2 liters.

INTAKE AND OUTPUT:  Urine output recorded as 3.7 liters yesterday, 1 liter so far

today since overnight.  Weight on the bed scale is 154.9 kg.

 

PHYSICAL EXAMINATION:

GENERAL:  The patient is awake, alert, oriented times three, laying in bed, in no

apparent distress.

HEAD AND NECK EXAMINATION:  Extraocular muscles intact.  Pupils equally round and

reactive to light.  Mucous membranes are moist.  Neck is supple.  I could not

appreciate jugular venous distention (JVD).

CARDIOVASCULAR:  S1, S2, irregularly irregular heart rate.  She has 1+ edema of

the bilateral lower extremities.  Patient has abdominal wall edema as well.

RESPIRATORY:  Chest is clear to auscultation bilaterally.  Bilateral equal air

entry.  No rales or rhonchi.

ABDOMEN:  Soft.  Obese.  Positive bowel sounds.  Massive abdominal wall edema.  I

could not appreciate any organomegaly because of body habitus.

MUSCULOSKELETAL:  No clubbing or cyanosis.  She 1+ of the bilateral lower

extremities.  The leg ulcers and blisters are healing now.

CENTRAL NERVOUS SYSTEM (CNS):  No focal neurological deficit.  Power is 5/5 in

bilateral upper extremities.

PSYCHIATRIC:  Normal mood and affect.

 

LABORATORY REVIEW:

BMP done today morning showed sodium 139, potassium 3.6, chloride 98 bicarbonate

33 BUN at 34 creatinine is 1.39, calcium 8.9, phosphorous 3.5, albumin is 3.

 

CURRENT INPATIENT MEDICATIONS:  The patient's medications were all reviewed by

me.  Acetazolamide has been stopped now.  Her amiloride dose was increased to 10

mg daily.  She continues to be on Lasix 60 mg IV twice a day.  There is no other

change in the medications today as compared with yesterday.

 

ASSESSMENT:  67-year-old female with past medical history of chronic kidney

disease stage III, diastolic congestive heart failure, paroxysmal atrial

fibrillation, and morbid obesity, admitted this time because of decompensated

diastolic congestive heart failure secondary to noncompliance with her

diuretics.

 

PLAN:

 

1.  Decompensated diastolic congestive heart failure.  The patient continues to

diurese well.  She is almost 2 liters negative every day.  Continue current dose

of Lasix 60 mg twice a day.  Continue amiloride 10 mg daily.  If diuresis is

inadequate then the Lasix dose would be increased.  Patient was on Diamox for

metabolic alkalosis.  Diamox dose has been stopped.   Bicarb level is acceptable.

A further change in the diuretics would be done tomorrow after reviewing the

labs.

 

2.  Chronic kidney disease stage III.  The patient's renal function continues to

be stable despite aggressive diuresis.  Baseline creatinine is around 1.3.

 

3.  Paroxysmal atrial fibrillation.  Heart rate is controlled at this time.

Continue current dose of metoprolol 12.5 mg twice a day, amiodarone 200 mg daily,

and Pradaxa 150 mg by mouth twice a day.

 

4.  Metabolic alkalosis.  Amiloride dose was increased to 10 mg daily.

Acetazolamide has been stopped.  Bicarb level is 33, which is acceptable at this

time.

 

5.  Morbid obesity and deconditioning.  Whenever I see the patient she is laying

in the bed and resting.  She needs more physical therapy and I am going to have

her Murray catheter removed soon so she goes to the restroom and she does not keep

on laying in the bed most of the time.

## 2017-12-11 NOTE — IPNPDOC
Date Seen


The patient was seen on 12/11/17.





Progress Note


SUBJECTIVE: Patient is a 67-year-old  female with decompensated 

diastolic congestive heart failure.  Patient is evaluated at bedside this 

morning.  She is sitting in a chair.  States that she feels quite weak and 

thinks that she would benefit from physical therapy to improve her strength.  

Her Murray catheter remains in place.  An order has been placed for the Murray 

catheter to be removed.  A renal profile has been ordered.  She continues to 

diurese.





REVIEW OF SYSTEMS: Admits to weakness and some abdominal pain secondary to 

abdominal fluid.  Denies chest pain, fever, night sweats, chills.





OBJECTIVE


PHYSICAL EXAMINATION:


VITAL SIGNS: Please see below. 


GENERAL: Obese  female, well nourished, well developed, appears stated 

age, no acute distress, A&O x3


HEENT: Atraumatic, normocephalic, PERRL, EOMI, oral mucosa appears pink and 

moist, nasal septum appears midline, nares are patent


CARDIOVASCULAR: Irregularly irregular heart rate and rhythm, no murmur, rub, 

click


RESPIRATORY: Clear to auscultation bilaterally, adequate inspiratory and 

expiratory airway excursion, no wheeze, rhonchi, crackles


ABDOMINAL: +2 pitting edema appreciated along the lower abdomen with cutaneous 

dimpling appreciated, bowel sounds appreciated


EXTREMITIES: Chronic venous stasis dermatitis appreciated in bilateral lower 

extremities, +1 pitting edema appreciated in bilateral lower extremities


NEUROLOGICAL: CN II-XII grossly intact


PSYCHOLOGICAL: Alert and conversant





LABORATORY DATA: Please see below.





MICROBIOLOGY: Please see below.





CURRENT INPATIENT MEDICATIONS:  All medications reviewed by myself and 

attending.  Have increased patient's Lasix to 40mg IV every eight hours.  Added 

Metolazone 2.5mg orally daily.  Remains on Amiloride 10mg orally daily.





DVT prophylaxis ordered?: Pradaxa 150mg orally twice a day.





ASSESSMENT AND PLAN: This is a 67-year-old  female with decompensated 

diastolic congestive heart failure.  Past medical history significant for 

chronic kidney disease stage III, paroxysmal atrial fibrillation, morbid obesity

, and diastolic congestive heart failure.





PROBLEMS:


1.  Decompensated diastolic congestive heart failure:  I/O over the last 24 

hours is 2535mL.  Have increased Lasix to 60mg IV every eight hours.  Also 

added Metolazone 2.5mg orally daily.  Continues with Amiloride 10mg orally 

daily.  Would potentially benefit from 3L/day negative fluid balance.  Changes 

to medications are to optimize fluid removal.  Will reassess tomorrow.


 


2.  Chronic kidney disease stage III:  Renal function today has improved.  

Creatinine is 1.36, which is around patient's baseline of 1.3.  Continue with 

diuresis.


 


3.  Paroxysmal atrial fibrillation:  Heart rate is controlled at this time. 

Continue current dose of metoprolol 12.5 mg twice a day, amiodarone 200 mg daily

,


and Pradaxa 150 mg by mouth twice a day.


 


4.  Metabolic alkalosis: Remains on Amiloride 10 mg orally daily.  Bicarbonate 

level is 35 today, which is acceptable.


 


5.  Morbid obesity and deconditioning:  Patient is sitting in a chair at today'

s evaluation.  She states that she feels weak and would benefit from physical 

therapy.  Discontinue Murray catheter in order to encourage her to ambulate.





DISPOSITION: Continue with diuresis.  Encourage ambulation.  Remove Murray 

catheter.





VS, I&O, 24H, Fishbone


Vital Signs/I&O





Vital Signs








  Date Time  Temp Pulse Resp B/P (MAP) Pulse Ox O2 Delivery O2 Flow Rate FiO2


 


12/11/17 08:45      Nasal Cannula 2.0 


 


12/11/17 08:05  80  109/60    


 


12/11/17 06:00 97.8  16  89   














I&O- Last 24 Hours up to 6 AM 


 


 12/12/17





 05:59


 


Intake Total 600 ml


 


Output Total 350 ml


 


Balance 250 ml











Laboratory Data


24H LABS


Laboratory Tests 2


12/10/17 11:32: 


Blood Urea Nitrogen 34H, Creatinine 1.39H, Sodium Level 139, Potassium Level 3.6

, Chloride Level 98, Carbon Dioxide Level 33H, Anion Gap 8, Glomerular 

Filtration Rate 40.3L, Calcium Level 8.9, Phosphorus Level 3.5, Albumin 3.0L


12/10/17 13:23: Bedside Glucose (Misc Panel) 99


12/10/17 16:57: Bedside Glucose (Misc Panel) 111


12/10/17 19:54: Bedside Glucose (Misc Panel) 141H


12/11/17 10:04: 


Blood Urea Nitrogen 30H, Creatinine 1.36H, Sodium Level 139, Potassium Level 3.7

, Chloride Level 98, Carbon Dioxide Level 35H, Anion Gap 6L, Glomerular 

Filtration Rate 41.3L, Calcium Level 9.1, Phosphorus Level 2.8, Albumin 3.3


CBC/BMP


Laboratory Tests


12/10/17 11:32








Anion Gap 8





12/11/17 10:04








Anion Gap 6 L


Microbiology





Microbiology


12/2/17 Blood Culture - Final, Complete


          NO GROWTH AFTER 5 DAYS


12/2/17 Blood Culture - Final, Complete


          NO GROWTH AFTER 5 DAYS


12/2/17 Urine Culture - Final, Complete











RICHARD BENTLEY DO Dec 11, 2017 11:46


PATTI AMBROCIO MD Dec 13, 2017 22:28

## 2017-12-12 VITALS — SYSTOLIC BLOOD PRESSURE: 104 MMHG | DIASTOLIC BLOOD PRESSURE: 51 MMHG

## 2017-12-12 VITALS — SYSTOLIC BLOOD PRESSURE: 102 MMHG | DIASTOLIC BLOOD PRESSURE: 52 MMHG

## 2017-12-12 VITALS — DIASTOLIC BLOOD PRESSURE: 78 MMHG | SYSTOLIC BLOOD PRESSURE: 123 MMHG

## 2017-12-12 LAB
ANION GAP SERPL CALC-SCNC: 5 MEQ/L (ref 8–16)
BUN SERPL-MCNC: 31 MG/DL (ref 7–18)
CALCIUM SERPL-MCNC: 9.3 MG/DL (ref 8.8–10.2)
CHLORIDE SERPL-SCNC: 96 MEQ/L (ref 98–107)
CO2 SERPL-SCNC: 37 MEQ/L (ref 21–32)
CREAT SERPL-MCNC: 1.33 MG/DL (ref 0.55–1.02)
ERYTHROCYTE [DISTWIDTH] IN BLOOD BY AUTOMATED COUNT: 15.2 % (ref 11.5–14.5)
GFR SERPL CREATININE-BSD FRML MDRD: 42.4 ML/MIN/{1.73_M2} (ref 45–?)
GLUCOSE SERPL-MCNC: 104 MG/DL (ref 80–110)
MAGNESIUM SERPL-MCNC: 2.4 MG/DL (ref 1.8–2.4)
MCH RBC QN AUTO: 29.8 PG (ref 27–33)
MCHC RBC AUTO-ENTMCNC: 31.3 G/DL (ref 32–36.5)
MCV RBC AUTO: 95.4 FL (ref 80–96)
NRBC BLD AUTO-RTO: 0 % (ref 0–0)
PLATELET # BLD AUTO: 246 10^3/UL (ref 150–450)
POTASSIUM SERPL-SCNC: 3.5 MEQ/L (ref 3.5–5.1)
SODIUM SERPL-SCNC: 138 MEQ/L (ref 136–145)
WBC # BLD AUTO: 6.8 10^3/UL (ref 4–10)

## 2017-12-12 RX ADMIN — SODIUM CHLORIDE, PRESERVATIVE FREE SCH ML: 5 INJECTION INTRAVENOUS at 13:03

## 2017-12-12 RX ADMIN — TIOTROPIUM BROMIDE SCH INHALATION: 18 CAPSULE ORAL; RESPIRATORY (INHALATION) at 07:20

## 2017-12-12 RX ADMIN — POTASSIUM CHLORIDE SCH MEQ: 750 TABLET, EXTENDED RELEASE ORAL at 09:07

## 2017-12-12 RX ADMIN — POTASSIUM CHLORIDE SCH MEQ: 750 TABLET, EXTENDED RELEASE ORAL at 22:00

## 2017-12-12 RX ADMIN — INSULIN LISPRO SCH UNITS: 100 INJECTION, SOLUTION INTRAVENOUS; SUBCUTANEOUS at 11:57

## 2017-12-12 RX ADMIN — ASPIRIN SCH MG: 81 TABLET ORAL at 09:08

## 2017-12-12 RX ADMIN — NYSTATIN SCH DOSE: 100000 POWDER TOPICAL at 21:00

## 2017-12-12 RX ADMIN — METOPROLOL TARTRATE SCH MG: 25 TABLET, FILM COATED ORAL at 09:00

## 2017-12-12 RX ADMIN — DOCUSATE SODIUM,SENNOSIDES SCH TAB: 50; 8.6 TABLET, FILM COATED ORAL at 09:07

## 2017-12-12 RX ADMIN — DABIGATRAN ETEXILATE MESYLATE SCH MG: 75 CAPSULE ORAL at 09:07

## 2017-12-12 RX ADMIN — NYSTATIN SCH DOSE: 100000 POWDER TOPICAL at 09:00

## 2017-12-12 RX ADMIN — HYDROCODONE BITARTRATE AND ACETAMINOPHEN PRN TAB: 5; 325 TABLET ORAL at 11:28

## 2017-12-12 RX ADMIN — ATORVASTATIN CALCIUM SCH MG: 20 TABLET, FILM COATED ORAL at 21:58

## 2017-12-12 RX ADMIN — INSULIN LISPRO SCH UNITS: 100 INJECTION, SOLUTION INTRAVENOUS; SUBCUTANEOUS at 21:00

## 2017-12-12 RX ADMIN — SODIUM CHLORIDE, PRESERVATIVE FREE SCH ML: 5 INJECTION INTRAVENOUS at 06:17

## 2017-12-12 RX ADMIN — DOCUSATE SODIUM,SENNOSIDES SCH TAB: 50; 8.6 TABLET, FILM COATED ORAL at 21:00

## 2017-12-12 RX ADMIN — DABIGATRAN ETEXILATE MESYLATE SCH MG: 75 CAPSULE ORAL at 21:58

## 2017-12-12 RX ADMIN — FERROUS SULFATE TAB 325 MG (65 MG ELEMENTAL FE) SCH MG: 325 (65 FE) TAB at 09:07

## 2017-12-12 RX ADMIN — DEXTROSE MONOHYDRATE SCH MG: 50 INJECTION, SOLUTION INTRAVENOUS at 01:04

## 2017-12-12 RX ADMIN — AMIODARONE HYDROCHLORIDE SCH MG: 200 TABLET ORAL at 09:09

## 2017-12-12 RX ADMIN — DEXTROSE MONOHYDRATE SCH MG: 50 INJECTION, SOLUTION INTRAVENOUS at 09:07

## 2017-12-12 RX ADMIN — INSULIN LISPRO SCH UNITS: 100 INJECTION, SOLUTION INTRAVENOUS; SUBCUTANEOUS at 07:30

## 2017-12-12 RX ADMIN — POLYETHYLENE GLYCOL 3350 SCH PKT: 17 POWDER, FOR SOLUTION ORAL at 09:06

## 2017-12-12 RX ADMIN — BISACODYL SCH MG: 10 SUPPOSITORY RECTAL at 09:00

## 2017-12-12 RX ADMIN — HYDROCODONE BITARTRATE AND ACETAMINOPHEN PRN TAB: 5; 325 TABLET ORAL at 22:00

## 2017-12-12 RX ADMIN — METOPROLOL TARTRATE SCH MG: 25 TABLET, FILM COATED ORAL at 21:00

## 2017-12-12 RX ADMIN — DEXTROSE MONOHYDRATE SCH MG: 50 INJECTION, SOLUTION INTRAVENOUS at 17:01

## 2017-12-12 RX ADMIN — INSULIN LISPRO SCH UNITS: 100 INJECTION, SOLUTION INTRAVENOUS; SUBCUTANEOUS at 17:01

## 2017-12-12 NOTE — IPNPDOC
Date Seen


The patient was seen on 12/12/17.





Progress Note


SUBJECTIVE: Patient is a 67-year-old  female with decompensated 

diastolic congestive heart failure.  Patient is evaluated at bedside this 

morning.  She is laying in bed on her left side.  The Murray catheter has been 

removed.  She has a bedside urinal available.  Renal function appears stable.  

Diuresed over 5L of fluid overnight on Lasix, Metolazone, and Amiloride.  

Potassium is low end of normal.





REVIEW OF SYSTEMS: Admits to weakness and some abdominal pain secondary to 

abdominal fluid.  Denies chest pain, fever, night sweats, chills.





OBJECTIVE


PHYSICAL EXAMINATION:


VITAL SIGNS: Please see below. 


GENERAL: Obese  female, well nourished, well developed, appears stated 

age, no acute distress, A&O x3


HEENT: Atraumatic, normocephalic, PERRL, EOMI, oral mucosa appears pink and 

moist, nasal septum appears midline, nares are patent


CARDIOVASCULAR: Irregularly irregular heart rate and rhythm, no murmur, rub, 

click


RESPIRATORY: Clear to auscultation bilaterally, adequate inspiratory and 

expiratory airway excursion, no wheeze, rhonchi, crackles


ABDOMINAL: +2 pitting edema appreciated along the lower abdomen with cutaneous 

dimpling appreciated, bowel sounds appreciated


EXTREMITIES: Chronic venous stasis dermatitis appreciated in bilateral lower 

extremities, +1 pitting edema appreciated in bilateral lower extremities


NEUROLOGICAL: CN II-XII grossly intact


PSYCHOLOGICAL: Alert and conversant





LABORATORY DATA: Please see below.





MICROBIOLOGY: Please see below.





CURRENT INPATIENT MEDICATIONS:  All medications reviewed by myself and 

attending.  Remains on Lasix 60mg IV every eight hours.  Have discontinued 

Metolazone 2.5mg orally daily as patient had more than adequate output 

overnight.  Remains on Amiloride 10mg orally daily.





DVT prophylaxis ordered?: Pradaxa 150mg orally twice a day.





ASSESSMENT AND PLAN: This is a 67-year-old  female with decompensated 

diastolic congestive heart failure.  Past medical history significant for 

chronic kidney disease stage III, paroxysmal atrial fibrillation, morbid obesity

, and diastolic congestive heart failure.





PROBLEMS:


1.  Decompensated diastolic congestive heart failure:  I/O over the last 24 

hours is 5450mL.  Continue with Lasix 60mg IV every eight hours and Amiloride 

10mg orally daily.  Will discontinue Metolazone 2.5mg orally daily.


 


2.  Chronic kidney disease stage III:  Renal function today has improved.  

Creatinine is 1.33, which is around patient's baseline of 1.3.  Continue with 

diuresis.


 


3.  Paroxysmal atrial fibrillation:  Heart rate is controlled at this time. 

Continue current dose of metoprolol 12.5 mg twice a day, amiodarone 200 mg daily

,


and Pradaxa 150 mg by mouth twice a day.


 


4.  Metabolic alkalosis: Remains on Amiloride 10 mg orally daily.  Bicarbonate 

level is 37 today, which is acceptable.


 


5.  Morbid obesity and deconditioning:  Patient is laying in bed during today's 

evaluation.  Physical therapy notes limited functional activity due to oxygen 

requirements and desaturations.  PT has recommended discharge to an inpatient 

rehabilitation facility only.  Murray catheter has been discontinued to provide 

ambulation.





DISPOSITION: Continue with diuresis.  Encourage ambulation.  Continue with 

physical therapy.





VS, I&O, 24H, Fishbone


Vital Signs/I&O





Vital Signs








  Date Time  Temp Pulse Resp B/P (MAP) Pulse Ox O2 Delivery O2 Flow Rate FiO2


 


12/12/17 11:28   22   Nasal Cannula 3.0 


 


12/12/17 06:00 97.1 67  104/51 (68) 98   














I&O- Last 24 Hours up to 6 AM 


 


 12/13/17





 06:00


 


Intake Total 120 ml


 


Output Total 1550 ml


 


Balance -1430 ml











Laboratory Data


24H LABS


Laboratory Tests 2


12/11/17 12:01: Bedside Glucose (Misc Panel) 122H


12/11/17 16:57: Bedside Glucose (Misc Panel) 99


12/11/17 20:47: Bedside Glucose (Misc Panel) 175H


12/12/17 07:03: 


Nucleated Red Blood Cells % (auto) 0.0, Anion Gap 5L, Glomerular Filtration 

Rate 42.4L, Blood Urea Nitrogen 31H, Creatinine 1.33H, Sodium Level 138, 

Potassium Level 3.5, Chloride Level 96L, Carbon Dioxide Level 37H, Calcium 

Level 9.3, Magnesium Level 2.4


CBC/BMP


Laboratory Tests


12/12/17 07:03








Red Blood Count 3.89 L, Mean Corpuscular Volume 95.4, Mean Corpuscular 

Hemoglobin 29.8, Mean Corpuscular Hemoglobin Concent 31.3 L, Red Cell 

Distribution Width 15.2 H, Calcium Level 9.3


Microbiology





Microbiology


12/2/17 Blood Culture - Final, Complete


          NO GROWTH AFTER 5 DAYS


12/2/17 Blood Culture - Final, Complete


          NO GROWTH AFTER 5 DAYS


12/2/17 Urine Culture - Final, Complete











RICHARD BENTLEY DO Dec 12, 2017 11:50

## 2017-12-12 NOTE — IPNPDOC
Subjective


Date Seen


The patient was seen on 12/12/17.





Subjective


Chief Complaint/HPI


The patient is a 67-year-old female admitted with a reason for visit of CHF.


Events since last encounter


Patient seen and examined at the bedside. States that the lower extremity edema 

is improving. Reports that she is eager to work with physical therapy to regain 

her strength. Denies any acute complaints at this time.





Objective


Physical Examination


General Exam:  Positive: Alert, Cooperative, No Acute Distress


ENT Exam:  Positive: Atraumatic, Mucous membr. moist/pink


Chest Exam:  Positive: Rales (b/l lower lobes), Diminished


Heart Exam:  Positive: Rate Normal, Irregular Rhythm


Telemetry:  Positive: Atrial fibrillation


Abdomen Exam:  Positive: Normal bowel sounds, 


   Negative: Soft, Tenderness


Extremity Exam:  Positive: Edema (2+), 


   Negative: Tenderness


Skin Exam:  Positive: Lesion (right LE warm & erythematous, weeping clear fluid)


Psych Exam:  Positive: Oriented x 3





Assessment /Plan


Plan/VTE


VTE Prophylaxis Ordered?:  Yes





Plan


Acute decompensated diastolic heart failure


Continue on Amiloride and IV Lasix as per Nephrology


We will cont to supplement k and mg as needed. 


The patient has diuresed a net negative of 30L, and has lost 20kg according to 

records here


Cont strict I/Os, daily weights, low salt diet, fluid restriction





CKD stage 3


Serum Cr appears to be stable here


Nephrology on board





Right LE Cellulitis, resolved


s/p completion of Teflaro  





Leukocytosis, resolved. 


Likely combination of reactionary to acute CHF decompensation and RLE cellulitis





Paroxysmal A.Fib


Cont Pradaxa, amiodarone and Metoprolol





NIDDM2


ISS inpatient. consistent carbs diet





COPD: no acute exacerbation


on 1L NC all the time at home


Continue home Ventolin





Chronic respiratory failure with hypoxia


Continue oxygen by nasal canula





Pulmonary Hypertension


continue diuresis and oxygen as ordered 





CAD with h/o stent: stable at this point. 





HLD


continue home med





HTN


continue home med





GERD


continue home med





Right LE venous stasis ulcer:   continue wound care. 





Morbid obesity bmi 60.5 complicating acute issues





Obesity hypoventilation/ SINTIA: Had abnormal nocturnal pulse oximetry before. 

Never had a sleep study.  continue on oxygen





DVT prophylaxis


Already on Pradaxa





Deconditioning--PT on board for functional optimization





VS, I&O, 24H, Fishbone


Vital Signs/I&O





Vital Signs








  Date Time  Temp Pulse Resp B/P (MAP) Pulse Ox O2 Delivery O2 Flow Rate FiO2


 


12/12/17 11:58   20   Nasal Cannula 3.0 


 


12/12/17 06:00 97.1 67  104/51 (68) 98   














I&O- Last 24 Hours up to 6 AM 


 


 12/13/17





 06:00


 


Intake Total 360 ml


 


Output Total 1750 ml


 


Balance -1390 ml











Laboratory Data


24H LABS


Laboratory Tests 2


12/11/17 16:57: Bedside Glucose (Misc Panel) 99


12/11/17 20:47: Bedside Glucose (Misc Panel) 175H


12/12/17 07:03: 


Nucleated Red Blood Cells % (auto) 0.0, Anion Gap 5L, Glomerular Filtration 

Rate 42.4L, Blood Urea Nitrogen 31H, Creatinine 1.33H, Sodium Level 138, 

Potassium Level 3.5, Chloride Level 96L, Carbon Dioxide Level 37H, Calcium 

Level 9.3, Magnesium Level 2.4


12/12/17 11:26: Bedside Glucose (Misc Panel) 129H


CBC/BMP


Laboratory Tests


12/12/17 07:03








Red Blood Count 3.89 L, Mean Corpuscular Volume 95.4, Mean Corpuscular 

Hemoglobin 29.8, Mean Corpuscular Hemoglobin Concent 31.3 L, Red Cell 

Distribution Width 15.2 H, Calcium Level 9.3


Microbiology





Microbiology


12/2/17 Blood Culture - Final, Complete


          NO GROWTH AFTER 5 DAYS


12/2/17 Blood Culture - Final, Complete


          NO GROWTH AFTER 5 DAYS


12/2/17 Urine Culture - Final, Complete











NELLY MONSON MD Dec 12, 2017 14:20

## 2017-12-13 VITALS — SYSTOLIC BLOOD PRESSURE: 97 MMHG | DIASTOLIC BLOOD PRESSURE: 52 MMHG

## 2017-12-13 VITALS — DIASTOLIC BLOOD PRESSURE: 50 MMHG | SYSTOLIC BLOOD PRESSURE: 101 MMHG

## 2017-12-13 VITALS — DIASTOLIC BLOOD PRESSURE: 63 MMHG | SYSTOLIC BLOOD PRESSURE: 114 MMHG

## 2017-12-13 LAB
ANION GAP SERPL CALC-SCNC: 7 MEQ/L (ref 8–16)
BUN SERPL-MCNC: 39 MG/DL (ref 7–18)
CALCIUM SERPL-MCNC: 9.7 MG/DL (ref 8.8–10.2)
CHLORIDE SERPL-SCNC: 89 MEQ/L (ref 98–107)
CO2 SERPL-SCNC: 40 MEQ/L (ref 21–32)
CREAT SERPL-MCNC: 1.44 MG/DL (ref 0.55–1.02)
ERYTHROCYTE [DISTWIDTH] IN BLOOD BY AUTOMATED COUNT: 14.8 % (ref 11.5–14.5)
GFR SERPL CREATININE-BSD FRML MDRD: 38.7 ML/MIN/{1.73_M2} (ref 45–?)
GLUCOSE SERPL-MCNC: 119 MG/DL (ref 80–110)
MCH RBC QN AUTO: 29.8 PG (ref 27–33)
MCHC RBC AUTO-ENTMCNC: 31.9 G/DL (ref 32–36.5)
MCV RBC AUTO: 93.6 FL (ref 80–96)
NRBC BLD AUTO-RTO: 0 % (ref 0–0)
PLATELET # BLD AUTO: 264 10^3/UL (ref 150–450)
POTASSIUM SERPL-SCNC: 3.2 MEQ/L (ref 3.5–5.1)
SODIUM SERPL-SCNC: 136 MEQ/L (ref 136–145)
WBC # BLD AUTO: 8.1 10^3/UL (ref 4–10)

## 2017-12-13 RX ADMIN — SODIUM CHLORIDE, PRESERVATIVE FREE SCH ML: 5 INJECTION INTRAVENOUS at 01:17

## 2017-12-13 RX ADMIN — POTASSIUM CHLORIDE SCH MEQ: 750 TABLET, EXTENDED RELEASE ORAL at 08:39

## 2017-12-13 RX ADMIN — AMIODARONE HYDROCHLORIDE SCH MG: 200 TABLET ORAL at 08:40

## 2017-12-13 RX ADMIN — MENTHOL, METHYL SALICYLATE SCH DOSE: 10; 15 CREAM TOPICAL at 09:00

## 2017-12-13 RX ADMIN — DEXTROSE MONOHYDRATE SCH MG: 50 INJECTION, SOLUTION INTRAVENOUS at 01:17

## 2017-12-13 RX ADMIN — INSULIN LISPRO SCH UNITS: 100 INJECTION, SOLUTION INTRAVENOUS; SUBCUTANEOUS at 12:10

## 2017-12-13 RX ADMIN — POTASSIUM CHLORIDE SCH MEQ: 750 TABLET, EXTENDED RELEASE ORAL at 22:19

## 2017-12-13 RX ADMIN — DOCUSATE SODIUM,SENNOSIDES SCH TAB: 50; 8.6 TABLET, FILM COATED ORAL at 21:00

## 2017-12-13 RX ADMIN — MENTHOL, METHYL SALICYLATE SCH DOSE: 10; 15 CREAM TOPICAL at 22:18

## 2017-12-13 RX ADMIN — INSULIN LISPRO SCH UNITS: 100 INJECTION, SOLUTION INTRAVENOUS; SUBCUTANEOUS at 22:19

## 2017-12-13 RX ADMIN — NYSTATIN SCH DOSE: 100000 POWDER TOPICAL at 21:00

## 2017-12-13 RX ADMIN — FERROUS SULFATE TAB 325 MG (65 MG ELEMENTAL FE) SCH MG: 325 (65 FE) TAB at 08:39

## 2017-12-13 RX ADMIN — DEXTROSE MONOHYDRATE SCH MG: 50 INJECTION, SOLUTION INTRAVENOUS at 15:57

## 2017-12-13 RX ADMIN — SODIUM CHLORIDE, PRESERVATIVE FREE SCH ML: 5 INJECTION INTRAVENOUS at 06:00

## 2017-12-13 RX ADMIN — DABIGATRAN ETEXILATE MESYLATE SCH MG: 75 CAPSULE ORAL at 08:44

## 2017-12-13 RX ADMIN — ASPIRIN SCH MG: 81 TABLET ORAL at 08:40

## 2017-12-13 RX ADMIN — METOPROLOL TARTRATE SCH MG: 25 TABLET, FILM COATED ORAL at 08:31

## 2017-12-13 RX ADMIN — HYDROCODONE BITARTRATE AND ACETAMINOPHEN PRN TAB: 5; 325 TABLET ORAL at 15:56

## 2017-12-13 RX ADMIN — DABIGATRAN ETEXILATE MESYLATE SCH MG: 75 CAPSULE ORAL at 22:19

## 2017-12-13 RX ADMIN — TIOTROPIUM BROMIDE SCH INHALATION: 18 CAPSULE ORAL; RESPIRATORY (INHALATION) at 11:11

## 2017-12-13 RX ADMIN — ATORVASTATIN CALCIUM SCH MG: 20 TABLET, FILM COATED ORAL at 22:19

## 2017-12-13 RX ADMIN — INSULIN LISPRO SCH UNITS: 100 INJECTION, SOLUTION INTRAVENOUS; SUBCUTANEOUS at 08:40

## 2017-12-13 RX ADMIN — INSULIN LISPRO SCH UNITS: 100 INJECTION, SOLUTION INTRAVENOUS; SUBCUTANEOUS at 17:27

## 2017-12-13 RX ADMIN — POLYETHYLENE GLYCOL 3350 SCH PKT: 17 POWDER, FOR SOLUTION ORAL at 08:40

## 2017-12-13 RX ADMIN — POTASSIUM CHLORIDE SCH MEQ: 750 TABLET, EXTENDED RELEASE ORAL at 10:24

## 2017-12-13 RX ADMIN — SODIUM CHLORIDE, PRESERVATIVE FREE SCH ML: 5 INJECTION INTRAVENOUS at 22:20

## 2017-12-13 RX ADMIN — DEXTROSE MONOHYDRATE SCH MG: 50 INJECTION, SOLUTION INTRAVENOUS at 08:31

## 2017-12-13 RX ADMIN — SODIUM CHLORIDE, PRESERVATIVE FREE SCH ML: 5 INJECTION INTRAVENOUS at 14:00

## 2017-12-13 RX ADMIN — DOCUSATE SODIUM,SENNOSIDES SCH TAB: 50; 8.6 TABLET, FILM COATED ORAL at 08:40

## 2017-12-13 RX ADMIN — BISACODYL SCH MG: 10 SUPPOSITORY RECTAL at 08:41

## 2017-12-13 RX ADMIN — NYSTATIN SCH DOSE: 100000 POWDER TOPICAL at 08:44

## 2017-12-13 RX ADMIN — METOPROLOL TARTRATE SCH MG: 25 TABLET, FILM COATED ORAL at 21:00

## 2017-12-14 VITALS — DIASTOLIC BLOOD PRESSURE: 65 MMHG | SYSTOLIC BLOOD PRESSURE: 100 MMHG

## 2017-12-14 VITALS — DIASTOLIC BLOOD PRESSURE: 55 MMHG | SYSTOLIC BLOOD PRESSURE: 98 MMHG

## 2017-12-14 VITALS — SYSTOLIC BLOOD PRESSURE: 103 MMHG | DIASTOLIC BLOOD PRESSURE: 52 MMHG

## 2017-12-14 LAB
ANION GAP SERPL CALC-SCNC: 5 MEQ/L (ref 8–16)
BUN SERPL-MCNC: 45 MG/DL (ref 7–18)
CALCIUM SERPL-MCNC: 8.9 MG/DL (ref 8.8–10.2)
CHLORIDE SERPL-SCNC: 96 MEQ/L (ref 98–107)
CO2 SERPL-SCNC: 35 MEQ/L (ref 21–32)
CREAT SERPL-MCNC: 1.43 MG/DL (ref 0.55–1.02)
ERYTHROCYTE [DISTWIDTH] IN BLOOD BY AUTOMATED COUNT: 15 % (ref 11.5–14.5)
GFR SERPL CREATININE-BSD FRML MDRD: 39 ML/MIN/{1.73_M2} (ref 45–?)
GLUCOSE SERPL-MCNC: 125 MG/DL (ref 80–110)
MCH RBC QN AUTO: 30.3 PG (ref 27–33)
MCHC RBC AUTO-ENTMCNC: 32.1 G/DL (ref 32–36.5)
MCV RBC AUTO: 94.3 FL (ref 80–96)
NRBC BLD AUTO-RTO: 0 % (ref 0–0)
PLATELET # BLD AUTO: 290 10^3/UL (ref 150–450)
POTASSIUM SERPL-SCNC: 3.5 MEQ/L (ref 3.5–5.1)
SODIUM SERPL-SCNC: 136 MEQ/L (ref 136–145)
WBC # BLD AUTO: 8.3 10^3/UL (ref 4–10)

## 2017-12-14 RX ADMIN — DABIGATRAN ETEXILATE MESYLATE SCH MG: 75 CAPSULE ORAL at 21:55

## 2017-12-14 RX ADMIN — HYDROCODONE BITARTRATE AND ACETAMINOPHEN PRN TAB: 5; 325 TABLET ORAL at 01:38

## 2017-12-14 RX ADMIN — METOPROLOL TARTRATE SCH MG: 25 TABLET, FILM COATED ORAL at 07:55

## 2017-12-14 RX ADMIN — INSULIN LISPRO SCH UNITS: 100 INJECTION, SOLUTION INTRAVENOUS; SUBCUTANEOUS at 07:51

## 2017-12-14 RX ADMIN — ASPIRIN SCH MG: 81 TABLET ORAL at 07:53

## 2017-12-14 RX ADMIN — MENTHOL, METHYL SALICYLATE SCH DOSE: 10; 15 CREAM TOPICAL at 21:00

## 2017-12-14 RX ADMIN — DOCUSATE SODIUM,SENNOSIDES SCH TAB: 50; 8.6 TABLET, FILM COATED ORAL at 07:54

## 2017-12-14 RX ADMIN — HYDROCODONE BITARTRATE AND ACETAMINOPHEN PRN TAB: 5; 325 TABLET ORAL at 10:25

## 2017-12-14 RX ADMIN — SODIUM CHLORIDE, PRESERVATIVE FREE SCH ML: 5 INJECTION INTRAVENOUS at 05:58

## 2017-12-14 RX ADMIN — SODIUM CHLORIDE, PRESERVATIVE FREE SCH ML: 5 INJECTION INTRAVENOUS at 22:00

## 2017-12-14 RX ADMIN — DEXTROSE MONOHYDRATE SCH MG: 50 INJECTION, SOLUTION INTRAVENOUS at 07:58

## 2017-12-14 RX ADMIN — ATORVASTATIN CALCIUM SCH MG: 20 TABLET, FILM COATED ORAL at 21:55

## 2017-12-14 RX ADMIN — POTASSIUM CHLORIDE SCH MEQ: 750 TABLET, EXTENDED RELEASE ORAL at 21:55

## 2017-12-14 RX ADMIN — BISACODYL SCH MG: 10 SUPPOSITORY RECTAL at 07:54

## 2017-12-14 RX ADMIN — FERROUS SULFATE TAB 325 MG (65 MG ELEMENTAL FE) SCH MG: 325 (65 FE) TAB at 07:53

## 2017-12-14 RX ADMIN — INSULIN LISPRO SCH UNITS: 100 INJECTION, SOLUTION INTRAVENOUS; SUBCUTANEOUS at 12:28

## 2017-12-14 RX ADMIN — POLYETHYLENE GLYCOL 3350 SCH PKT: 17 POWDER, FOR SOLUTION ORAL at 07:53

## 2017-12-14 RX ADMIN — MENTHOL, METHYL SALICYLATE SCH DOSE: 10; 15 CREAM TOPICAL at 07:57

## 2017-12-14 RX ADMIN — INSULIN LISPRO SCH UNITS: 100 INJECTION, SOLUTION INTRAVENOUS; SUBCUTANEOUS at 17:23

## 2017-12-14 RX ADMIN — METOPROLOL TARTRATE SCH MG: 25 TABLET, FILM COATED ORAL at 21:00

## 2017-12-14 RX ADMIN — INSULIN LISPRO SCH UNITS: 100 INJECTION, SOLUTION INTRAVENOUS; SUBCUTANEOUS at 21:00

## 2017-12-14 RX ADMIN — TIOTROPIUM BROMIDE SCH INHALATION: 18 CAPSULE ORAL; RESPIRATORY (INHALATION) at 07:59

## 2017-12-14 RX ADMIN — DEXTROSE MONOHYDRATE SCH MG: 50 INJECTION, SOLUTION INTRAVENOUS at 01:41

## 2017-12-14 RX ADMIN — NYSTATIN SCH DOSE: 100000 POWDER TOPICAL at 07:57

## 2017-12-14 RX ADMIN — DEXTROSE MONOHYDRATE SCH MG: 50 INJECTION, SOLUTION INTRAVENOUS at 16:15

## 2017-12-14 RX ADMIN — DABIGATRAN ETEXILATE MESYLATE SCH MG: 75 CAPSULE ORAL at 07:53

## 2017-12-14 RX ADMIN — AMIODARONE HYDROCHLORIDE SCH MG: 200 TABLET ORAL at 07:56

## 2017-12-14 RX ADMIN — LORATADINE PRN MG: 10 TABLET ORAL at 08:14

## 2017-12-14 RX ADMIN — DOCUSATE SODIUM,SENNOSIDES SCH TAB: 50; 8.6 TABLET, FILM COATED ORAL at 21:00

## 2017-12-14 RX ADMIN — HYDROCODONE BITARTRATE AND ACETAMINOPHEN PRN TAB: 5; 325 TABLET ORAL at 21:56

## 2017-12-14 RX ADMIN — POTASSIUM CHLORIDE SCH MEQ: 750 TABLET, EXTENDED RELEASE ORAL at 07:52

## 2017-12-14 RX ADMIN — NYSTATIN SCH DOSE: 100000 POWDER TOPICAL at 21:00

## 2017-12-14 RX ADMIN — SODIUM CHLORIDE, PRESERVATIVE FREE SCH ML: 5 INJECTION INTRAVENOUS at 14:55

## 2017-12-14 NOTE — IPNPDOC
Subjective


Date Seen


The patient was seen on 12/14/17.





Subjective


Chief Complaint/HPI


The patient is a 67-year-old female admitted with a reason for visit of CHF.


Events since last encounter


Patient seen and examined at the bedside. States that her volume status is 

improving, she has been better able to work with occupational therapy this 

morning. Denies any acute overnight complaints.





Objective


Physical Examination


General Exam:  Positive: Alert, Cooperative, No Acute Distress


ENT Exam:  Positive: Atraumatic, Mucous membr. moist/pink


Chest Exam:  Positive: Rales (b/l lower lobes), Diminished


Heart Exam:  Positive: Rate Normal, Irregular Rhythm


Telemetry:  Positive: Atrial fibrillation


Abdomen Exam:  Positive: Normal bowel sounds, 


   Negative: Soft, Tenderness


Extremity Exam:  Positive: Edema (2+), 


   Negative: Tenderness


Skin Exam:  Positive: Lesion (right LE warm & erythematous, weeping clear fluid)


Psych Exam:  Positive: Oriented x 3





Assessment /Plan


Plan/VTE


VTE Prophylaxis Ordered?:  Yes





Plan


Acute decompensated diastolic heart failure


Continue on Amiloride and IV Lasix as per Nephrology


We will cont to supplement k and mg as needed. 


The patient has diuresed a net negative of +30L, and has lost +20kg according 

to records here


Cont strict I/Os, daily weights, low salt diet, fluid restriction





CKD stage 3


Serum Cr appears to be stable here


Nephrology on board





Right LE Cellulitis, resolved


s/p completion of Teflaro  





Leukocytosis, resolved. 


Likely combination of reactionary to acute CHF decompensation and RLE cellulitis





Paroxysmal A.Fib


Cont Pradaxa, amiodarone and Metoprolol





NIDDM2


ISS inpatient. consistent carbs diet





COPD: no acute exacerbation


on 1L NC all the time at home


Continue home Ventolin





Chronic respiratory failure with hypoxia


Continue oxygen by nasal canula





Pulmonary Hypertension


continue diuresis and oxygen as ordered 





CAD with h/o stent: stable at this point. 





HLD


continue home med





HTN


continue home med





GERD


continue home med





Right LE venous stasis ulcer:   continue wound care. 





Morbid obesity bmi 60.5 complicating acute issues





Obesity hypoventilation/ SINTIA: Had abnormal nocturnal pulse oximetry before. 

Never had a sleep study.  continue on oxygen





DVT prophylaxis


Already on Pradaxa





Deconditioning--PT on board for functional optimization





VS, I&O, 24H, Fishbone


Vital Signs/I&O





Vital Signs








  Date Time  Temp Pulse Resp B/P (MAP) Pulse Ox O2 Delivery O2 Flow Rate FiO2


 


12/14/17 10:25   18   Nasal Cannula  


 


12/14/17 07:55  90  101/63    


 


12/14/17 06:00 96.5    96  2.0 














I&O- Last 24 Hours up to 6 AM 


 


 12/15/17





 06:00


 


Intake Total 240 ml


 


Output Total 100 ml


 


Balance 140 ml











Laboratory Data


24H LABS


Laboratory Tests 2


12/13/17 11:31: Bedside Glucose (Misc Panel) 130H


12/13/17 16:57: Bedside Glucose (Misc Panel) 126H


12/13/17 20:14: Bedside Glucose (Misc Panel) 273H


12/14/17 06:20: 


Nucleated Red Blood Cells % (auto) 0.0, Anion Gap 5L, Glomerular Filtration 

Rate 39.0L, Blood Urea Nitrogen 45H, Creatinine 1.43H, Sodium Level 136, 

Potassium Level 3.5, Chloride Level 96L, Carbon Dioxide Level 35H, Calcium 

Level 8.9


CBC/BMP


Laboratory Tests


12/14/17 06:20








Red Blood Count 4.03, Mean Corpuscular Volume 94.3, Mean Corpuscular Hemoglobin 

30.3, Mean Corpuscular Hemoglobin Concent 32.1, Red Cell Distribution Width 

15.0 H, Calcium Level 8.9











NELLY MONSON MD Dec 14, 2017 11:17

## 2017-12-14 NOTE — IPNPDOC
Date Seen


The patient was seen on 12/14/17.





Progress Note


SUBJECTIVE:  Patient was seen and examined at the bedside this morning.  She is 

sitting in a chair during my evaluation.  Feels as if her swelling is 

improving.  Per patient, did not have a good day yesterday because she felt 

nauseated with abdominal pain.  Was unable to participate with physical therapy 

yesterday due to how she was feeling. Feels better today.  Participated with 

occupational therapy today and says that it went well.  Continues to diurese 

well.


 


REVIEW OF SYSTEMS:  Patient denies any fever, chills, rigors.  She denies any 

headache, chest pain, shortness of breath.  Patient reports her abdominal wall 

edema is improving.  She reported leg edema is significantly better without 

cramps, numbness, or tingling in upper or lower extremities.  Rest of review of 

systems is negative.


 


PHYSICAL EXAMINATION:


GENERAL:  Patient is awake, alert, oriented times three, sitting in a chair, 

morbidly obese.


HEAD AND NECK EXAMINATION:  Extraocular muscles intact.  Pupils equally round 

and reactive to light.  Mucous membranes are moist.  Neck is supple.  I could 

not appreciate the jugular venous distention (JVD) because of body habitus.


CARDIOVASCULAR:  S1, S2, irregularly irregular heart rate.  She has trace edema 

of the bilateral lower extremities and she has abdominal wall edema on the 

right > left.


RESPIRATORY:  Chest is clear to auscultation bilaterally.  Bilateral equal air 

entry.  No rales or rhonchi.


ABDOMEN:  Soft.  Obese.  Positive bowel sounds.  There is large amount of 

abdominal wall edema in the lower part of abdomen and of pannus.  I could not 

appreciate any organomegaly.


MUSCULOSKELETAL:  No clubbing or cyanosis.  Trace edema of the bilateral lower 

extremities.  The ulcers on the legs have healed now.


CENTRAL NERVOUS SYSTEM (CNS):  No focal neurological deficit.  Power is 5/5 in 

all extremities.


PSYCHIATRIC:  Normal mood and affect.


 


LABORATORY REVIEW:  See below.


 


CURRENT INPATIENT MEDICATIONS:  Patient's medications were all reviewed by 

myself and my attending. She continues to be on Lasix 60 mg IV every 8 hours 

and amiloride 10 mg daily.  Continues with potassium 40 mEq by mouth twice a 

day.


 


ASSESSMENT:  67-year-old female with past medical history of chronic kidney 

disease stage III, chronic diastolic congestive heart failure, paroxysmal 

atrial fibrillation, and morbid obesity, admitted this time because of 

decompensated diastolic congestive heart failure secondary to noncompliance 

with her diuretic regimen.


 


PLAN:


1.  Decompensated diastolic congestive heart failure:  Remains on Lasix 60mg IV 

every eight hours and Amiloride 10mg orally daily.


 


2.  Chronic kidney disease stage III:  Creatinine is 1.43.  Metolazone has been 

discontinued.  Patient diuresed very well two nights ago.  Diuresis continues 

to be appropriate.


 


3.  Hypokalemia:  Low end of normal.  Continues with oral Potassium 40mEq 

orally twice a day.


 


4.  Paroxysmal atrial fibrillation:  Heart rate is controlled at this time. 

Continue current dose of metoprolol 12.5 mg by mouth twice a day, amiodarone 

200 mg daily, and Pradaxa 150 mg by mouth twice a day.


 


5.  Metabolic alkalosis:  Bicarbonate level is 35.  Continue with Lasix and 

Amiloride.  Could consider restarting Acetazolamie if bicarbonate continues to 

increase.


 


6.  Morbid obesity and deconditioning:  Continues to make progress with PT and 

OT; however, desaturates with activity, but able to recover within minutes.  

Continue with rehabilitation at this time.





DISPOSITION: Monitor BMP and diuresis.





VS, I&O, 24H, Counts include 234 beds at the [facility]


Vital Signs/I&O





Vital Signs








  Date Time  Temp Pulse Resp B/P (MAP) Pulse Ox O2 Delivery O2 Flow Rate FiO2


 


12/14/17 10:25   18   Nasal Cannula  


 


12/14/17 07:55  90  101/63    


 


12/14/17 06:00 96.5    96  2.0 














I&O- Last 24 Hours up to 6 AM 


 


 12/15/17





 06:00


 


Intake Total 240 ml


 


Output Total 100 ml


 


Balance 140 ml











Laboratory Data


24H LABS


Laboratory Tests 2


12/13/17 11:31: Bedside Glucose (Misc Panel) 130H


12/13/17 16:57: Bedside Glucose (Misc Panel) 126H


12/13/17 20:14: Bedside Glucose (Misc Panel) 273H


12/14/17 06:20: 


Nucleated Red Blood Cells % (auto) 0.0, Anion Gap 5L, Glomerular Filtration 

Rate 39.0L, Blood Urea Nitrogen 45H, Creatinine 1.43H, Sodium Level 136, 

Potassium Level 3.5, Chloride Level 96L, Carbon Dioxide Level 35H, Calcium 

Level 8.9


CBC/BMP


Laboratory Tests


12/14/17 06:20








Red Blood Count 4.03, Mean Corpuscular Volume 94.3, Mean Corpuscular Hemoglobin 

30.3, Mean Corpuscular Hemoglobin Concent 32.1, Red Cell Distribution Width 

15.0 H, Calcium Level 8.9











RICHARD BENTLEY DO Dec 14, 2017 11:36

## 2017-12-14 NOTE — IPN
DATE OF SERVICE:  12/13/2017

 

SUBJECTIVE:  Patient was seen and examined at the bedside today morning.  She was

lying in the bed.  Patient reported that she was feeling weak and tired.

Otherwise, she is hemodynamically stable.  There is slight bump in the creatinine

today as compared with yesterday.  However, she continues to diurese very well.

 

REVIEW OF SYSTEMS:  Patient denies any fever, chills, rigors.  She denies any

headache, chest pain, shortness of breath.  Patient reports her abdominal wall

edema is improving.  She reported leg edema is significantly better and she had a

few cramps in the legs overnight.  Rest of review of systems is negative.

 

OBJECTIVE:

VITAL SIGNS:  Temperature is 97 degrees Fahrenheit, blood pressure is 114/63,

pulse is 88, respiratory rate of 18, saturating 91% on nasal cannula at 2

liters.

INTAKE AND OUTPUT:  Urine output recorded yesterday was 6 liters.  Urine output

is not recorded well today because Murray catheter has been removed.   Weight on

the bed scale is stable at 153 kg.

 

PHYSICAL EXAMINATION:

GENERAL:  Patient is awake, alert, oriented times three, lying in bed, morbidly

obese.

HEAD AND NECK EXAMINATION:  Extraocular muscles intact.  Pupils equally round and

reactive to light.  Mucous membranes are moist.  Neck is supple.  I could not

appreciate the jugular venous distention (JVD) because of body habitus.

CARDIOVASCULAR:  S1, S2, irregularly irregular heart rate.  She has trace edema

of the bilateral lower extremities and she has abdominal wall edema.

RESPIRATORY:  Chest is clear to auscultation bilaterally.  Bilateral equal air

entry.  No rales or rhonchi.

ABDOMEN:  Soft.  Obese.  Positive bowel sounds.  There is large amount of

abdominal wall edema in the lower part of abdomen and of pannus.  I could not

appreciate any organomegaly.

MUSCULOSKELETAL:  No clubbing or cyanosis.  Trace edema of the bilateral lower

extremities.  The ulcers on the legs have healed now.

CENTRAL NERVOUS SYSTEM (CNS):  No focal neurological deficit.  Power is 5/5 in

all extremities.

PSYCHIATRIC:  Normal mood and affect.

 

LABORATORY REVIEW:  CBC showed a WBC 8.1, hemoglobin 12.5, platelets are 264.

 

BMP showed sodium 136, potassium 3.2, chloride 89, bicarbonate 40, BUN 39,

creatinine is 1.44, calcium 9.7.

 

CURRENT INPATIENT MEDICATIONS:  Patient's medications were all reviewed by me.

She continues to be on Lasix 60 mg IV every 8 hours and amiloride 10 mg daily.

Her potassium has been changed to 40 mEq by mouth twice a day.  Metolazone has

been stopped because she was becoming alkalotic and made more than 6 liters of

urine yesterday.

 

ASSESSMENT:  67-year-old female with past medical history of chronic kidney

disease stage III, chronic diastolic congestive heart failure, paroxysmal atrial

fibrillation, and morbid obesity, admitted this time because of decompensated

diastolic congestive heart failure secondary to noncompliance with her diuretic

regimen.

 

PLAN:

1.  Decompensated diastolic congestive heart failure.  The patient continues to

diurese well.  She was getting alkalotic, so I stopped her metolazone yesterday.

Continue current dose of Lasix 60 mg every 8 hours.  Continue amiloride 10 mg

daily.

 

2.  Chronic kidney disease stage III.  Creatinine had been fluctuating around

1.3. However, there is a slight bump in the creatinine today to 1.4.  Diuretic

regimen has been tapered down.

 

3.  Hypokalemia.  Patient was given extra dose of potassium chloride 40 mEq and I

have changed her oral potassium 40 mEq by mouth twice a day.  Continue current

dose now.

 

4.  Paroxysmal atrial fibrillation.  Heart rate is controlled at this time.

Continue current dose of metoprolol 12.5 mg by mouth twice a day, amiodarone 200

mg daily, and Pradaxa 150 mg by mouth twice a day.

 

5.  Metabolic alkalosis.  It is secondary to aggressive diuresis.  Initially

patient was on acetazolamide, which was stopped.  Bicarbonate level is going up.

I have stopped the metolazone.  Continue current dose of Lasix and amiloride.  If

bicarbonate level goes further up, then patient will be restarted on

acetazolamide.

 

6.  Morbid obesity and deconditioning.  Patient's Murray catheter was removed

yesterday and she was asked to walk to the restroom and she needs extensive

physical therapy as well.  As per physical therapy notes, she was only able to

ambulate 11 feet and she gets tired and desaturates.  Continue the current

rehabilitation at this time.

## 2017-12-15 VITALS — SYSTOLIC BLOOD PRESSURE: 112 MMHG | DIASTOLIC BLOOD PRESSURE: 54 MMHG

## 2017-12-15 VITALS — DIASTOLIC BLOOD PRESSURE: 51 MMHG | SYSTOLIC BLOOD PRESSURE: 96 MMHG

## 2017-12-15 VITALS — DIASTOLIC BLOOD PRESSURE: 49 MMHG | SYSTOLIC BLOOD PRESSURE: 118 MMHG

## 2017-12-15 LAB
ANION GAP SERPL CALC-SCNC: 10 MEQ/L (ref 8–16)
BUN SERPL-MCNC: 45 MG/DL (ref 7–18)
CALCIUM SERPL-MCNC: 9.1 MG/DL (ref 8.8–10.2)
CHLORIDE SERPL-SCNC: 95 MEQ/L (ref 98–107)
CO2 SERPL-SCNC: 31 MEQ/L (ref 21–32)
CREAT SERPL-MCNC: 1.64 MG/DL (ref 0.55–1.02)
ERYTHROCYTE [DISTWIDTH] IN BLOOD BY AUTOMATED COUNT: 15.7 % (ref 11.5–14.5)
GFR SERPL CREATININE-BSD FRML MDRD: 33.3 ML/MIN/{1.73_M2} (ref 45–?)
GLUCOSE SERPL-MCNC: 192 MG/DL (ref 80–110)
MCH RBC QN AUTO: 30.4 PG (ref 27–33)
MCHC RBC AUTO-ENTMCNC: 31.9 G/DL (ref 32–36.5)
MCV RBC AUTO: 95.3 FL (ref 80–96)
NRBC BLD AUTO-RTO: 0 % (ref 0–0)
PLATELET # BLD AUTO: 342 10^3/UL (ref 150–450)
POTASSIUM SERPL-SCNC: 3.9 MEQ/L (ref 3.5–5.1)
SODIUM SERPL-SCNC: 136 MEQ/L (ref 136–145)
WBC # BLD AUTO: 8.3 10^3/UL (ref 4–10)

## 2017-12-15 RX ADMIN — SODIUM CHLORIDE, PRESERVATIVE FREE SCH ML: 5 INJECTION INTRAVENOUS at 08:45

## 2017-12-15 RX ADMIN — METOPROLOL TARTRATE SCH MG: 25 TABLET, FILM COATED ORAL at 08:28

## 2017-12-15 RX ADMIN — HYDROCODONE BITARTRATE AND ACETAMINOPHEN PRN TAB: 5; 325 TABLET ORAL at 10:54

## 2017-12-15 RX ADMIN — POTASSIUM CHLORIDE SCH MEQ: 750 TABLET, EXTENDED RELEASE ORAL at 08:27

## 2017-12-15 RX ADMIN — LORATADINE PRN MG: 10 TABLET ORAL at 08:27

## 2017-12-15 RX ADMIN — MENTHOL, METHYL SALICYLATE SCH DOSE: 10; 15 CREAM TOPICAL at 21:00

## 2017-12-15 RX ADMIN — NYSTATIN SCH DOSE: 100000 POWDER TOPICAL at 08:29

## 2017-12-15 RX ADMIN — DEXTROSE MONOHYDRATE SCH MG: 50 INJECTION, SOLUTION INTRAVENOUS at 08:28

## 2017-12-15 RX ADMIN — NYSTATIN SCH DOSE: 100000 POWDER TOPICAL at 21:00

## 2017-12-15 RX ADMIN — TIOTROPIUM BROMIDE SCH INHALATION: 18 CAPSULE ORAL; RESPIRATORY (INHALATION) at 07:23

## 2017-12-15 RX ADMIN — MENTHOL, METHYL SALICYLATE SCH DOSE: 10; 15 CREAM TOPICAL at 08:29

## 2017-12-15 RX ADMIN — METOPROLOL TARTRATE SCH MG: 25 TABLET, FILM COATED ORAL at 21:37

## 2017-12-15 RX ADMIN — INSULIN LISPRO SCH UNITS: 100 INJECTION, SOLUTION INTRAVENOUS; SUBCUTANEOUS at 12:15

## 2017-12-15 RX ADMIN — SODIUM CHLORIDE, PRESERVATIVE FREE SCH ML: 5 INJECTION INTRAVENOUS at 12:15

## 2017-12-15 RX ADMIN — DABIGATRAN ETEXILATE MESYLATE SCH MG: 75 CAPSULE ORAL at 08:28

## 2017-12-15 RX ADMIN — INSULIN LISPRO SCH UNITS: 100 INJECTION, SOLUTION INTRAVENOUS; SUBCUTANEOUS at 21:00

## 2017-12-15 RX ADMIN — DOCUSATE SODIUM,SENNOSIDES SCH TAB: 50; 8.6 TABLET, FILM COATED ORAL at 21:00

## 2017-12-15 RX ADMIN — SODIUM CHLORIDE, PRESERVATIVE FREE SCH ML: 5 INJECTION INTRAVENOUS at 22:00

## 2017-12-15 RX ADMIN — POLYETHYLENE GLYCOL 3350 SCH PKT: 17 POWDER, FOR SOLUTION ORAL at 08:09

## 2017-12-15 RX ADMIN — FERROUS SULFATE TAB 325 MG (65 MG ELEMENTAL FE) SCH MG: 325 (65 FE) TAB at 08:27

## 2017-12-15 RX ADMIN — DABIGATRAN ETEXILATE MESYLATE SCH MG: 75 CAPSULE ORAL at 21:36

## 2017-12-15 RX ADMIN — BISACODYL SCH MG: 10 SUPPOSITORY RECTAL at 08:09

## 2017-12-15 RX ADMIN — ASPIRIN SCH MG: 81 TABLET ORAL at 08:27

## 2017-12-15 RX ADMIN — DEXTROSE MONOHYDRATE SCH MG: 50 INJECTION, SOLUTION INTRAVENOUS at 01:30

## 2017-12-15 RX ADMIN — AMIODARONE HYDROCHLORIDE SCH MG: 200 TABLET ORAL at 08:28

## 2017-12-15 RX ADMIN — INSULIN LISPRO SCH UNITS: 100 INJECTION, SOLUTION INTRAVENOUS; SUBCUTANEOUS at 18:13

## 2017-12-15 RX ADMIN — INSULIN LISPRO SCH UNITS: 100 INJECTION, SOLUTION INTRAVENOUS; SUBCUTANEOUS at 08:45

## 2017-12-15 RX ADMIN — DOCUSATE SODIUM,SENNOSIDES SCH TAB: 50; 8.6 TABLET, FILM COATED ORAL at 08:09

## 2017-12-15 RX ADMIN — DEXTROSE MONOHYDRATE SCH MG: 50 INJECTION, SOLUTION INTRAVENOUS at 21:38

## 2017-12-15 RX ADMIN — HYDROCODONE BITARTRATE AND ACETAMINOPHEN PRN TAB: 5; 325 TABLET ORAL at 21:39

## 2017-12-15 RX ADMIN — ATORVASTATIN CALCIUM SCH MG: 20 TABLET, FILM COATED ORAL at 21:37

## 2017-12-15 NOTE — IPNPDOC
Subjective


Date Seen


The patient was seen on 12/15/17.





Subjective


Chief Complaint/HPI


The patient is a 67-year-old female admitted with a reason for visit of CHF.


Events since last encounter


Patient seen and examined at the bedside. States that she is feeling lighter, 

and notes that she has been able to work better with physical/occupational 

therapy. Notes that she is getting closer to being back to her baseline





Objective


Physical Examination


General Exam:  Positive: Alert, Cooperative, No Acute Distress


ENT Exam:  Positive: Atraumatic, Mucous membr. moist/pink


Chest Exam:  Positive: Diminished


Heart Exam:  Positive: Rate Normal, Irregular Rhythm


Telemetry:  Positive: Atrial fibrillation


Abdomen Exam:  Positive: Normal bowel sounds, 


   Negative: Soft, Tenderness


Extremity Exam:  Positive: Edema (2+ b/l LE), 


   Negative: Tenderness


Psych Exam:  Positive: Oriented x 3





Assessment /Plan


Plan/VTE


VTE Prophylaxis Ordered?:  Yes





Plan


Acute decompensated diastolic heart failure


Continue on Amiloride and IV Lasix as per Nephrology


We will cont to supplement k and mg as needed. 


The patient has diuresed a net negative of +30L, and has lost +20kg according 

to records here


Cont strict I/Os, daily weights, low salt diet, fluid restriction





CKD stage 3


Serum Cr appears to be trending upward with IV diuresis


Nephrology on board





Right LE Cellulitis, resolved


s/p completion of Teflaro  





Leukocytosis, resolved. 


Likely combination of reactionary to acute CHF decompensation and RLE cellulitis





Paroxysmal A.Fib


Cont Pradaxa, amiodarone and Metoprolol





NIDDM2


ISS inpatient. consistent carbs diet





COPD: no acute exacerbation


on 1L NC all the time at home


Continue home Ventolin





Chronic respiratory failure with hypoxia


Continue oxygen by nasal canula





Pulmonary Hypertension


continue diuresis and oxygen as ordered 





CAD with h/o stent: stable at this point. 





HLD


continue home med





HTN


continue home med





GERD


continue home med





Right LE venous stasis ulcer:   continue wound care. 





Morbid obesity bmi 60.5 complicating acute issues





Obesity hypoventilation/ SINTIA: Had abnormal nocturnal pulse oximetry before. 

Never had a sleep study.  continue on oxygen





DVT prophylaxis


Already on Pradaxa





Deconditioning--PT on board for functional optimization





VS, I&O, 24H, Fishbone


Vital Signs/I&O





Vital Signs








  Date Time  Temp Pulse Resp B/P (MAP) Pulse Ox O2 Delivery O2 Flow Rate FiO2


 


12/15/17 08:28  97  101/63    


 


12/15/17 06:00 96.4  20  99 Nasal Cannula 2.0 














I&O- Last 24 Hours up to 6 AM 


 


 12/16/17





 06:00


 


Intake Total 180 ml


 


Output Total 250 ml


 


Balance -70 ml











Laboratory Data


24H LABS


Laboratory Tests 2


12/14/17 11:33: Bedside Glucose (Misc Panel) 152H


12/14/17 16:42: Bedside Glucose (Misc Panel) 131H


12/15/17 08:25: Bedside Glucose (Misc Panel) 176H


12/15/17 08:41: 


Nucleated Red Blood Cells % (auto) 0.0, Anion Gap 10, Glomerular Filtration 

Rate 33.3L, Blood Urea Nitrogen 45H, Creatinine 1.64H, Sodium Level 136, 

Potassium Level 3.9, Chloride Level 95L, Carbon Dioxide Level 31, Calcium Level 

9.1


CBC/BMP


Laboratory Tests


12/15/17 08:41








Red Blood Count 4.24, Mean Corpuscular Volume 95.3, Mean Corpuscular Hemoglobin 

30.4, Mean Corpuscular Hemoglobin Concent 31.9 L, Red Cell Distribution Width 

15.7 H, Calcium Level 9.1











NELLY MONSON MD Dec 15, 2017 10:54

## 2017-12-16 VITALS — SYSTOLIC BLOOD PRESSURE: 108 MMHG | DIASTOLIC BLOOD PRESSURE: 56 MMHG

## 2017-12-16 VITALS — DIASTOLIC BLOOD PRESSURE: 60 MMHG | SYSTOLIC BLOOD PRESSURE: 103 MMHG

## 2017-12-16 VITALS — SYSTOLIC BLOOD PRESSURE: 109 MMHG | DIASTOLIC BLOOD PRESSURE: 60 MMHG

## 2017-12-16 LAB
ANION GAP SERPL CALC-SCNC: 5 MEQ/L (ref 8–16)
BUN SERPL-MCNC: 49 MG/DL (ref 7–18)
CALCIUM SERPL-MCNC: 9.1 MG/DL (ref 8.8–10.2)
CHLORIDE SERPL-SCNC: 98 MEQ/L (ref 98–107)
CO2 SERPL-SCNC: 35 MEQ/L (ref 21–32)
CREAT SERPL-MCNC: 1.3 MG/DL (ref 0.55–1.02)
ERYTHROCYTE [DISTWIDTH] IN BLOOD BY AUTOMATED COUNT: 15.6 % (ref 11.5–14.5)
GFR SERPL CREATININE-BSD FRML MDRD: 43.5 ML/MIN/{1.73_M2} (ref 45–?)
GLUCOSE SERPL-MCNC: 121 MG/DL (ref 80–110)
MCH RBC QN AUTO: 30.3 PG (ref 27–33)
MCHC RBC AUTO-ENTMCNC: 31.7 G/DL (ref 32–36.5)
MCV RBC AUTO: 95.5 FL (ref 80–96)
NRBC BLD AUTO-RTO: 0 % (ref 0–0)
PLATELET # BLD AUTO: 306 10^3/UL (ref 150–450)
POTASSIUM SERPL-SCNC: 3.6 MEQ/L (ref 3.5–5.1)
SODIUM SERPL-SCNC: 138 MEQ/L (ref 136–145)
WBC # BLD AUTO: 8.3 10^3/UL (ref 4–10)

## 2017-12-16 RX ADMIN — MENTHOL, METHYL SALICYLATE SCH DOSE: 10; 15 CREAM TOPICAL at 21:00

## 2017-12-16 RX ADMIN — DABIGATRAN ETEXILATE MESYLATE SCH MG: 75 CAPSULE ORAL at 08:57

## 2017-12-16 RX ADMIN — ATORVASTATIN CALCIUM SCH MG: 20 TABLET, FILM COATED ORAL at 21:02

## 2017-12-16 RX ADMIN — POLYETHYLENE GLYCOL 3350 SCH PKT: 17 POWDER, FOR SOLUTION ORAL at 08:59

## 2017-12-16 RX ADMIN — DOCUSATE SODIUM,SENNOSIDES SCH TAB: 50; 8.6 TABLET, FILM COATED ORAL at 21:00

## 2017-12-16 RX ADMIN — ASPIRIN SCH MG: 81 TABLET ORAL at 08:56

## 2017-12-16 RX ADMIN — POTASSIUM CHLORIDE SCH MEQ: 750 TABLET, EXTENDED RELEASE ORAL at 08:57

## 2017-12-16 RX ADMIN — INSULIN LISPRO SCH UNITS: 100 INJECTION, SOLUTION INTRAVENOUS; SUBCUTANEOUS at 08:53

## 2017-12-16 RX ADMIN — INSULIN LISPRO SCH UNITS: 100 INJECTION, SOLUTION INTRAVENOUS; SUBCUTANEOUS at 12:38

## 2017-12-16 RX ADMIN — BISACODYL SCH MG: 10 SUPPOSITORY RECTAL at 09:00

## 2017-12-16 RX ADMIN — HYDROCODONE BITARTRATE AND ACETAMINOPHEN PRN TAB: 5; 325 TABLET ORAL at 08:56

## 2017-12-16 RX ADMIN — METOPROLOL TARTRATE SCH MG: 25 TABLET, FILM COATED ORAL at 08:55

## 2017-12-16 RX ADMIN — AMIODARONE HYDROCHLORIDE SCH MG: 200 TABLET ORAL at 08:56

## 2017-12-16 RX ADMIN — TORSEMIDE SCH MG: 20 TABLET ORAL at 17:25

## 2017-12-16 RX ADMIN — DOCUSATE SODIUM,SENNOSIDES SCH TAB: 50; 8.6 TABLET, FILM COATED ORAL at 08:59

## 2017-12-16 RX ADMIN — METOPROLOL TARTRATE SCH MG: 25 TABLET, FILM COATED ORAL at 20:38

## 2017-12-16 RX ADMIN — MENTHOL, METHYL SALICYLATE SCH DOSE: 10; 15 CREAM TOPICAL at 08:58

## 2017-12-16 RX ADMIN — HYDROCODONE BITARTRATE AND ACETAMINOPHEN PRN TAB: 5; 325 TABLET ORAL at 21:03

## 2017-12-16 RX ADMIN — SODIUM CHLORIDE, PRESERVATIVE FREE SCH ML: 5 INJECTION INTRAVENOUS at 05:05

## 2017-12-16 RX ADMIN — INSULIN LISPRO SCH UNITS: 100 INJECTION, SOLUTION INTRAVENOUS; SUBCUTANEOUS at 20:39

## 2017-12-16 RX ADMIN — DABIGATRAN ETEXILATE MESYLATE SCH MG: 75 CAPSULE ORAL at 21:02

## 2017-12-16 RX ADMIN — NYSTATIN SCH DOSE: 100000 POWDER TOPICAL at 21:02

## 2017-12-16 RX ADMIN — FERROUS SULFATE TAB 325 MG (65 MG ELEMENTAL FE) SCH MG: 325 (65 FE) TAB at 08:56

## 2017-12-16 RX ADMIN — NYSTATIN SCH DOSE: 100000 POWDER TOPICAL at 08:58

## 2017-12-16 RX ADMIN — TIOTROPIUM BROMIDE SCH INHALATION: 18 CAPSULE ORAL; RESPIRATORY (INHALATION) at 07:31

## 2017-12-16 RX ADMIN — INSULIN LISPRO SCH UNITS: 100 INJECTION, SOLUTION INTRAVENOUS; SUBCUTANEOUS at 17:25

## 2017-12-16 RX ADMIN — DEXTROSE MONOHYDRATE SCH MG: 50 INJECTION, SOLUTION INTRAVENOUS at 08:57

## 2017-12-16 RX ADMIN — SODIUM CHLORIDE, PRESERVATIVE FREE SCH ML: 5 INJECTION INTRAVENOUS at 21:13

## 2017-12-16 RX ADMIN — SODIUM CHLORIDE, PRESERVATIVE FREE SCH ML: 5 INJECTION INTRAVENOUS at 14:17

## 2017-12-16 NOTE — IPNPDOC
Date Seen


The patient was seen on 12/16/17.





Progress Note


SUBJECTIVE:  Patient was seen and examined at the bedside this morning.  She is 

sitting up in a chair during our evaluation.  States that her lower extremity 

and abdominal swelling is improving.  Walked from the chair to the door of her 

hospital room yesterday with physical therapy.  Also compensating well on 1L of 

oxygen, which is the oxygen level that she is on at home.  Urine output 

overnight was 2820mL.  Her weight has remained stable from yesterday to today.  

Renal function has improved.


 


REVIEW OF SYSTEMS:  Patient denies any fever, chills, rigors.  She denies any 

headache, chest pain, shortness of breath.  Patient reports her abdominal wall 

edema is improving.  She reported leg edema is significantly better without 

cramps, numbness, or tingling in upper or lower extremities.  Rest of review of 

systems is negative.


 


PHYSICAL EXAMINATION:


GENERAL:  Patient is awake, alert, oriented times three, sitting up in a chair, 

morbidly obese.


HEAD AND NECK EXAMINATION:  Extraocular muscles intact.  Pupils equally round 

and reactive to light.  Mucous membranes are moist.  Neck is supple.  I could 

not appreciate the jugular venous distention (JVD) because of body habitus.


CARDIOVASCULAR:  S1, S2, regular heart rate and rhythm.  She has trace edema of 

the right side of her abdomen; very mild trace edema appreciated on the 

bilateral lower extremities.


RESPIRATORY:  Chest is clear to auscultation bilaterally.  Bilateral equal air 

entry.  No rales or rhonchi.


ABDOMEN:  Soft.  Obese.  Positive bowel sounds.  Improving edema of the 

abdominal wall and pannus.  I could not appreciate any organomegaly.


MUSCULOSKELETAL:  No clubbing or cyanosis.  Very mild trace edema of the 

bilateral lower extremities.


CENTRAL NERVOUS SYSTEM (CNS):  No focal neurological deficit.  Power is 5/5 in 

all extremities.


PSYCHIATRIC:  Normal mood and affect.


 


LABORATORY REVIEW:  See below.


 


CURRENT INPATIENT MEDICATIONS:  Patient's medications were all reviewed by 

myself and my attending. No medication changes today.  Will switch patient's 

diuretic to oral tomorrow.


 


ASSESSMENT:  67-year-old female with past medical history of chronic kidney 

disease stage III, chronic diastolic congestive heart failure, paroxysmal 

atrial fibrillation, and morbid obesity, admitted this time because of 

decompensated diastolic congestive heart failure secondary to noncompliance 

with her diuretic regimen.


 


PLAN:


1.  Decompensated diastolic congestive heart failure:  Decreased Lasix to 60mg 

IV every 12 hours.  Will switch patient's diuretic to oral tomorrow.  Continue 

with Amiloride 10mg orally daily.


 


2.  Chronic kidney disease stage III:  Creatinine is 1.3.  Urine output 2820mL 

overnight.  Remains on Lasix to 60mg IV every 12 hours.  Will switch patient's 

IV Lasix to oral tomorrow.  Weight appears to have stablized at 143kg since 

yesterday.


 


3.  Hypokalemia:  Normalized.  Remains on Potassium 40mEq daily.


 


4.  Paroxysmal atrial fibrillation:  Heart rate is controlled at this time. 

Continue current dose of metoprolol 12.5 mg by mouth twice a day, amiodarone 

200 mg daily, and Pradaxa 150 mg by mouth twice a day.


 


5.  Metabolic alkalosis:  Bicarbonate level is 35.  Remains on Lasix to 60mg IV 

every 12 hours.  Will switch to oral Lasix tomorrow.  Continue with Amiloride.  

Could consider restarting Acetazolamie if bicarbonate continues to increase.


 


6.  Morbid obesity and deconditioning:  Continues to make progress with PT and 

OT.  Occupational therapy recommends continued OT and to be discharged home 

with services.  Physical therapy recommends that patient is safe for discharge 

from their standpoint and also recommends home with services.  Patient's 

oxygenation has remained stable throughout therapy sessions.





DISPOSITION:  Switch diuretic to oral tomorrow.  Stable from a renal standpoint.





[facility]


Vital Signs/I&O





Vital Signs








  Date Time  Temp Pulse Resp B/P (MAP) Pulse Ox O2 Delivery O2 Flow Rate FiO2


 


12/16/17 09:40   20     


 


12/16/17 08:55  76  119/65    


 


12/16/17 06:00 97.2    99 Room Air  


 


12/15/17 21:00       1.0 














I&O- Last 24 Hours up to 6 AM 


 


 12/17/17





 06:00


 


Intake Total 250 ml


 


Output Total 150 ml


 


Balance 100 ml











Laboratory Data


24H LABS


Laboratory Tests 2


12/15/17 21:18: Bedside Glucose (Misc Panel) 214H


12/16/17 05:25: 


Nucleated Red Blood Cells % (auto) 0.0, Anion Gap 5L, Glomerular Filtration 

Rate 43.5L, Blood Urea Nitrogen 49H, Creatinine 1.30H, Sodium Level 138, 

Potassium Level 3.6, Chloride Level 98, Carbon Dioxide Level 35H, Calcium Level 

9.1


CBC/BMP


Laboratory Tests


12/16/17 05:25








Red Blood Count 3.80 L, Mean Corpuscular Volume 95.5, Mean Corpuscular 

Hemoglobin 30.3, Mean Corpuscular Hemoglobin Concent 31.7 L, Red Cell 

Distribution Width 15.6 H, Calcium Level 9.1











RICHARD BENTLEY DO Dec 16, 2017 13:43

## 2017-12-16 NOTE — IPNPDOC
Subjective


Date Seen


The patient was seen on 12/16/17.





Subjective


Chief Complaint/HPI


The patient is a 67-year-old female admitted with a reason for visit of CHF.


Events since last encounter


Patient seen and examined at bedside. States that her respiratory status is 

near back to baseline, and she has been able to get around better than she did 

before she came to the hospital.





Objective


Physical Examination


General Exam:  Positive: Alert, Cooperative, No Acute Distress


ENT Exam:  Positive: Atraumatic, Mucous membr. moist/pink


Chest Exam:  Positive: Diminished


Heart Exam:  Positive: Rate Normal, Irregular Rhythm


Telemetry:  Positive: Atrial fibrillation


Abdomen Exam:  Positive: Normal bowel sounds, 


   Negative: Soft, Tenderness


Extremity Exam:  Positive: Edema (2+ b/l LE), 


   Negative: Tenderness


Psych Exam:  Positive: Oriented x 3





Assessment /Plan


Plan/VTE


VTE Prophylaxis Ordered?:  Yes





Plan


Acute decompensated diastolic heart failure


Continue on Amiloride and IV Lasix as per Nephrology


We will cont to supplement k and mg as needed. 


The patient has diuresed a net negative of +30L, and has lost +20kg according 

to records here


Cont strict I/Os, daily weights, low salt diet, fluid restriction





CKD stage 3


Serum Cr appears to be at baseline


Nephrology on board





Right LE Cellulitis, resolved


s/p completion of Teflaro  





Leukocytosis, resolved. 


Likely combination of reactionary to acute CHF decompensation and RLE cellulitis





Paroxysmal A.Fib


Cont Pradaxa, amiodarone and Metoprolol





NIDDM2


ISS inpatient. consistent carbs diet





COPD: no acute exacerbation


on 1L NC all the time at home


Continue home Ventolin





Chronic respiratory failure with hypoxia


Continue oxygen by nasal canula





Pulmonary Hypertension


continue diuresis and oxygen as ordered 





CAD with h/o stent: stable at this point. 





HLD


continue home med





HTN


continue home med





GERD


continue home med





Right LE venous stasis ulcer:   continue wound care. 





Morbid obesity bmi 60.5 complicating acute issues





Obesity hypoventilation/ SINTIA: Had abnormal nocturnal pulse oximetry before. 

Never had a sleep study.  continue on oxygen





DVT prophylaxis


Already on Pradaxa





Deconditioning--PT has cleared the patient for discharge. Anticipate changing 

IV diuretic therapy to by mouth, with discharge home in the next 24-48 hours.





VS, I&O, 24H, Fishbone


Vital Signs/I&O





Vital Signs








  Date Time  Temp Pulse Resp B/P (MAP) Pulse Ox O2 Delivery O2 Flow Rate FiO2


 


12/16/17 09:40   20     


 


12/16/17 08:55  76  119/65    


 


12/16/17 06:00 97.2    99 Room Air  


 


12/15/17 21:00       1.0 











Laboratory Data


24H LABS


Laboratory Tests 2


12/15/17 11:47: Bedside Glucose (Misc Panel) 138H


12/15/17 21:18: Bedside Glucose (Misc Panel) 214H


12/16/17 05:25: 


Nucleated Red Blood Cells % (auto) 0.0, Anion Gap 5L, Glomerular Filtration 

Rate 43.5L, Blood Urea Nitrogen 49H, Creatinine 1.30H, Sodium Level 138, 

Potassium Level 3.6, Chloride Level 98, Carbon Dioxide Level 35H, Calcium Level 

9.1


CBC/BMP


Laboratory Tests


12/16/17 05:25








Red Blood Count 3.80 L, Mean Corpuscular Volume 95.5, Mean Corpuscular 

Hemoglobin 30.3, Mean Corpuscular Hemoglobin Concent 31.7 L, Red Cell 

Distribution Width 15.6 H, Calcium Level 9.1











NELLY MONSON MD Dec 16, 2017 11:15

## 2017-12-17 VITALS — DIASTOLIC BLOOD PRESSURE: 57 MMHG | SYSTOLIC BLOOD PRESSURE: 121 MMHG

## 2017-12-17 VITALS — SYSTOLIC BLOOD PRESSURE: 102 MMHG | DIASTOLIC BLOOD PRESSURE: 71 MMHG

## 2017-12-17 LAB
ANION GAP SERPL CALC-SCNC: 6 MEQ/L (ref 8–16)
BUN SERPL-MCNC: 48 MG/DL (ref 7–18)
CALCIUM SERPL-MCNC: 8.5 MG/DL (ref 8.8–10.2)
CHLORIDE SERPL-SCNC: 103 MEQ/L (ref 98–107)
CO2 SERPL-SCNC: 30 MEQ/L (ref 21–32)
CREAT SERPL-MCNC: 1.42 MG/DL (ref 0.55–1.02)
ERYTHROCYTE [DISTWIDTH] IN BLOOD BY AUTOMATED COUNT: 15.9 % (ref 11.5–14.5)
GFR SERPL CREATININE-BSD FRML MDRD: 39.3 ML/MIN/{1.73_M2} (ref 45–?)
GLUCOSE SERPL-MCNC: 118 MG/DL (ref 80–110)
MCH RBC QN AUTO: 29.9 PG (ref 27–33)
MCHC RBC AUTO-ENTMCNC: 31.3 G/DL (ref 32–36.5)
MCV RBC AUTO: 95.6 FL (ref 80–96)
NRBC BLD AUTO-RTO: 0 % (ref 0–0)
PLATELET # BLD AUTO: 310 10^3/UL (ref 150–450)
POTASSIUM SERPL-SCNC: 4.2 MEQ/L (ref 3.5–5.1)
SODIUM SERPL-SCNC: 139 MEQ/L (ref 136–145)
WBC # BLD AUTO: 7.8 10^3/UL (ref 4–10)

## 2017-12-17 RX ADMIN — HYDROCODONE BITARTRATE AND ACETAMINOPHEN PRN TAB: 5; 325 TABLET ORAL at 11:33

## 2017-12-17 RX ADMIN — INSULIN LISPRO SCH UNITS: 100 INJECTION, SOLUTION INTRAVENOUS; SUBCUTANEOUS at 20:49

## 2017-12-17 RX ADMIN — DOCUSATE SODIUM,SENNOSIDES SCH TAB: 50; 8.6 TABLET, FILM COATED ORAL at 08:13

## 2017-12-17 RX ADMIN — POTASSIUM CHLORIDE SCH MEQ: 750 TABLET, EXTENDED RELEASE ORAL at 08:07

## 2017-12-17 RX ADMIN — NYSTATIN SCH DOSE: 100000 POWDER TOPICAL at 21:02

## 2017-12-17 RX ADMIN — HYDROCODONE BITARTRATE AND ACETAMINOPHEN PRN TAB: 5; 325 TABLET ORAL at 20:57

## 2017-12-17 RX ADMIN — DABIGATRAN ETEXILATE MESYLATE SCH MG: 75 CAPSULE ORAL at 08:07

## 2017-12-17 RX ADMIN — DABIGATRAN ETEXILATE MESYLATE SCH MG: 75 CAPSULE ORAL at 20:56

## 2017-12-17 RX ADMIN — TORSEMIDE SCH MG: 20 TABLET ORAL at 08:08

## 2017-12-17 RX ADMIN — ASPIRIN SCH MG: 81 TABLET ORAL at 08:08

## 2017-12-17 RX ADMIN — TIOTROPIUM BROMIDE SCH INHALATION: 18 CAPSULE ORAL; RESPIRATORY (INHALATION) at 07:27

## 2017-12-17 RX ADMIN — BISACODYL SCH MG: 10 SUPPOSITORY RECTAL at 08:13

## 2017-12-17 RX ADMIN — MENTHOL, METHYL SALICYLATE SCH DOSE: 10; 15 CREAM TOPICAL at 21:02

## 2017-12-17 RX ADMIN — AMIODARONE HYDROCHLORIDE SCH MG: 200 TABLET ORAL at 08:07

## 2017-12-17 RX ADMIN — NYSTATIN SCH DOSE: 100000 POWDER TOPICAL at 08:09

## 2017-12-17 RX ADMIN — INSULIN LISPRO SCH UNITS: 100 INJECTION, SOLUTION INTRAVENOUS; SUBCUTANEOUS at 17:36

## 2017-12-17 RX ADMIN — INSULIN LISPRO SCH UNITS: 100 INJECTION, SOLUTION INTRAVENOUS; SUBCUTANEOUS at 08:07

## 2017-12-17 RX ADMIN — METOPROLOL TARTRATE SCH MG: 25 TABLET, FILM COATED ORAL at 21:00

## 2017-12-17 RX ADMIN — SODIUM CHLORIDE, PRESERVATIVE FREE SCH ML: 5 INJECTION INTRAVENOUS at 06:00

## 2017-12-17 RX ADMIN — DOCUSATE SODIUM,SENNOSIDES SCH TAB: 50; 8.6 TABLET, FILM COATED ORAL at 20:56

## 2017-12-17 RX ADMIN — MENTHOL, METHYL SALICYLATE SCH DOSE: 10; 15 CREAM TOPICAL at 08:09

## 2017-12-17 RX ADMIN — INSULIN LISPRO SCH UNITS: 100 INJECTION, SOLUTION INTRAVENOUS; SUBCUTANEOUS at 12:55

## 2017-12-17 RX ADMIN — FERROUS SULFATE TAB 325 MG (65 MG ELEMENTAL FE) SCH MG: 325 (65 FE) TAB at 08:08

## 2017-12-17 RX ADMIN — METOPROLOL TARTRATE SCH MG: 25 TABLET, FILM COATED ORAL at 08:08

## 2017-12-17 RX ADMIN — DOCUSATE SODIUM,SENNOSIDES SCH TAB: 50; 8.6 TABLET, FILM COATED ORAL at 21:00

## 2017-12-17 RX ADMIN — POLYETHYLENE GLYCOL 3350 SCH PKT: 17 POWDER, FOR SOLUTION ORAL at 08:13

## 2017-12-17 RX ADMIN — HYDROCODONE BITARTRATE AND ACETAMINOPHEN PRN TAB: 5; 325 TABLET ORAL at 04:50

## 2017-12-17 RX ADMIN — ATORVASTATIN CALCIUM SCH MG: 20 TABLET, FILM COATED ORAL at 20:56

## 2017-12-17 RX ADMIN — TORSEMIDE SCH MG: 20 TABLET ORAL at 17:35

## 2017-12-17 NOTE — IPN
DATE:  12/17/2017

 

SUBJECTIVE:  The patient is seen this morning sitting out of bed to the chair,

comfortable, in no acute distress.  She feels well and is discharge pending.

 

VITAL SIGNS:  Temperature 97.2, pulse 68, respiratory rate 16-20, blood pressure

121/57, saturating 96% on 2 liters nasal cannula.

Intake and output:  Oral intake yesterday 970, urine output 1500, net negative

530.  Weight in the bed scale today is 143.8 kg.

GENERAL:  The patient is seen sitting out of bed to the chair, comfortable, in no

acute distress.  Extraocular muscles are intact.  Her dentition is poor.  Tongue

is  moist.  Neck is supple.  Cannot assess the jugular veins because of body

habitus.

CARDIAC:  S1, S2, distant heart sounds.  There is a 2+ radial pulse.  There is

trace edema in the lower extremities.

RESPIRATORY:  Symmetric and distant air entry bilaterally.  She is without overt

crackle or rale.

ABDOMEN:  Soft, obese, with significant pannus and some induration and edema in

the lower part of the pannus, which is overall serially improved.

MUSCULOSKELETAL:  Moving all extremities without issue.  No clubbing or

cyanosis.

NEUROLOGIC:  She is at her baseline mentation.

PSYCHIATRIC:  Appropriate mood and affect.

 

LABORATORY DATA:  White count 7.8, hemoglobin 10.8, platelets 310.  Sodium 139,

potassium 4.2, BUN 48, creatinine 1.4, glucose 118.

 

INPATIENT MEDICATIONS:  The patient is on an oral diuretic regimen of metolazone

2.5 mg by mouth Monday, Wednesday, and Friday, torsemide 20 mg by mouth twice a

day, and amiloride 10 mg by mouth daily.  Her remainder of medications are

unchanged from prior.

 

PROBLEMS:

1.  Decompensated diastolic heart failure.  The patient's volume status has

significantly improved over the course of this admission with accumulative

negative fluid balance of 37 liters and significant downtrend in her daily

weights and clinical improvement in her edema.  She is back on her home diuretic

regimen of torsemide and metolazone.  Her spironolactone was switched over to

amiloride on this admission due to her metabolic alkalosis and she will be

continued on amiloride as an outpatient.

 

2.  Chronic kidney disease (CKD) stage III.  Renal function is fairly close to

baseline despite aggressive diuresis on this admission.

 

3.  Paroxysmal atrial fibrillation.  The patient continues on amiodarone,

Pradaxa, and metoprolol.

 

4.  Chronic obstructive pulmonary disease (COPD).  No acute exacerbation.  The

patient is on oxygen at home as well and she continues on her inhalers.

 

5.  Morbid obesity and obesity hypoventilation and obstructive sleep apnea (SINTIA)

complicating her care.

 

DISCHARGE PLANNING:  Patient is clear for discharge from a renal point of view to

continue on torsemide 20 mg by mouth twice a day, amiloride 10 mg by mouth daily,

and metolazone 2.5 mg on Monday, Wednesday, and Friday, and she will be seen

within a week of discharge in the nephrology office to followup with her primary

nephrologist, Dr. Medeiros.

## 2017-12-17 NOTE — IPNPDOC
Subjective


Date Seen


The patient was seen on 12/17/17.





Subjective


Chief Complaint/HPI


The patient is a 67-year-old female admitted with a reason for visit of CHF.


Events since last encounter


Patient seen and examined at the bedside. States that she is feeling better 

today, and is looking forward to going home tomorrow.





Objective


Physical Examination


General Exam:  Positive: Alert, Cooperative, No Acute Distress


ENT Exam:  Positive: Atraumatic, Mucous membr. moist/pink


Chest Exam:  Positive: Diminished


Heart Exam:  Positive: Rate Normal, Irregular Rhythm


Telemetry:  Positive: Atrial fibrillation


Abdomen Exam:  Positive: Normal bowel sounds, 


   Negative: Soft, Tenderness


Extremity Exam:  Positive: Edema (1+ b/l LE), 


   Negative: Tenderness


Psych Exam:  Positive: Oriented x 3





Assessment /Plan


Plan/VTE


VTE Prophylaxis Ordered?:  Yes





Plan


Acute decompensated diastolic heart failure


Continue PO diuretic regimen as per Nephrology orders


We will cont to supplement k and mg as needed. 


The patient has diuresed a net negative of +30L, and has lost +20kg according 

to records here


Cont strict I/Os, daily weights, low salt diet, fluid restriction





CKD stage 3


Serum Cr appears to be at baseline


Nephrology on board





Right LE Cellulitis, resolved


s/p completion of Teflaro  





Leukocytosis, resolved. 


Likely combination of reactionary to acute CHF decompensation and RLE cellulitis





Paroxysmal A.Fib


Cont Pradaxa, amiodarone and Metoprolol





NIDDM2


ISS inpatient. consistent carbs diet





COPD: no acute exacerbation


on 1L NC all the time at home


Continue home Ventolin





Chronic respiratory failure with hypoxia


Continue oxygen by nasal canula





Pulmonary Hypertension


continue diuresis and oxygen as ordered 





CAD with h/o stent: stable at this point. 





HLD


continue home med





HTN


continue home med





GERD


continue home med





Right LE venous stasis ulcer:   continue wound care. 





Morbid obesity bmi 60.5 complicating acute issues





Obesity hypoventilation/ SINTIA: Had abnormal nocturnal pulse oximetry before. 

Never had a sleep study.  continue on oxygen





DVT prophylaxis


Already on Pradaxa





Deconditioning--PT has cleared the patient for discharge. Anticipate D/C 

tomorrow as the patient had difficulty obtaining transportation today.





VS, I&O, 24H, Fishbone


Vital Signs/I&O





Vital Signs








  Date Time  Temp Pulse Resp B/P (MAP) Pulse Ox O2 Delivery O2 Flow Rate FiO2


 


12/17/17 12:15   18     


 


12/17/17 08:15      Nasal Cannula 1.0 


 


12/17/17 08:08  77  121/57    


 


12/17/17 06:00 97.2    96   














I&O- Last 24 Hours up to 6 AM 


 


 12/18/17





 06:00


 


Intake Total 520 ml


 


Output Total 0 ml


 


Balance 520 ml











Laboratory Data


24H LABS


Laboratory Tests 2


12/16/17 16:40: Bedside Glucose (Misc Panel) 173H


12/16/17 20:31: Bedside Glucose (Misc Panel) 226H


12/17/17 05:48: 


Nucleated Red Blood Cells % (auto) 0.0, Anion Gap 6L, Glomerular Filtration 

Rate 39.3L, Blood Urea Nitrogen 48H, Creatinine 1.42H, Sodium Level 139, 

Potassium Level 4.2, Chloride Level 103, Carbon Dioxide Level 30, Calcium Level 

8.5L


CBC/BMP


Laboratory Tests


12/17/17 05:48








Red Blood Count 3.61 L, Mean Corpuscular Volume 95.6, Mean Corpuscular 

Hemoglobin 29.9, Mean Corpuscular Hemoglobin Concent 31.3 L, Red Cell 

Distribution Width 15.9 H, Calcium Level 8.5 L











NELLY MONSON MD Dec 17, 2017 13:49

## 2017-12-18 VITALS — DIASTOLIC BLOOD PRESSURE: 63 MMHG | SYSTOLIC BLOOD PRESSURE: 147 MMHG

## 2017-12-18 VITALS — SYSTOLIC BLOOD PRESSURE: 147 MMHG | DIASTOLIC BLOOD PRESSURE: 63 MMHG

## 2017-12-18 LAB
ANION GAP SERPL CALC-SCNC: 7 MEQ/L (ref 8–16)
BUN SERPL-MCNC: 48 MG/DL (ref 7–18)
CALCIUM SERPL-MCNC: 8.2 MG/DL (ref 8.8–10.2)
CHLORIDE SERPL-SCNC: 104 MEQ/L (ref 98–107)
CO2 SERPL-SCNC: 29 MEQ/L (ref 21–32)
CREAT SERPL-MCNC: 1.36 MG/DL (ref 0.55–1.02)
ERYTHROCYTE [DISTWIDTH] IN BLOOD BY AUTOMATED COUNT: 16.1 % (ref 11.5–14.5)
GFR SERPL CREATININE-BSD FRML MDRD: 41.3 ML/MIN/{1.73_M2} (ref 45–?)
GLUCOSE SERPL-MCNC: 120 MG/DL (ref 80–110)
MCH RBC QN AUTO: 30.2 PG (ref 27–33)
MCHC RBC AUTO-ENTMCNC: 31.1 G/DL (ref 32–36.5)
MCV RBC AUTO: 97.3 FL (ref 80–96)
NRBC BLD AUTO-RTO: 0 % (ref 0–0)
PLATELET # BLD AUTO: 299 10^3/UL (ref 150–450)
POTASSIUM SERPL-SCNC: 4.3 MEQ/L (ref 3.5–5.1)
SODIUM SERPL-SCNC: 140 MEQ/L (ref 136–145)
WBC # BLD AUTO: 6.5 10^3/UL (ref 4–10)

## 2017-12-18 RX ADMIN — INSULIN LISPRO SCH UNITS: 100 INJECTION, SOLUTION INTRAVENOUS; SUBCUTANEOUS at 08:12

## 2017-12-18 RX ADMIN — POTASSIUM CHLORIDE SCH MEQ: 750 TABLET, EXTENDED RELEASE ORAL at 08:09

## 2017-12-18 RX ADMIN — POLYETHYLENE GLYCOL 3350 SCH PKT: 17 POWDER, FOR SOLUTION ORAL at 08:15

## 2017-12-18 RX ADMIN — DOCUSATE SODIUM,SENNOSIDES SCH TAB: 50; 8.6 TABLET, FILM COATED ORAL at 08:15

## 2017-12-18 RX ADMIN — BISACODYL SCH MG: 10 SUPPOSITORY RECTAL at 08:15

## 2017-12-18 RX ADMIN — METOPROLOL TARTRATE SCH MG: 25 TABLET, FILM COATED ORAL at 08:10

## 2017-12-18 RX ADMIN — NYSTATIN SCH DOSE: 100000 POWDER TOPICAL at 08:15

## 2017-12-18 RX ADMIN — ASPIRIN SCH MG: 81 TABLET ORAL at 08:10

## 2017-12-18 RX ADMIN — INSULIN LISPRO SCH UNITS: 100 INJECTION, SOLUTION INTRAVENOUS; SUBCUTANEOUS at 11:41

## 2017-12-18 RX ADMIN — AMIODARONE HYDROCHLORIDE SCH MG: 200 TABLET ORAL at 08:10

## 2017-12-18 RX ADMIN — HYDROCODONE BITARTRATE AND ACETAMINOPHEN PRN TAB: 5; 325 TABLET ORAL at 08:09

## 2017-12-18 RX ADMIN — TIOTROPIUM BROMIDE SCH INHALATION: 18 CAPSULE ORAL; RESPIRATORY (INHALATION) at 07:25

## 2017-12-18 RX ADMIN — TORSEMIDE SCH MG: 20 TABLET ORAL at 08:10

## 2017-12-18 RX ADMIN — DABIGATRAN ETEXILATE MESYLATE SCH MG: 75 CAPSULE ORAL at 08:09

## 2017-12-18 RX ADMIN — MENTHOL, METHYL SALICYLATE SCH DOSE: 10; 15 CREAM TOPICAL at 08:11

## 2017-12-18 RX ADMIN — FERROUS SULFATE TAB 325 MG (65 MG ELEMENTAL FE) SCH MG: 325 (65 FE) TAB at 08:10

## 2017-12-18 NOTE — IPNPDOC
Date Seen


The patient was seen on 12/18/17.





Progress Note


SUBJECTIVE:  The patient is seen and evaluated this morning at bedside.  She is 

out of bed and sitting in a chair.  She is getting ready to be discharged from 

the hospital.  Urine output has been adequate.  Patient has been restarted on 

Metolazone.  Renal function has improved.  Patient offers no complaints at this 

time.


 


VITAL SIGNS:  See below.


GENERAL:  The patient is seen and evaluated sitting out of bed to the chair, 

comfortable, in no acute distress.  Extraocular muscles are intact.  Her 

dentition is poor.  Tongue is  moist.  Neck is supple.  Cannot assess the 

jugular veins because of body habitus.


CARDIAC:  S1, S2, distant heart sounds.  There is a 2+ radial pulse.  There is 

trace edema in the lower extremities.


RESPIRATORY:  Symmetric and distant air entry bilaterally.  She is without 

overt crackle or rale.


ABDOMEN:  Soft, obese, with significant pannus and some induration and edema in 

the lower part of the pannus, which is overall serially improved.


MUSCULOSKELETAL:  Moving all extremities without issue.  No clubbing or 

cyanosis.


NEUROLOGIC:  She is at her baseline mentation.


PSYCHIATRIC:  Appropriate mood and affect.


 


LABORATORY DATA:  See below.


 


INPATIENT MEDICATIONS:  The patient is on an oral diuretic regimen of 

metolazone 2.5 mg by mouth Monday, Wednesday, and Friday, torsemide 20 mg by 

mouth twice a day, and amiloride 10 mg by mouth daily.  Her remainder of 

medications are unchanged from prior.


 


PROBLEMS:


1.  Decompensated diastolic heart failure:  The patient's volume status has 

significantly improved over the course of this admission with accumulative


negative fluid balance of 38 liters and significant downtrend in her daily 

weights and clinical improvement in her edema.  Weight at time of discharge is 

146kg.  She is back on her home diuretic regimen of torsemide and metolazone.  

Her spironolactone was switched over to amiloride on this admission due to her 

metabolic alkalosis and she will be continued on amiloride as an outpatient.


 


2.  Chronic kidney disease (CKD) stage III:  Renal function is fairly close to 

baseline despite aggressive diuresis on this admission.  Creatinine is 1.36.


 


3.  Paroxysmal atrial fibrillation:  The patient continues on amiodarone, 

Pradaxa, and metoprolol.


 


4.  Chronic obstructive pulmonary disease (COPD):  No acute exacerbation.  The 

patient is on oxygen at home as well and she continues on her inhalers.


 


5.  Morbid obesity and obesity hypoventilation and obstructive sleep apnea (SINTIA

) complicating her care.


 


DISCHARGE PLANNING:  Patient is stable from a renal standpoint.  Continue with 

Torsemide 20mg by mouth twice a day, Amiloride 10mg by mouth daily, and 

Metolazone 2.5mg Monday, Wednesday, Friday.  Recommend follow-up in the 

nephrology in one week.





VS, I&O, 24H, Fishbone


Vital Signs/I&O





Vital Signs








  Date Time  Temp Pulse Resp B/P (MAP) Pulse Ox O2 Delivery O2 Flow Rate FiO2


 


12/18/17 09:00      Nasal Cannula 1.0 


 


12/18/17 08:39   18     


 


12/18/17 08:10  83  147/63    


 


12/18/17 06:00 97.0    93   














I&O- Last 24 Hours up to 6 AM 


 


 12/19/17





 06:00


 


Intake Total 780 ml


 


Output Total 800 ml


 


Balance -20 ml











Laboratory Data


24H LABS


Laboratory Tests 2


12/17/17 20:26: Bedside Glucose (Misc Panel) 195H


12/18/17 05:29: 


Nucleated Red Blood Cells % (auto) 0.0, Anion Gap 7L, Glomerular Filtration 

Rate 41.3L, Blood Urea Nitrogen 48H, Creatinine 1.36H, Sodium Level 140, 

Potassium Level 4.3, Chloride Level 104, Carbon Dioxide Level 29, Calcium Level 

8.2L


12/18/17 11:14: Bedside Glucose (Misc Panel) 150H


CBC/BMP


Laboratory Tests


12/18/17 05:29








Red Blood Count 3.64 L, Mean Corpuscular Volume 97.3 H, Mean Corpuscular 

Hemoglobin 30.2, Mean Corpuscular Hemoglobin Concent 31.1 L, Red Cell 

Distribution Width 16.1 H, Calcium Level 8.2 L











RICHARD BENTLEY DO Dec 18, 2017 13:08

## 2017-12-18 NOTE — DS.PDOC
Discharge Summary


General


Date of Admission


Dec 2, 2017 at 18:02


Date of Discharge


12/18/17


Specialist/Consultants Involve


Dr. Alvares, Dr. Medeiros of Nephrology





Discharge Summary


PROCEDURES PERFORMED DURING STAY: None.





ADMITTING/DISCHARGE DIAGNOSES: 





Acute decompensated diastolic heart failure


Right LE Cellulitis


CKD stage 3


Paroxysmal A.Fib


COPD





COMPLICATIONS/CHIEF COMPLAINT: CHF.





HISTORY OF PRESENT ILLNESS: .





67-year-old female with past medical history of diastolic CHF, paroxysmal A. 

fib on Pradaxa, HLD, HTN, NIDDM 2, COPD 1L NC, CAD s/p stents x, and CKD 

presented to the ER with the chief complaint of focal the ablating due to 

aggressive bilateral leg swelling and associated shortness of breath. The 

patient stated that she had a 20 pound weight gain over the past few weeks and 

had worsening orthopnea, and paroxysmal nocturnal dyspnea. In addition, the 

patient also stated that she felt increased tenderness, on the skin of her 

right lower extremity. The patient was admitted to the hospital service for 

further evaluation and management.





During hospitalization, the patient was initiated on an aggressive IV diuretic 

regimen with assistance of nephrology given her underlying chronic kidney 

disease. The patient has diuresed over a net negative of 30 L, and has lost 

over 20 kg since being hospitalized. In addition, the patient was treated with 

a trial of teflaro for her right lower extremity cellulitis. At this time, the 

patient states that she is feeling much better and is eager to return home. The 

patient has been switched back to her by mouth diuretic regimen. In addition, 

the patient was evaluated by physical therapy and has returned to her baseline 

level of functioning. At this time, the patient will be discharged with 

recommendation to follow-up with her primary care physician and nephrology 

within 7 days. Follow with cardiology in 1-2 weeks. In addition, the patient 

has been advised to take her medications as prescribed. Lastly the patient has 

been consulted to return to the ER for any acute emergencies.





DISCHARGE MEDICATIONS: Please see below.


 


ALLERGIES: Please see below.





PHYSICAL EXAMINATION ON DISCHARGE:


VITAL SIGNS: Please see below.


General Exam:  Positive: Alert, Cooperative, No Acute Distress


ENT Exam:  Positive: Atraumatic, Mucous membr. moist/pink


Chest Exam:  Positive: Diminished


Heart Exam:  Positive: Rate Normal, Irregular Rhythm


Telemetry:  Positive: Atrial fibrillation


Abdomen Exam:  Positive: Normal bowel sounds, 


   Negative: Soft, Tenderness


Extremity Exam:  Positive: Edema (1+ b/l LE), 


   Negative: Tenderness


Psych Exam:  Positive: Oriented x 3





LABORATORY DATA: Please see below.





IMAGING: 





Chest two views


 


HISTORY: Dyspnea


 


Comparison: 10/05/2017


 


The radiographs are underpenetrated.  An increase in interstitial markings is


present in the lungs consistent with chronic interstitial change.  The cardiac


silhouette is enlarged. The pulmonary vasculature is normal in appearance.  The


bony structure is intact.


 


IMPRESSION:


1.  Limited examination demonstrating chronic interstitial change.  Superimposed


edema cannot be excluded.


2.  Cardiomegaly.





PROGNOSIS: Fair





ACTIVITY: As tolerated.





DIET: . 2 g low sodium diet, 1800 mL fluid restriction





DISCHARGE PLAN: 





DISPOSITION: 01 Home, Self-Care.





DISCHARGE INSTRUCTIONS:


At this time, the patient will be discharged with recommendation to follow-up 

with her primary care physician and nephrology within 7 days. Follow with 

cardiology in 1-2 weeks. In addition, the patient has been advised to take her 

medications as prescribed. Lastly the patient has been consulted to return to 

the ER for any acute emergencies.





DISCHARGE CONDITION: Stable.





TIME SPENT ON DISCHARGE: Greater than 30 minutes.





Vital Signs/I&Os





Vital Signs








  Date Time  Temp Pulse Resp B/P (MAP) Pulse Ox O2 Delivery O2 Flow Rate FiO2


 


12/18/17 09:00      Nasal Cannula 1.0 


 


12/18/17 08:39   18     


 


12/18/17 08:10  83  147/63    


 


12/18/17 06:00 97.0    93   














I&O- Last 24 Hours up to 6 AM 


 


 12/19/17





 06:00


 


Intake Total 780 ml


 


Output Total 800 ml


 


Balance -20 ml











Laboratory Data


Labs 24H


Laboratory Tests 2


12/17/17 20:26: Bedside Glucose (Misc Panel) 195H


12/18/17 05:29: 


Nucleated Red Blood Cells % (auto) 0.0, Anion Gap 7L, Glomerular Filtration 

Rate 41.3L, Blood Urea Nitrogen 48H, Creatinine 1.36H, Sodium Level 140, 

Potassium Level 4.3, Chloride Level 104, Carbon Dioxide Level 29, Calcium Level 

8.2L


12/18/17 11:14: Bedside Glucose (Misc Panel) 150H


CBC/BMP


Laboratory Tests


12/18/17 05:29








Red Blood Count 3.64 L, Mean Corpuscular Volume 97.3 H, Mean Corpuscular 

Hemoglobin 30.2, Mean Corpuscular Hemoglobin Concent 31.1 L, Red Cell 

Distribution Width 16.1 H, Calcium Level 8.2 L


FSBS





Laboratory Tests








Test


  12/17/17


20:26 12/18/17


11:14 Range/Units


 


 


Bedside Glucose (Misc Panel) 195 150   MG/DL











Discharge Medications


Scheduled


Amiloride HCl (Amiloride HCl) 5 Mg Tab, 10 MG PO DAILY


Amiodarone HCl (Amiodarone HCl) 200 Mg Tab, 200 MG PO DAILY, (Reported)


Aspirin (Aspirin 81) 81 Mg Tab, 81 MG PO DAILY, (Reported)


Atorvastatin Calcium (Atorvastatin Calcium) 40 Mg Tab, 40 MG PO QHS, (Reported)


Dabigatran Etexilate (Pradaxa) 150 Mg Cap, 150 MG PO BID, (Reported)


Ferrous Sulfate (Ferrous Sulfate) 325 Mg Tab, 325 MG PO DAILY, (Reported)


Fluticasone/Vilanterol (Breo Ellipta 200-25 Mcg/INH) 1 Inh Inh, 1 PUFF INH DAILY

, (Reported)


Fluticasone/Vilanterol (Breo Ellipta 200-25 Mcg/INH) 1 Inh Inh, 1 PUFF INH DAILY

, (Reported)


Metformin Hydrochloride (Metformin HCl) 500 Mg Tab, 500 MG PO BID, (Reported)


Metolazone (Metolazone) 2.5 Mg Tab, 2.5 MG PO 3XW, (Reported)


   MON, WED, FRI 


Metoprolol Tartrate (Metoprolol Tartrate) 25 Mg Tab, 12.5 MG PO BID, (Reported)


Multivitamins *[facility] STOCKED* (Thera M Plus *[facility] STOCKED*) 1 Tab Tab, 1 TAB PO 

DAILY, (Reported)


Nystatin (Nystatin Powder) 100,000 Unit/Gm Pow, 1 DOSE TOP BID, (Reported)


   APPLY TO ABDOMINAL FOLDS 


Potassium Chloride (Klor-Con M20) 20 Meq Tabcr, 20 MEQ PO BID, (Reported)


Tiotropium Bromide Monohydrate (Spiriva Handihaler) 18 Mcg Cap, 1 INHALATION 

INH DAILY, (Reported)


Torsemide (Torsemide) 20 Mg Tab, 20 MG PO BID, (Reported)





Scheduled PRN


Acetaminophen (Acetaminophen Extra Stren) 500 Mg Tab, 500 MG PO QID PRN for 

PAIN OR FEVER, (Reported)


Acetaminophen/Hydrocodone (Hydrocodone/Acetaminophen 5-325 mg) 1 Tab Tab, 1 TAB 

PO TID PRN for PAIN, (Reported)


Albuterol Sulfate (Ventolin Hfa) 200 Puff/8 Gm Aers, 2 PUFF INH Q4H PRN for 

SHORTNESS OF BREATH, (Reported)


Albuterol Sulfate (Ventolin Hfa) 108 Mcg/Act Aer, 108 MCG PO Q4H PRN for 

SHORTNESS OF BREATH, (Reported)


Docusate Sodium (Colace) 100 Mg Cap, 100 MG PO DAILY PRN for CONSTIPATION, (

Reported)


Loratadine (Loratadine) 10 Mg Tab, 10 MG PO DAILY PRN for ALLERGIES, (Reported)


Senna (Senna Lax) 8.6 Mg Tab, 1 TAB PO BID PRN for CONSTIPATION, (Reported)





Allergies


Coded Allergies:  


     Sulfa Drugs (Verified  Allergy, Unknown, QUESTIONABLE- PARENTS TOLD HER 

SHE WAS ALLERGIC???, 12/16/16)


     Sulfa Drugs Cross Reactors (Verified  Allergy, Unknown, 4/5/13)


     Heparin (Verified  Adverse Reaction, Intermediate, HIT, 6/9/16)











NELLY MONSON MD Dec 18, 2017 14:44

## 2018-01-10 ENCOUNTER — HOSPITAL ENCOUNTER (OUTPATIENT)
Dept: HOSPITAL 53 - M SFHCPLAZ | Age: 68
End: 2018-01-10
Attending: STUDENT IN AN ORGANIZED HEALTH CARE EDUCATION/TRAINING PROGRAM
Payer: COMMERCIAL

## 2018-01-10 DIAGNOSIS — R82.90: Primary | ICD-10-CM

## 2018-01-10 LAB
APPEARANCE, URINE: CLEAR
BACTERIA UR QL AUTO: (no result)
BILIRUBIN, URINE AUTO: NEGATIVE
BLOOD, URINE BLOOD: NEGATIVE
GLUCOSE, URINE (UA) AUTO: NEGATIVE MG/DL
KETONE, URINE AUTO: NEGATIVE MG/DL
LEUKOCYTE ESTERASE UR QL STRIP.AUTO: NEGATIVE
NITRITE, URINE AUTO: NEGATIVE
PH,URINE: 6 UNITS (ref 5–9)
PROT UR QL STRIP.AUTO: NEGATIVE MG/DL
RBC, URINE AUTO: 7 /HPF (ref 0–3)
SPECIFIC GRAVITY URINE AUTO: 1.01 (ref 1–1.03)
SQUAMOUS #/AREA URNS AUTO: 1 /HPF (ref 0–6)
UROBILINOGEN, URINE AUTO: 4 MG/DL (ref 0–2)
WBC, URINE AUTO: 1 /HPF (ref 0–3)

## 2018-01-10 PROCEDURE — 81001 URINALYSIS AUTO W/SCOPE: CPT

## 2018-06-24 ENCOUNTER — HOSPITAL ENCOUNTER (EMERGENCY)
Dept: HOSPITAL 53 - M ED | Age: 68
Discharge: HOME | End: 2018-06-24
Payer: COMMERCIAL

## 2018-06-24 DIAGNOSIS — I50.9: ICD-10-CM

## 2018-06-24 DIAGNOSIS — Z88.2: ICD-10-CM

## 2018-06-24 DIAGNOSIS — M79.606: Primary | ICD-10-CM

## 2018-06-24 DIAGNOSIS — J44.9: ICD-10-CM

## 2018-06-24 DIAGNOSIS — I12.9: ICD-10-CM

## 2018-06-24 DIAGNOSIS — Z79.84: ICD-10-CM

## 2018-06-24 DIAGNOSIS — Z79.82: ICD-10-CM

## 2018-06-24 DIAGNOSIS — E11.9: ICD-10-CM

## 2018-06-24 DIAGNOSIS — N18.9: ICD-10-CM

## 2018-06-24 DIAGNOSIS — Z79.01: ICD-10-CM

## 2018-06-24 DIAGNOSIS — M25.511: ICD-10-CM

## 2018-06-24 DIAGNOSIS — Z79.899: ICD-10-CM

## 2018-06-24 DIAGNOSIS — I48.91: ICD-10-CM

## 2018-06-24 DIAGNOSIS — R94.31: ICD-10-CM

## 2018-06-24 DIAGNOSIS — Z95.5: ICD-10-CM

## 2018-06-24 DIAGNOSIS — I25.10: ICD-10-CM

## 2018-06-24 DIAGNOSIS — I45.10: ICD-10-CM

## 2018-06-24 DIAGNOSIS — Z88.8: ICD-10-CM

## 2018-06-24 DIAGNOSIS — I51.7: ICD-10-CM

## 2018-06-24 LAB
ALBUMIN/GLOBULIN RATIO: 0.7 (ref 1–1.93)
ALBUMIN: 3.1 GM/DL (ref 3.2–5.2)
ALKALINE PHOSPHATASE: 97 U/L (ref 45–117)
ALT SERPL W P-5'-P-CCNC: 14 U/L (ref 12–78)
ANION GAP: 4 MEQ/L (ref 8–16)
AST SERPL-CCNC: 10 U/L (ref 7–37)
BASO #: 0 10^3/UL (ref 0–0.2)
BASO %: 0.3 % (ref 0–1)
BILIRUB CONJ SERPL-MCNC: 0.3 MG/DL (ref 0–0.2)
BILIRUBIN,TOTAL: 1 MG/DL (ref 0.2–1)
BLOOD UREA NITROGEN: 24 MG/DL (ref 7–18)
CALCIUM LEVEL: 8.7 MG/DL (ref 8.8–10.2)
CARBON DIOXIDE LEVEL: 33 MEQ/L (ref 21–32)
CHLORIDE LEVEL: 103 MEQ/L (ref 98–107)
CK MB CFR.DF SERPL CALC: 3.63
CK SERPL-CCNC: 33 U/L (ref 26–192)
CK-MB VALUE MASS: 1.2 NG/ML (ref ?–3.6)
CREATININE FOR GFR: 1.43 MG/DL (ref 0.55–1.3)
EOS #: 0.1 10^3/UL (ref 0–0.5)
EOSINOPHIL NFR BLD AUTO: 1.3 % (ref 0–3)
FT4I SERPL CALC-MCNC: 3.2 % (ref 1.3–4.8)
GFR SERPL CREATININE-BSD FRML MDRD: 39 ML/MIN/{1.73_M2} (ref 45–?)
GLUCOSE, FASTING: 107 MG/DL (ref 70–100)
HEMATOCRIT: 40.6 % (ref 36–47)
HEMOGLOBIN: 13.1 G/DL (ref 12–15.5)
IMMATURE GRANULOCYTE %: 0.4 % (ref 0–3)
INR: 1.96
LACTIC ACID SEPSIS PROTOCOL: 1.3 MMOL/L (ref 0.4–2)
LYMPH #: 0.6 10^3/UL (ref 1.5–4.5)
LYMPH %: 8.5 % (ref 24–44)
MEAN CORPUSCULAR HEMOGLOBIN: 31.3 PG (ref 27–33)
MEAN CORPUSCULAR HGB CONC: 32.3 G/DL (ref 32–36.5)
MEAN CORPUSCULAR VOLUME: 97.1 FL (ref 80–96)
MONO #: 0.6 10^3/UL (ref 0–0.8)
MONO %: 8.1 % (ref 0–5)
NEUTROPHILS #: 5.8 10^3/UL (ref 1.8–7.7)
NEUTROPHILS %: 81.4 % (ref 36–66)
NRBC BLD AUTO-RTO: 0 % (ref 0–0)
NT-PRO BNP: 2012 PG/ML (ref ?–125)
PLATELET COUNT, AUTOMATED: 216 10^3/UL (ref 150–450)
POTASSIUM SERUM: 4.1 MEQ/L (ref 3.5–5.1)
PROTHROMBIN TIME: 23 SECONDS (ref 12.4–14.5)
RED BLOOD COUNT: 4.18 10^6/UL (ref 4–5.4)
RED CELL DISTRIBUTION WIDTH: 16.1 % (ref 11.5–14.5)
SODIUM LEVEL: 140 MEQ/L (ref 136–145)
T UPTAKE: 37 % (ref 30–39)
THYROXINE (T4): 8.6 UG/DL (ref 4.5–12)
TOTAL PROTEIN: 7.5 GM/DL (ref 6.4–8.2)
TROPONIN I: 0.02 NG/ML (ref ?–0.1)
WHITE BLOOD COUNT: 7.2 10^3/UL (ref 4–10)

## 2018-06-24 PROCEDURE — 71046 X-RAY EXAM CHEST 2 VIEWS: CPT

## 2018-06-24 RX ADMIN — OXYCODONE HYDROCHLORIDE AND ACETAMINOPHEN 1 TAB: 5; 325 TABLET ORAL at 21:45

## 2018-06-24 RX ADMIN — IPRATROPIUM BROMIDE AND ALBUTEROL SULFATE 1 ML: .5; 3 SOLUTION RESPIRATORY (INHALATION) at 17:54

## 2018-08-04 ENCOUNTER — HOSPITAL ENCOUNTER (INPATIENT)
Dept: HOSPITAL 53 - M ED | Age: 68
LOS: 9 days | Discharge: HOME | DRG: 291 | End: 2018-08-13
Attending: INTERNAL MEDICINE | Admitting: INTERNAL MEDICINE
Payer: COMMERCIAL

## 2018-08-04 DIAGNOSIS — Z79.899: ICD-10-CM

## 2018-08-04 DIAGNOSIS — Z90.49: ICD-10-CM

## 2018-08-04 DIAGNOSIS — I87.2: ICD-10-CM

## 2018-08-04 DIAGNOSIS — J44.9: ICD-10-CM

## 2018-08-04 DIAGNOSIS — N17.9: ICD-10-CM

## 2018-08-04 DIAGNOSIS — I27.20: ICD-10-CM

## 2018-08-04 DIAGNOSIS — Z88.2: ICD-10-CM

## 2018-08-04 DIAGNOSIS — Z99.81: ICD-10-CM

## 2018-08-04 DIAGNOSIS — Z79.82: ICD-10-CM

## 2018-08-04 DIAGNOSIS — N18.3: ICD-10-CM

## 2018-08-04 DIAGNOSIS — E78.5: ICD-10-CM

## 2018-08-04 DIAGNOSIS — Z79.84: ICD-10-CM

## 2018-08-04 DIAGNOSIS — E66.2: ICD-10-CM

## 2018-08-04 DIAGNOSIS — I95.89: ICD-10-CM

## 2018-08-04 DIAGNOSIS — Z95.5: ICD-10-CM

## 2018-08-04 DIAGNOSIS — I50.33: ICD-10-CM

## 2018-08-04 DIAGNOSIS — E87.6: ICD-10-CM

## 2018-08-04 DIAGNOSIS — E03.2: ICD-10-CM

## 2018-08-04 DIAGNOSIS — I48.0: ICD-10-CM

## 2018-08-04 DIAGNOSIS — Z87.891: ICD-10-CM

## 2018-08-04 DIAGNOSIS — Z88.8: ICD-10-CM

## 2018-08-04 DIAGNOSIS — I47.2: ICD-10-CM

## 2018-08-04 DIAGNOSIS — I25.10: ICD-10-CM

## 2018-08-04 DIAGNOSIS — E87.4: ICD-10-CM

## 2018-08-04 DIAGNOSIS — Z79.01: ICD-10-CM

## 2018-08-04 DIAGNOSIS — I13.0: Primary | ICD-10-CM

## 2018-08-04 DIAGNOSIS — E11.22: ICD-10-CM

## 2018-08-04 DIAGNOSIS — I34.1: ICD-10-CM

## 2018-08-04 LAB
ABG BASE EXCESS: 4.7 (ref -2–2)
ABG HCO3: 29.1 MEQ/L (ref 22–26)
ABG O2 SATURATION: 93.8 % (ref 95–99)
ABG PARTIAL PRESSURE CO2: 42.2 MMHG (ref 35–45)
ABG PARTIAL PRESSURE O2: 67 MMHG (ref 75–100)
ABG PH (ARTERIAL): 7.46 UNITS (ref 7.35–7.45)
ABG STANDARD HCO3: 28.6 MEQ/L (ref 22–26)
ABG TOTAL CO2: 30.4 MEQ/L (ref 23–31)
ALBUMIN/GLOBULIN RATIO: 0.76 (ref 1–1.93)
ALBUMIN: 3.5 GM/DL (ref 3.2–5.2)
ALKALINE PHOSPHATASE: 89 U/L (ref 45–117)
ALT SERPL W P-5'-P-CCNC: 17 U/L (ref 12–78)
ANION GAP: 8 MEQ/L (ref 8–16)
AST SERPL-CCNC: 13 U/L (ref 7–37)
BASO #: 0 10^3/UL (ref 0–0.2)
BASO %: 0.5 % (ref 0–1)
BILIRUB CONJ SERPL-MCNC: 0.3 MG/DL (ref 0–0.2)
BILIRUBIN,TOTAL: 0.8 MG/DL (ref 0.2–1)
BLOOD UREA NITROGEN: 25 MG/DL (ref 7–18)
CALCIUM LEVEL: 9 MG/DL (ref 8.8–10.2)
CARBON DIOXIDE LEVEL: 30 MEQ/L (ref 21–32)
CHLORIDE LEVEL: 102 MEQ/L (ref 98–107)
CK MB CFR.DF SERPL CALC: 1.96
CK MB CFR.DF SERPL CALC: 2.68
CK SERPL-CCNC: 41 U/L (ref 26–192)
CK SERPL-CCNC: 51 U/L (ref 26–192)
CK-MB VALUE MASS: 1.1 NG/ML (ref ?–3.6)
CK-MB VALUE MASS: < 1 NG/ML (ref ?–3.6)
CREATININE FOR GFR: 1.65 MG/DL (ref 0.55–1.3)
EOS #: 0.2 10^3/UL (ref 0–0.5)
EOSINOPHIL NFR BLD AUTO: 2.5 % (ref 0–3)
FREE T4: 0.64 NG/DL (ref 0.76–1.46)
GFR SERPL CREATININE-BSD FRML MDRD: 33 ML/MIN/{1.73_M2} (ref 45–?)
GLUCOSE BLDC GLUCOMTR-MCNC: 111 MG/DL (ref 80–115)
GLUCOSE BLDC GLUCOMTR-MCNC: 140 MG/DL (ref 80–115)
GLUCOSE BLDC GLUCOMTR-MCNC: 273 MG/DL (ref 80–115)
GLUCOSE, FASTING: 118 MG/DL (ref 70–100)
HEMATOCRIT: 41.3 % (ref 36–47)
HEMOGLOBIN: 12.8 G/DL (ref 12–15.5)
IMMATURE GRANULOCYTE %: 0.4 % (ref 0–3)
INR: 1.57
LYMPH #: 0.7 10^3/UL (ref 1.5–4.5)
LYMPH %: 9.1 % (ref 24–44)
MAGNESIUM LEVEL: 2.2 MG/DL (ref 1.8–2.4)
MEAN CORPUSCULAR HEMOGLOBIN: 31.1 PG (ref 27–33)
MEAN CORPUSCULAR HGB CONC: 31 G/DL (ref 32–36.5)
MEAN CORPUSCULAR VOLUME: 100.5 FL (ref 80–96)
MONO #: 0.8 10^3/UL (ref 0–0.8)
MONO %: 10.5 % (ref 0–5)
NEUTROPHILS #: 6.1 10^3/UL (ref 1.8–7.7)
NEUTROPHILS %: 77 % (ref 36–66)
NRBC BLD AUTO-RTO: 0 % (ref 0–0)
NT-PRO BNP: 3488 PG/ML (ref ?–125)
PARTIAL THROMBOPLASTIN TIME: 77.1 SECONDS (ref 25.4–37.6)
PLATELET COUNT, AUTOMATED: 244 10^3/UL (ref 150–450)
POTASSIUM SERUM: 3.8 MEQ/L (ref 3.5–5.1)
PROTHROMBIN TIME: 19 SECONDS (ref 12.1–14.4)
RED BLOOD COUNT: 4.11 10^6/UL (ref 4–5.4)
RED CELL DISTRIBUTION WIDTH: 16.3 % (ref 11.5–14.5)
SODIUM LEVEL: 140 MEQ/L (ref 136–145)
THYROID STIMULATING HORMONE: 28.5 UIU/ML (ref 0.36–3.74)
TOTAL PROTEIN: 8.1 GM/DL (ref 6.4–8.2)
TROPONIN I: 0.03 NG/ML (ref ?–0.1)
TROPONIN I: 0.03 NG/ML (ref ?–0.1)
WHITE BLOOD COUNT: 8 10^3/UL (ref 4–10)

## 2018-08-04 RX ADMIN — INSULIN LISPRO 1 UNITS: 100 INJECTION, SOLUTION INTRAVENOUS; SUBCUTANEOUS at 20:10

## 2018-08-04 RX ADMIN — FUROSEMIDE 1 MG: 10 INJECTION, SOLUTION INTRAMUSCULAR; INTRAVENOUS at 13:25

## 2018-08-04 RX ADMIN — MULTIPLE VITAMINS W/ MINERALS TAB 1 TAB: TAB at 13:25

## 2018-08-04 RX ADMIN — INSULIN LISPRO 8 UNITS: 100 INJECTION, SOLUTION INTRAVENOUS; SUBCUTANEOUS at 13:32

## 2018-08-04 RX ADMIN — ATORVASTATIN CALCIUM 1 MG: 20 TABLET, FILM COATED ORAL at 20:10

## 2018-08-04 RX ADMIN — IPRATROPIUM BROMIDE AND ALBUTEROL SULFATE 1 ML: .5; 3 SOLUTION RESPIRATORY (INHALATION) at 08:48

## 2018-08-04 RX ADMIN — NYSTATIN 1 DOSE: 100000 POWDER TOPICAL at 20:11

## 2018-08-04 RX ADMIN — INSULIN LISPRO 1 UNITS: 100 INJECTION, SOLUTION INTRAVENOUS; SUBCUTANEOUS at 17:30

## 2018-08-04 RX ADMIN — FUROSEMIDE 1 MG: 10 INJECTION, SOLUTION INTRAMUSCULAR; INTRAVENOUS at 17:59

## 2018-08-04 RX ADMIN — FUROSEMIDE 1 MG: 10 INJECTION, SOLUTION INTRAMUSCULAR; INTRAVENOUS at 08:08

## 2018-08-04 RX ADMIN — METOPROLOL TARTRATE 1 MG: 25 TABLET, FILM COATED ORAL at 20:09

## 2018-08-04 RX ADMIN — DABIGATRAN ETEXILATE MESYLATE 1 MG: 75 CAPSULE ORAL at 20:09

## 2018-08-04 RX ADMIN — HYDROCODONE BITARTRATE AND ACETAMINOPHEN 1 TAB: 5; 325 TABLET ORAL at 20:10

## 2018-08-04 RX ADMIN — ASPIRIN 81 MG CHEWABLE TABLET 1 MG: 81 TABLET CHEWABLE at 08:08

## 2018-08-04 RX ADMIN — LORATADINE 1 MG: 10 TABLET ORAL at 13:25

## 2018-08-05 LAB
ANION GAP: 5 MEQ/L (ref 8–16)
BLOOD UREA NITROGEN: 23 MG/DL (ref 7–18)
CALCIUM LEVEL: 8.7 MG/DL (ref 8.8–10.2)
CARBON DIOXIDE LEVEL: 36 MEQ/L (ref 21–32)
CHLORIDE LEVEL: 102 MEQ/L (ref 98–107)
CREATININE FOR GFR: 1.58 MG/DL (ref 0.55–1.3)
GFR SERPL CREATININE-BSD FRML MDRD: 34.7 ML/MIN/{1.73_M2} (ref 45–?)
GLUCOSE BLDC GLUCOMTR-MCNC: 102 MG/DL (ref 80–115)
GLUCOSE BLDC GLUCOMTR-MCNC: 108 MG/DL (ref 80–115)
GLUCOSE BLDC GLUCOMTR-MCNC: 133 MG/DL (ref 80–115)
GLUCOSE BLDC GLUCOMTR-MCNC: 176 MG/DL (ref 80–115)
GLUCOSE, FASTING: 119 MG/DL (ref 70–100)
HEMATOCRIT: 38.6 % (ref 36–47)
HEMOGLOBIN: 11.7 G/DL (ref 12–15.5)
MAGNESIUM LEVEL: 2.2 MG/DL (ref 1.8–2.4)
MEAN CORPUSCULAR HEMOGLOBIN: 30.5 PG (ref 27–33)
MEAN CORPUSCULAR HGB CONC: 30.3 G/DL (ref 32–36.5)
MEAN CORPUSCULAR VOLUME: 100.5 FL (ref 80–96)
NRBC BLD AUTO-RTO: 0 % (ref 0–0)
PLATELET COUNT, AUTOMATED: 217 10^3/UL (ref 150–450)
POTASSIUM SERUM: 3.7 MEQ/L (ref 3.5–5.1)
RED BLOOD COUNT: 3.84 10^6/UL (ref 4–5.4)
RED CELL DISTRIBUTION WIDTH: 16 % (ref 11.5–14.5)
SODIUM LEVEL: 143 MEQ/L (ref 136–145)
WHITE BLOOD COUNT: 7.3 10^3/UL (ref 4–10)

## 2018-08-05 RX ADMIN — FUROSEMIDE 1 MG: 10 INJECTION, SOLUTION INTRAMUSCULAR; INTRAVENOUS at 06:08

## 2018-08-05 RX ADMIN — POTASSIUM CHLORIDE 1 MEQ: 750 TABLET, EXTENDED RELEASE ORAL at 12:26

## 2018-08-05 RX ADMIN — DABIGATRAN ETEXILATE MESYLATE 1 MG: 75 CAPSULE ORAL at 20:15

## 2018-08-05 RX ADMIN — INSULIN LISPRO 2 UNITS: 100 INJECTION, SOLUTION INTRAVENOUS; SUBCUTANEOUS at 12:25

## 2018-08-05 RX ADMIN — INSULIN LISPRO 1 UNITS: 100 INJECTION, SOLUTION INTRAVENOUS; SUBCUTANEOUS at 20:15

## 2018-08-05 RX ADMIN — METOPROLOL TARTRATE 1 MG: 25 TABLET, FILM COATED ORAL at 20:15

## 2018-08-05 RX ADMIN — LORATADINE 1 MG: 10 TABLET ORAL at 08:37

## 2018-08-05 RX ADMIN — NYSTATIN 1 DOSE: 100000 POWDER TOPICAL at 08:38

## 2018-08-05 RX ADMIN — FUROSEMIDE 1 MG: 10 INJECTION, SOLUTION INTRAMUSCULAR; INTRAVENOUS at 00:25

## 2018-08-05 RX ADMIN — INSULIN LISPRO 2 UNITS: 100 INJECTION, SOLUTION INTRAVENOUS; SUBCUTANEOUS at 18:05

## 2018-08-05 RX ADMIN — ASPIRIN 1 MG: 81 TABLET ORAL at 08:37

## 2018-08-05 RX ADMIN — HYDROCODONE BITARTRATE AND ACETAMINOPHEN 1 TAB: 5; 325 TABLET ORAL at 04:47

## 2018-08-05 RX ADMIN — FUROSEMIDE 1 MG: 10 INJECTION, SOLUTION INTRAMUSCULAR; INTRAVENOUS at 12:00

## 2018-08-05 RX ADMIN — HYDROCODONE BITARTRATE AND ACETAMINOPHEN 1 TAB: 5; 325 TABLET ORAL at 18:05

## 2018-08-05 RX ADMIN — METOPROLOL TARTRATE 1 MG: 25 TABLET, FILM COATED ORAL at 08:37

## 2018-08-05 RX ADMIN — FUROSEMIDE 1 MG: 10 INJECTION, SOLUTION INTRAMUSCULAR; INTRAVENOUS at 18:00

## 2018-08-05 RX ADMIN — ALBUTEROL SULFATE 1 MG: 2.5 SOLUTION RESPIRATORY (INHALATION) at 16:13

## 2018-08-05 RX ADMIN — DABIGATRAN ETEXILATE MESYLATE 1 MG: 75 CAPSULE ORAL at 08:36

## 2018-08-05 RX ADMIN — MULTIPLE VITAMINS W/ MINERALS TAB 1 TAB: TAB at 08:36

## 2018-08-05 RX ADMIN — INSULIN LISPRO 4 UNITS: 100 INJECTION, SOLUTION INTRAVENOUS; SUBCUTANEOUS at 08:36

## 2018-08-05 RX ADMIN — LEVOTHYROXINE SODIUM 1 MCG: 25 TABLET ORAL at 06:08

## 2018-08-05 RX ADMIN — ATORVASTATIN CALCIUM 1 MG: 20 TABLET, FILM COATED ORAL at 20:15

## 2018-08-05 RX ADMIN — NYSTATIN 1 DOSE: 100000 POWDER TOPICAL at 20:16

## 2018-08-06 LAB
ANION GAP: 4 MEQ/L (ref 8–16)
BLOOD UREA NITROGEN: 26 MG/DL (ref 7–18)
CALCIUM LEVEL: 8.2 MG/DL (ref 8.8–10.2)
CARBON DIOXIDE LEVEL: 38 MEQ/L (ref 21–32)
CHLORIDE LEVEL: 100 MEQ/L (ref 98–107)
CREATININE FOR GFR: 1.53 MG/DL (ref 0.55–1.3)
GFR SERPL CREATININE-BSD FRML MDRD: 36 ML/MIN/{1.73_M2} (ref 45–?)
GLUCOSE BLDC GLUCOMTR-MCNC: 131 MG/DL (ref 80–115)
GLUCOSE BLDC GLUCOMTR-MCNC: 132 MG/DL (ref 80–115)
GLUCOSE BLDC GLUCOMTR-MCNC: 132 MG/DL (ref 80–115)
GLUCOSE, FASTING: 118 MG/DL (ref 70–100)
HEMATOCRIT: 35.7 % (ref 36–47)
HEMOGLOBIN: 11 G/DL (ref 12–15.5)
MAGNESIUM LEVEL: 2.2 MG/DL (ref 1.8–2.4)
MEAN CORPUSCULAR HEMOGLOBIN: 30.6 PG (ref 27–33)
MEAN CORPUSCULAR HGB CONC: 30.8 G/DL (ref 32–36.5)
MEAN CORPUSCULAR VOLUME: 99.4 FL (ref 80–96)
NRBC BLD AUTO-RTO: 0 % (ref 0–0)
PLATELET COUNT, AUTOMATED: 205 10^3/UL (ref 150–450)
POTASSIUM SERUM: 3.3 MEQ/L (ref 3.5–5.1)
RED BLOOD COUNT: 3.59 10^6/UL (ref 4–5.4)
RED CELL DISTRIBUTION WIDTH: 15.9 % (ref 11.5–14.5)
SODIUM LEVEL: 142 MEQ/L (ref 136–145)
WHITE BLOOD COUNT: 7.3 10^3/UL (ref 4–10)

## 2018-08-06 RX ADMIN — FUROSEMIDE 1 MG: 10 INJECTION, SOLUTION INTRAMUSCULAR; INTRAVENOUS at 11:55

## 2018-08-06 RX ADMIN — HYDROCODONE BITARTRATE AND ACETAMINOPHEN 1 TAB: 5; 325 TABLET ORAL at 09:58

## 2018-08-06 RX ADMIN — INSULIN LISPRO 2 UNITS: 100 INJECTION, SOLUTION INTRAVENOUS; SUBCUTANEOUS at 08:04

## 2018-08-06 RX ADMIN — INSULIN LISPRO 2 UNITS: 100 INJECTION, SOLUTION INTRAVENOUS; SUBCUTANEOUS at 17:19

## 2018-08-06 RX ADMIN — HYDROCODONE BITARTRATE AND ACETAMINOPHEN 1 TAB: 5; 325 TABLET ORAL at 02:09

## 2018-08-06 RX ADMIN — INSULIN LISPRO 1 UNITS: 100 INJECTION, SOLUTION INTRAVENOUS; SUBCUTANEOUS at 20:20

## 2018-08-06 RX ADMIN — NYSTATIN 1 DOSE: 100000 POWDER TOPICAL at 08:12

## 2018-08-06 RX ADMIN — AMIODARONE HYDROCHLORIDE 1 MG: 200 TABLET ORAL at 08:08

## 2018-08-06 RX ADMIN — FUROSEMIDE 1 MG: 10 INJECTION, SOLUTION INTRAMUSCULAR; INTRAVENOUS at 00:14

## 2018-08-06 RX ADMIN — ATORVASTATIN CALCIUM 1 MG: 20 TABLET, FILM COATED ORAL at 20:20

## 2018-08-06 RX ADMIN — METOPROLOL TARTRATE 1 MG: 25 TABLET, FILM COATED ORAL at 20:20

## 2018-08-06 RX ADMIN — FUROSEMIDE 1 MG: 10 INJECTION, SOLUTION INTRAMUSCULAR; INTRAVENOUS at 17:42

## 2018-08-06 RX ADMIN — POTASSIUM CHLORIDE 1 MEQ: 750 TABLET, EXTENDED RELEASE ORAL at 20:20

## 2018-08-06 RX ADMIN — METOPROLOL TARTRATE 1 MG: 25 TABLET, FILM COATED ORAL at 08:10

## 2018-08-06 RX ADMIN — LORATADINE 1 MG: 10 TABLET ORAL at 08:09

## 2018-08-06 RX ADMIN — HYDROCODONE BITARTRATE AND ACETAMINOPHEN 1 TAB: 5; 325 TABLET ORAL at 18:11

## 2018-08-06 RX ADMIN — FUROSEMIDE 1 MG: 10 INJECTION, SOLUTION INTRAMUSCULAR; INTRAVENOUS at 06:30

## 2018-08-06 RX ADMIN — DABIGATRAN ETEXILATE MESYLATE 1 MG: 75 CAPSULE ORAL at 08:04

## 2018-08-06 RX ADMIN — LEVOTHYROXINE SODIUM 1 MCG: 25 TABLET ORAL at 06:30

## 2018-08-06 RX ADMIN — ASPIRIN 1 MG: 81 TABLET ORAL at 08:09

## 2018-08-06 RX ADMIN — MULTIPLE VITAMINS W/ MINERALS TAB 1 TAB: TAB at 08:09

## 2018-08-06 RX ADMIN — POTASSIUM CHLORIDE 1 MLS/HR: 7.46 INJECTION, SOLUTION INTRAVENOUS at 08:11

## 2018-08-06 RX ADMIN — POTASSIUM CHLORIDE 1 MEQ: 750 TABLET, EXTENDED RELEASE ORAL at 08:08

## 2018-08-06 RX ADMIN — INSULIN LISPRO 2 UNITS: 100 INJECTION, SOLUTION INTRAVENOUS; SUBCUTANEOUS at 13:05

## 2018-08-06 RX ADMIN — NYSTATIN 1 DOSE: 100000 POWDER TOPICAL at 20:21

## 2018-08-06 RX ADMIN — DABIGATRAN ETEXILATE MESYLATE 1 MG: 75 CAPSULE ORAL at 20:19

## 2018-08-07 LAB
ANION GAP: 8 MEQ/L (ref 8–16)
BLOOD UREA NITROGEN: 33 MG/DL (ref 7–18)
CALCIUM LEVEL: 8.3 MG/DL (ref 8.8–10.2)
CARBON DIOXIDE LEVEL: 37 MEQ/L (ref 21–32)
CHLORIDE LEVEL: 98 MEQ/L (ref 98–107)
CREATININE FOR GFR: 1.64 MG/DL (ref 0.55–1.3)
GFR SERPL CREATININE-BSD FRML MDRD: 33.3 ML/MIN/{1.73_M2} (ref 45–?)
GLUCOSE BLDC GLUCOMTR-MCNC: 100 MG/DL (ref 80–115)
GLUCOSE BLDC GLUCOMTR-MCNC: 136 MG/DL (ref 80–115)
GLUCOSE BLDC GLUCOMTR-MCNC: 164 MG/DL (ref 80–115)
GLUCOSE, FASTING: 117 MG/DL (ref 70–100)
HEMATOCRIT: 35.4 % (ref 36–47)
HEMOGLOBIN: 10.9 G/DL (ref 12–15.5)
MAGNESIUM LEVEL: 2.4 MG/DL (ref 1.8–2.4)
MEAN CORPUSCULAR HEMOGLOBIN: 30.6 PG (ref 27–33)
MEAN CORPUSCULAR HGB CONC: 30.8 G/DL (ref 32–36.5)
MEAN CORPUSCULAR VOLUME: 99.4 FL (ref 80–96)
NRBC BLD AUTO-RTO: 0 % (ref 0–0)
PLATELET COUNT, AUTOMATED: 196 10^3/UL (ref 150–450)
POTASSIUM SERUM: 3.4 MEQ/L (ref 3.5–5.1)
RED BLOOD COUNT: 3.56 10^6/UL (ref 4–5.4)
RED CELL DISTRIBUTION WIDTH: 15.9 % (ref 11.5–14.5)
SODIUM LEVEL: 143 MEQ/L (ref 136–145)
WHITE BLOOD COUNT: 7.3 10^3/UL (ref 4–10)

## 2018-08-07 RX ADMIN — LEVOTHYROXINE SODIUM 1 MCG: 25 TABLET ORAL at 05:53

## 2018-08-07 RX ADMIN — HYDROCODONE BITARTRATE AND ACETAMINOPHEN 1 TAB: 5; 325 TABLET ORAL at 11:12

## 2018-08-07 RX ADMIN — SODIUM CHLORIDE, PRESERVATIVE FREE 1 ML: 5 INJECTION INTRAVENOUS at 21:28

## 2018-08-07 RX ADMIN — ASPIRIN 1 MG: 81 TABLET ORAL at 08:40

## 2018-08-07 RX ADMIN — INSULIN LISPRO 1 UNITS: 100 INJECTION, SOLUTION INTRAVENOUS; SUBCUTANEOUS at 20:31

## 2018-08-07 RX ADMIN — POTASSIUM CHLORIDE 1 MEQ: 750 TABLET, EXTENDED RELEASE ORAL at 08:38

## 2018-08-07 RX ADMIN — FUROSEMIDE 1 MG: 10 INJECTION, SOLUTION INTRAMUSCULAR; INTRAVENOUS at 17:54

## 2018-08-07 RX ADMIN — MULTIPLE VITAMINS W/ MINERALS TAB 1 TAB: TAB at 08:38

## 2018-08-07 RX ADMIN — INSULIN LISPRO 2 UNITS: 100 INJECTION, SOLUTION INTRAVENOUS; SUBCUTANEOUS at 12:37

## 2018-08-07 RX ADMIN — AMIODARONE HYDROCHLORIDE 1 MG: 200 TABLET ORAL at 08:40

## 2018-08-07 RX ADMIN — FUROSEMIDE 1 MG: 10 INJECTION, SOLUTION INTRAMUSCULAR; INTRAVENOUS at 00:02

## 2018-08-07 RX ADMIN — INSULIN LISPRO 2 UNITS: 100 INJECTION, SOLUTION INTRAVENOUS; SUBCUTANEOUS at 07:36

## 2018-08-07 RX ADMIN — METOPROLOL TARTRATE 1 MG: 25 TABLET, FILM COATED ORAL at 20:35

## 2018-08-07 RX ADMIN — FUROSEMIDE 1 MG: 10 INJECTION, SOLUTION INTRAMUSCULAR; INTRAVENOUS at 12:38

## 2018-08-07 RX ADMIN — HYDROCODONE BITARTRATE AND ACETAMINOPHEN 1 TAB: 5; 325 TABLET ORAL at 02:22

## 2018-08-07 RX ADMIN — FUROSEMIDE 1 MG: 10 INJECTION, SOLUTION INTRAMUSCULAR; INTRAVENOUS at 05:53

## 2018-08-07 RX ADMIN — NYSTATIN 1 DOSE: 100000 POWDER TOPICAL at 08:41

## 2018-08-07 RX ADMIN — INSULIN LISPRO 1 UNITS: 100 INJECTION, SOLUTION INTRAVENOUS; SUBCUTANEOUS at 17:30

## 2018-08-07 RX ADMIN — DABIGATRAN ETEXILATE MESYLATE 1 MG: 75 CAPSULE ORAL at 08:37

## 2018-08-07 RX ADMIN — POTASSIUM CHLORIDE 1 MEQ: 750 TABLET, EXTENDED RELEASE ORAL at 21:30

## 2018-08-07 RX ADMIN — NYSTATIN 1 DOSE: 100000 POWDER TOPICAL at 20:31

## 2018-08-07 RX ADMIN — POTASSIUM CHLORIDE 1 MEQ: 750 TABLET, EXTENDED RELEASE ORAL at 20:30

## 2018-08-07 RX ADMIN — METOPROLOL TARTRATE 1 MG: 25 TABLET, FILM COATED ORAL at 08:40

## 2018-08-07 RX ADMIN — LORATADINE 1 MG: 10 TABLET ORAL at 08:39

## 2018-08-07 RX ADMIN — DABIGATRAN ETEXILATE MESYLATE 1 MG: 75 CAPSULE ORAL at 20:29

## 2018-08-07 RX ADMIN — HYDROCODONE BITARTRATE AND ACETAMINOPHEN 1 TAB: 5; 325 TABLET ORAL at 20:29

## 2018-08-07 RX ADMIN — ALBUTEROL SULFATE 1 MG: 2.5 SOLUTION RESPIRATORY (INHALATION) at 08:06

## 2018-08-07 RX ADMIN — ATORVASTATIN CALCIUM 1 MG: 20 TABLET, FILM COATED ORAL at 20:30

## 2018-08-08 LAB
ANION GAP: 5 MEQ/L (ref 8–16)
BLOOD UREA NITROGEN: 37 MG/DL (ref 7–18)
CALCIUM LEVEL: 8.6 MG/DL (ref 8.8–10.2)
CARBON DIOXIDE LEVEL: 37 MEQ/L (ref 21–32)
CHLORIDE LEVEL: 99 MEQ/L (ref 98–107)
CREATININE FOR GFR: 1.54 MG/DL (ref 0.55–1.3)
GFR SERPL CREATININE-BSD FRML MDRD: 35.8 ML/MIN/{1.73_M2} (ref 45–?)
GLUCOSE BLDC GLUCOMTR-MCNC: 124 MG/DL (ref 80–115)
GLUCOSE BLDC GLUCOMTR-MCNC: 143 MG/DL (ref 80–115)
GLUCOSE BLDC GLUCOMTR-MCNC: 190 MG/DL (ref 80–115)
GLUCOSE, FASTING: 114 MG/DL (ref 70–100)
HEMATOCRIT: 37.6 % (ref 36–47)
HEMOGLOBIN: 11.5 G/DL (ref 12–15.5)
MAGNESIUM LEVEL: 2.3 MG/DL (ref 1.8–2.4)
MEAN CORPUSCULAR HEMOGLOBIN: 30.8 PG (ref 27–33)
MEAN CORPUSCULAR HGB CONC: 30.6 G/DL (ref 32–36.5)
MEAN CORPUSCULAR VOLUME: 100.8 FL (ref 80–96)
NRBC BLD AUTO-RTO: 0 % (ref 0–0)
PHOSPHORUS LEVEL: 3.7 MG/DL (ref 2.5–4.9)
PLATELET COUNT, AUTOMATED: 214 10^3/UL (ref 150–450)
POTASSIUM SERUM: 3.5 MEQ/L (ref 3.5–5.1)
RED BLOOD COUNT: 3.73 10^6/UL (ref 4–5.4)
RED CELL DISTRIBUTION WIDTH: 15.5 % (ref 11.5–14.5)
SODIUM LEVEL: 141 MEQ/L (ref 136–145)
WHITE BLOOD COUNT: 7.5 10^3/UL (ref 4–10)

## 2018-08-08 RX ADMIN — INSULIN LISPRO 2 UNITS: 100 INJECTION, SOLUTION INTRAVENOUS; SUBCUTANEOUS at 17:42

## 2018-08-08 RX ADMIN — INSULIN LISPRO 1 UNITS: 100 INJECTION, SOLUTION INTRAVENOUS; SUBCUTANEOUS at 20:39

## 2018-08-08 RX ADMIN — NYSTATIN 1 DOSE: 100000 POWDER TOPICAL at 20:19

## 2018-08-08 RX ADMIN — ASPIRIN 1 MG: 81 TABLET ORAL at 07:49

## 2018-08-08 RX ADMIN — INSULIN LISPRO 2 UNITS: 100 INJECTION, SOLUTION INTRAVENOUS; SUBCUTANEOUS at 07:50

## 2018-08-08 RX ADMIN — FUROSEMIDE 1 MG: 10 INJECTION, SOLUTION INTRAMUSCULAR; INTRAVENOUS at 05:37

## 2018-08-08 RX ADMIN — HYDROCODONE BITARTRATE AND ACETAMINOPHEN 1 TAB: 5; 325 TABLET ORAL at 05:36

## 2018-08-08 RX ADMIN — LEVOTHYROXINE SODIUM 1 MCG: 25 TABLET ORAL at 05:36

## 2018-08-08 RX ADMIN — TORSEMIDE 1 MG: 20 TABLET ORAL at 17:42

## 2018-08-08 RX ADMIN — POTASSIUM CHLORIDE 1 MEQ: 750 TABLET, EXTENDED RELEASE ORAL at 07:49

## 2018-08-08 RX ADMIN — SODIUM CHLORIDE, PRESERVATIVE FREE 1 ML: 5 INJECTION INTRAVENOUS at 05:37

## 2018-08-08 RX ADMIN — METOPROLOL TARTRATE 1 MG: 25 TABLET, FILM COATED ORAL at 09:00

## 2018-08-08 RX ADMIN — FUROSEMIDE 1 MG: 10 INJECTION, SOLUTION INTRAMUSCULAR; INTRAVENOUS at 00:00

## 2018-08-08 RX ADMIN — METOPROLOL TARTRATE 1 MG: 25 TABLET, FILM COATED ORAL at 20:17

## 2018-08-08 RX ADMIN — HYDROCODONE BITARTRATE AND ACETAMINOPHEN 1 TAB: 5; 325 TABLET ORAL at 23:24

## 2018-08-08 RX ADMIN — LORATADINE 1 MG: 10 TABLET ORAL at 07:48

## 2018-08-08 RX ADMIN — POTASSIUM CHLORIDE 1 MEQ: 750 TABLET, EXTENDED RELEASE ORAL at 20:16

## 2018-08-08 RX ADMIN — ATORVASTATIN CALCIUM 1 MG: 20 TABLET, FILM COATED ORAL at 20:16

## 2018-08-08 RX ADMIN — SODIUM CHLORIDE, PRESERVATIVE FREE 1 ML: 5 INJECTION INTRAVENOUS at 21:21

## 2018-08-08 RX ADMIN — HYDROCODONE BITARTRATE AND ACETAMINOPHEN 1 TAB: 5; 325 TABLET ORAL at 14:30

## 2018-08-08 RX ADMIN — MULTIPLE VITAMINS W/ MINERALS TAB 1 TAB: TAB at 07:48

## 2018-08-08 RX ADMIN — INSULIN LISPRO 2 UNITS: 100 INJECTION, SOLUTION INTRAVENOUS; SUBCUTANEOUS at 12:07

## 2018-08-08 RX ADMIN — DABIGATRAN ETEXILATE MESYLATE 1 MG: 75 CAPSULE ORAL at 20:16

## 2018-08-08 RX ADMIN — DABIGATRAN ETEXILATE MESYLATE 1 MG: 75 CAPSULE ORAL at 07:49

## 2018-08-08 RX ADMIN — SODIUM CHLORIDE, PRESERVATIVE FREE 1 ML: 5 INJECTION INTRAVENOUS at 17:42

## 2018-08-08 RX ADMIN — NYSTATIN 1 DOSE: 100000 POWDER TOPICAL at 12:07

## 2018-08-08 RX ADMIN — AMIODARONE HYDROCHLORIDE 1 MG: 200 TABLET ORAL at 07:48

## 2018-08-09 LAB
AMORPHOUS SEDIMENT RFX: (no result)
ANION GAP: 5 MEQ/L (ref 8–16)
BLOOD UREA NITROGEN: 38 MG/DL (ref 7–18)
CALCIUM LEVEL: 8.5 MG/DL (ref 8.8–10.2)
CARBON DIOXIDE LEVEL: 39 MEQ/L (ref 21–32)
CHLORIDE LEVEL: 99 MEQ/L (ref 98–107)
CREATININE FOR GFR: 1.61 MG/DL (ref 0.55–1.3)
GFR SERPL CREATININE-BSD FRML MDRD: 34 ML/MIN/{1.73_M2} (ref 45–?)
GLUCOSE BLDC GLUCOMTR-MCNC: 118 MG/DL (ref 80–115)
GLUCOSE BLDC GLUCOMTR-MCNC: 125 MG/DL (ref 80–115)
GLUCOSE BLDC GLUCOMTR-MCNC: 216 MG/DL (ref 80–115)
GLUCOSE, FASTING: 115 MG/DL (ref 70–100)
HEMATOCRIT: 35.5 % (ref 36–47)
HEMOGLOBIN: 11 G/DL (ref 12–15.5)
LEUKOCYTE ESTERASE UR AUTO RFX: NEGATIVE
MAGNESIUM LEVEL: 2.4 MG/DL (ref 1.8–2.4)
MEAN CORPUSCULAR HEMOGLOBIN: 31.3 PG (ref 27–33)
MEAN CORPUSCULAR HGB CONC: 31 G/DL (ref 32–36.5)
MEAN CORPUSCULAR VOLUME: 101.1 FL (ref 80–96)
NRBC BLD AUTO-RTO: 0 % (ref 0–0)
PLATELET COUNT, AUTOMATED: 201 10^3/UL (ref 150–450)
POTASSIUM SERUM: 3.3 MEQ/L (ref 3.5–5.1)
RED BLOOD COUNT: 3.51 10^6/UL (ref 4–5.4)
RED CELL DISTRIBUTION WIDTH: 15.7 % (ref 11.5–14.5)
SODIUM LEVEL: 143 MEQ/L (ref 136–145)
SPECIFIC GRAVITY UR AUTO RFX: 1.01 (ref 1–1.03)
SQUAM EPITHELIAL CELL UR AURFX: 0 /HPF (ref 0–6)
WHITE BLOOD COUNT: 6.7 10^3/UL (ref 4–10)

## 2018-08-09 RX ADMIN — HYDROCODONE BITARTRATE AND ACETAMINOPHEN 1 TAB: 5; 325 TABLET ORAL at 08:29

## 2018-08-09 RX ADMIN — INSULIN LISPRO 2 UNITS: 100 INJECTION, SOLUTION INTRAVENOUS; SUBCUTANEOUS at 18:23

## 2018-08-09 RX ADMIN — DABIGATRAN ETEXILATE MESYLATE 1 MG: 75 CAPSULE ORAL at 08:29

## 2018-08-09 RX ADMIN — POTASSIUM CHLORIDE 1 MEQ: 750 TABLET, EXTENDED RELEASE ORAL at 16:44

## 2018-08-09 RX ADMIN — POTASSIUM CHLORIDE 1 MEQ: 750 TABLET, EXTENDED RELEASE ORAL at 08:30

## 2018-08-09 RX ADMIN — INSULIN LISPRO 2 UNITS: 100 INJECTION, SOLUTION INTRAVENOUS; SUBCUTANEOUS at 13:02

## 2018-08-09 RX ADMIN — NYSTATIN 1 DOSE: 100000 POWDER TOPICAL at 08:32

## 2018-08-09 RX ADMIN — SODIUM CHLORIDE, PRESERVATIVE FREE 1 ML: 5 INJECTION INTRAVENOUS at 05:07

## 2018-08-09 RX ADMIN — INSULIN LISPRO 1 UNITS: 100 INJECTION, SOLUTION INTRAVENOUS; SUBCUTANEOUS at 20:02

## 2018-08-09 RX ADMIN — ASPIRIN 1 MG: 81 TABLET ORAL at 08:31

## 2018-08-09 RX ADMIN — DABIGATRAN ETEXILATE MESYLATE 1 MG: 75 CAPSULE ORAL at 20:43

## 2018-08-09 RX ADMIN — SODIUM CHLORIDE, PRESERVATIVE FREE 1 ML: 5 INJECTION INTRAVENOUS at 16:43

## 2018-08-09 RX ADMIN — POTASSIUM CHLORIDE 1 MEQ: 750 TABLET, EXTENDED RELEASE ORAL at 20:44

## 2018-08-09 RX ADMIN — TORSEMIDE 1 MG: 20 TABLET ORAL at 16:44

## 2018-08-09 RX ADMIN — MULTIPLE VITAMINS W/ MINERALS TAB 1 TAB: TAB at 08:30

## 2018-08-09 RX ADMIN — LEVOTHYROXINE SODIUM 1 MCG: 25 TABLET ORAL at 05:07

## 2018-08-09 RX ADMIN — ALBUTEROL SULFATE 1 MG: 2.5 SOLUTION RESPIRATORY (INHALATION) at 09:14

## 2018-08-09 RX ADMIN — NYSTATIN 1 DOSE: 100000 POWDER TOPICAL at 20:45

## 2018-08-09 RX ADMIN — LORATADINE 1 MG: 10 TABLET ORAL at 08:30

## 2018-08-09 RX ADMIN — INSULIN LISPRO 2 UNITS: 100 INJECTION, SOLUTION INTRAVENOUS; SUBCUTANEOUS at 08:29

## 2018-08-09 RX ADMIN — ATORVASTATIN CALCIUM 1 MG: 20 TABLET, FILM COATED ORAL at 20:44

## 2018-08-09 RX ADMIN — AMIODARONE HYDROCHLORIDE 1 MG: 200 TABLET ORAL at 08:31

## 2018-08-09 RX ADMIN — METOPROLOL TARTRATE 1 MG: 25 TABLET, FILM COATED ORAL at 20:44

## 2018-08-09 RX ADMIN — HYDROCODONE BITARTRATE AND ACETAMINOPHEN 1 TAB: 5; 325 TABLET ORAL at 16:46

## 2018-08-09 RX ADMIN — TORSEMIDE 1 MG: 20 TABLET ORAL at 08:31

## 2018-08-09 RX ADMIN — SODIUM CHLORIDE, PRESERVATIVE FREE 1 ML: 5 INJECTION INTRAVENOUS at 20:44

## 2018-08-09 RX ADMIN — METOPROLOL TARTRATE 1 MG: 25 TABLET, FILM COATED ORAL at 08:31

## 2018-08-10 LAB
ANION GAP: 5 MEQ/L (ref 8–16)
BLOOD UREA NITROGEN: 46 MG/DL (ref 7–18)
CALCIUM LEVEL: 8.3 MG/DL (ref 8.8–10.2)
CARBON DIOXIDE LEVEL: 37 MEQ/L (ref 21–32)
CHLORIDE LEVEL: 99 MEQ/L (ref 98–107)
CREATININE FOR GFR: 1.65 MG/DL (ref 0.55–1.3)
FT4I SERPL CALC-MCNC: 2.7 % (ref 1.3–4.8)
GFR SERPL CREATININE-BSD FRML MDRD: 33 ML/MIN/{1.73_M2} (ref 45–?)
GLUCOSE BLDC GLUCOMTR-MCNC: 117 MG/DL (ref 80–115)
GLUCOSE BLDC GLUCOMTR-MCNC: 118 MG/DL (ref 80–115)
GLUCOSE BLDC GLUCOMTR-MCNC: 175 MG/DL (ref 80–115)
GLUCOSE, FASTING: 125 MG/DL (ref 70–100)
HEMATOCRIT: 34.6 % (ref 36–47)
HEMOGLOBIN: 10.7 G/DL (ref 12–15.5)
MAGNESIUM LEVEL: 2.3 MG/DL (ref 1.8–2.4)
MEAN CORPUSCULAR HEMOGLOBIN: 31.1 PG (ref 27–33)
MEAN CORPUSCULAR HGB CONC: 30.9 G/DL (ref 32–36.5)
MEAN CORPUSCULAR VOLUME: 100.6 FL (ref 80–96)
NRBC BLD AUTO-RTO: 0 % (ref 0–0)
PLATELET COUNT, AUTOMATED: 202 10^3/UL (ref 150–450)
POTASSIUM SERUM: 3.3 MEQ/L (ref 3.5–5.1)
RED BLOOD COUNT: 3.44 10^6/UL (ref 4–5.4)
RED CELL DISTRIBUTION WIDTH: 15.9 % (ref 11.5–14.5)
SODIUM LEVEL: 141 MEQ/L (ref 136–145)
T UPTAKE: 36 % (ref 30–39)
THYROXINE (T4): 7.5 UG/DL (ref 4.5–12)
WHITE BLOOD COUNT: 7.2 10^3/UL (ref 4–10)

## 2018-08-10 RX ADMIN — DABIGATRAN ETEXILATE MESYLATE 1 MG: 75 CAPSULE ORAL at 21:02

## 2018-08-10 RX ADMIN — METOPROLOL TARTRATE 1 MG: 25 TABLET, FILM COATED ORAL at 21:02

## 2018-08-10 RX ADMIN — INSULIN LISPRO 2 UNITS: 100 INJECTION, SOLUTION INTRAVENOUS; SUBCUTANEOUS at 08:31

## 2018-08-10 RX ADMIN — NYSTATIN 1 DOSE: 100000 POWDER TOPICAL at 21:03

## 2018-08-10 RX ADMIN — SODIUM CHLORIDE, PRESERVATIVE FREE 1 ML: 5 INJECTION INTRAVENOUS at 21:03

## 2018-08-10 RX ADMIN — SPIRONOLACTONE 1 MG: 50 TABLET ORAL at 17:39

## 2018-08-10 RX ADMIN — METOPROLOL TARTRATE 1 MG: 25 TABLET, FILM COATED ORAL at 08:35

## 2018-08-10 RX ADMIN — INSULIN LISPRO 2 UNITS: 100 INJECTION, SOLUTION INTRAVENOUS; SUBCUTANEOUS at 13:05

## 2018-08-10 RX ADMIN — HYDROCODONE BITARTRATE AND ACETAMINOPHEN 1 TAB: 5; 325 TABLET ORAL at 15:48

## 2018-08-10 RX ADMIN — SPIRONOLACTONE 1 MG: 50 TABLET ORAL at 08:35

## 2018-08-10 RX ADMIN — LORATADINE 1 MG: 10 TABLET ORAL at 08:34

## 2018-08-10 RX ADMIN — POTASSIUM CHLORIDE 1 MEQ: 750 TABLET, EXTENDED RELEASE ORAL at 08:34

## 2018-08-10 RX ADMIN — INSULIN LISPRO 1 UNITS: 100 INJECTION, SOLUTION INTRAVENOUS; SUBCUTANEOUS at 21:00

## 2018-08-10 RX ADMIN — LEVOTHYROXINE SODIUM 1 MCG: 25 TABLET ORAL at 06:20

## 2018-08-10 RX ADMIN — SODIUM CHLORIDE, PRESERVATIVE FREE 1 ML: 5 INJECTION INTRAVENOUS at 14:00

## 2018-08-10 RX ADMIN — SODIUM CHLORIDE, PRESERVATIVE FREE 1 ML: 5 INJECTION INTRAVENOUS at 06:20

## 2018-08-10 RX ADMIN — POTASSIUM CHLORIDE 1 MEQ: 750 TABLET, EXTENDED RELEASE ORAL at 17:38

## 2018-08-10 RX ADMIN — ATORVASTATIN CALCIUM 1 MG: 20 TABLET, FILM COATED ORAL at 21:03

## 2018-08-10 RX ADMIN — AMIODARONE HYDROCHLORIDE 1 MG: 200 TABLET ORAL at 08:48

## 2018-08-10 RX ADMIN — MULTIPLE VITAMINS W/ MINERALS TAB 1 TAB: TAB at 08:34

## 2018-08-10 RX ADMIN — HYDROCODONE BITARTRATE AND ACETAMINOPHEN 1 TAB: 5; 325 TABLET ORAL at 03:27

## 2018-08-10 RX ADMIN — INSULIN LISPRO 2 UNITS: 100 INJECTION, SOLUTION INTRAVENOUS; SUBCUTANEOUS at 18:22

## 2018-08-10 RX ADMIN — POTASSIUM CHLORIDE 1 MEQ: 750 TABLET, EXTENDED RELEASE ORAL at 21:02

## 2018-08-10 RX ADMIN — TORSEMIDE 1 MG: 20 TABLET ORAL at 17:39

## 2018-08-10 RX ADMIN — NYSTATIN 1 DOSE: 100000 POWDER TOPICAL at 09:00

## 2018-08-10 RX ADMIN — ASPIRIN 1 MG: 81 TABLET ORAL at 08:34

## 2018-08-10 RX ADMIN — TORSEMIDE 1 MG: 20 TABLET ORAL at 08:34

## 2018-08-10 RX ADMIN — DABIGATRAN ETEXILATE MESYLATE 1 MG: 75 CAPSULE ORAL at 08:34

## 2018-08-11 LAB
ANION GAP: 7 MEQ/L (ref 8–16)
BLOOD UREA NITROGEN: 48 MG/DL (ref 7–18)
CALCIUM LEVEL: 8.9 MG/DL (ref 8.8–10.2)
CARBON DIOXIDE LEVEL: 35 MEQ/L (ref 21–32)
CHLORIDE LEVEL: 101 MEQ/L (ref 98–107)
CREATININE FOR GFR: 1.53 MG/DL (ref 0.55–1.3)
GFR SERPL CREATININE-BSD FRML MDRD: 36 ML/MIN/{1.73_M2} (ref 45–?)
GLUCOSE BLDC GLUCOMTR-MCNC: 115 MG/DL (ref 80–115)
GLUCOSE BLDC GLUCOMTR-MCNC: 194 MG/DL (ref 80–115)
GLUCOSE BLDC GLUCOMTR-MCNC: 86 MG/DL (ref 80–115)
GLUCOSE, FASTING: 128 MG/DL (ref 70–100)
HEMATOCRIT: 37.6 % (ref 36–47)
HEMOGLOBIN: 11.6 G/DL (ref 12–15.5)
MAGNESIUM LEVEL: 2.3 MG/DL (ref 1.8–2.4)
MEAN CORPUSCULAR HEMOGLOBIN: 30.9 PG (ref 27–33)
MEAN CORPUSCULAR HGB CONC: 30.9 G/DL (ref 32–36.5)
MEAN CORPUSCULAR VOLUME: 100.3 FL (ref 80–96)
NRBC BLD AUTO-RTO: 0 % (ref 0–0)
PLATELET COUNT, AUTOMATED: 208 10^3/UL (ref 150–450)
POTASSIUM SERUM: 3.7 MEQ/L (ref 3.5–5.1)
RED BLOOD COUNT: 3.75 10^6/UL (ref 4–5.4)
RED CELL DISTRIBUTION WIDTH: 15.5 % (ref 11.5–14.5)
SODIUM LEVEL: 143 MEQ/L (ref 136–145)
WHITE BLOOD COUNT: 6.5 10^3/UL (ref 4–10)

## 2018-08-11 RX ADMIN — NYSTATIN 1 DOSE: 100000 POWDER TOPICAL at 17:23

## 2018-08-11 RX ADMIN — METOPROLOL TARTRATE 1 MG: 25 TABLET, FILM COATED ORAL at 09:01

## 2018-08-11 RX ADMIN — INSULIN LISPRO 1 UNITS: 100 INJECTION, SOLUTION INTRAVENOUS; SUBCUTANEOUS at 20:37

## 2018-08-11 RX ADMIN — SODIUM CHLORIDE, PRESERVATIVE FREE 1 ML: 5 INJECTION INTRAVENOUS at 21:40

## 2018-08-11 RX ADMIN — ATORVASTATIN CALCIUM 1 MG: 20 TABLET, FILM COATED ORAL at 20:35

## 2018-08-11 RX ADMIN — INSULIN LISPRO 2 UNITS: 100 INJECTION, SOLUTION INTRAVENOUS; SUBCUTANEOUS at 09:00

## 2018-08-11 RX ADMIN — POTASSIUM CHLORIDE 1 MEQ: 750 TABLET, EXTENDED RELEASE ORAL at 09:01

## 2018-08-11 RX ADMIN — SPIRONOLACTONE 1 MG: 50 TABLET ORAL at 09:03

## 2018-08-11 RX ADMIN — HYDROCODONE BITARTRATE AND ACETAMINOPHEN 1 TAB: 5; 325 TABLET ORAL at 20:36

## 2018-08-11 RX ADMIN — ASPIRIN 1 MG: 81 TABLET ORAL at 09:02

## 2018-08-11 RX ADMIN — SPIRONOLACTONE 1 MG: 50 TABLET ORAL at 17:26

## 2018-08-11 RX ADMIN — SODIUM CHLORIDE, PRESERVATIVE FREE 1 ML: 5 INJECTION INTRAVENOUS at 05:56

## 2018-08-11 RX ADMIN — LEVOTHYROXINE SODIUM 1 MCG: 75 TABLET ORAL at 05:56

## 2018-08-11 RX ADMIN — TORSEMIDE 1 MG: 20 TABLET ORAL at 09:02

## 2018-08-11 RX ADMIN — SODIUM CHLORIDE, PRESERVATIVE FREE 1 ML: 5 INJECTION INTRAVENOUS at 14:00

## 2018-08-11 RX ADMIN — MULTIPLE VITAMINS W/ MINERALS TAB 1 TAB: TAB at 09:02

## 2018-08-11 RX ADMIN — SODIUM CHLORIDE, PRESERVATIVE FREE 1 ML: 5 INJECTION INTRAVENOUS at 06:00

## 2018-08-11 RX ADMIN — METOPROLOL TARTRATE 1 MG: 25 TABLET, FILM COATED ORAL at 20:35

## 2018-08-11 RX ADMIN — NYSTATIN 1 DOSE: 100000 POWDER TOPICAL at 20:36

## 2018-08-11 RX ADMIN — POTASSIUM CHLORIDE 1 MEQ: 750 TABLET, EXTENDED RELEASE ORAL at 20:36

## 2018-08-11 RX ADMIN — TORSEMIDE 1 MG: 20 TABLET ORAL at 17:27

## 2018-08-11 RX ADMIN — LORATADINE 1 MG: 10 TABLET ORAL at 09:02

## 2018-08-11 RX ADMIN — DABIGATRAN ETEXILATE MESYLATE 1 MG: 75 CAPSULE ORAL at 20:35

## 2018-08-11 RX ADMIN — DABIGATRAN ETEXILATE MESYLATE 1 MG: 75 CAPSULE ORAL at 09:01

## 2018-08-11 RX ADMIN — HYDROCODONE BITARTRATE AND ACETAMINOPHEN 1 TAB: 5; 325 TABLET ORAL at 00:52

## 2018-08-11 RX ADMIN — INSULIN LISPRO 2 UNITS: 100 INJECTION, SOLUTION INTRAVENOUS; SUBCUTANEOUS at 17:26

## 2018-08-11 RX ADMIN — INSULIN LISPRO 1 UNITS: 100 INJECTION, SOLUTION INTRAVENOUS; SUBCUTANEOUS at 12:00

## 2018-08-12 LAB
ANION GAP: 6 MEQ/L (ref 8–16)
BLOOD UREA NITROGEN: 52 MG/DL (ref 7–18)
CALCIUM LEVEL: 8.9 MG/DL (ref 8.8–10.2)
CARBON DIOXIDE LEVEL: 36 MEQ/L (ref 21–32)
CHLORIDE LEVEL: 99 MEQ/L (ref 98–107)
CREATININE FOR GFR: 1.51 MG/DL (ref 0.55–1.3)
GFR SERPL CREATININE-BSD FRML MDRD: 36.6 ML/MIN/{1.73_M2} (ref 45–?)
GLUCOSE BLDC GLUCOMTR-MCNC: 115 MG/DL (ref 80–115)
GLUCOSE BLDC GLUCOMTR-MCNC: 127 MG/DL (ref 80–115)
GLUCOSE BLDC GLUCOMTR-MCNC: 146 MG/DL (ref 80–115)
GLUCOSE, FASTING: 126 MG/DL (ref 70–100)
HEMATOCRIT: 34.7 % (ref 36–47)
HEMOGLOBIN: 10.9 G/DL (ref 12–15.5)
MAGNESIUM LEVEL: 2.3 MG/DL (ref 1.8–2.4)
MEAN CORPUSCULAR HEMOGLOBIN: 31.1 PG (ref 27–33)
MEAN CORPUSCULAR HGB CONC: 31.4 G/DL (ref 32–36.5)
MEAN CORPUSCULAR VOLUME: 98.9 FL (ref 80–96)
NRBC BLD AUTO-RTO: 0 % (ref 0–0)
PLATELET COUNT, AUTOMATED: 224 10^3/UL (ref 150–450)
POTASSIUM SERUM: 3.6 MEQ/L (ref 3.5–5.1)
RED BLOOD COUNT: 3.51 10^6/UL (ref 4–5.4)
RED CELL DISTRIBUTION WIDTH: 15.6 % (ref 11.5–14.5)
SODIUM LEVEL: 141 MEQ/L (ref 136–145)
WHITE BLOOD COUNT: 6.5 10^3/UL (ref 4–10)

## 2018-08-12 RX ADMIN — SPIRONOLACTONE 1 MG: 50 TABLET ORAL at 17:18

## 2018-08-12 RX ADMIN — METOPROLOL TARTRATE 1 MG: 25 TABLET, FILM COATED ORAL at 09:20

## 2018-08-12 RX ADMIN — DABIGATRAN ETEXILATE MESYLATE 1 MG: 75 CAPSULE ORAL at 21:19

## 2018-08-12 RX ADMIN — DABIGATRAN ETEXILATE MESYLATE 1 MG: 75 CAPSULE ORAL at 09:19

## 2018-08-12 RX ADMIN — MULTIPLE VITAMINS W/ MINERALS TAB 1 TAB: TAB at 09:22

## 2018-08-12 RX ADMIN — POTASSIUM CHLORIDE 1 MEQ: 750 TABLET, EXTENDED RELEASE ORAL at 09:22

## 2018-08-12 RX ADMIN — ATORVASTATIN CALCIUM 1 MG: 20 TABLET, FILM COATED ORAL at 21:18

## 2018-08-12 RX ADMIN — TORSEMIDE 1 MG: 20 TABLET ORAL at 17:18

## 2018-08-12 RX ADMIN — LORATADINE 1 MG: 10 TABLET ORAL at 09:22

## 2018-08-12 RX ADMIN — POTASSIUM CHLORIDE 1 MEQ: 750 TABLET, EXTENDED RELEASE ORAL at 21:18

## 2018-08-12 RX ADMIN — NYSTATIN 1 DOSE: 100000 POWDER TOPICAL at 21:20

## 2018-08-12 RX ADMIN — INSULIN LISPRO 2 UNITS: 100 INJECTION, SOLUTION INTRAVENOUS; SUBCUTANEOUS at 17:18

## 2018-08-12 RX ADMIN — SODIUM CHLORIDE, PRESERVATIVE FREE 1 ML: 5 INJECTION INTRAVENOUS at 14:00

## 2018-08-12 RX ADMIN — HYDROCODONE BITARTRATE AND ACETAMINOPHEN 1 TAB: 5; 325 TABLET ORAL at 11:54

## 2018-08-12 RX ADMIN — SODIUM CHLORIDE, PRESERVATIVE FREE 1 ML: 5 INJECTION INTRAVENOUS at 05:02

## 2018-08-12 RX ADMIN — INSULIN LISPRO 2 UNITS: 100 INJECTION, SOLUTION INTRAVENOUS; SUBCUTANEOUS at 07:53

## 2018-08-12 RX ADMIN — TORSEMIDE 1 MG: 20 TABLET ORAL at 09:21

## 2018-08-12 RX ADMIN — LEVOTHYROXINE SODIUM 1 MCG: 75 TABLET ORAL at 05:53

## 2018-08-12 RX ADMIN — INSULIN LISPRO 2 UNITS: 100 INJECTION, SOLUTION INTRAVENOUS; SUBCUTANEOUS at 12:39

## 2018-08-12 RX ADMIN — SPIRONOLACTONE 1 MG: 50 TABLET ORAL at 09:21

## 2018-08-12 RX ADMIN — INSULIN LISPRO 1 UNITS: 100 INJECTION, SOLUTION INTRAVENOUS; SUBCUTANEOUS at 21:00

## 2018-08-12 RX ADMIN — SODIUM CHLORIDE, PRESERVATIVE FREE 1 ML: 5 INJECTION INTRAVENOUS at 21:18

## 2018-08-12 RX ADMIN — NYSTATIN 1 DOSE: 100000 POWDER TOPICAL at 09:23

## 2018-08-12 RX ADMIN — ASPIRIN 1 MG: 81 TABLET ORAL at 09:19

## 2018-08-13 LAB — GLUCOSE BLDC GLUCOMTR-MCNC: 120 MG/DL (ref 80–115)

## 2018-08-13 RX ADMIN — ASPIRIN 1 MG: 81 TABLET ORAL at 08:33

## 2018-08-13 RX ADMIN — DABIGATRAN ETEXILATE MESYLATE 1 MG: 75 CAPSULE ORAL at 08:39

## 2018-08-13 RX ADMIN — MULTIPLE VITAMINS W/ MINERALS TAB 1 TAB: TAB at 08:33

## 2018-08-13 RX ADMIN — NYSTATIN 1 DOSE: 100000 POWDER TOPICAL at 08:34

## 2018-08-13 RX ADMIN — LORATADINE 1 MG: 10 TABLET ORAL at 08:32

## 2018-08-13 RX ADMIN — AMIODARONE HYDROCHLORIDE 1 MG: 200 TABLET ORAL at 08:33

## 2018-08-13 RX ADMIN — TORSEMIDE 1 MG: 20 TABLET ORAL at 08:32

## 2018-08-13 RX ADMIN — POTASSIUM CHLORIDE 1 MEQ: 750 TABLET, EXTENDED RELEASE ORAL at 08:33

## 2018-08-13 RX ADMIN — LEVOTHYROXINE SODIUM 1 MCG: 75 TABLET ORAL at 05:22

## 2018-08-13 RX ADMIN — SPIRONOLACTONE 1 MG: 50 TABLET ORAL at 08:32

## 2018-08-13 RX ADMIN — SODIUM CHLORIDE, PRESERVATIVE FREE 1 ML: 5 INJECTION INTRAVENOUS at 05:22

## 2018-08-13 RX ADMIN — INSULIN LISPRO 2 UNITS: 100 INJECTION, SOLUTION INTRAVENOUS; SUBCUTANEOUS at 08:31

## 2018-09-25 ENCOUNTER — HOSPITAL ENCOUNTER (OUTPATIENT)
Dept: HOSPITAL 53 - M LAB REF | Age: 68
End: 2018-09-25
Attending: INTERNAL MEDICINE
Payer: COMMERCIAL

## 2018-09-25 DIAGNOSIS — E03.9: Primary | ICD-10-CM

## 2018-09-25 LAB
FREE T4: 1.33 NG/DL (ref 0.76–1.46)
THYROID STIMULATING HORMONE: 11.3 UIU/ML (ref 0.36–3.74)

## 2018-09-25 PROCEDURE — 84443 ASSAY THYROID STIM HORMONE: CPT

## 2018-12-26 ENCOUNTER — HOSPITAL ENCOUNTER (INPATIENT)
Dept: HOSPITAL 53 - M ED | Age: 68
LOS: 6 days | DRG: 871 | End: 2019-01-01
Attending: HOSPITALIST | Admitting: HOSPITALIST
Payer: COMMERCIAL

## 2018-12-26 VITALS — DIASTOLIC BLOOD PRESSURE: 42 MMHG | SYSTOLIC BLOOD PRESSURE: 100 MMHG

## 2018-12-26 VITALS — SYSTOLIC BLOOD PRESSURE: 100 MMHG | DIASTOLIC BLOOD PRESSURE: 47 MMHG

## 2018-12-26 VITALS — DIASTOLIC BLOOD PRESSURE: 48 MMHG | SYSTOLIC BLOOD PRESSURE: 100 MMHG

## 2018-12-26 VITALS — WEIGHT: 293 LBS | HEIGHT: 66 IN | BODY MASS INDEX: 47.09 KG/M2

## 2018-12-26 VITALS — SYSTOLIC BLOOD PRESSURE: 97 MMHG | DIASTOLIC BLOOD PRESSURE: 46 MMHG

## 2018-12-26 DIAGNOSIS — I13.0: ICD-10-CM

## 2018-12-26 DIAGNOSIS — L03.115: ICD-10-CM

## 2018-12-26 DIAGNOSIS — E78.5: ICD-10-CM

## 2018-12-26 DIAGNOSIS — M54.5: ICD-10-CM

## 2018-12-26 DIAGNOSIS — N17.9: ICD-10-CM

## 2018-12-26 DIAGNOSIS — E87.2: ICD-10-CM

## 2018-12-26 DIAGNOSIS — E87.5: ICD-10-CM

## 2018-12-26 DIAGNOSIS — R57.8: ICD-10-CM

## 2018-12-26 DIAGNOSIS — L03.116: ICD-10-CM

## 2018-12-26 DIAGNOSIS — J44.9: ICD-10-CM

## 2018-12-26 DIAGNOSIS — E86.0: ICD-10-CM

## 2018-12-26 DIAGNOSIS — E11.22: ICD-10-CM

## 2018-12-26 DIAGNOSIS — Z88.8: ICD-10-CM

## 2018-12-26 DIAGNOSIS — Z79.01: ICD-10-CM

## 2018-12-26 DIAGNOSIS — S22.089A: ICD-10-CM

## 2018-12-26 DIAGNOSIS — Z87.891: ICD-10-CM

## 2018-12-26 DIAGNOSIS — Z99.81: ICD-10-CM

## 2018-12-26 DIAGNOSIS — E66.2: ICD-10-CM

## 2018-12-26 DIAGNOSIS — Z95.5: ICD-10-CM

## 2018-12-26 DIAGNOSIS — I25.10: ICD-10-CM

## 2018-12-26 DIAGNOSIS — J96.92: ICD-10-CM

## 2018-12-26 DIAGNOSIS — Z88.2: ICD-10-CM

## 2018-12-26 DIAGNOSIS — R32: ICD-10-CM

## 2018-12-26 DIAGNOSIS — J96.91: ICD-10-CM

## 2018-12-26 DIAGNOSIS — R65.21: ICD-10-CM
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
## 2018-12-26 DIAGNOSIS — S36.892A: ICD-10-CM

## 2018-12-26 DIAGNOSIS — R15.9: ICD-10-CM

## 2018-12-26 DIAGNOSIS — A41.9: Primary | ICD-10-CM

## 2018-12-26 DIAGNOSIS — Z79.899: ICD-10-CM

## 2018-12-26 DIAGNOSIS — Z90.49: ICD-10-CM

## 2018-12-26 DIAGNOSIS — I50.33: ICD-10-CM

## 2018-12-26 DIAGNOSIS — I48.0: ICD-10-CM

## 2018-12-26 DIAGNOSIS — Z79.84: ICD-10-CM

## 2018-12-26 DIAGNOSIS — Z79.82: ICD-10-CM

## 2018-12-26 DIAGNOSIS — D62: ICD-10-CM

## 2018-12-26 DIAGNOSIS — N18.3: ICD-10-CM

## 2018-12-26 LAB
ALBUMIN SERPL BCG-MCNC: 3.1 GM/DL (ref 3.2–5.2)
ALT SERPL W P-5'-P-CCNC: 15 U/L (ref 12–78)
AMYLASE SERPL-CCNC: 11 U/L (ref 25–115)
ANISOCYTOSIS BLD QL SMEAR: (no result)
APPEARANCE UR: (no result)
APTT BLD: 105.6 SECONDS (ref 25.4–37.6)
BACTERIA UR QL AUTO: (no result)
BASE EXCESS BLDA CALC-SCNC: -8.1 MMOL/L (ref -2–2)
BASE EXCESS BLDV CALC-SCNC: -9.1 MMOL/L (ref -2–2)
BASOPHILS # BLD AUTO: 0 10^3/UL (ref 0–0.2)
BASOPHILS NFR BLD AUTO: 0.1 % (ref 0–1)
BILIRUB CONJ SERPL-MCNC: 0.5 MG/DL (ref 0–0.2)
BILIRUB SERPL-MCNC: 1.2 MG/DL (ref 0.2–1)
BILIRUB UR QL STRIP.AUTO: NEGATIVE
BUN SERPL-MCNC: 54 MG/DL (ref 7–18)
BUN SERPL-MCNC: 55 MG/DL (ref 7–18)
CALCIUM SERPL-MCNC: 7.9 MG/DL (ref 8.8–10.2)
CALCIUM SERPL-MCNC: 8.7 MG/DL (ref 8.8–10.2)
CHLORIDE SERPL-SCNC: 102 MEQ/L (ref 98–107)
CHLORIDE SERPL-SCNC: 107 MEQ/L (ref 98–107)
CK MB CFR.DF SERPL CALC: 3.12
CK MB SERPL-MCNC: 4 NG/ML (ref ?–3.6)
CK SERPL-CCNC: 141 U/L (ref 26–192)
CO2 BLDA CALC-SCNC: 18.6 MEQ/L (ref 23–31)
CO2 BLDV CALC-SCNC: 19 MEQ/L (ref 24–28)
CO2 SERPL-SCNC: 14 MEQ/L (ref 21–32)
CO2 SERPL-SCNC: 19 MEQ/L (ref 21–32)
CREAT SERPL-MCNC: 2.63 MG/DL (ref 0.55–1.3)
CREAT SERPL-MCNC: 2.76 MG/DL (ref 0.55–1.3)
CRP SERPL-MCNC: 13.3 MG/DL (ref 0–0.3)
EOSINOPHIL # BLD AUTO: 0 10^3/UL (ref 0–0.5)
EOSINOPHIL NFR BLD AUTO: 0.1 % (ref 0–3)
FLUAV RNA UPPER RESP QL NAA+PROBE: NEGATIVE
FLUBV RNA UPPER RESP QL NAA+PROBE: NEGATIVE
GFR SERPL CREATININE-BSD FRML MDRD: 18.2 ML/MIN/{1.73_M2} (ref 45–?)
GFR SERPL CREATININE-BSD FRML MDRD: 19.2 ML/MIN/{1.73_M2} (ref 45–?)
GLUCOSE SERPL-MCNC: 133 MG/DL (ref 70–100)
GLUCOSE SERPL-MCNC: 143 MG/DL (ref 70–100)
GLUCOSE UR QL STRIP.AUTO: NEGATIVE MG/DL
HCO3 BLDA-SCNC: 17.5 MEQ/L (ref 22–26)
HCO3 BLDV-SCNC: 17.7 MEQ/L (ref 23–27)
HCO3 STD BLDA-SCNC: 17.8 MEQ/L (ref 22–26)
HCO3 STD BLDV-SCNC: 16.4 MEQ/L
HCT VFR BLD AUTO: 24.6 % (ref 36–47)
HCT VFR BLD AUTO: 26.7 % (ref 36–47)
HGB BLD-MCNC: 7 G/DL (ref 12–15.5)
HGB BLD-MCNC: 7.7 G/DL (ref 12–15.5)
HGB UR QL STRIP.AUTO: NEGATIVE
HYPOCHROMIA BLD QL SMEAR: (no result)
INR PPP: 4.01
KETONES UR QL STRIP.AUTO: NEGATIVE MG/DL
LEUKOCYTE ESTERASE UR QL STRIP.AUTO: NEGATIVE
LYMPHOCYTES # BLD AUTO: 0.2 10^3/UL (ref 1.5–4.5)
LYMPHOCYTES NFR BLD AUTO: 0.5 % (ref 24–44)
MCH RBC QN AUTO: 26.8 PG (ref 27–33)
MCH RBC QN AUTO: 27.4 PG (ref 27–33)
MCHC RBC AUTO-ENTMCNC: 28.5 G/DL (ref 32–36.5)
MCHC RBC AUTO-ENTMCNC: 28.8 G/DL (ref 32–36.5)
MCV RBC AUTO: 94.3 FL (ref 80–96)
MCV RBC AUTO: 95 FL (ref 80–96)
MICROCYTES BLD QL SMEAR: (no result)
MONOCYTES # BLD AUTO: 1.5 10^3/UL (ref 0–0.8)
MONOCYTES NFR BLD AUTO: 4.3 % (ref 0–5)
MONOCYTES NFR BLD MANUAL: 2 % (ref 0–8)
MUCOUS THREADS URNS QL MICRO: (no result)
NEUTROPHILS # BLD AUTO: 31.7 10^3/UL (ref 1.8–7.7)
NEUTROPHILS NFR BLD AUTO: 93.6 % (ref 36–66)
NEUTROPHILS NFR BLD MANUAL: 95 % (ref 35–75)
NITRITE UR QL STRIP.AUTO: NEGATIVE
PCO2 BLDA: 36.1 MMHG (ref 35–45)
PCO2 BLDV: 42.9 MMHG (ref 38–50)
PH BLDA: 7.3 UNITS (ref 7.35–7.45)
PH BLDV: 7.23 UNITS (ref 7.33–7.43)
PH UR STRIP.AUTO: 5 UNITS (ref 5–9)
PLATELET # BLD AUTO: 268 10^3/UL (ref 150–450)
PLATELET # BLD AUTO: 344 10^3/UL (ref 150–450)
PLATELET BLD QL SMEAR: NORMAL
PO2 BLDA: 86.1 MMHG (ref 75–100)
PO2 BLDV: 34.1 MMHG (ref 30–50)
POTASSIUM SERPL-SCNC: 5.3 MEQ/L (ref 3.5–5.1)
POTASSIUM SERPL-SCNC: 5.4 MEQ/L (ref 3.5–5.1)
PROT SERPL-MCNC: 7.1 GM/DL (ref 6.4–8.2)
PROT UR QL STRIP.AUTO: NEGATIVE MG/DL
PROTHROMBIN TIME: 40.1 SECONDS (ref 12.1–14.4)
RBC # BLD AUTO: 2.61 10^6/UL (ref 4–5.4)
RBC # BLD AUTO: 2.81 10^6/UL (ref 4–5.4)
RBC # UR AUTO: 0 /HPF (ref 0–3)
SAO2 % BLDA: 95.8 % (ref 95–99)
SAO2 % BLDV: 52.7 % (ref 60–80)
SODIUM SERPL-SCNC: 133 MEQ/L (ref 136–145)
SODIUM SERPL-SCNC: 133 MEQ/L (ref 136–145)
SP GR UR STRIP.AUTO: 1.02 (ref 1–1.03)
SQUAMOUS #/AREA URNS AUTO: 0 /HPF (ref 0–6)
TROPONIN I SERPL-MCNC: 0.12 NG/ML (ref ?–0.1)
UROBILINOGEN UR QL STRIP.AUTO: 2 MG/DL (ref 0–2)
WBC # BLD AUTO: 33.8 10^3/UL (ref 4–10)
WBC # BLD AUTO: 36.7 10^3/UL (ref 4–10)
WBC #/AREA URNS AUTO: 1 /HPF (ref 0–3)

## 2018-12-26 RX ADMIN — ACETAMINOPHEN PRN MG: 325 TABLET ORAL at 23:45

## 2018-12-26 RX ADMIN — DEXTROSE MONOHYDRATE SCH MLS/HR: 5 INJECTION INTRAVENOUS at 17:51

## 2018-12-26 RX ADMIN — SODIUM CHLORIDE SCH MLS/HR: 9 INJECTION, SOLUTION INTRAVENOUS at 17:51

## 2018-12-26 RX ADMIN — NYSTATIN SCH DOSE: 100000 POWDER TOPICAL at 21:23

## 2018-12-26 RX ADMIN — SODIUM CHLORIDE SCH MLS/HR: 9 INJECTION, SOLUTION INTRAVENOUS at 23:46

## 2018-12-26 NOTE — HPE
DATE OF ADMISSION:   12/26/2018

 

This is a 68-year-old female with a past medical history of super morbid obesity,

diastolic heart failure, hypertension, diabetes, hyperlipidemia, oxygen dependent

chronic obstructive pulmonary disease (COPD) requiring 1 liter of oxygen,

coronary artery disease status post stent placement, paroxysmal atrial

fibrillation, chronic low back pain, who presented to the emergency room after

having a fall at home this morning.  She says that she did not actually fall but

her legs gave out and she slowly slumped herself on her buttocks to the floor.

She was not able to get back up and so she emailed a friend to call emergency

medical services (EMS) to bring her to the emergency room.  In the emergency

room, she was in severe pain and was given fentanyl.  She was subsequently found

to be hypotensive and with elevated lactic acid, septic.  Was started on IV

cefepime.  Abdominal CT of the chest, abdomen and pelvis, as well as CT of the

thoracolumbar spine showed no evidence of consolidation or pulmonary congestion

and did show some mild fluid collection in the right pelvis.  Dr. Gosselin was

notified by the emergency room attending, who saw the images but was not

concerned and recommendations to continue antibiotic therapy at this time.  Murray

was placed but a urinalysis is pending.

 

CT of the thoracolumbar spine only showed a nondisplaced fracture of T12

vertebral body.  The patient did note to be having diarrhea for the last 2 weeks,

approximately 4 days ago the diarrhea stopped.  She was on Lomotil for this.  She

did not receive antibiotics in the last few months.  She denies any upper

respiratory symptoms or any subjective feeling of fever or chills.  She has no

dysuria or frequency, though she was found incontinent of stool and urine when

she came to the emergency room.  She will be admitted for further evaluation.

 

PAST MEDICAL HISTORY:

1.  Super morbid obesity.

2.  Chronic diastolic heart failure.

3.  History of paroxysmal atrial fibrillation.

4.  Hypertension.

5.  Diabetes.

6.  Hyperlipidemia.

7.  Coronary artery disease, status post stent placement.

8.  Oxygen dependent chronic obstructive pulmonary disease (COPD), requiring 1

liter of oxygen.

9.  Chronic kidney disease stage III, baseline creatinine of approximately 1.7.

10.  History of ischemic bowel, status post partial bowel resection in 2010.

 

ALLERGIES: She has allergy to SULFA medication, as well as HEPARIN.  She has a

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

 

FAMILY HISTORY:   Noncontributory.

 

SOCIAL HISTORY:   The patient used to be a heavy smoker, approximately 35 pack

per year history but quit many years ago.  Denies alcohol or illicit drugs.

 

MEDICATIONS:

She takes at home:

- Tylenol 650 mg by mouth every 6 hours as needed

- Norco one tablet by mouth three times a day as needed, 5/325 mg

- albuterol sulfate two puffs inhaled every 4 hours as needed

- amiodarone 200 mg by mouth five times a week

- Anoro Ellipta one puff inhaled daily

- aspirin 81 mg daily

- atorvastatin 40 mg daily at bedtime

- Pradaxa 150 mg by mouth twice a day

- Synthroid 75 mcg by mouth daily

- loratadine 10 mg by mouth daily

- magnesium chloride 64 mg by mouth three times a week

- metformin 500 mg by mouth daily

- metoprolol 12.5 mg by mouth daily

- potassium chloride 20 mEq three times a day

- spironolactone 50 mg by mouth twice a day

- torsemide 40 mg by mouth daily

 

REVIEW OF SYSTEMS:  Negative for all ten major systems except what is mentioned

in the history of present illness.

 

VITAL SIGNS:  Blood pressure is 90/46, heart rate is 69 and regular, respiratory

rate is 20, temperature rectally was 96.2, oxygen saturation is 93% on 2 liters

nasal cannula.

Head is atraumatic, normocephalic.  Dried mucous membranes noted.  Neck is supple

with no jugular venous distention (JVD).

Lungs are clear to auscultation.

S1, S2 audible.  No murmurs appreciated.

Abdomen has tenderness in all quadrants on deep palpation.  No rebound.  Positive

bowel sounds.

There is 1+ pedal edema bilaterally.

Skin intact.

Neurologic examination, the patient is awake, alert and oriented times three.

 

LABORATORIES:

WBC is 36.7, hemoglobin 7.7, hematocrit 26.7, platelets are 344,000.

 

ABG shows arterial pH of 7.304, PCO2 of 36.1, PO2 of 86.1, bicarbonate is 17.5.

 

 

Sodium is 133, potassium 5.4, chloride 102, CO2 of 19, anion gap is 12, BUN is

55, creatinine 2.76, lactic acid is 5, calcium 8.7, troponin is 0.12.  AST is 25,

ALT 15, total bilirubin is 1.2.

 

Urinalysis is not suggestive of a urinary tract infection (UTI).

 

IMPRESSION:

1.  Sepsis.

2.  Acute kidney injury.

 

PLAN:  The patient was admitted to the progressive care unit (PCU).  We will

continue sepsis protocol and I am going to start the patient on IV Zosyn 2.25 mg

every 8 hours.  Pan cultures have been sent, as well as a GI panel.  As far as

acute kidney injury is concerned, this is likely dehydration, prerenal azotemia

from volume loss from the chronic diarrhea that she has had, in fact she says

that she has only been having saltine crackers and sips of water.  We will

continue IV fluids and monitor BUN and creatinine trends.  As far as her low back

pain and weakness with falls are concerned, I will have physical therapy (PT) see

the patient in the morning.  I cannot give any narcotics at this time because of

the low blood pressure.  We will instead give a Lidoderm patch for the time being

and we will continue her care in the progressive care unit (PCU).

## 2018-12-26 NOTE — REP
Clinical:  Sepsis.

 

Technique:  Axial noncontrast images from the thoracic inlet to the upper abdomen

with coronal and sagittal re-formations.

 

Note:  Examination is significantly limited due to body habitus and associated

technical factors causing significant artifact.

 

Findings:

Very subtle trace scattered bilateral ground-glass opacities and atelectasis may

reflect mild pneumonitis.  No focal consolidation.  No effusion.  No

pneumothorax.  Tracheobronchial tree is patent.  Mild cardiomegaly suggested.

Atherosclerotic disease to the thoracic aorta and coronary arteries noted.  No

pericardial effusion.  No obvious adenopathy.

 

Fat stranding along the right side of the visualized right flank with possible

ill-defined fluid collection in the subcutaneous tissues.

 

Osseous structures demonstrate degenerative changes without focal osseous

abnormality.

 

Impression:

1.  Significantly limited examination.  Cannot exclude a mild pneumonitis with

subtle scattered perihilar ground-glass opacities and trace atelectasis.

2.  Significant fat stranding within the right flank with possible fluid

collection.

 

 

Electronically Signed by

Cameron Chang MD 12/26/2018 12:18 P

## 2018-12-26 NOTE — REP
Clinical:  Abdominal pain and sepsis.

 

Technique:  Axial noncontrast images from the lung bases to the pubic symphysis

with coronal and sagittal re-formations.

 

Note:  Evaluation is significantly limited due to body habitus and associated

technical factors causing significant artifact.

 

Findings:

Liver, spleen, atrophic pancreas, bilateral adrenal glands are essentially

normal.  Small amount of perihepatic ascites is suggested.  Cholelithiasis noted

without obvious acute cholecystitis.  Kidneys demonstrate chronic atrophic

changes and renovascular calcifications without hydronephrosis or perinephric

stranding.  The enteric system is without obvious obstruction or acute

inflammatory process.  Diverticulosis noted without acute diverticulitis.  Pelvis

demonstrates collapsed normal bladder and age-appropriate uterus/adnexa.  No free

air.  Osseous structures demonstrate degenerative changes without focal osseous

abnormality.  Abdominal aorta without aneurysm.

 

Extensive subcutaneous edema and suspected fluid collection in the right flank is

incompletely evaluated due to positioning.

 

Impression:

1.  Diffuse knee pain subcutaneous edema and possible fluid collection in the

right flank.

2.  Relatively chronic-appearing changes as described above.

3.  Evaluation is significantly limited.

 

 

Electronically Signed by

Cameron Chang MD 12/26/2018 12:15 P

## 2018-12-26 NOTE — REP
CT lumbar spine without contrast:

 

History:  Severe low back pain after a fall.

 

Technique:  Helical scanning is acquired and 4 mm axial images are reformatted.

Coronal and sagittal multiplanar re-formation images are generated.  Image

quality is significantly inhibited by patient body habitus.

 

Comparison imaging is from December 13, 2016.

 

CT findings:  There is considerable beam-hardening artifact due to body habitus.

There is a nondisplaced fracture of the right anterolateral margin of the T12

vertebral body.  There is bridging osteophyte formation at T12-L1 and kissing

osteophytes are observed at the L1-L2.  These are on the right side.  No other

fracture is seen.  No vertebral body collapse is seen.  There is osteoarthritic

facet disease at multiple lumbar levels.  Vascular calcification is noted.

Normal caliber aorta is seen.  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

 

 Vertebral body heights are preserved.  No collapse is seen.  Degenerative

discogenic spurring is seen at multiple levels.  Vacuum phenomenon are visible at

L3-4, L1-2 and T12-L1.

 

Impression:

 

Nondisplaced fracture of the right anterolateral aspect of the T12 vertebral

body.  There is adjacent bridging osteophyte formation on the right at T12-L1.

No other fracture is seen.  Fairly advanced degenerative spondylosis changes are

noted.

 

 

Electronically Signed by

Corona He MD 12/26/2018 01:33 P

## 2018-12-27 VITALS — SYSTOLIC BLOOD PRESSURE: 100 MMHG | DIASTOLIC BLOOD PRESSURE: 49 MMHG

## 2018-12-27 VITALS — SYSTOLIC BLOOD PRESSURE: 107 MMHG | DIASTOLIC BLOOD PRESSURE: 52 MMHG

## 2018-12-27 VITALS — SYSTOLIC BLOOD PRESSURE: 93 MMHG | DIASTOLIC BLOOD PRESSURE: 52 MMHG

## 2018-12-27 VITALS — DIASTOLIC BLOOD PRESSURE: 50 MMHG | SYSTOLIC BLOOD PRESSURE: 99 MMHG

## 2018-12-27 VITALS — SYSTOLIC BLOOD PRESSURE: 96 MMHG | DIASTOLIC BLOOD PRESSURE: 49 MMHG

## 2018-12-27 VITALS — SYSTOLIC BLOOD PRESSURE: 90 MMHG | DIASTOLIC BLOOD PRESSURE: 52 MMHG

## 2018-12-27 VITALS — DIASTOLIC BLOOD PRESSURE: 52 MMHG | SYSTOLIC BLOOD PRESSURE: 93 MMHG

## 2018-12-27 VITALS — SYSTOLIC BLOOD PRESSURE: 83 MMHG | DIASTOLIC BLOOD PRESSURE: 43 MMHG

## 2018-12-27 VITALS — DIASTOLIC BLOOD PRESSURE: 52 MMHG | SYSTOLIC BLOOD PRESSURE: 96 MMHG

## 2018-12-27 VITALS — DIASTOLIC BLOOD PRESSURE: 41 MMHG | SYSTOLIC BLOOD PRESSURE: 87 MMHG

## 2018-12-27 VITALS — DIASTOLIC BLOOD PRESSURE: 53 MMHG | SYSTOLIC BLOOD PRESSURE: 111 MMHG

## 2018-12-27 VITALS — DIASTOLIC BLOOD PRESSURE: 56 MMHG | SYSTOLIC BLOOD PRESSURE: 123 MMHG

## 2018-12-27 VITALS — DIASTOLIC BLOOD PRESSURE: 55 MMHG | SYSTOLIC BLOOD PRESSURE: 94 MMHG

## 2018-12-27 VITALS — DIASTOLIC BLOOD PRESSURE: 41 MMHG | SYSTOLIC BLOOD PRESSURE: 89 MMHG

## 2018-12-27 VITALS — DIASTOLIC BLOOD PRESSURE: 46 MMHG | SYSTOLIC BLOOD PRESSURE: 94 MMHG

## 2018-12-27 VITALS — SYSTOLIC BLOOD PRESSURE: 101 MMHG | DIASTOLIC BLOOD PRESSURE: 55 MMHG

## 2018-12-27 VITALS — DIASTOLIC BLOOD PRESSURE: 33 MMHG | SYSTOLIC BLOOD PRESSURE: 66 MMHG

## 2018-12-27 VITALS — DIASTOLIC BLOOD PRESSURE: 44 MMHG | SYSTOLIC BLOOD PRESSURE: 107 MMHG

## 2018-12-27 VITALS — DIASTOLIC BLOOD PRESSURE: 47 MMHG | SYSTOLIC BLOOD PRESSURE: 92 MMHG

## 2018-12-27 VITALS — SYSTOLIC BLOOD PRESSURE: 91 MMHG | DIASTOLIC BLOOD PRESSURE: 51 MMHG

## 2018-12-27 VITALS — SYSTOLIC BLOOD PRESSURE: 100 MMHG | DIASTOLIC BLOOD PRESSURE: 46 MMHG

## 2018-12-27 VITALS — DIASTOLIC BLOOD PRESSURE: 48 MMHG | SYSTOLIC BLOOD PRESSURE: 89 MMHG

## 2018-12-27 VITALS — SYSTOLIC BLOOD PRESSURE: 102 MMHG | DIASTOLIC BLOOD PRESSURE: 54 MMHG

## 2018-12-27 LAB
ALBUMIN SERPL BCG-MCNC: 2.3 GM/DL (ref 3.2–5.2)
ALBUMIN SERPL BCG-MCNC: 2.3 GM/DL (ref 3.2–5.2)
ALT SERPL W P-5'-P-CCNC: 126 U/L (ref 12–78)
ALT SERPL W P-5'-P-CCNC: 128 U/L (ref 12–78)
BILIRUB SERPL-MCNC: 0.8 MG/DL (ref 0.2–1)
BILIRUB SERPL-MCNC: 1.1 MG/DL (ref 0.2–1)
BUN SERPL-MCNC: 53 MG/DL (ref 7–18)
BUN SERPL-MCNC: 56 MG/DL (ref 7–18)
CALCIUM SERPL-MCNC: 7.8 MG/DL (ref 8.8–10.2)
CALCIUM SERPL-MCNC: 8 MG/DL (ref 8.8–10.2)
CHLORIDE SERPL-SCNC: 108 MEQ/L (ref 98–107)
CHLORIDE SERPL-SCNC: 110 MEQ/L (ref 98–107)
CO2 SERPL-SCNC: 19 MEQ/L (ref 21–32)
CO2 SERPL-SCNC: 21 MEQ/L (ref 21–32)
CREAT SERPL-MCNC: 2.4 MG/DL (ref 0.55–1.3)
CREAT SERPL-MCNC: 2.61 MG/DL (ref 0.55–1.3)
FERRITIN SERPL-MCNC: 64 NG/ML (ref 8–252)
GFR SERPL CREATININE-BSD FRML MDRD: 19.4 ML/MIN/{1.73_M2} (ref 45–?)
GFR SERPL CREATININE-BSD FRML MDRD: 21.4 ML/MIN/{1.73_M2} (ref 45–?)
GLUCOSE SERPL-MCNC: 102 MG/DL (ref 70–100)
GLUCOSE SERPL-MCNC: 107 MG/DL (ref 70–100)
HCT VFR BLD AUTO: 23.3 % (ref 36–47)
HCT VFR BLD AUTO: 23.8 % (ref 36–47)
HCT VFR BLD AUTO: 24.5 % (ref 36–47)
HGB BLD-MCNC: 6.7 G/DL (ref 12–15.5)
HGB BLD-MCNC: 7 G/DL (ref 12–15.5)
HGB BLD-MCNC: 7 G/DL (ref 12–15.5)
INR PPP: 4.32
IRON SATN MFR SERPL: 3.3 % (ref 13.2–45)
IRON SERPL-MCNC: 9 UG/DL (ref 50–170)
MCH RBC QN AUTO: 26.8 PG (ref 27–33)
MCH RBC QN AUTO: 27.6 PG (ref 27–33)
MCHC RBC AUTO-ENTMCNC: 28.8 G/DL (ref 32–36.5)
MCHC RBC AUTO-ENTMCNC: 29.4 G/DL (ref 32–36.5)
MCV RBC AUTO: 93.2 FL (ref 80–96)
MCV RBC AUTO: 93.7 FL (ref 80–96)
PLATELET # BLD AUTO: 222 10^3/UL (ref 150–450)
PLATELET # BLD AUTO: 228 10^3/UL (ref 150–450)
POTASSIUM SERPL-SCNC: 4.9 MEQ/L (ref 3.5–5.1)
POTASSIUM SERPL-SCNC: 5.4 MEQ/L (ref 3.5–5.1)
PROT SERPL-MCNC: 5.5 GM/DL (ref 6.4–8.2)
PROT SERPL-MCNC: 5.7 GM/DL (ref 6.4–8.2)
PROTHROMBIN TIME: 42.5 SECONDS (ref 12.1–14.4)
RBC # BLD AUTO: 2.5 10^6/UL (ref 4–5.4)
RBC # BLD AUTO: 2.54 10^6/UL (ref 4–5.4)
SODIUM SERPL-SCNC: 136 MEQ/L (ref 136–145)
SODIUM SERPL-SCNC: 137 MEQ/L (ref 136–145)
TIBC SERPL-MCNC: 276 UG/DL (ref 250–450)
TROPONIN I SERPL-MCNC: 0.17 NG/ML (ref ?–0.1)
WBC # BLD AUTO: 20.5 10^3/UL (ref 4–10)
WBC # BLD AUTO: 26.3 10^3/UL (ref 4–10)

## 2018-12-27 PROCEDURE — 30233N1 TRANSFUSION OF NONAUTOLOGOUS RED BLOOD CELLS INTO PERIPHERAL VEIN, PERCUTANEOUS APPROACH: ICD-10-PCS | Performed by: HOSPITALIST

## 2018-12-27 RX ADMIN — IPRATROPIUM BROMIDE AND ALBUTEROL SULFATE SCH ML: .5; 3 SOLUTION RESPIRATORY (INHALATION) at 19:32

## 2018-12-27 RX ADMIN — CAPSAICIN SCH DOSE: 0.25 CREAM TOPICAL at 08:58

## 2018-12-27 RX ADMIN — CAPSAICIN SCH DOSE: 0.25 CREAM TOPICAL at 21:00

## 2018-12-27 RX ADMIN — LEVOTHYROXINE SODIUM SCH MCG: 75 TABLET ORAL at 05:38

## 2018-12-27 RX ADMIN — DEXTROSE MONOHYDRATE SCH MLS/HR: 5 INJECTION INTRAVENOUS at 17:49

## 2018-12-27 RX ADMIN — DEXTROSE MONOHYDRATE SCH MLS/HR: 5 INJECTION INTRAVENOUS at 10:16

## 2018-12-27 RX ADMIN — LORATADINE SCH MG: 10 TABLET ORAL at 08:58

## 2018-12-27 RX ADMIN — ACETAMINOPHEN PRN MG: 325 TABLET ORAL at 03:39

## 2018-12-27 RX ADMIN — NYSTATIN SCH DOSE: 100000 POWDER TOPICAL at 08:59

## 2018-12-27 RX ADMIN — DEXTROSE MONOHYDRATE SCH MLS/HR: 50 INJECTION, SOLUTION INTRAVENOUS at 11:05

## 2018-12-27 RX ADMIN — CAPSAICIN SCH DOSE: 0.25 CREAM TOPICAL at 15:59

## 2018-12-27 RX ADMIN — DEXTROSE MONOHYDRATE SCH MLS/HR: 5 INJECTION INTRAVENOUS at 01:52

## 2018-12-27 RX ADMIN — IPRATROPIUM BROMIDE AND ALBUTEROL SULFATE SCH ML: .5; 3 SOLUTION RESPIRATORY (INHALATION) at 14:12

## 2018-12-27 RX ADMIN — NYSTATIN SCH DOSE: 100000 POWDER TOPICAL at 21:00

## 2018-12-27 NOTE — IPNPDOC
Date Seen


The patient was seen on 12/27/18.





Progress Note


SUBJECTIVE: Patient continues to complain of whole body pain, she tells me is 

worse in her back in the mid region is mostly midline she tells me is 

progressively worsened over the last several days prior to her fall at the time 

of her admission. She tells me that her weight is also increased significantly 

about 50 pounds since she was last discharged that she has not been checking her

daily weights at the present time she complains of some mild shortness of breath

and burning in her feet





OBJECTIVE


PHYSICAL EXAMINATION:


VITAL SIGNS: Please see below. 


GENERAL: Disheveled super morbidly obese elderly  woman lying on her 

left side she appears quite comfortable at the time of my exam she is awake 

alert oriented 3 and speaking in complete sentences without any accessory 

muscle use


HEENT: Difficult to assess for elevation CVP secondary to body habitus she 

appears to have moist mucous membranes cranial nerves grossly intact


CARDIOVASCULAR: Heart sounds are distant secondary to body habitus S1-S2 appears

regular without additional heart sounds at this time.


RESPIRATORY: Breath sounds are distant secondary to body habitus she gives me a 

poor inspiratory effort with exam. Somewhat diminished at the bases


ABDOMINAL: Grossly obese. She does have some bruising on her right flank her 

back is exquisitely tender to palpation throughout without any area being 

specifically more tender than another


EXTREMITIES: 3+ pitting edema and erythema over bilateral feet with exquisite 

tenderness to even soft palpation





LABORATORY DATA, IMAGING STUDIES, MICROBIOLOGY: Please see below.





Echocardiogram: Ordered.





DVT prophylaxis ordered?: Sequentials teds





ASSESSMENT AND PLAN: This is a 68-year-old female who presented with back pain 

weight gain found to be in shock with T12 compression fracture.





PROBLEMS:


1. Shock: And did have a leukocytosis and significantly elevated lactic acid. 

Although not tachycardic febrile urine was not abnormal urine culture and blood 

cultures are currently pending her left foot is quite erythematous and as a 

potential source of infection she is currently on Zosyn and I will broadened to 

vancomycin as well given her hemodynamic instability overnight. She appears to 

have a right flank fluid collection and some bruising there in the setting of a 

T12 fracture and anticoagulation I also concern for retroperitoneal hematoma. We

are transfusing HER 2 units of PRBCs at this time. I have no reason to suspect 

adrenal insufficiency I'll recheck a lactic acid this morning when I examined 

her her map is 75 I asked nursing staff to discontinue her dopamine as well as 

her IV fluids in preparation for transfusion of blood products. It appears given

her super morbid obesity there checking blood pressures along her right forearm 

which may be causing some inaccuracy on clinical exam she appears volume 

overloaded and as such I will hold any further IV fluids unless absolutely 

necessary and proven by elevated lactic acid.





2. Anemia: Etiology is not immediately clear I'll begin working this up I'll 

check iron studies peripheral smear occult stool for blood my concern is for 

retroperitoneal hematoma though we will transfuse HER-2 units of PRBCs and mo

nitor hemoglobin closely given the acute drop since her last visit in August I 

will hold her aspirin Pradaxa my suspicion for any hemolysis his lower. Some of 

her drop this morning is likely dilutional however she did present with 

significantly lower hemoglobin at her baseline. We'll recheck hemoglobin at 5 PM





3. Diastolic congestive heart failure: No recent echocardiogram within the last 

year I will order one at this time. She does appear decompensated with 

significant volume overload at this time she has received 6 L of IV fluids 

overnight and was put out greater than 50 pounds as per her records since her 

last discharge within the last 6 months. With the addition of 2 units of PRBCs 

and her inability to diurese her in the setting of shock she is certainly at 

risk for impending respiratory failure and will try to limit further fluids and 

diuresis when able to place a nephrology consult at this time. She did have a 

slightly abnormal troponin which I will trend I would not be surprised for it to

be somewhat elevated in the setting of demand ischemia EKG however does not 

appear significantly different. Her baseline oxygen requirement is 1 L she is 

currently requiring more that I suspect she is developing early pulmonary edema 

and may necessitate pulmonary consultation I did discuss goals of care as she 

wishes to be a full code.





4. Back pain: Likely secondary to T12 compression fracture also possibly a small

retroperitoneal hematoma either way management is supportive and close 

monitoring she which she is able recommend PT and pain control for the time 

being we'll hold off on narcotics with slight drop her blood pressure further





5. Elevated INR: The patient does normally take Pradaxa at home however the INR 

appears to be disproportionately elevated is unclear to me why this is the case 

swelling her LFTs are slightly elevated as well she could certainly have some 

fatty liver disease given her body habitus however she does have right-sided 

flank pain as well and as such I'll check a liver duplex. We will hold Tylenol 

and statin





6. Hyperkalemia: The patient is normally on spironolactone and potassium 

repletion at home these been held. He received significant fluid monitor closely

she reported a history of diarrhea however stop days ago we'll recheck a CMP of 

5 PM





7. Acute on chronic kidney failure: We'll place nephrology consult she certainly

appears volume overloaded on exam however she was hypotensive renal function has

had marginal improvement since receiving 6 L of fluid were now providing him 

with blood products we'll recheck a CMP this evening and follow further 

recommendations from nephrology service. For the time being her torsemide is on 

hold while she is hypotensive as her blood pressure could tolerate diuresis 

would encourage this





8. Super morbid obesity:, Dictating care likely obesity hypoventilation syndrome

and likely obstructive sleep apnea as well as well as venous stasis





9. COPD: She is on chronic O2 1 L at the present time she is requiring more 

oxygen I suspect related pulmonary edema and not secondary to acute 

decompensation of her COPD we'll start her on nebulizer treatments





10. Coronary artery disease: She is asymptomatic at this time she is volume up 

her EKG was unchanged we are holding her aspirin she is not on a beta blocker 

she is stable on a symptom medic at this time





11. Paroxysmal atrial fibrillation: She is hypotensive at this time I will hold 

her amiodarone as well as her metoprolol. We're also holding her Pradaxa in the 

setting of acute anemia





12. Diabetes: Sliding scale and fingersticks hold home metformin





13. Hypothyroidism: Continue with Synthroid





Prognosis: Guarded.





VS, I&O, 24H, Fishbone


Vital Signs/I&O





Vital Signs








  Date Time  Temp Pulse Resp B/P (MAP) Pulse Ox O2 Delivery O2 Flow Rate FiO2


 


12/27/18 10:01  66 18 92/47 (62) 99 Nasal Cannula 3.0 


 


12/27/18 09:00 97.8       














I&O- Last 24 Hours up to 6 AM 


 


 12/27/18





 06:00


 


Intake Total 7600 ml


 


Output Total 625 ml


 


Balance 6975 ml











Laboratory Data


24H LABS


Laboratory Tests 2


12/26/18 10:48: 


White Blood Count 36.7*H, Red Blood Count 2.81L, Hemoglobin 7.7L, Hematocrit 

26.7L, Mean Corpuscular Volume 95.0, Mean Corpuscular Hemoglobin 27.4, Mean 

Corpuscular Hemoglobin Concent 28.8L, Red Cell Distribution Width 17.7H, 

Platelet Count 344, Neutrophils # (Auto) , Lymphocytes # (Auto) , Nucleated Red 

Blood Cells % (auto) 0.9H, Neutrophils 95H, Band Neutrophils 3, Monocytes 

(Manual) 2, Platelet Estimate NORMAL, Hypochromasia 1+, Anisocytosis 1+, 

Microcytosis 1+, Prothrombin Time 40.1H, Prothromb Time International Ratio 

4.01, Activated Partial Thromboplast Time 105.6H, Blood Gas Bicarbonate Standard

16.4, Venous Blood pH 7.234L, Venous Blood Partial Pressure CO2 42.9, Venous 

Blood Partial Pressure O2 34.1, Venous Blood Total Carbon Dioxide 19.0L, Venous 

Blood HCO3 17.7L, Venous Blood Oxygen Saturation 52.7L, Venous Blood Base Excess

-9.1L, Anion Gap 12, Glomerular Filtration Rate 18.2L, Lactic Acid Level 5.0*H, 

Calcium Level 8.7L, Aspartate Amino Transf (AST/SGOT) 25, Alanine 

Aminotransferase (ALT/SGPT) 15, Alkaline Phosphatase 134H, Total Bilirubin 1.2H,

Direct Bilirubin 0.5H, Total Creatine Kinase 141, Creatine Kinase MB 4.0H, 

Creatine Kinase MB Relative Index 3.12, Troponin I 0.12H, C-Reactive Protein, 

Quantitative 13.30H, Total Protein 7.1, Albumin 3.1L, Albumin/Globulin Ratio 

0.78L, Amylase Level 11L


12/26/18 12:12: 


Blood Gas Bicarbonate Standard 17.8L, Arterial Blood pH 7.304L, Arterial Blood 

Partial Pressure CO2 36.1, Arterial Blood Partial Pressure O2 86.1, Arterial 

Blood Total CO2 18.6L, Arterial Blood HCO3 17.5L, Arterial Blood Base Excess -

8.1L, Arterial Blood Oxygen Saturation 95.8


12/26/18 12:34: 


Influenza Type A (RT-PCR) NEGATIVE, Influenza Type B (RT-PCR) NEGATIVE


12/26/18 12:55: 


Urine Appearance HAZY, Urine Color YELLOW, Urine pH 5.0, Urine Specific Gravity 

1.017, Urine Protein NEGATIVE, Urine Glucose (UA) NEGATIVE, Urine Ketones 

NEGATIVE, Urine Urobilinogen 2.0H, Urine Bilirubin NEGATIVE, Urine Leukocyte 

Esterase NEGATIVE, Urine Blood NEGATIVE, Urine Nitrite NEGATIVE, Urine WBC 

(Auto) 1, Urine RBC (Auto) 0, Urine Hyaline Casts (Auto) 15, Urine Bacteria 

(Auto) 1+H, Urine Squamous Epithelial Cells 0, Urine Mucus (Auto) SMALL, Urine 

Sperm (Auto) 


12/26/18 15:26: 


Immature Granulocyte % (Auto) 1.4, White Blood Count 33.8*H, Red Blood Count 

2.61L, Hemoglobin 7.0L, Hematocrit 24.6L, Mean Corpuscular Volume 94.3, Mean 

Corpuscular Hemoglobin 26.8L, Mean Corpuscular Hemoglobin Concent 28.5L, Red 

Cell Distribution Width 17.6H, Platelet Count 268, Neutrophils (%) (Auto) 93.6H,

Lymphocytes (%) (Auto) 0.5L, Monocytes (%) (Auto) 4.3, Eosinophils (%) (Auto) 

0.1, Basophils (%) (Auto) 0.1, Neutrophils # (Auto) 31.7H, Lymphocytes # (Auto) 

0.2L, Monocytes # (Auto) 1.5H, Eosinophils # (Auto) 0.0, Basophils # (Auto) 0.0,

Nucleated Red Blood Cells % (auto) 0.7H, Anion Gap 12, Glomerular Filtration 

Rate 19.2L, Lactic Acid Followup at 4 Hours 4.4*H, Blood Urea Nitrogen 54H, 

Creatinine 2.63H, Sodium Level 133L, Potassium Level 5.3H, Chloride Level 107, 

Carbon Dioxide Level 14L, Calcium Level 7.9L


12/27/18 04:41: 


Nucleated Red Blood Cells % (auto) 1.1H, Anion Gap 7L, Glomerular Filtration 

Rate 19.4L, Blood Urea Nitrogen 56H, Creatinine 2.61H, Sodium Level 136, 

Potassium Level 5.4H, Chloride Level 110H, Carbon Dioxide Level 19L, Calcium 

Level 7.8L, Aspartate Amino Transf (AST/SGOT) 220H, Alanine Aminotransferase 

(ALT/SGPT) 128H, Alkaline Phosphatase 112, Total Bilirubin 0.8, Total Protein 

5.5#L, Albumin 2.3#L, Albumin/Globulin Ratio 0.72L


12/27/18 06:47: Lactic Acid Level 1.4


12/27/18 06:49: 


Reticulocyte # (auto) 75.6, Differential Slide Review Report, Peripheral Blood 

Smear Path Consult PERIPHERAL SMEAR, Percent Reticulocyte Count 2.7H, 

Reticulocyte Hemoglobin Equivalent 19.0L, Iron Level 9L, Total Iron Binding 

Capacity 276, Transferrin % Saturation 3.3L, Ferritin 64, Troponin I 0.17#H


CBC/BMP


Laboratory Tests


12/26/18 10:48








Red Blood Count 2.81 L, Mean Corpuscular Volume 95.0, Mean Corpuscular 

Hemoglobin 27.4, Mean Corpuscular Hemoglobin Concent 28.8 L, Red Cell 

Distribution Width 17.7 H, Neutrophils # (Auto) , Lymphocytes # (Auto) 





12/26/18 15:26








Red Blood Count 2.61 L, Mean Corpuscular Volume 94.3, Mean Corpuscular Hem

oglobin 26.8 L, Mean Corpuscular Hemoglobin Concent 28.5 L, Red Cell 

Distribution Width 17.6 H, Neutrophils # (Auto) 31.7 H, Lymphocytes # (Auto) 0.2

L, Neutrophils (%) (Auto) 93.6 H, Lymphocytes (%) (Auto) 0.5 L, Monocytes (%) 

(Auto) 4.3, Eosinophils (%) (Auto) 0.1, Basophils (%) (Auto) 0.1, Monocytes # 

(Auto) 1.5 H, Eosinophils # (Auto) 0.0, Basophils # (Auto) 0.0, Calcium Level 

7.9 L





12/26/18 23:54








12/27/18 04:41








Red Blood Count 2.50 L, Mean Corpuscular Volume 93.2, Mean Corpuscular 

Hemoglobin 26.8 L, Mean Corpuscular Hemoglobin Concent 28.8 L, Red Cell 

Distribution Width 17.2 H, Calcium Level 7.8 L, Aspartate Amino Transf 

(AST/SGOT) 220 H, Alanine Aminotransferase (ALT/SGPT) 128 H, Alkaline 

Phosphatase 112, Total Bilirubin 0.8, Total Protein 5.5 #L, Albumin 2.3 #L


Microbiology





Microbiology


12/26/18 Blood Culture, Received


           Pending


12/26/18 Blood Culture, Received


           Pending


12/26/18 Urine Culture, Received


           Pending











KEREN ARAMBULA MD              Dec 27, 2018 10:37

## 2018-12-27 NOTE — PHACANCOPD
PHARMACY VANCOMYCIN DOSING


Pt Demographics


Demographics


Patient Age:68 , Weight:173.000  , Gender: female


Adjusted Body Weight


Date: 12/27/18, Adjusted Body Weight: [104] Kg





Events Past 24 Hours


Events Past 24 Hours:  NO: Dialysis, Diuretic Therapy, Change in CrCl, Fever, 

Elevation in WBC, Pending Diagnostics, Pending Procedures, Other





Vancomycin


Vancomycin indication:  SEPTIC SHOCK


Vancomycin Target Ranges:  10-20 mcg/ml


Vancomycin Load Y/N:  Yes


Load Dose Date Time


Vancomycin Load Dose:   2G      Date: 12/27/18        Time:11:00


Vancomycin Dose


Date: 12/27/18. Current Vancomycin Dose: [1G IV Q24H]


Intermittent Dosing?:  No





Labs


Labs











Item Value  Date Time


 


White Blood Count 36.7 10^3/uL *H 12/26/18 1048


 


White Blood Count 33.8 10^3/uL *H 12/26/18 1526


 


White Blood Count 26.3 10^3/uL H 12/27/18 0441


 


Creatinine 2.76 MG/DL H 12/26/18 1048


 


Creatinine 2.63 MG/DL H 12/26/18 1526


 


Creatinine 2.61 MG/DL H 12/27/18 0441











                                   Vital Signs








Label Value  Date Time


 


Blood Pressure Assessment 92/47 (62) 12/27/18 1001


 


Blood Pressure Assessment 96/49 (65) 12/27/18 0900








Micro





Microbiology


12/26/18 Blood Culture, Received


           Pending


12/26/18 Blood Culture, Received


           Pending


12/26/18 Urine Culture, Received


           Pending


Creatinine Clearance


Date:12/27/18. Creatinine Clearance: [~34ML/MIN].





Assessment and Plan


Maintaining Current Dose?:  Yes


Reason for dose change:  No Dose Change





Pharmacist Note


Pharmacist Note


Date: 12/27/18. Pharmacist note: PT is a 68 year old female being treated with 

vancomycin for septic shock goal trough 10-20mcg/ml. The patient has been 

treated with vancomycin here at [facility] [place] in the past most recently 11/2016. The 

patients renal functioning has decreased since then. To achieve goal a 2g 

loading dose was started 11/27/18 @ 11. Maintenance therapy will consist of 1g 

iv q 24h starting 12/28/18 @ 11:00. We will continue to monitor and adjust the 

dose as needed.











[facility]     Dec 27, 2018 11:04

## 2018-12-27 NOTE — ECGEPIP
Stationary ECG Study

                           [facility] - ED

                                       

                                       Test Date:    2018

Pat Name:     NGHIA HUI              Department:   

Patient ID:   [id number]                 Room:         [place] [id number]

Gender:       F                        Technician:   marilin

:          1950               Requested By: Mauricio WRIGHT

Order Number: [id number]     Reading MD:   Mauricio Donovan

                                 Measurements

Intervals                              Axis          

Rate:         60                       P:            

PA:           0                        QRS:          101

QRSD:         137                      T:            7

QT:           441                                    

QTc:          441                                    

                           Interpretive Statements

ATRIAL FIBRILLATION

RIGHT AXIS DEVIATION

INTRAVENTRICULAR CONDUCTION DELAY

POSSIBLE INFERIOR MYOCARDIAL INFARCTION, PROBABLY OLD

SIMILAR TO 18

 

Electronically Signed On 2018 4:48:54 EST by Mauricio Donovan

## 2018-12-27 NOTE — CR
DATE OF CONSULTATION:   12/27/2018

 

REQUESTING PHYSICIAN:  Dr. Bharati Corona

CONSULTING PHYSICIAN:  Dr. Medeiros

REASON FOR CONSULTATION:  Management of acute kidney injury superimposed on

chronic kidney disease and hyperkalemia.

 

CHIEF COMPLAINT:   The patient presented to the emergency room yesterday after a

fall at home.

 

HISTORY OF PRESENT ILLNESS:   Mimi Raphael is a 68-year-old female with a past

medical history of morbid obesity, history of chronic diastolic congestive heart

failure (CHF), hypertension, chronic kidney disease stage III with a best

baseline creatinine of around 1.5, chronic obstructive pulmonary disease (COPD)

with hypoventilation, chronic oxygen dependence, well known to the nephrology

service from multiple previous hospitalizations and from outpatient clinic. She

reports that she was feeling very weak and tired at home, her legs just gave out

yesterday and she fell at home.  She hit her back.  She was brought to the

emergency room by emergency medical services (EMS).  When she arrived in the

emergency room, she was found to have hypotension, leukocytosis, lactic acidosis,

and sepsis protocol was initiated.  She was started on broad spectrum IV

antibiotics. She was given aggressive IV fluid hydration.  The patient was

admitted to the intensive care unit (ICU) under the hospitalist service.  Imaging

in the emergency room also showed a fracture of T12 vertebral body.  The patient

remained hypotensive overnight.  She needed dopamine infusion in the intensive

care unit (ICU).  She was also found to be in acute renal failure when she

arrived in the emergency room with a creatinine of 2.7 and a potassium of 5.4.

The patient has been aggressively hydrated overnight; however, she was still

oliguric and there was no sign of improvement in the renal function and so

nephrology service was called for further help in the management of this patient

with acute kidney injury superimposed on chronic kidney disease stage III.

 

I evaluated the patient this morning in the intensive care unit (ICU).  The

patient is feeling very weak and lethargic.  She was complaining of pain all over

the body, especially in her abdomen, in the back and lower extremities.

Otherwise, she was able to communicate this morning and she was able to provide

some of the history to me.  The rest of the history was obtained from the

patient's chart.

 

PAST MEDICAL HISTORY:

1.  Morbid obesity.

2.  Chronic diastolic congestive heart failure (CHF).

3.  History of paroxysmal atrial fibrillation.

4.  Chronic kidney disease stage III with a baseline creatinine of 1.5.

5.  Hypertension.

6.  Diabetes mellitus type 2.

7.  Hyperlipidemia.

8.  Coronary artery disease.

9.  Chronic obstructive pulmonary disease (COPD) and obesity hypoventilation,

currently oxygen dependent.

 

PAST SURGICAL HISTORY:  Status post partial bowel resection in 2010 because of

ischemic bowel, status post coronary artery stenting in the past.

 

ALLERGIES:  The patient is allergic to VITAMIN D, HEPARIN, SULFA DRUGS.

 

FAMILY HISTORY:   No significant family history of end stage renal disease

requiring hemodialysis.

 

SOCIAL HISTORY:   The patient lives alone. She is a former smoker.  She denies

any illicit drug abuse or alcohol abuse.

 

REVIEW OF SYSTEMS:

CONSTITUTIONAL:  The patient reports feeling very weak and tired.

EYES:  She denies any blurry vision or double vision.

ENT:  She denies any dysphagia or odynophagia.

CARDIOVASCULAR:  She denies any chest pain or palpitations.  She does report a

recent weight gain and lower extremity edema.

RESPIRATORY:  She denies any shortness of breath at this time, phlegm or cough.

GASTROINTESTINAL:  She reports that she recently had diarrhea, which has

improved.  She does report abdominal pain.

GENITOURINARY:  She denies any dysuria or hematuria.

MUSCULOSKELETAL:  She reports pain and tenderness in the lower extremities and

redness of the lower extremities.  She also reports back pain after recent fall.

 

CENTRAL NERVOUS SYSTEM (CNS): She denies any strokes or seizures.

PSYCHIATRIC:  She denies any depression or anxiety.

ENDOCRINE:  She reports a history of diabetes mellitus type 2.

HEMATOLOGIC/ONCOLOGIC:  She denies any recent bleeding, bruising.  All other

review of systems are negative.

 

PHYSICAL EXAMINATION:

GENERAL:  The patient is awake, alert, oriented times three.  Morbidly obese,

lying in bed in moderate painful distress.

VITAL SIGNS:  Temperature 98 degrees Fahrenheit, blood pressure 107/52, pulse is

66, respiratory rate of 20, saturating 99% on nasal cannula at 3 liters.

HEAD AND NECK EXAM:  Extraocular muscles are intact.  Pupils are equal, round and

reactive to light.  Mucous membranes are moist.

NECK:  Supple.  No jugular venous distention (JVD).

CARDIOVASCULAR:  S1, S2.  Regular rate.  No murmur, rub or gallop.  2+ edema of

the bilateral lower extremities.

RESPIRATORY:  Decreased breath sounds at the bases.  Otherwise, no active rales

or rhonchi.

ABDOMEN:  Soft, morbidly obese, large abdominal pannus.  There is a lot of

abdominal wall edema.  Her pannus is actually hanging down with a lot of fluid.

In the back, she has severe costovertebral angle tenderness and a mild amount of

erythema in the right sided paraspinal region.

MUSCULOSKELETAL:  The patient has 2+ edema of the bilateral lower extremities all

the way up to her thighs.  She has erythema of the bilateral lower extremities

and possible cellulitis that are severely tender.

CENTRAL NERVOUS SYSTEM (CNS):  No focal deficits.  The patient is slightly

agitated because of pain.  Otherwise, she follows some commands and she was able

to communicate with me.

SKIN:  Erythema with some oozing of fluid from bilateral lower extremities.

Otherwise, no rashes or ulcers.

 

LABORATORY REVIEW:  CBC showed a WBC of 36.7 on arrival, it is 26.3 right now.

Hemoglobin is 6.7.  Platelets are 222.  INR was 4 on arrival.  ABG showed a pH of

7.3, PCO2 of 36, PO2 of 86, bicarbonate is 17.5, oxygen saturation is 95.8%.

 

BMP done this morning showed sodium 136, potassium 5.4, chloride 110, bicarbonate

19, BUN 56, creatinine is 2.6, glucose 102, lactic acid was 4.4 yesterday and it

is 1.4 today.  Calcium is 7.8, iron 9, TIBC is 276, transferrin saturation is

3.3, ferritin is 6.4, troponin is 0.16, albumin is 2.3.

 

Microbiology:  Blood cultures, one out of two, is positive for gram-positive

cocci in clusters.

 

IMAGING:  Ultrasound of the liver was done, which showed cholelithiasis and

gallbladder wall thickening, hepatosteatosis, portal venous hypertension.

 

CAT scan of the abdomen and pelvis was done.  Fluid collection in the right

flank, significantly limited exam because of body habitus and the patient's

movement.

 

CAT scan of the chest was done, which showed mild pneumonitis, stranding within

the right flank with possible fluid collection.

 

CAT scan of the lumbar spine was done, which showed nondisplaced fracture of the

right anterolateral aspect of T12.

 

ASSESSMENT: 68-year-old female with chronic kidney disease stage III, morbid

obesity, chronic diastolic congestive heart failure (CHF), admitted this time

with septic shock bacteremia, severe anemia, acute renal failure, hyperkalemia

and metabolic acidosis.

 

PLAN:

1.  Acute renal failure superimposed on chronic kidney disease stage III, which

is secondary to shock, hypervolemia, low blood pressure.  Continue to hold home

dose of diuretic.  The patient has been aggressively hydrated.  Blood pressures

are better.  She has started to make urine.  I am hopeful that with improvement

of her shock that her renal function will start improving.

 

2.  Hyperkalemia.  It is secondary to acute renal failure and metabolic acidosis.

Potassium level is 5.4, which is within the acceptable range.  No  need of

Kayexalate administration at this point.  Potassium is expected to improve with

improvement in the urine output.

 

3.  Metabolic acidosis.  It is secondary to acute renal failure and lactic

acidosis.  Bicarbonate level is 19, which is improving.  No need for IV

bicarbonate administration at this point.

 

4.  Septic shock and bacteremia, most likely source might be cellulitis in the

bilateral lower extremities.  The patient is off of dopamine infusion at this

point.  Blood pressures are improving.  Lactic acid is improving.  One out of two

blood culture is growing gram-positive cocci.  She is already empirically being

covered with Zosyn.  She has been started on vancomycin.

 

5.  Severe anemia.  The patient's hemoglobin has dropped to 6.7.  She has

received 1 unit of packed red blood cell transfusion.  Aspirin and Plavix have

been stopped.  I highly suspect that she might be bleeding in the right flank

region where the CAT scan picked up the fluid collection.  Continue CBC

monitoring every 8 hours.  Continue to hold Pradaxa as well.

 

6.  Chronic diastolic congestive heart failure (CHF).  The patient is in shock at

this time, although she has a lot of edema in the abdominal wall and lower

extremities.  She is not a candidate for any diuretics at this point.

 

7.  Hypothyroidism.  Continue home dose of levothyroxine 75 mcg by mouth daily.

 

 

8.  Atrial fibrillation.  Heart rate is controlled.  She cannot receive beta

blocker at this point because of shock and low blood pressures.  Anticoagulation

is on hold because of possible occult bleeding and severe anemia.

 

9.  Diabetes mellitus type 2.  Avoid using metformin in acute renal failure.  If

needed, the patient can get insulin sliding scale.

 

10.  Coagulopathy.  The patient has high INR.  She is having bleeding and she is

requiring packed red blood cell transfusion.  I am going to give her fresh frozen

plasma transfusion as well.

 

Thank you for involving me in the care of the patient.  I shall be happy to

follow the patient along with you tomorrow morning.

 

Total critical care time spent in the management of this patient today in the

intensive care unit (ICU) was 50 minutes.

## 2018-12-27 NOTE — REP
Clinical:  Right upper quadrant pain and elevated liver function tests.

 

Technique:  Real time gray scale and color evaluation using curved array

transducer as well as color Doppler evaluation of the hepatic vasculature.

 

Findings:

Gallbladder appears filled with gallstones causing posterior shadowing.  The

proximal gallbladder wall is moderately thickened to 4.9 mm.  No obvious

pericholecystic fluid is appreciated and there is no evidence for biliary ductal

dilatation.  The common bile duct measures 5.6 mm diameter.

 

The liver is incompletely evaluated due to technical factors and positioning as

well as body habitus but diffuse fatty infiltration is appreciated.  The pancreas

is incompletely evaluated again due to body habitus and technical factors, but

visualized portions appear relatively normal. Doppler interrogation demonstrates

moderately elevated portal venous velocities with normal flow direction.  Main

portal vein is dilated to 19 mm.  Hepatic veins demonstrate wave patterns

suggesting underlying cardiac disease and tricuspid regurgitation.

 

The right kidney is normal in reniform shape and demonstrates cortical thinning

and increased central sinus fat along with renovascular calcifications all of

which suggest chronic medical renal disease.  There is no evidence for

hydronephrosis and the kidney measures 10.9 x 6.2 x 5.6 cm.

 

No ascites in the visualized right upper quadrant.

 

Impression:

1.  Limited examination demonstrating cholelithiasis and gallbladder wall

thickening.

2.  Hepatic steatosis.

3.  Chronic medical renal disease without hydronephrosis.

4.  Doppler interrogation is limited, but there is findings to suggest portal

venous hypertension as well as underlying cardiac dysfunction with tricuspid

regurgitation.

 

 

Electronically Signed by

Cameron Chang MD 12/27/2018 10:29 A

## 2018-12-28 VITALS — SYSTOLIC BLOOD PRESSURE: 92 MMHG | DIASTOLIC BLOOD PRESSURE: 55 MMHG

## 2018-12-28 VITALS — DIASTOLIC BLOOD PRESSURE: 55 MMHG | SYSTOLIC BLOOD PRESSURE: 112 MMHG

## 2018-12-28 VITALS — DIASTOLIC BLOOD PRESSURE: 51 MMHG | SYSTOLIC BLOOD PRESSURE: 90 MMHG

## 2018-12-28 VITALS — DIASTOLIC BLOOD PRESSURE: 60 MMHG | SYSTOLIC BLOOD PRESSURE: 123 MMHG

## 2018-12-28 VITALS — SYSTOLIC BLOOD PRESSURE: 101 MMHG | DIASTOLIC BLOOD PRESSURE: 55 MMHG

## 2018-12-28 VITALS — SYSTOLIC BLOOD PRESSURE: 107 MMHG | DIASTOLIC BLOOD PRESSURE: 54 MMHG

## 2018-12-28 VITALS — DIASTOLIC BLOOD PRESSURE: 56 MMHG | SYSTOLIC BLOOD PRESSURE: 116 MMHG

## 2018-12-28 VITALS — SYSTOLIC BLOOD PRESSURE: 114 MMHG | DIASTOLIC BLOOD PRESSURE: 58 MMHG

## 2018-12-28 VITALS — SYSTOLIC BLOOD PRESSURE: 104 MMHG | DIASTOLIC BLOOD PRESSURE: 58 MMHG

## 2018-12-28 VITALS — DIASTOLIC BLOOD PRESSURE: 54 MMHG | SYSTOLIC BLOOD PRESSURE: 105 MMHG

## 2018-12-28 VITALS — SYSTOLIC BLOOD PRESSURE: 93 MMHG | DIASTOLIC BLOOD PRESSURE: 50 MMHG

## 2018-12-28 VITALS — DIASTOLIC BLOOD PRESSURE: 54 MMHG | SYSTOLIC BLOOD PRESSURE: 103 MMHG

## 2018-12-28 VITALS — SYSTOLIC BLOOD PRESSURE: 101 MMHG | DIASTOLIC BLOOD PRESSURE: 54 MMHG

## 2018-12-28 VITALS — DIASTOLIC BLOOD PRESSURE: 55 MMHG | SYSTOLIC BLOOD PRESSURE: 109 MMHG

## 2018-12-28 LAB
ALBUMIN SERPL BCG-MCNC: 2.4 GM/DL (ref 3.2–5.2)
ALT SERPL W P-5'-P-CCNC: 114 U/L (ref 12–78)
BILIRUB SERPL-MCNC: 1.3 MG/DL (ref 0.2–1)
BUN SERPL-MCNC: 49 MG/DL (ref 7–18)
CALCIUM SERPL-MCNC: 8 MG/DL (ref 8.8–10.2)
CHLORIDE SERPL-SCNC: 110 MEQ/L (ref 98–107)
CO2 SERPL-SCNC: 21 MEQ/L (ref 21–32)
CREAT SERPL-MCNC: 2.21 MG/DL (ref 0.55–1.3)
GFR SERPL CREATININE-BSD FRML MDRD: 23.5 ML/MIN/{1.73_M2} (ref 45–?)
GLUCOSE SERPL-MCNC: 128 MG/DL (ref 70–100)
HCT VFR BLD AUTO: 27 % (ref 36–47)
HCT VFR BLD AUTO: 27.6 % (ref 36–47)
HCT VFR BLD AUTO: 28 % (ref 36–47)
HGB BLD-MCNC: 8.2 G/DL (ref 12–15.5)
HGB BLD-MCNC: 8.3 G/DL (ref 12–15.5)
HGB BLD-MCNC: 8.3 G/DL (ref 12–15.5)
INR PPP: 2.49
MAGNESIUM SERPL-MCNC: 2.5 MG/DL (ref 1.8–2.4)
MCH RBC QN AUTO: 27.2 PG (ref 27–33)
MCH RBC QN AUTO: 27.5 PG (ref 27–33)
MCHC RBC AUTO-ENTMCNC: 29.6 G/DL (ref 32–36.5)
MCHC RBC AUTO-ENTMCNC: 30.4 G/DL (ref 32–36.5)
MCV RBC AUTO: 90.6 FL (ref 80–96)
MCV RBC AUTO: 91.8 FL (ref 80–96)
PLATELET # BLD AUTO: 203 10^3/UL (ref 150–450)
PLATELET # BLD AUTO: 220 10^3/UL (ref 150–450)
POTASSIUM SERPL-SCNC: 4.9 MEQ/L (ref 3.5–5.1)
PROT SERPL-MCNC: 5.7 GM/DL (ref 6.4–8.2)
PROTHROMBIN TIME: 27.4 SECONDS (ref 12.1–14.4)
RBC # BLD AUTO: 2.98 10^6/UL (ref 4–5.4)
RBC # BLD AUTO: 3.05 10^6/UL (ref 4–5.4)
SODIUM SERPL-SCNC: 138 MEQ/L (ref 136–145)
TROPONIN I SERPL-MCNC: 0.11 NG/ML (ref ?–0.1)
WBC # BLD AUTO: 18.4 10^3/UL (ref 4–10)
WBC # BLD AUTO: 19.3 10^3/UL (ref 4–10)

## 2018-12-28 RX ADMIN — DEXTROSE MONOHYDRATE SCH MLS/HR: 5 INJECTION INTRAVENOUS at 01:27

## 2018-12-28 RX ADMIN — IPRATROPIUM BROMIDE AND ALBUTEROL SULFATE SCH ML: .5; 3 SOLUTION RESPIRATORY (INHALATION) at 01:25

## 2018-12-28 RX ADMIN — DEXTROSE MONOHYDRATE SCH MLS/HR: 50 INJECTION, SOLUTION INTRAVENOUS at 11:08

## 2018-12-28 RX ADMIN — IPRATROPIUM BROMIDE AND ALBUTEROL SULFATE SCH ML: .5; 3 SOLUTION RESPIRATORY (INHALATION) at 08:04

## 2018-12-28 RX ADMIN — CAPSAICIN SCH DOSE: 0.25 CREAM TOPICAL at 08:12

## 2018-12-28 RX ADMIN — CAPSAICIN SCH DOSE: 0.25 CREAM TOPICAL at 16:00

## 2018-12-28 RX ADMIN — CAPSAICIN SCH DOSE: 0.25 CREAM TOPICAL at 21:12

## 2018-12-28 RX ADMIN — IPRATROPIUM BROMIDE AND ALBUTEROL SULFATE SCH ML: .5; 3 SOLUTION RESPIRATORY (INHALATION) at 15:20

## 2018-12-28 RX ADMIN — NYSTATIN SCH DOSE: 100000 POWDER TOPICAL at 08:12

## 2018-12-28 RX ADMIN — GABAPENTIN SCH MG: 100 CAPSULE ORAL at 21:11

## 2018-12-28 RX ADMIN — DEXTROSE MONOHYDRATE SCH MLS/HR: 5 INJECTION INTRAVENOUS at 10:44

## 2018-12-28 RX ADMIN — NYSTATIN SCH DOSE: 100000 POWDER TOPICAL at 21:00

## 2018-12-28 RX ADMIN — GABAPENTIN SCH MG: 100 CAPSULE ORAL at 08:11

## 2018-12-28 RX ADMIN — GABAPENTIN SCH MG: 100 CAPSULE ORAL at 16:28

## 2018-12-28 RX ADMIN — DEXTROSE MONOHYDRATE SCH MLS/HR: 5 INJECTION INTRAVENOUS at 17:17

## 2018-12-28 RX ADMIN — LEVOTHYROXINE SODIUM SCH MCG: 75 TABLET ORAL at 05:53

## 2018-12-28 RX ADMIN — ONDANSETRON PRN MG: 2 INJECTION INTRAMUSCULAR; INTRAVENOUS at 01:28

## 2018-12-28 RX ADMIN — DEXTROSE MONOHYDRATE SCH MLS/HR: 50 INJECTION, SOLUTION INTRAVENOUS at 12:11

## 2018-12-28 RX ADMIN — Medication SCH MG: at 08:11

## 2018-12-28 RX ADMIN — ONDANSETRON PRN MG: 2 INJECTION INTRAMUSCULAR; INTRAVENOUS at 07:03

## 2018-12-28 RX ADMIN — LORATADINE SCH MG: 10 TABLET ORAL at 08:10

## 2018-12-28 RX ADMIN — FUROSEMIDE SCH MG: 10 INJECTION, SOLUTION INTRAMUSCULAR; INTRAVENOUS at 16:28

## 2018-12-28 RX ADMIN — IPRATROPIUM BROMIDE AND ALBUTEROL SULFATE SCH ML: .5; 3 SOLUTION RESPIRATORY (INHALATION) at 23:27

## 2018-12-28 RX ADMIN — CALCITONIN SALMON SCH SPRAYS: 200 SPRAY, METERED NASAL at 11:08

## 2018-12-28 NOTE — IPNPDOC
Date Seen


The patient was seen on 12/28/18.





Progress Note


SUBJECTIVE: Patient complains of pain mostly in the mid back in the spinal 

region not on the sides. She otherwise states her breathing is quite good and 

has no other specific complaints. She does feel better than yesterday





OBJECTIVE


PHYSICAL EXAMINATION:


VITAL SIGNS: Please see below. 


GENERAL: Disheveled super morbidly obese elderly  woman lying on her 

left side she appears she occasionally cries out in pain but then goes back to 

speaking in complete sentences quite commonly


HEENT: Difficult to assess for elevation CVP secondary to body habitus she 

appears to have moist mucous membranes cranial nerves grossly intact


CARDIOVASCULAR: Heart sounds are distant secondary to body habitus S1-S2 appears

regular without additional heart sounds at this time.


RESPIRATORY: Breath sounds are distant secondary to body habitus she gives me a 

poor inspiratory effort with exam. Somewhat diminished at the bases


ABDOMINAL: Grossly obese. She does have some bruising on her right flank she is 

less tender in her flanks today but has more midline tenderness where her known 

vertebral fracture is 


EXTREMITIES: 3+ pitting edema and erythema over bilateral feet with exquisite 

tenderness to even soft palpation





LABORATORY DATA, IMAGING STUDIES, MICROBIOLOGY: Please see below.





Echocardiogram: Ordered.





DVT prophylaxis ordered?: Sequentials teds





ASSESSMENT AND PLAN: This is a 68-year-old female who presented with back pain 

weight gain found to be in shock with T12 compression fracture.





PROBLEMS:


1. Shock hemorrhagic versus septic: She did have leukocytosis and elevated 

lactic acid although for source we have no definite source her feet are 

suspicious for possible soft tissue infection. 1 of 2 blood cultures positive 

which I suspect is more contaminated she has improved on antibiotics we'll 

continue to follow up her cultures and narrow spectrum as appropriate. Given her

acute anemia right flank fluid collection recent fall and anticoagulation there 

was certainly concern for retroperitoneal hematoma as well anticoagulations and 

antiplatelets have been held. At this time she is more hemodynamically stable 

lactic acid is normalized and her acidosis has resolved she is no longer 

requiring any fluids or pressors





2. Anemia: Possibly acute on chronic secondary to retroperitoneal bleed we are 

holding her anticoagulation peripheral smear and iron studies are suggestive of 

a combination of iron deficiency and anemia of chronic disease I suspect acute 

blood loss on top of this as well anticoagulation and antiplatelets have been 

held she's been transfused 3 units of PRBCs total we will continue to monitor 

H&H closely. If she truly has a retroperitoneal hematoma I suspect it will temp 

in on itself off and will continue with supportive care would ideally like to 

keep hemoglobin greater than 8 given her slightly abnormal troponin upon 

presentation





3. Diastolic congestive heart failure: No recent echocardiogram within the last 

year I have ordered one. She does appear decompensated with significant volume 

overload at this time she has received 6 L of IV fluids and 3 units of blood and

has prior to this already gained greater than 50 pounds as per her records since

her last discharge within the last 6 months. She is certainly at risk for 

impending respiratory failure and will try to limit further fluids and begin 

diuresis when able nephrology's guidance is greatly appreciated. She did have a 

slightly abnormal troponin which has plateaued I would not be surprised for it 

to be somewhat elevated in the setting of demand ischemia EKG however does not 

appear significantly different. Her baseline oxygen requirement is 1 L she is 

currently requiring slightly more.





4. Back pain: Likely secondary to T12 compression fracture also possibly a small

retroperitoneal hematoma either way management is supportive and close 

monitoring. I will start her on low-dose Roxicodone calcitonin and gabapentin in

order to better manage her pain and encourage her to be out of bed work with 

physical therapy now that her blood pressure is more stabilized





5. Elevated INR: The patient does normally take Pradaxa at home however the INR 

appears to be disproportionately elevated is unclear to me why this is the case,

perhaps her renal function deteriorated in the setting of sepsis or hemorrhage 

causing her Pradaxa dosed enter a toxic range and further elevating her INR. 

We'll hold her Tylenol and statin as her liver function tests appear to be 

trending down today she likely had some acute hepatitis in the setting of her 

hypotension





6. Hyperkalemia: resolved nephrology help appreciated





7. Acute on chronic kidney failure: nephrology consult appreciated, appears to 

be improving perhaps she can start some gentle diuresis in the coming days home 

diuretics are on hold





8. Super morbid obesity: Complicating care, likely obesity hypoventilation 

syndrome and likely obstructive sleep apnea as well as well as venous stasis





9. COPD: She is on chronic O2 1 L at the present time she is requiring more 

oxygen I suspect related to some pulmonary edema and not secondary to acute d

ecompensation of her COPD we'll start her on nebulizer treatments





10. Coronary artery disease: She is asymptomatic at this time she is volume up 

her EKG was unchanged we are holding her aspirin she is not on a beta blocker 

she is stable on a symptom medic at this time





11. Paroxysmal atrial fibrillation: She was hypotensive at admission I will hold

her amiodarone as well as her metoprolol potentially restarting coming days. 

We're also holding her Pradaxa in the setting of acute anemia





12. Diabetes: Sliding scale and fingersticks hold home metformin





13. Hypothyroidism: Continue with Synthroid





Prognosis: Stable for transfer to the medical surgical floor





VS, I&O, 24H, Fishbone


Vital Signs/I&O





Vital Signs








  Date Time  Temp Pulse Resp B/P (MAP) Pulse Ox O2 Delivery O2 Flow Rate FiO2


 


12/28/18 14:00 97.2 82 20 93/50 (64) 89 Nasal Cannula 3.0 














I&O- Last 24 Hours up to 6 AM 


 


 12/28/18





 06:00


 


Intake Total 2690 ml


 


Output Total 1875 ml


 


Balance 815 ml











Laboratory Data


24H LABS


Laboratory Tests 2


12/27/18 17:02: 


Nucleated Red Blood Cells % (auto) 1.2H, Anion Gap 8, Glomerular Filtration Rate

21.4L, Blood Urea Nitrogen 53H, Creatinine 2.40H, Sodium Level 137, Potassium 

Level 4.9, Chloride Level 108H, Carbon Dioxide Level 21, Calcium Level 8.0L, 

Aspartate Amino Transf (AST/SGOT) 188H, Alanine Aminotransferase (ALT/SGPT) 

126H, Alkaline Phosphatase 100, Total Bilirubin 1.1H, Total Protein 5.7L, 

Albumin 2.3L, Albumin/Globulin Ratio 0.68L


12/27/18 23:53: 


Nucleated Red Blood Cells % (auto) 2.0H, Fibrinogen 461H


12/28/18 04:46: 


Nucleated Red Blood Cells % (auto) 1.5H, Anion Gap 7L, Glomerular Filtration 

Rate 23.5L, Blood Urea Nitrogen 49H, Creatinine 2.21H, Sodium Level 138, 

Potassium Level 4.9, Chloride Level 110H, Carbon Dioxide Level 21, Calcium Level

8.0L, Aspartate Amino Transf (AST/SGOT) 145H, Alanine Aminotransferase 

(ALT/SGPT) 114H, Alkaline Phosphatase 107, Total Bilirubin 1.3H, Total Protein 

5.7L, Albumin 2.4L, Albumin/Globulin Ratio 0.73L, Magnesium Level 2.5H


12/28/18 06:11: 


Prothrombin Time 27.4H, Prothromb Time International Ratio 2.49


12/28/18 10:24: Whole Blood Ionized Calcium 4.6


CBC/BMP


Laboratory Tests


12/27/18 17:02








Red Blood Count 2.54 L, Mean Corpuscular Volume 93.7, Mean Corpuscular Hemoglobi

n 27.6, Mean Corpuscular Hemoglobin Concent 29.4 L, Red Cell Distribution Width 

17.2 H, Calcium Level 8.0 L, Aspartate Amino Transf (AST/SGOT) 188 H, Alanine 

Aminotransferase (ALT/SGPT) 126 H, Alkaline Phosphatase 100, Total Bilirubin 1.1

H, Total Protein 5.7 L, Albumin 2.3 L





12/27/18 23:53








Red Blood Count 2.98 L, Mean Corpuscular Volume 90.6, Mean Corpuscular 

Hemoglobin 27.5, Mean Corpuscular Hemoglobin Concent 30.4 L, Red Cell 

Distribution Width 16.8 H





12/28/18 04:46








Red Blood Count 3.05 L, Mean Corpuscular Volume 91.8, Mean Corpuscular Hemoglo

bin 27.2, Mean Corpuscular Hemoglobin Concent 29.6 L, Red Cell Distribution 

Width 17.2 H, Calcium Level 8.0 L, Aspartate Amino Transf (AST/SGOT) 145 H, 

Alanine Aminotransferase (ALT/SGPT) 114 H, Alkaline Phosphatase 107, Total 

Bilirubin 1.3 H, Total Protein 5.7 L, Albumin 2.4 L


Microbiology





Microbiology


12/27/18 Blood Culture - Preliminary, Resulted


           No growth after 24 hours . All specim...


12/26/18 Blood Culture - Preliminary, Resulted


           No Growth after 48 hours. All Specime...


12/26/18 Blood Culture - Preliminary, Resulted


           


12/26/18 Urine Culture - Final, Complete











KEREN ARAMBULA MD              Dec 28, 2018 16:14

## 2018-12-29 VITALS — DIASTOLIC BLOOD PRESSURE: 42 MMHG | SYSTOLIC BLOOD PRESSURE: 86 MMHG

## 2018-12-29 VITALS — SYSTOLIC BLOOD PRESSURE: 103 MMHG | DIASTOLIC BLOOD PRESSURE: 57 MMHG

## 2018-12-29 VITALS — DIASTOLIC BLOOD PRESSURE: 53 MMHG | SYSTOLIC BLOOD PRESSURE: 107 MMHG

## 2018-12-29 LAB
ALBUMIN SERPL BCG-MCNC: 2.5 GM/DL (ref 3.2–5.2)
ALT SERPL W P-5'-P-CCNC: 98 U/L (ref 12–78)
BILIRUB SERPL-MCNC: 0.9 MG/DL (ref 0.2–1)
BUN SERPL-MCNC: 43 MG/DL (ref 7–18)
CALCIUM SERPL-MCNC: 8.3 MG/DL (ref 8.8–10.2)
CHLORIDE SERPL-SCNC: 109 MEQ/L (ref 98–107)
CO2 SERPL-SCNC: 21 MEQ/L (ref 21–32)
CREAT SERPL-MCNC: 2.01 MG/DL (ref 0.55–1.3)
GFR SERPL CREATININE-BSD FRML MDRD: 26.2 ML/MIN/{1.73_M2} (ref 45–?)
GLUCOSE SERPL-MCNC: 126 MG/DL (ref 70–100)
HCT VFR BLD AUTO: 30.4 % (ref 36–47)
HGB BLD-MCNC: 8.9 G/DL (ref 12–15.5)
INR PPP: 1.87
MCH RBC QN AUTO: 27.1 PG (ref 27–33)
MCHC RBC AUTO-ENTMCNC: 29.3 G/DL (ref 32–36.5)
MCV RBC AUTO: 92.7 FL (ref 80–96)
PLATELET # BLD AUTO: 189 10^3/UL (ref 150–450)
POTASSIUM SERPL-SCNC: 4.9 MEQ/L (ref 3.5–5.1)
PROT SERPL-MCNC: 6.2 GM/DL (ref 6.4–8.2)
PROTHROMBIN TIME: 21.9 SECONDS (ref 12.1–14.4)
RBC # BLD AUTO: 3.28 10^6/UL (ref 4–5.4)
SODIUM SERPL-SCNC: 136 MEQ/L (ref 136–145)
WBC # BLD AUTO: 15 10^3/UL (ref 4–10)

## 2018-12-29 RX ADMIN — IPRATROPIUM BROMIDE AND ALBUTEROL SULFATE SCH ML: .5; 3 SOLUTION RESPIRATORY (INHALATION) at 08:24

## 2018-12-29 RX ADMIN — DEXTROSE MONOHYDRATE SCH MLS/HR: 50 INJECTION, SOLUTION INTRAVENOUS at 13:20

## 2018-12-29 RX ADMIN — CAPSAICIN SCH DOSE: 0.25 CREAM TOPICAL at 21:22

## 2018-12-29 RX ADMIN — GABAPENTIN SCH MG: 100 CAPSULE ORAL at 21:20

## 2018-12-29 RX ADMIN — IPRATROPIUM BROMIDE AND ALBUTEROL SULFATE SCH ML: .5; 3 SOLUTION RESPIRATORY (INHALATION) at 18:34

## 2018-12-29 RX ADMIN — NYSTATIN SCH DOSE: 100000 POWDER TOPICAL at 21:21

## 2018-12-29 RX ADMIN — CAPSAICIN SCH DOSE: 0.25 CREAM TOPICAL at 09:29

## 2018-12-29 RX ADMIN — DEXTROSE MONOHYDRATE SCH MLS/HR: 50 INJECTION, SOLUTION INTRAVENOUS at 12:08

## 2018-12-29 RX ADMIN — GABAPENTIN SCH MG: 100 CAPSULE ORAL at 09:29

## 2018-12-29 RX ADMIN — CAPSAICIN SCH DOSE: 0.25 CREAM TOPICAL at 16:43

## 2018-12-29 RX ADMIN — CALCITONIN SALMON SCH SPRAYS: 200 SPRAY, METERED NASAL at 09:32

## 2018-12-29 RX ADMIN — GABAPENTIN SCH MG: 100 CAPSULE ORAL at 16:43

## 2018-12-29 RX ADMIN — NYSTATIN SCH DOSE: 100000 POWDER TOPICAL at 09:28

## 2018-12-29 RX ADMIN — IPRATROPIUM BROMIDE AND ALBUTEROL SULFATE SCH ML: .5; 3 SOLUTION RESPIRATORY (INHALATION) at 13:36

## 2018-12-29 RX ADMIN — IPRATROPIUM BROMIDE AND ALBUTEROL SULFATE SCH ML: .5; 3 SOLUTION RESPIRATORY (INHALATION) at 04:17

## 2018-12-29 RX ADMIN — DEXTROSE MONOHYDRATE SCH MLS/HR: 5 INJECTION INTRAVENOUS at 09:28

## 2018-12-29 RX ADMIN — DEXTROSE MONOHYDRATE SCH MLS/HR: 5 INJECTION INTRAVENOUS at 17:31

## 2018-12-29 RX ADMIN — LEVOTHYROXINE SODIUM SCH MCG: 75 TABLET ORAL at 05:37

## 2018-12-29 RX ADMIN — FUROSEMIDE SCH MG: 10 INJECTION, SOLUTION INTRAMUSCULAR; INTRAVENOUS at 09:28

## 2018-12-29 RX ADMIN — DEXTROSE MONOHYDRATE SCH MLS/HR: 5 INJECTION INTRAVENOUS at 01:58

## 2018-12-29 RX ADMIN — LORATADINE SCH MG: 10 TABLET ORAL at 09:29

## 2018-12-29 RX ADMIN — FUROSEMIDE SCH MG: 10 INJECTION, SOLUTION INTRAMUSCULAR; INTRAVENOUS at 16:43

## 2018-12-29 NOTE — IPN
DATE OF SERVICE: 12/28/2018

 

SUBJECTIVE:

Patient was seen and examined at the bedside today morning in the intensive care

unit (ICU).  Patient is hemodynamically more stable today.  She is getting serial

CBC monitoring. She has gotten 3 units of packed red blood cells (PRBC)

transfusion done so far.  Her urine output is improving now.  There is slight

improvement in the creatinine today from 2.4 to 2.2 today and patient is afebrile

now.  Her lactic acid also improved yesterday with aggressive fluid and blood

transfusion.  Patient still reports feeling very weak and tired, however, she

does report that she is hungry and she has a good appetite today.

 

OBJECTIVE:

VITAL SIGNS:  Temperature is 98 degrees Fahrenheit, blood pressure is 105/54,

pulse is 83, respiratory rate of 24, saturating 98% on nasal cannula at 2 liters.

 

 

INTAKE AND OUTPUT:  Urine output recorded as 1.9 liters yesterday, 825 mL so far

today since overnight.  Weight in the bed scale is 175.8 kg.

 

PHYSICAL EXAMINATION:

GENERAL:  The patient is awake, alert and oriented times three.  Laying in bed in

left lateral position, morbidly obese.

HEAD AND NECK EXAM:  Pupils are equally round and reactive to light.  Mucous

membranes are moist.

NECK:  Supple.  I could not appreciate jugular venous distention (JVD) because of

body habitus.

CARDIOVASCULAR:  S1, S2.  Regular rate.  About 2+ edema of the bilateral lower

extremities.

RESPIRATORY:  Mildly decreased breath sounds at the bases, otherwise no active

rales or rhonchi.

ABDOMEN:  Morbidly obese.  Alot of edema of abdominal pannus which is hanging on

the left side.  I could not appreciate any organomegaly because of the body

habitus.

MUSCULOSKELETAL:  Patient has erythema and severe tenderness of the bilateral

lower extremities and because of the edema.  There is some fluid oozing from both

lower extremity ulcerations.

CENTRAL NERVOUS SYSTEM (CNS):  No focal neurological deficit.  Patient is able to

communicate.  She follows commands and moves upper extremities.

PSYCH:  Patient has a depressed mood at this point.

 

LABORATORY REVIEW:

Showed a WBC of 18.4, hemoglobin is 8.3, platelets are 203.  INR is 2.4 today.

BMP showed a sodium of 138, potassium 4.9, chloride 110, bicarbonate 21, BUN 49,

creatinine is 2.2, calcium is 8, INR calcium is 4.6, magnesium 2.5, total

bilirubin is 1.3, troponin is 0.11, albumin is 2.4.

 

MICROBIOLOGY:

Blood culture reading from 12/26/2018 is showing gram-positive cocci in clusters.

 

CURRENT INPATIENT MEDICATIONS:

The patient's medications are all reviewed by me, she was given tranexamic acid

one dose yesterday.  She continues to be Zosyn and vancomycin.  She has been

started on calcitonin nasal spray because of spinal fracture.  I started her on

Lasix 40 mg IV twice a say with holding parameters.  No other change in her

medications today as compared with yesterday.

 

ASSESSMENT AND PLAN:

1.  Acute renal failure superimposed on chronic kidney disease stage 3.

Patient's renal function is improving now.  Her urine output is better with

improvement in her shock and volume status.  Because of significant fluid

overload I am going to start on her IV Lasix with hold parameters.  First dose

will be given in the morning.  Continue to monitor intake and output.

 

2.  Shock.  Probably septic versus hemorrhagic.  Patient had leukocytosis, lactic

acidosis and anemia on arrival.  She got aggressive blood transfusion.  Blood

count has improved.  White cell count is also improving.  She is hemodynamically

stable.  Continue empiric antibiotics.  She has one of the two blood cultures

positive.  Final sensitivity report is pending.  Continue dose of vancomycin and

Zosyn at this point.

 

3.  Severe anemia and supratherapeutic INR.  Patient was given 3 units of packed

red blood cells (PRBC) transfusion.  Hemoglobin is stable.  She was also given

tranexamic acid because of high INR yesterday.  She was on Eliquis which is on

hold.  Patient likely has paraspinal low retroperitoneal bleeding after a fall.

 

4.  Spinal fracture.  Patient has been on started on calcitonin because of the

spinal fracture.  Continue to monitor calcium levels.  Patient is allergic to

vitamin D.  I would start her on calcium tablets only.

 

5.  Chronic diastolic congestive heart failure.  Patient is out of shock now.

She has significant volume overload.  I have started her on a low dose of Lasix

40 mg IV twice a day.

 

6.  Atrial fibrillation.  Heart rate is controlled.  Anticoagulation on hold

because of severe anemia and possible retroperitoneal bleed.  No beta blockers at

this time because of low blood pressure.

## 2018-12-29 NOTE — IPNPDOC
Date Seen


The patient was seen on 12/29/18.





Progress Note


SUBJECTIVE: Patient tells me that her back pain is much improved today she tells

me she slept a great deal last night and is feeling much better at this time she

has no specific complaints.





OBJECTIVE


PHYSICAL EXAMINATION:


VITAL SIGNS: Please see below. 


GENERAL: Disheveled super morbidly obese elderly  woman lying on her 

left side sleeping comfortably she is in no acute distress easily arousable to 

verbal stimuli 


HEENT: Difficult to assess for elevation CVP secondary to body habitus she 

appears to have moist mucous membranes cranial nerves grossly intact


CARDIOVASCULAR: Heart sounds are distant secondary to body habitus S1-S2 appears

regular without additional heart sounds at this time.


RESPIRATORY: Breath sounds are distant secondary to body habitus she gives me a 

poor inspiratory effort with exam. 


ABDOMINAL: Grossly obese. No tenderness to palpation


EXTREMITIES: 2+ pitting edema and erythema over bilateral feet with exquisite 

tenderness to even soft palpation, feet do appear less erythematous and improved

today from the previous days exam





LABORATORY DATA, IMAGING STUDIES, MICROBIOLOGY: Please see below.





Echocardiogram: Ordered.





DVT prophylaxis ordered?: Sequentials teds





ASSESSMENT AND PLAN: This is a 68-year-old female who presented with back pain 

weight gain found to be in shock with T12 compression fracture.





PROBLEMS:


1. Shock hemorrhagic versus septic: She did have leukocytosis which is 

downtrending and elevated lactic acid although we have no definite source her 

feet are suspicious for possible soft tissue infection. 1 of 2 blood cultures 

positive which I suspect is more contaminated she has improved on antibiotics 

we'll continue to follow up her cultures and narrow spectrum as appropriate. 

Given her acute anemia right flank fluid collection recent fall and 

anticoagulation there was certainly concern for retroperitoneal hematoma as well

anticoagulations and antiplatelets have been held. At this time she is more 

hemodynamically stable lactic acid is normalized and her acidosis has resolved 

she is no longer requiring any fluids or pressors. Appears to have resolved





2. Anemia: Possibly acute on chronic secondary to retroperitoneal bleed we are 

holding her anticoagulation peripheral smear and iron studies are suggestive of 

a combination of iron deficiency and anemia of chronic disease I suspect acute 

blood loss on top of this as well anticoagulation and antiplatelets have been 

held she's been transfused 3 units of PRBCs total we will continue to monitor 

H&H closely and has remained stable. If she truly has a retroperitoneal hematoma

I suspect it will temponade on itself off and will continue with supportive care

would ideally like to keep hemoglobin greater than 8 given her slightly abnormal

troponin upon presentation





3. Diastolic congestive heart failure: No recent echocardiogram within the last 

year I have ordered one. She does appear decompensated with significant volume 

overload at this time she has received 6 L of IV fluids and 3 units of blood and

has prior to this already gained greater than 50 pounds as per her records since

her last discharge within the last 6 months. She did have a slightly abnormal 

troponin which has plateaued I would not be surprised for it to be somewhat 

elevated in the setting of demand ischemia EKG however does not appear 

significantly different. Her baseline oxygen requirement is 1 L she is currently

requiring slightly more.





4. Back pain: Likely secondary to T12 compression fracture also possibly a small

retroperitoneal hematoma either way management is supportive and close m

onitoring. I have started her on low-dose Roxicodone calcitonin and gabapentin 

and encourage her to be out of bed work with physical therapy now that her blood

pressure is more stabilized





5. Elevated INR: Resolved, The patient does normally take Pradaxa at home 

however the INR appears to be disproportionately elevated is unclear to me why 

this is the case, perhaps her renal function deteriorated in the setting of 

sepsis or hemorrhage causing her Pradaxa dosed enter a toxic range and further 

elevating her INR. We'll hold her Tylenol and statin as her liver function tests

appear to be trending down today she likely had some acute hepatitis in the 

setting of her hypotension





6. Hyperkalemia: resolved nephrology help appreciated





7. Acute on chronic kidney failure: Resolving ,nephrology consult appreciated, 

appears to be improving perhaps she can start some gentle diuresis in the coming

days home diuretics are on hold





8. Super morbid obesity: Complicating care, likely obesity hypoventilation 

syndrome and likely obstructive sleep apnea as well as well as venous stasis





9. COPD: She is on chronic O2 1 L at the present time she is requiring more 

oxygen I suspect related to some pulmonary edema and not secondary to acute 

decompensation of her COPD we'll start her on nebulizer treatments





10. Coronary artery disease: She is asymptomatic at this time she is volume up 

her EKG was unchanged we are holding her aspirin she is not on a beta blocker 

she is stable on a symptom medic at this time





11. Paroxysmal atrial fibrillation: She was hypotensive at admission I will hold

her amiodarone as well as her metoprolol potentially restarting coming days. 

We're also holding her Pradaxa in the setting of acute anemia





12. Diabetes: Sliding scale and fingersticks hold home metformin





13. Hypothyroidism: Continue with Synthroid





Prognosis: Clinically improving





VS, I&O, 24H, Fishbone


Vital Signs/I&O





Vital Signs








  Date Time  Temp Pulse Resp B/P (MAP) Pulse Ox O2 Delivery O2 Flow Rate FiO2


 


12/29/18 09:33   18     


 


12/29/18 06:00 97.4 78  107/53 (71) 96 Nasal Cannula 3.0 














I&O- Last 24 Hours up to 6 AM 


 


 12/29/18





 06:00


 


Intake Total 590 ml


 


Output Total 2000 ml


 


Balance -1410 ml











Laboratory Data


24H LABS


Laboratory Tests 2


12/29/18 06:18: 


Nucleated Red Blood Cells % (auto) 2.2H, Prothrombin Time 21.9H, Prothromb Time 

International Ratio 1.87, Anion Gap 6L, Glomerular Filtration Rate 26.2L, Blood 

Urea Nitrogen 43H, Creatinine 2.01H, Sodium Level 136, Potassium Level 4.9, 

Chloride Level 109H, Carbon Dioxide Level 21, Calcium Level 8.3L, Aspartate 

Amino Transf (AST/SGOT) 81H, Alanine Aminotransferase (ALT/SGPT) 98H, Alkaline 

Phosphatase 111, Total Bilirubin 0.9, Total Protein 6.2L, Albumin 2.5L, 

Albumin/Globulin Ratio 0.68L


12/29/18 10:03: 


CBC/BMP


Laboratory Tests


12/28/18 18:50








12/29/18 06:18








Red Blood Count 3.28 L, Mean Corpuscular Volume 92.7, Mean Corpuscular 

Hemoglobin 27.1, Mean Corpuscular Hemoglobin Concent 29.3 L, Red Cell 

Distribution Width 17.3 H, Calcium Level 8.3 L, Aspartate Amino Transf 

(AST/SGOT) 81 H, Alanine Aminotransferase (ALT/SGPT) 98 H, Alkaline Phosphatase 

111, Total Bilirubin 0.9, Total Protein 6.2 L, Albumin 2.5 L


Microbiology





Microbiology


12/27/18 Blood Culture - Preliminary, Resulted


           No growth after 24 hours . All specim...


12/26/18 Blood Culture - Preliminary, Resulted


           No Growth after 48 hours. All Specime...


12/26/18 Blood Culture - Final, Complete


           Staphylococcus Capitis


12/26/18 Urine Culture - Final, Complete











ARAMBULA,NAZEEL MD              Dec 29, 2018 10:57

## 2018-12-30 VITALS — SYSTOLIC BLOOD PRESSURE: 105 MMHG | DIASTOLIC BLOOD PRESSURE: 83 MMHG

## 2018-12-30 VITALS — SYSTOLIC BLOOD PRESSURE: 92 MMHG | DIASTOLIC BLOOD PRESSURE: 46 MMHG

## 2018-12-30 VITALS — DIASTOLIC BLOOD PRESSURE: 51 MMHG | SYSTOLIC BLOOD PRESSURE: 87 MMHG

## 2018-12-30 LAB
ALBUMIN SERPL BCG-MCNC: 2.4 GM/DL (ref 3.2–5.2)
ALBUMIN SERPL BCG-MCNC: 2.7 GM/DL (ref 3.2–5.2)
ALT SERPL W P-5'-P-CCNC: 76 U/L (ref 12–78)
ALT SERPL W P-5'-P-CCNC: 90 U/L (ref 12–78)
BILIRUB SERPL-MCNC: 0.9 MG/DL (ref 0.2–1)
BILIRUB SERPL-MCNC: 1 MG/DL (ref 0.2–1)
BUN SERPL-MCNC: 49 MG/DL (ref 7–18)
BUN SERPL-MCNC: 50 MG/DL (ref 7–18)
CALCIUM SERPL-MCNC: 8.2 MG/DL (ref 8.8–10.2)
CALCIUM SERPL-MCNC: 8.4 MG/DL (ref 8.8–10.2)
CHLORIDE SERPL-SCNC: 107 MEQ/L (ref 98–107)
CHLORIDE SERPL-SCNC: 107 MEQ/L (ref 98–107)
CO2 SERPL-SCNC: 21 MEQ/L (ref 21–32)
CO2 SERPL-SCNC: 22 MEQ/L (ref 21–32)
CREAT SERPL-MCNC: 2.58 MG/DL (ref 0.55–1.3)
CREAT SERPL-MCNC: 2.73 MG/DL (ref 0.55–1.3)
GFR SERPL CREATININE-BSD FRML MDRD: 18.4 ML/MIN/{1.73_M2} (ref 45–?)
GFR SERPL CREATININE-BSD FRML MDRD: 19.7 ML/MIN/{1.73_M2} (ref 45–?)
GLUCOSE SERPL-MCNC: 128 MG/DL (ref 70–100)
GLUCOSE SERPL-MCNC: 160 MG/DL (ref 70–100)
HCT VFR BLD AUTO: 31.9 % (ref 36–47)
HGB BLD-MCNC: 9.1 G/DL (ref 12–15.5)
INR PPP: 1.67
MCH RBC QN AUTO: 27.3 PG (ref 27–33)
MCHC RBC AUTO-ENTMCNC: 28.5 G/DL (ref 32–36.5)
MCV RBC AUTO: 95.8 FL (ref 80–96)
PLATELET # BLD AUTO: 244 10^3/UL (ref 150–450)
POTASSIUM SERPL-SCNC: 5.2 MEQ/L (ref 3.5–5.1)
POTASSIUM SERPL-SCNC: 5.4 MEQ/L (ref 3.5–5.1)
PROT SERPL-MCNC: 6.2 GM/DL (ref 6.4–8.2)
PROT SERPL-MCNC: 6.7 GM/DL (ref 6.4–8.2)
PROTHROMBIN TIME: 19.9 SECONDS (ref 12.1–14.4)
RBC # BLD AUTO: 3.33 10^6/UL (ref 4–5.4)
SODIUM SERPL-SCNC: 135 MEQ/L (ref 136–145)
SODIUM SERPL-SCNC: 135 MEQ/L (ref 136–145)
WBC # BLD AUTO: 19.1 10^3/UL (ref 4–10)

## 2018-12-30 RX ADMIN — NYSTATIN SCH DOSE: 100000 POWDER TOPICAL at 21:19

## 2018-12-30 RX ADMIN — IPRATROPIUM BROMIDE AND ALBUTEROL SULFATE SCH ML: .5; 3 SOLUTION RESPIRATORY (INHALATION) at 14:20

## 2018-12-30 RX ADMIN — DEXTROSE MONOHYDRATE SCH MLS/HR: 5 INJECTION INTRAVENOUS at 08:43

## 2018-12-30 RX ADMIN — IPRATROPIUM BROMIDE AND ALBUTEROL SULFATE SCH ML: .5; 3 SOLUTION RESPIRATORY (INHALATION) at 20:00

## 2018-12-30 RX ADMIN — GABAPENTIN SCH MG: 100 CAPSULE ORAL at 08:41

## 2018-12-30 RX ADMIN — GABAPENTIN SCH MG: 100 CAPSULE ORAL at 21:18

## 2018-12-30 RX ADMIN — IPRATROPIUM BROMIDE AND ALBUTEROL SULFATE SCH ML: .5; 3 SOLUTION RESPIRATORY (INHALATION) at 07:36

## 2018-12-30 RX ADMIN — LORATADINE SCH MG: 10 TABLET ORAL at 08:42

## 2018-12-30 RX ADMIN — CAPSAICIN SCH DOSE: 0.25 CREAM TOPICAL at 21:19

## 2018-12-30 RX ADMIN — GABAPENTIN SCH MG: 100 CAPSULE ORAL at 16:29

## 2018-12-30 RX ADMIN — NYSTATIN SCH DOSE: 100000 POWDER TOPICAL at 08:42

## 2018-12-30 RX ADMIN — DEXTROSE MONOHYDRATE SCH MLS/HR: 5 INJECTION INTRAVENOUS at 17:41

## 2018-12-30 RX ADMIN — DEXTROSE MONOHYDRATE SCH MLS/HR: 5 INJECTION INTRAVENOUS at 01:21

## 2018-12-30 RX ADMIN — CAPSAICIN SCH DOSE: 0.25 CREAM TOPICAL at 16:29

## 2018-12-30 RX ADMIN — LEVOTHYROXINE SODIUM SCH MCG: 75 TABLET ORAL at 06:00

## 2018-12-30 RX ADMIN — CAPSAICIN SCH DOSE: 0.25 CREAM TOPICAL at 08:43

## 2018-12-30 RX ADMIN — IPRATROPIUM BROMIDE AND ALBUTEROL SULFATE SCH ML: .5; 3 SOLUTION RESPIRATORY (INHALATION) at 05:21

## 2018-12-30 NOTE — IPN
DATE:  12/29/2018

 

SUBJECTIVE:  Patient was seen and examined at the bedside today morning.  Patient

is afebrile.  She is hemodynamically much more stable.  She is tolerating low

dose of Lasix.  She is making more urine.  Renal function continues to improve.

Creatinine is down to 2; however, patient still reports that she is feeling very

weak and tired and lethargic.  She is unable to get up from the bed and she is

still complaining of pain in the back.  Leukocytosis is improving now.

 

OBJECTIVE:

VITAL SIGNS:  Temperature 97.4 degrees Fahrenheit, blood pressure 107/53, pulse

78, respiratory rate 20, saturating 96% on nasal cannula at 3 liters.

INTAKE AND OUTPUT:  Urine output recorded as 1700 mL yesterday, 600 mL today

since overnight.  Weight in the bed scale is not available.

 

PHYSICAL EXAMINATION:

GENERAL:  Patient is awake, alert, oriented times three, morbidly obese, laying

in bed in left lateral position.

HEAD AND NECK EXAMINATION:  Extraocular muscles intact.  Pupils equally round and

reactive to light.  Mucous membranes are moist.  Neck is supple.   There could

not appreciate jugular venous distention (JVD).  Patient is morbidly obese.

CARDIOVASCULAR:  S1, S2.  2+ edema of the bilateral lower extremities.

RESPIRATORY:  Mildly decreased breath sounds at the bases bilaterally.

Otherwise, no active rales or rhonchi.

ABDOMEN:  Morbidly obese.  A lot of abdominal wall edema in her abdominal panus.

 

MUSCULOSKELETAL:  She has erythema, tenderness and edema of the bilateral lower

extremities.  Some blisters of the lower extremities from edema and there is some

fluid oozing from the blisters.

CENTRAL NERVOUS SYSTEM:  No focal deficits.  Patient is feeling very weak.

Otherwise, she is able to communicate and she moves upper extremities.

 

LABORATORY REVIEW:

Complete blood count (CBC) showed a WBC of 15, hemoglobin 8.9, platelets of 189.

 

INR is 1.8.

 

Basic metabolic panel (BMP) showed sodium 136, potassium 4.9, chloride 109,

bicarbonate 21, BUN 43, creatinine 2 (it was 2.2 yesterday), calcium 8.3.

 

AST 81, ALT 98, which are improving, albumin 2.5.

 

MICROBIOLOGY:

Blood cultures, one out of two from 12/26/2018, is growing Staphylococcus

capitus.

 

CURRENT INPATIENT MEDICATIONS:

Patient's medications were all reviewed by me.  She continues to be on

intravenous (IV) Zosyn and IV vancomycin.  She is also tolerating Lasix 40 mg IV

twice a day.

 

ASSESSMENT AND PLAN:

1.  Acute renal failure superimposed on chronic kidney disease stage III.  Acute

renal failure was secondary to shock and hypotension.  Patient's renal function

is improving.  She is tolerating the Lasix.  Continue to monitor daily intake and

output.  Creatinine is down to 2 today.

 

2.  Status post shock.  Probably, it was a combination of septic and hemorrhagic

shock.  She was given packed red blood cells transfusions and aggressive IV fluid

hydration.  She is out of shock now.  Blood pressures are acceptable now.  She is

tolerating the low dose of diuretics because of anasarca.  Continue Zosyn and

vancomycin for now.  Patient has Staphylococcus capitus bacteremia, which is

sensitive to the current antibiotics.

 

3.  Acute blood loss anemia.  Patient's hemoglobin is 8.9.  She is status post 3

units of packed red blood cells transfusion.  Hemoglobin is better now.

 

4.  Chronic diastolic congestive heart failure.  Patient is significantly volume

overloaded.  Continue current dose of Lasix 40 mg IV twice a day.  Once she is

hemodynamically more stable, I would increase the dose of diuretics.

 

5.  Atrial fibrillation.  Heart rate is controlled.  Anticoagulation is on hold

because of severe anemia.  She cannot tolerate beta-blockers at this point.

 

6.  Cellulitis of the bilateral lower extremities.  Patient most likely has a

combination of chronic bilateral lower extremity edema and cellulitis of the

legs.  Continue diuretics, as mentioned above.  Continue current antibiotics.

Cellulitis is improving.

 

7.  Spinal fracture.  Patient is currently on calcitonin.  Okay to continue

current dose.  Calcium level is within the acceptable range at this point.

## 2018-12-30 NOTE — IPNPDOC
Date Seen


The patient was seen on 12/30/18.





Progress Note


SUBJECTIVE: Patient complains of fatigue and weakness she tells me she was only 

able to roll over in bed yesterday she was not able to get out of bed. She tells

me that her pain is better controlled and she was able to sleep she otherwise 

denies any other complaints or changes in her status





OBJECTIVE


PHYSICAL EXAMINATION:


VITAL SIGNS: Please see below. 


GENERAL: Disheveled super morbidly obese elderly  woman lying on her 

left side she is in no acute distress oriented 3 and speaking in complete 

sentences


HEENT: Difficult to assess for elevation CVP secondary to body habitus she 

appears to have moist mucous membranes cranial nerves grossly intact


CARDIOVASCULAR: Heart sounds are distant secondary to body habitus S1-S2 appears

regular 


RESPIRATORY: Breath sounds are distant secondary to body habitus


ABDOMINAL: Grossly obese. No tenderness to palpation


EXTREMITIES: 1+ pitting edema improved erythema of the bilateral feet





LABORATORY DATA, IMAGING STUDIES, MICROBIOLOGY: Please see below.





Echocardiogram: Patient initially refused reordered at this time





DVT prophylaxis ordered?: Sequentials teds





ASSESSMENT AND PLAN: This is a 68-year-old female who presented with back pain 

weight gain found to be in shock with T12 compression fracture.





PROBLEMS:


1. Shock hemorrhagic versus septic: She did have leukocytosis which was 

improving but is now trending although we have no definite source her feet are 

suspicious for possible soft tissue infection. 1 of 2 blood cultures positive 

which I suspect is more contaminated she has improved on antibiotics we'll 

continue to follow up her cultures at this time I'll discontinue vancomycin. 

Given her acute anemia right flank fluid collection recent fall and 

anticoagulation there was certainly concern for retroperitoneal hematoma as 

well, anticoagulations and antiplatelets have been held. At this time she is 

hemodynamically stable





2. Anemia: Possibly acute on chronic secondary to retroperitoneal bleed we are 

holding her anticoagulation peripheral smear and iron studies are suggestive of 

a combination of iron deficiency and anemia of chronic disease I suspect acute 

blood loss on top of this as well anticoagulation and antiplatelets have been 

held she's been transfused 3 units of PRBCs total we will continue to monitor H

&H closely and has remained stable. If she truly has a retroperitoneal hematoma 

I suspect it will temponade on itself off and will continue with supportive care

would ideally like to keep hemoglobin greater than 8 given her slightly abnormal

troponin upon presentation. She has remained stable





3. Diastolic congestive heart failure: No recent echocardiogram within the last 

year patient refused her initial attempt we will reattempt 1 after some 

counseling. She does appear to be less decompensated after some diuresis 

yesterday. She did have a slightly abnormal troponin which has plateaued I would

not be surprised for it to be somewhat elevated in the setting of demand 

ischemia EKG however does not appear significantly different. Her baseline 

oxygen requirement is 1 L she is currently requiring slightly more.





4. Back pain: Likely secondary to T12 compression fracture also possibly a small

retroperitoneal hematoma either way management is supportive and close 

monitoring. I have started her on low-dose Roxicodone and gabapentin and 

encourage her to be out of bed work with physical therapy now that her blood 

pressure is more stabilized





5. Elevated INR: Resolved, The patient does normally take Pradaxa at home 

however the INR appears to be disproportionately elevated is unclear to me why 

this is the case, perhaps her renal function deteriorated in the setting of 

sepsis or hemorrhage causing her Pradaxa dosed enter a toxic range and further 

elevating her INR. We'll hold her Tylenol and statin as her liver function tests

appear to be trending down today she likely had some acute hepatitis in the 

setting of her hypotension





6. Hyperkalemia: resolved nephrology help appreciated





7. Acute on chronic kidney failure: Was improving however after some diuresis 

yesterday she did have an acute bump in her creatinine it is strange given that 

she did not massively diuresis we will hold her diuretics nephrotoxic 

medications at this time her renal function very closely





8. Super morbid obesity: Complicating care, likely obesity hypoventilation 

syndrome and likely obstructive sleep apnea as well as well as venous stasis





9. COPD: She is on chronic O2 1 L at the present time she is requiring more 

oxygen I suspect related to some pulmonary edema and not secondary to acute 

decompensation of her COPD we'll start her on nebulizer treatments





10. Coronary artery disease: She is asymptomatic at this time she is volume up 

her EKG was unchanged we are holding her aspirin she is not on a beta blocker 

she is stable on a symptom medic at this time





11. Paroxysmal atrial fibrillation: She was hypotensive at admission I will hold

her amiodarone as well as her metoprolol potentially restarting coming days. 

We're also holding her Pradaxa in the setting of acute anemia





12. Diabetes: Sliding scale and fingersticks hold home metformin





13. Hypothyroidism: Continue with Synthroid





Prognosis: Guarded





VS, I&O, 24H, Fishbone


Vital Signs/I&O





Vital Signs








  Date Time  Temp Pulse Resp B/P (MAP) Pulse Ox O2 Delivery O2 Flow Rate FiO2


 


12/30/18 09:12   20     


 


12/30/18 06:17       2.0 


 


12/30/18 06:00 98.2 77  105/83 (90) 90 Nasal Cannula  














I&O- Last 24 Hours up to 6 AM 


 


 12/30/18





 06:00


 


Intake Total 2370 ml


 


Output Total 1300 ml


 


Balance 1070 ml











Laboratory Data


24H LABS


Laboratory Tests 2


12/29/18 19:39: Bedside Glucose (Misc Panel) 153H


12/30/18 05:54: 


Nucleated Red Blood Cells % (auto) 6.6H, Prothrombin Time 19.9H, Prothromb Time 

International Ratio 1.67, Anion Gap 6L, Glomerular Filtration Rate 19.7L, Blood 

Urea Nitrogen 49H, Creatinine 2.58H, Sodium Level 135L, Potassium Level 5.4H, 

Chloride Level 107, Carbon Dioxide Level 22, Calcium Level 8.4L, Aspartate Amino

Transf (AST/SGOT) 57H, Alanine Aminotransferase (ALT/SGPT) 90H, Alkaline 

Phosphatase 117, Total Bilirubin 0.9, Total Protein 6.7, Albumin 2.7L, 

Albumin/Globulin Ratio 0.68L


CBC/BMP


Laboratory Tests


12/30/18 05:54








Red Blood Count 3.33 L, Mean Corpuscular Volume 95.8, Mean Corpuscular 

Hemoglobin 27.3, Mean Corpuscular Hemoglobin Concent 28.5 L, Red Cell 

Distribution Width 17.6 H, Calcium Level 8.4 L, Aspartate Amino Transf 

(AST/SGOT) 57 H, Alanine Aminotransferase (ALT/SGPT) 90 H, Alkaline Phosphatase 

117, Total Bilirubin 0.9, Total Protein 6.7, Albumin 2.7 L


Microbiology





Microbiology


12/27/18 Blood Culture - Preliminary, Resulted


           No Growth after 72 hours. All specime...


12/26/18 Blood Culture - Preliminary, Resulted


           No Growth after 72 hours. All specime...


12/26/18 Blood Culture - Final, Complete


           Staphylococcus Capitis


12/26/18 Urine Culture - Final, Complete











KEREN ARAMBULA MD              Dec 30, 2018 13:32

## 2018-12-31 VITALS — DIASTOLIC BLOOD PRESSURE: 46 MMHG | SYSTOLIC BLOOD PRESSURE: 90 MMHG

## 2018-12-31 VITALS — SYSTOLIC BLOOD PRESSURE: 105 MMHG | DIASTOLIC BLOOD PRESSURE: 52 MMHG

## 2018-12-31 LAB
ALBUMIN SERPL BCG-MCNC: 2.5 GM/DL (ref 3.2–5.2)
ALBUMIN SERPL BCG-MCNC: 2.9 GM/DL (ref 3.2–5.2)
ALT SERPL W P-5'-P-CCNC: 73 U/L (ref 12–78)
ALT SERPL W P-5'-P-CCNC: 77 U/L (ref 12–78)
BASE EXCESS BLDA CALC-SCNC: -8 MMOL/L (ref -2–2)
BILIRUB SERPL-MCNC: 0.9 MG/DL (ref 0.2–1)
BILIRUB SERPL-MCNC: 1.1 MG/DL (ref 0.2–1)
BUN SERPL-MCNC: 53 MG/DL (ref 7–18)
BUN SERPL-MCNC: 58 MG/DL (ref 7–18)
CALCIUM SERPL-MCNC: 8.3 MG/DL (ref 8.8–10.2)
CALCIUM SERPL-MCNC: 8.6 MG/DL (ref 8.8–10.2)
CHLORIDE SERPL-SCNC: 102 MEQ/L (ref 98–107)
CHLORIDE SERPL-SCNC: 105 MEQ/L (ref 98–107)
CO2 BLDA CALC-SCNC: 23.4 MEQ/L (ref 23–31)
CO2 SERPL-SCNC: 22 MEQ/L (ref 21–32)
CO2 SERPL-SCNC: 23 MEQ/L (ref 21–32)
CREAT SERPL-MCNC: 3.06 MG/DL (ref 0.55–1.3)
CREAT SERPL-MCNC: 3.28 MG/DL (ref 0.55–1.3)
CRP SERPL-MCNC: 3.65 MG/DL (ref 0–0.3)
GFR SERPL CREATININE-BSD FRML MDRD: 14.9 ML/MIN/{1.73_M2} (ref 45–?)
GFR SERPL CREATININE-BSD FRML MDRD: 16.1 ML/MIN/{1.73_M2} (ref 45–?)
GLUCOSE SERPL-MCNC: 130 MG/DL (ref 70–100)
GLUCOSE SERPL-MCNC: 141 MG/DL (ref 70–100)
HCO3 BLDA-SCNC: 21.4 MEQ/L (ref 22–26)
HCO3 STD BLDA-SCNC: 17.9 MEQ/L (ref 22–26)
HCT VFR BLD AUTO: 30.6 % (ref 36–47)
HGB BLD-MCNC: 8.5 G/DL (ref 12–15.5)
INR PPP: 1.6
MCH RBC QN AUTO: 27.1 PG (ref 27–33)
MCHC RBC AUTO-ENTMCNC: 27.8 G/DL (ref 32–36.5)
MCV RBC AUTO: 97.5 FL (ref 80–96)
PCO2 BLDA: 65 MMHG (ref 35–45)
PH BLDA: 7.13 UNITS (ref 7.35–7.45)
PLATELET # BLD AUTO: 179 10^3/UL (ref 150–450)
PO2 BLDA: 70 MMHG (ref 75–100)
POTASSIUM SERPL-SCNC: 5.2 MEQ/L (ref 3.5–5.1)
POTASSIUM SERPL-SCNC: 5.5 MEQ/L (ref 3.5–5.1)
PROT SERPL-MCNC: 6.4 GM/DL (ref 6.4–8.2)
PROT SERPL-MCNC: 7.1 GM/DL (ref 6.4–8.2)
PROTHROMBIN TIME: 19.3 SECONDS (ref 12.1–14.4)
RBC # BLD AUTO: 3.14 10^6/UL (ref 4–5.4)
SAO2 % BLDA: 90.7 % (ref 95–99)
SODIUM SERPL-SCNC: 133 MEQ/L (ref 136–145)
SODIUM SERPL-SCNC: 134 MEQ/L (ref 136–145)
WBC # BLD AUTO: 13 10^3/UL (ref 4–10)

## 2018-12-31 RX ADMIN — IPRATROPIUM BROMIDE AND ALBUTEROL SULFATE SCH ML: .5; 3 SOLUTION RESPIRATORY (INHALATION) at 07:37

## 2018-12-31 RX ADMIN — IPRATROPIUM BROMIDE AND ALBUTEROL SULFATE SCH ML: .5; 3 SOLUTION RESPIRATORY (INHALATION) at 21:46

## 2018-12-31 RX ADMIN — IPRATROPIUM BROMIDE AND ALBUTEROL SULFATE SCH ML: .5; 3 SOLUTION RESPIRATORY (INHALATION) at 02:10

## 2018-12-31 RX ADMIN — LORATADINE SCH MG: 10 TABLET ORAL at 08:55

## 2018-12-31 RX ADMIN — Medication SCH MG: at 08:55

## 2018-12-31 RX ADMIN — LEVOTHYROXINE SODIUM SCH MCG: 75 TABLET ORAL at 21:46

## 2018-12-31 RX ADMIN — NYSTATIN SCH DOSE: 100000 POWDER TOPICAL at 21:00

## 2018-12-31 RX ADMIN — CAPSAICIN SCH DOSE: 0.25 CREAM TOPICAL at 08:56

## 2018-12-31 RX ADMIN — NYSTATIN SCH DOSE: 100000 POWDER TOPICAL at 08:56

## 2018-12-31 RX ADMIN — IPRATROPIUM BROMIDE AND ALBUTEROL SULFATE SCH ML: .5; 3 SOLUTION RESPIRATORY (INHALATION) at 20:00

## 2018-12-31 RX ADMIN — DEXTROSE MONOHYDRATE SCH MLS/HR: 5 INJECTION INTRAVENOUS at 02:10

## 2018-12-31 RX ADMIN — GABAPENTIN SCH MG: 100 CAPSULE ORAL at 16:00

## 2018-12-31 RX ADMIN — DEXTROSE MONOHYDRATE SCH MLS/HR: 5 INJECTION INTRAVENOUS at 21:45

## 2018-12-31 RX ADMIN — GABAPENTIN SCH MG: 100 CAPSULE ORAL at 21:00

## 2018-12-31 RX ADMIN — LEVOTHYROXINE SODIUM SCH MCG: 75 TABLET ORAL at 05:19

## 2018-12-31 RX ADMIN — IPRATROPIUM BROMIDE AND ALBUTEROL SULFATE SCH ML: .5; 3 SOLUTION RESPIRATORY (INHALATION) at 14:13

## 2018-12-31 RX ADMIN — GABAPENTIN SCH MG: 100 CAPSULE ORAL at 08:55

## 2018-12-31 RX ADMIN — CAPSAICIN SCH DOSE: 0.25 CREAM TOPICAL at 21:00

## 2018-12-31 RX ADMIN — CAPSAICIN SCH DOSE: 0.25 CREAM TOPICAL at 16:00

## 2018-12-31 RX ADMIN — DEXTROSE MONOHYDRATE SCH MLS/HR: 5 INJECTION INTRAVENOUS at 18:55

## 2018-12-31 RX ADMIN — DEXTROSE MONOHYDRATE SCH MLS/HR: 5 INJECTION INTRAVENOUS at 10:16

## 2018-12-31 NOTE — IPNPDOC
Date Seen


The patient was seen on 12/31/18.





Progress Note


SUBJECTIVE: Patient complains of fatigue and weakness she tells me that her pain

is better controlled but then she cries and tells me that it's too painful for 

her to get out of bed and that she does not want to get out of bed today. She 

denies any change in her status how she feels from yesterday





OBJECTIVE


PHYSICAL EXAMINATION:


VITAL SIGNS: Please see below. 


GENERAL: Disheveled super morbidly obese elderly  woman lying on her 

left side once again she is in no acute distress she is emotionally labile


HEENT: Difficult to assess for elevation CVP secondary to body habitus she travis

ears to have moist mucous membranes cranial nerves grossly intact


CARDIOVASCULAR: Heart sounds are distant secondary to body habitus S1-S2 appears

regular 


RESPIRATORY: Breath sounds are distant secondary to body habitus


ABDOMINAL: Grossly obese. No tenderness to palpation


EXTREMITIES: 1+ pitting edema improved erythema of the bilateral feet





LABORATORY DATA, IMAGING STUDIES, MICROBIOLOGY: Please see below.





Echocardiogram: Patient initially refused reordered at this time





DVT prophylaxis ordered?: Sequentials teds





ASSESSMENT AND PLAN: This is a 68-year-old female who presented with back pain 

weight gain found to be in shock with T12 compression fracture.





PROBLEMS:


1. Shock hemorrhagic versus septic: She did have leukocytosis which is 

improving, we have no definite source her feet were suspicious for possible soft

tissue infection. 1 of 2 blood cultures positive which I suspect is more 

contaminated she has improved on antibiotics. Given her acute anemia right flank

fluid collection recent fall and anticoagulation there was certainly concern for

retroperitoneal hematoma as well, anticoagulations and antiplatelets have been 

held. At this time she is hemodynamically stable





2. Anemia: Possibly acute on chronic secondary to retroperitoneal bleed we are 

holding her anticoagulation peripheral smear and iron studies are suggestive of 

a combination of iron deficiency and anemia of chronic disease I suspect acute 

blood loss on top of this as well  she's been transfused 3 units of PRBCs total 

we will continue to monitor H&H closely and has remained stable. If she truly 

has a retroperitoneal hematoma I suspect it has temponaded on itself off . 

Ideally like to keep hemoglobin greater than 8 given her slightly abnormal 

troponin upon presentation. She has remained stable





3. Diastolic congestive heart failure: No recent echocardiogram within the last 

year patient refused her initial attempt we will reattempt 1 after some 

counseling. She does appear to be less decompensated after some diuresis. She 

did have a slightly abnormal troponin which has plateaued I would not be surpri

sed for it to be somewhat elevated in the setting of demand ischemia EKG however

does not appear significantly different. Her baseline oxygen requirement is 1 L 

she is currently requiring slightly more.





4. Back pain: Likely secondary to T12 compression fracture also possibly a small

retroperitoneal hematoma either way management is supportive and close 

monitoring. I have started her on low-dose Roxicodone and gabapentin and 

encourage her to be out of bed work with physical therapy now that her blood 

pressure is more stabilized





5. Elevated INR: Resolved, The patient does normally take Pradaxa at home 

however the INR appears to be disproportionately elevated is unclear to me why 

this is the case, perhaps her renal function deteriorated in the setting of 

sepsis or hemorrhage causing her Pradaxa dosed enter a toxic range and further 

elevating her INR. We'll hold her Tylenol and statin as her liver function tests

appear to be trending down today she likely had some acute hepatitis in the 

setting of her hypotension





6. Hyperkalemia: Remains slightly elevated continue to monitor she does have 

some acute kidney injury associated with this





7. Acute on chronic kidney failure: Was improving however after some diuresis 

she did have an acute bump in her creatinine it is strange given that she did 

not massively diuresis we will hold her diuretics nephrotoxic medications at 

this time her renal function very closely. Nephrology's help is greatly 

appreciated





8. Super morbid obesity: Complicating care, likely obesity hypoventilation 

syndrome and likely obstructive sleep apnea as well as well as venous stasis





9. COPD: She is on chronic O2 1 L at the present time she is requiring more o

xygen I suspect related to some pulmonary edema and not secondary to acute 

decompensation of her COPD we have started her on nebulizer treatments





10. Coronary artery disease: She is asymptomatic at this time she is volume up 

her EKG was unchanged we are holding her aspirin she is not on a beta blocker 

she is stable on a symptom medic at this time





11. Paroxysmal atrial fibrillation: She was hypotensive at admission I did hold 

her amiodarone as well as her metoprolo,l potentially restarting in coming days.

We're also holding her Pradaxa in the setting of acute anemia





12. Diabetes: Sliding scale and fingersticks hold home metformin





13. Hypothyroidism: Continue with Synthroid





Prognosis: Guarded, the patient refused to get out of bed and participate with 

physical therapy I suspect she'll end up requiring skilled nursing facility





VS, I&O, [facility], [place]


Vital Signs/I&O





Vital Signs








  Date Time  Temp Pulse Resp B/P (MAP) Pulse Ox O2 Delivery O2 Flow Rate FiO2


 


12/31/18 14:00 96.8 70 22 105/52 (69) 93 Nasal Cannula 3.0 














I&O- Last 24 Hours up to 6 AM 


 


 12/31/18





 06:00


 


Intake Total 940 ml


 


Output Total 350 ml


 


Balance 590 ml











Laboratory Data


[facility] LABS


Laboratory Tests 2


12/30/18 16:49: 


Anion Gap 7L, Glomerular Filtration Rate 18.4L, Blood Urea Nitrogen 50H, 

Creatinine 2.73H, Sodium Level 135L, Potassium Level 5.2H, Chloride Level 107, 

Carbon Dioxide Level 21, Calcium Level 8.2L, Aspartate Amino Transf (AST/SGOT) 

45H, Alanine Aminotransferase (ALT/SGPT) 76, Alkaline Phosphatase 105, Total 

Bilirubin 1.0, Total Protein 6.2L, Albumin 2.4L, Albumin/Globulin Ratio 0.63L


12/31/18 05:39: 


Anion Gap 7L, Glomerular Filtration Rate 16.1L, Blood Urea Nitrogen 53H, 

Creatinine 3.06H, Sodium Level 134L, Potassium Level 5.2H, Chloride Level 105, 

Carbon Dioxide Level 22, Calcium Level 8.3L, Aspartate Amino Transf (AST/SGOT) 

40H, Alanine Aminotransferase (ALT/SGPT) 73, Alkaline Phosphatase 106, Total 

Bilirubin 0.9, Total Protein 6.4, Albumin 2.5L, Albumin/Globulin Ratio 0.64L, 

Nucleated Red Blood Cells % (auto) 3.0H, Prothrombin Time 19.3H, Prothromb Time 

International Ratio 1.60, C-Reactive Protein, Quantitative 3.65H


CBC/BMP


Laboratory Tests


12/30/18 16:49








Calcium Level 8.2 L, Aspartate Amino Transf (AST/SGOT) 45 H, Alanine 

Aminotransferase (ALT/SGPT) 76, Alkaline Phosphatase 105, Total Bilirubin 1.0, 

Total Protein 6.2 L, Albumin 2.4 L





12/31/18 05:39








Calcium Level 8.3 L, Aspartate Amino Transf (AST/SGOT) 40 H, Alanine 

Aminotransferase (ALT/SGPT) 73, Alkaline Phosphatase 106, Total Bilirubin 0.9, 

Total Protein 6.4, Albumin 2.5 L, Red Blood Count 3.14 L, Mean Corpuscular 

Volume 97.5 H, Mean Corpuscular Hemoglobin 27.1, Mean Corpuscular Hemoglobin 

Concent 27.8 L, Red Cell Distribution Width 17.5 H


Microbiology





Microbiology


12/27/18 Blood Culture - Preliminary, Resulted


           No Growth after 72 hours. All specime...


12/26/18 Blood Culture - Final, Complete


           NO GROWTH AFTER 5 DAYS


12/26/18 Blood Culture - Final, Complete


           Staphylococcus Capitis


12/30/18 MRSA Screen - Final, Complete


           


12/26/18 Urine Culture - Final, Complete











KEREN ARAMBULA MD              Dec 31, 2018 15:37

## 2018-12-31 NOTE — REP
Clinical:  Hypoxia and low O2 saturations.

 

Findings:

Near complete opacification of the left hemithorax is appreciated the right

hemithorax demonstrates extensive pulmonary vascular congestion with

cephalization and indistinct pulmonary vasculature along with interstitial edema.

Findings are consistent with pulmonary edema including large left pleural

effusion.

 

Impression:

Pulmonary edema as described above.

 

 

Electronically Signed by

Cameron Chang MD 12/31/2018 05:25 P

## 2018-12-31 NOTE — IPN
DATE:  12/30/2018

 

SUBJECTIVE: Patient was seen and examined at the bedside today morning. Patient

is very weak and lethargic today s compared with yesterday. Her blood pressures

have been low normal. There is slight bump in her creatinine from 2 to 2.5 today.

Renal function is worse, and her urine output is also slightly low. Leukocytosis

got worse today. White cell count is up to 19,000. It was 15,000 yesterday.

Patient reports feeling very weak and tired.

 

OBJECTIVE: Vital signs: Temperature is 98.2 degrees Fahrenheit, blood pressure

105/83, pulse 77, respiratory rate of 20, saturating 90% on nasal cannula at 4

liters. Intake and output: Urine output recorded is 1600 mL yesterday, 300 mL so

far today since overnight. Weight in the bed scale is not available.

 

PHYSICAL EXAMINATION:

GENERAL: Patient is awake, alert, oriented times two, very weak, lethargic today.

 

HEAD AND NECK: Patient has facial and periorbital puffiness. Pupils equally round

and reactive to light. Mucous membranes are moist. Neck is supple. I could not

appreciate the jugular venous distention (JVD).

CARDIOVASCULAR: S1, S2. Edema 2+ of the bilateral lower extremities.

RESPIRATORY: Decreased breath sounds at the bases; otherwise no active rales or

rhonchi.

ABDOMEN: Morbidly obese. Abdominal wall edema was noted, especially in the

abdominal pannus, which is hanging on the left side.

MUSCULOSKELETAL: Patient has erythema and mild amount of tenderness in the

bilateral lower extremities and 2+ edema of the bilateral lower extremities,

which is slightly improving today as compared with yesterday.

CENTRAL NERVOUS SYSTEM: Patient is drowsy and sleepy today. She moves upper

extremities to commands.

 

LABORATORY REVIEW:

CBC showed a WBC of 19.1, hemoglobin is 9.1, platelets are 244. INR is 1.67.

 

BMP showed sodium 135, potassium 5.4, chloride 107, bicarbonate 22, BUN 49,

creatinine is 2.5, calcium is 8.4. AST 57, ALT is 90, albumin is 2.7.

 

Microbiology: Repeat blood cultures from December 27 are negative.

 

IMAGING: No new imaging is available.

 

CURRENT INPATIENT MEDICATIONS: Patient's medications were all reviewed by me.

Intravenous (IV) vancomycin has been stopped. She continues to be on Zosyn 2.25

gram IV every 8 hours. Calcitonin has been stopped. I have also stopped her Lasix

40 mg twice a day, which she was receiving yesterday. No other change in the

medications today as compared with yesterday.

 

ASSESSMENT AND PLAN:

1.  Acute renal failure superimposed on chronic kidney disease, stage III.

Patient's renal function initially was secondary to shock and hypotension;

however, she had recovered after getting blood and fluid resuscitation in the

intensive care unit (ICU). She was actually on gentle IV diuresis. Today she is

hypotensive, and her renal function is worse. Her diuretics have been stopped.

 

2. Status post shock, which was a combination of hemorrhagic and septic shock.

One out of two blood cultures were growing Staphylococcus capitis. She continues

to be on Zosyn. Vancomycin has been stopped by the primary team. Hemoglobin level

is within the acceptable range. Patient again has soft blood pressures. If

needed, she can be restarted on low dose of Levophed. Diuretics have been

stopped.

 

3. Chronic diastolic congestive heart failure. Patient is significantly volume

overloaded; however, because of worsening renal failure and hypotension,

diuretics have been stopped.

 

4. Atrial fibrillation. She is not a candidate for anticoagulation at this time,

and she cannot receive any beta blockers at this time; however, her pulse rate is

within the acceptable limits.

 

5. Cellulitis of bilateral lower extremities. Patient is currently on IV

vancomycin and Zosyn. Vancomycin was therapeutic yesterday; however, it has been

stopped today.

 

6. Hyperkalemia. Hyperkalemia has returned because of worsening renal failure.

Patient can get Veltassa 16.4 grams by mouth as needed for potassium more than

5.5.

## 2019-01-01 NOTE — IPN
DATE:  12/31/2018

 

Mrs. Raphael is seen this morning on her bedside.  She is quite uncomfortable in

the bed and reports pain everywhere.  Her blood pressure has been somewhat low,

however, it is checked in the forearm.  She is currently on 4 liters of oxygen

due to hypoxemia.  She has no fever or chills.

 

On physical examination, she is a morbidly obese lady laying in the bed on her

left side.

Temperature is 97.4 degrees Fahrenheit, heart rate 75 per minute and respiratory

rate 18 per minute.  Blood pressure 90/46 mmHg and oxygen saturation 90% on 4

liters of oxygen.  Intake and output records from yesterday showed total intake

900 and output 600 ml.  Today her urine output seems to be quite low.

Her head is atraumatic.

Neck veins are extremely difficult to be assessed due to laying on the side and

morbid obesity.

Her heart sounds are distant and lungs have poor inspiratory effort with

diminished breath sounds bilaterally.

Abdomen is obese and nontender.

Extremities have chronic stasis changes and no cyanosis or clubbing.  Her lower

extremity edema is at least 2+.

Neurologically, she is awake and able to answer questions.  She can move all her

extremities.  She is in quite a bit of pain when she moves.

 

Today's labs show WBC count 13.0, hemoglobin 8.5 and hematocrit 30.6.  Platelets

179.  Sodium 134, potassium 5.2, CO2 22, BUN 53 and creatinine 3.06.  Her last

blood gas is from December 26th when pH was 7.30, pCO2 36, CO2 86 and bicarb

17.5.

 

PROBLEMS:

1.  Acute renal failure superimposed on chronic kidney disease.  She has slight

worsening of kidney function over the last couple of days.  The patient has

baseline advanced chronic kidney disease and now her kidney function is worsened

most likely related to sepsis.  She is not a suitable candidate for dialysis due

to her hypotension and multiple comorbid conditions.  I think any attempt to

dialyze her will be futile and she is likely to crash and die within a short

period of attempting dialysis.

2.  Acute on chronic congestive heart failure.  There is long history of chronic

congestive heart failure and now her volume status is worsening due to acute

renal failure and hypotension.  We can try to diurese her with intravenous Lasix;

however, I am not very optimistic about the response to diuretics due to low

blood pressure.  She is not a suitable candidate for dialysis due to hypotension

and multiple comorbid conditions.

3.  Sepsis.  Her leukocytosis has improved slightly; however, blood pressure

remains low and now she has decreased urine output which is again an indication

of sepsis.  She has been treated with antibiotics and remains on Zosyn, though

source of sepsis is still not clear.  It is likely intra-abdominal.  She is not a

surgical candidate and even doing a CAT scan on her will be quite difficult.  She

is not a candidate for IV contrast.

 

In view of multiple comorbid conditions and multiorgan failure, her prognosis

remains extremely poor.  She is a suitable candidate for comfort measures and

hospice care.

## 2020-09-23 NOTE — IPN
DATE:   12/22/2016

 

SUBJECTIVE:   Ms. Raphael is a 66-year-old  female who was seen and

examined at the bedside.  The patient denies chest pain; however, the patient 
has

orthopnea and paroxysmal nocturnal dyspnea.  The patient denies nausea and

vomiting.  The patient, at this time, is on 4 liters nasal cannula.  The patient

expressed that she has lower extremity pain bilaterally.

 

OBJECTIVE:

VITAL SIGNS:  Temperature 97.4, pulse 79, respiratory rate 18, blood pressure

118/57, pulse oximetry 97% on 4 liters nasal cannula.

GENERAL APPEARANCE:   The patient was sitting in a chair.  No acute distress.

The patient was alert and oriented to time, place and person.

HEENT:  Normocephalic, atraumatic.

HEART:  Irregular.  Normal S1, S2.

LUNGS:  The patient has clear breath sounds bilaterally.  Good air movement.

ABDOMEN:  Morbidly obese, soft, nontender to palpation.  Positive bowel sounds 
in

all quadrants.

EXTREMITIES:  The patient has lower extremity edema bilaterally.  +2 pulses in

both lower extremities; however, the patient has tenderness to palpation below

the knee, especially in the erythematous area.  The patient has bilateral lower

extremity edema.

 

LABORATORY DATA:

White blood cells 12.5, red blood cells 3.02, hemoglobin 9, hematocrit 30.2, MCV

100.1, MCH 29.8, MCHC 29.8, RDW 22.6, platelet count 296, neutrophil percentage

84.6, lymphocyte percentage 6.2, monocyte percentage 5.5, eosinophil percentage

1.5, basophil percentage 0.4, leukocyte percentage 1.6.

 

Sodium 138, potassium 3.8, chloride 98, carbon dioxide 35, anion gap 5, BUN 38,

creatinine 1.23, GFR 46.5, fasting glucose 130, calcium 8.5, total bilirubin 0.7
,

AST 24, ALT 17, alkaline phosphatase 72, total protein 7.7, albumin 2.9.

 

Urine culture negative.

 

Blood culture came back negative.

 

ASSESSMENT AND PLAN:

1.  Vaginal candidiasis.  The patient complained about having vaginal pruritus.

The patient has been having vaginal candidiasis in the past.  We gave one dose 
of

Diflucan 150 mg by mouth.  Although the patient has finding of liver cirrhosis;

however, liver functions are normal at this time.  We will continue monitoring

the patient's liver function again tomorrow.

 

2.  Abnormal urinalysis, which was performed 2 days ago and was indicative of

urinary tract infection (UTI); however, urine culture was negative.  At this

time, the patient is on cefepime.  We will continue to monitor the patient for

any abnormal symptoms.

 

3.  Dyspnea.  At this time, the patient is on 4 liters nasal cannula.  Her

baseline is around 3 liters.  This is multifactorial, which includes congestive

heart failure (CHF), as well as obesity and history of community acquired

pneumonia.  The patient is on diuretic medications.  The patient has been 
started

on torsemide 100 mg twice a day by mouth yesterday by nephrology group and Lasix

has been stopped.  However, we will continue the patient on spironolactone 25 mg

twice a day by mouth.

 

4.  Cellulitis.  The patient continues to have lower extremity erythema.  At 
this

time, the patient is on cefepime.  Due to having continuation of the pain and 
the

patient not being mobile and due to high risk of deep vein thrombosis (DVT), I

have ordered lower extremity ultrasound, result is pending at this time.

 

5.  Anasarca.  This is probably secondary to diastolic congestive heart failure

(CHF).  We will continue the patient on diuretics.  Liver function is normal.

 

6.  Acute on chronic diastolic congestive heart failure (CHF).  The patient is 
on

diuretics (torsemide 100 mg twice a day by mouth, as well as spironolactone).  
We

will continue to monitor the patient's input and output.

 

7.  Acute on chronic respiratory failure with hypoxia.  This is multifactorial

secondary to exacerbation of the congestive heart failure (CHF), as well as

history of community acquired pneumonia.  We will continue the patient on nasal

cannula.  However, the patient, at this time, is not at her baseline.

 

8.  Community acquired pneumonia.  The patient has finished a course of

ceftriaxone.

 

9.  Constipation.  The patient is on bowel regimen.

 

10.  Obstructive sleep apnea.  This is a chronic issue.

 

11.  Morbid obesity.  This is a chronic issue.

 

12.  Diabetes.  We will continue the patient on sliding scale, as well as

consistent carbohydrate diet.

 

13.  Hypertension.  The patient is stable.  We will continue the patient on

diuretics, as well as beta blocker.

 

14.  Chronic obstructive pulmonary disease (COPD).  The patient is on breathing

treatments, as well as oxygen nasal cannula.

 

15.  Coronary artery disease.  The patient is on aspirin, statin, beta blocker,

and Pradaxa.

 

16.  Hyperlipidemia.  The patient is on a statin.

 

17.  Mitral stenosis.  This is a chronic issue.

 

18.  Pulmonary hypertension.  The patient is on torsemide, as well as

spironolactone.

 

19.  Atrial fibrillation.  The patient is on Pradaxa, aspirin, as well as beta

blocker (Lopressor 12.5 mg twice a day by mouth).

 

20.  Chronic anemia. The patient received 2 units of red blood cells 2 days ago.

At this time, the patient's hemoglobin and hematocrit are stable.patient also 
started on Aranesp by nephrology group.

 

21.  Allergies.  The patient is on loratadine.

 

22.  Hematuria.  The patient did not report any hematuria.  The patient's

hemoglobin and hematocrit are stable.

 

23.  Deep vein thrombosis (DVT) prophylaxis.  The patient is on Pradaxa.

 

 

My preceptor for this patient encounter was Dr. Shanks.  The preceptor was

physically present in the building during the encounter and was fully available.

As needed, all aspects of the patient interview, examination, medical decision

making process, and medical care plan development were reviewed and approved by

the preceptor.  The preceptor is aware and concurs with the plan as stated in 
the

body of this note and will attest to such by his/her cosignature.



I, Tamara Shanks, have both independently examined this patient as well as 
reviewed the documentation.  I have discussed in detail with the resident the 
findings and plan of treatment as documented in the residents documentation. I 
will continue to follow the patient and offer further guidance to the patients 
care as necessary during this hospital stay.
GRISELDA Statement Selected

## 2022-08-05 NOTE — IPNPDOC
Date Seen


The patient was seen on 12/15/17.





Progress Note


SUBJECTIVE:  Patient was seen and examined at the bedside this morning.  She is 

laying in bed on her left side.  She feels as though her abdominal edema is 

improving.  She had just finished with occupational therapy and states that it 

is going well.  She has yet to see physical therapy.  Urine output was 2300mL 

overnight with slight worsening of her renal function.


 


REVIEW OF SYSTEMS:  Patient denies any fever, chills, rigors.  She denies any 

headache, chest pain, shortness of breath.  Patient reports her abdominal wall 

edema is improving.  She reported leg edema is significantly better without 

cramps, numbness, or tingling in upper or lower extremities.  Rest of review of 

systems is negative.


 


PHYSICAL EXAMINATION:


GENERAL:  Patient is awake, alert, oriented times three, laying in bed on her 

left side, morbidly obese.


HEAD AND NECK EXAMINATION:  Extraocular muscles intact.  Pupils equally round 

and reactive to light.  Mucous membranes are moist.  Neck is supple.  I could 

not appreciate the jugular venous distention (JVD) because of body habitus.


CARDIOVASCULAR:  S1, S2, irregularly irregular heart rate.  She has trace edema 

of the right side of her abdomen; very mild trace edema appreciated on the 

bilateral lower extremities.


RESPIRATORY:  Chest is clear to auscultation bilaterally.  Bilateral equal air 

entry.  No rales or rhonchi.


ABDOMEN:  Soft.  Obese.  Positive bowel sounds.  Improving edema of the 

abdominal wall and pannus.  I could not appreciate any organomegaly.


MUSCULOSKELETAL:  No clubbing or cyanosis.  Very mild trace edema of the 

bilateral lower extremities.


CENTRAL NERVOUS SYSTEM (CNS):  No focal neurological deficit.  Power is 5/5 in 

all extremities.


PSYCHIATRIC:  Normal mood and affect.


 


LABORATORY REVIEW:  See below.


 


CURRENT INPATIENT MEDICATIONS:  Patient's medications were all reviewed by 

myself and my attending. Have decreased the dose of patient's Lasix to 60mg IV 

every 12 hours.  Reduced the dose of potassium to 40mEq daily.


 


ASSESSMENT:  67-year-old female with past medical history of chronic kidney 

disease stage III, chronic diastolic congestive heart failure, paroxysmal 

atrial fibrillation, and morbid obesity, admitted this time because of 

decompensated diastolic congestive heart failure secondary to noncompliance 

with her diuretic regimen.


 


PLAN:


1.  Decompensated diastolic congestive heart failure:  Decreased Lasix to 60mg 

IV every 12 hours.  Continue with Amiloride 10mg orally daily.


 


2.  Chronic kidney disease stage III:  Creatinine is 1.64.  Urine output 2300mL 

overnight.  Ordered bladder scan.  Reduced Lasix to 60mg IV every 12 hours.  

Have ordered accurate weights to be obtained on patient.  Current recorded 

weight today is 143.2kg.  Prior recorded daily weight was 153kg.  This may not 

be an accurate reflection of patient's current weight.  Would be optimal to 

weigh patient on a chair/standing scale.


 


3.  Hypokalemia:  Normalized.  Reduced dose to 40mEq daily.


 


4.  Paroxysmal atrial fibrillation:  Heart rate is controlled at this time. 

Continue current dose of metoprolol 12.5 mg by mouth twice a day, amiodarone 

200 mg daily, and Pradaxa 150 mg by mouth twice a day.


 


5.  Metabolic alkalosis:  Bicarbonate level is 31.  Decreased Lasix to 60mg IV 

every 12 hours.  Continue with Amiloride.  Could consider restarting 

Acetazolamie if bicarbonate continues to increase.


 


6.  Morbid obesity and deconditioning:  Continues to make progress with PT and 

OT; however, desaturates with activity, but able to recover within minutes.  

Continue with rehabilitation at this time.





DISPOSITION:  Bladder scan.  Monitor urine output.





Attending Addendum:





Pt was seen and examined during rounds with the resident.


I agree with assessment and plan.


Diuretic dose being decreased.





VS, I&O, 24H, Fishbone


Vital Signs/I&O





Vital Signs








  Date Time  Temp Pulse Resp B/P (MAP) Pulse Ox O2 Delivery O2 Flow Rate FiO2


 


12/15/17 11:24   20   Nasal Cannula  


 


12/15/17 08:28  97  101/63    


 


12/15/17 06:00 96.4    99  2.0 














I&O- Last 24 Hours up to 6 AM 


 


 12/16/17





 06:00


 


Intake Total 180 ml


 


Output Total 650 ml


 


Balance -470 ml











Laboratory Data


24H LABS


Laboratory Tests 2


12/14/17 16:42: Bedside Glucose (Misc Panel) 131H


12/14/17 21:49: Bedside Glucose (Misc Panel) 162H


12/15/17 08:25: Bedside Glucose (Misc Panel) 176H


12/15/17 08:41: 


Nucleated Red Blood Cells % (auto) 0.0, Anion Gap 10, Glomerular Filtration 

Rate 33.3L, Blood Urea Nitrogen 45H, Creatinine 1.64H, Sodium Level 136, 

Potassium Level 3.9, Chloride Level 95L, Carbon Dioxide Level 31, Calcium Level 

9.1


12/15/17 11:47: Bedside Glucose (Misc Panel) 138H


CBC/BMP


Laboratory Tests


12/15/17 08:41








Red Blood Count 4.24, Mean Corpuscular Volume 95.3, Mean Corpuscular Hemoglobin 

30.4, Mean Corpuscular Hemoglobin Concent 31.9 L, Red Cell Distribution Width 

15.7 H, Calcium Level 9.1











RICHARD BENTLEY DO Dec 15, 2017 13:26


PATTI AMBROCIO MD Dec 16, 2017 15:36 no